# Patient Record
Sex: MALE | Race: WHITE | URBAN - METROPOLITAN AREA
[De-identification: names, ages, dates, MRNs, and addresses within clinical notes are randomized per-mention and may not be internally consistent; named-entity substitution may affect disease eponyms.]

---

## 2017-02-22 ENCOUNTER — RADIANT APPOINTMENT (OUTPATIENT)
Dept: GENERAL RADIOLOGY | Facility: CLINIC | Age: 52
End: 2017-02-22
Attending: PHYSICIAN ASSISTANT
Payer: COMMERCIAL

## 2017-02-22 ENCOUNTER — OFFICE VISIT (OUTPATIENT)
Dept: INTERNAL MEDICINE | Facility: CLINIC | Age: 52
End: 2017-02-22
Payer: COMMERCIAL

## 2017-02-22 VITALS
OXYGEN SATURATION: 98 % | HEART RATE: 60 BPM | SYSTOLIC BLOOD PRESSURE: 116 MMHG | TEMPERATURE: 98.8 F | DIASTOLIC BLOOD PRESSURE: 82 MMHG | WEIGHT: 197.8 LBS

## 2017-02-22 DIAGNOSIS — R05.9 COUGH: ICD-10-CM

## 2017-02-22 DIAGNOSIS — R05.8 POST-VIRAL COUGH SYNDROME: Primary | ICD-10-CM

## 2017-02-22 DIAGNOSIS — G93.31 POST VIRAL SYNDROME: ICD-10-CM

## 2017-02-22 PROCEDURE — 71020 XR CHEST 2 VW: CPT

## 2017-02-22 PROCEDURE — 99203 OFFICE O/P NEW LOW 30 MIN: CPT | Performed by: PHYSICIAN ASSISTANT

## 2017-02-22 RX ORDER — ALBUTEROL SULFATE 90 UG/1
2 AEROSOL, METERED RESPIRATORY (INHALATION) EVERY 6 HOURS PRN
Qty: 1 INHALER | Refills: 0 | Status: SHIPPED | OUTPATIENT
Start: 2017-02-22 | End: 2017-06-07

## 2017-02-22 RX ORDER — PREDNISONE 20 MG/1
20 TABLET ORAL DAILY
Qty: 5 TABLET | Refills: 0 | Status: SHIPPED | OUTPATIENT
Start: 2017-02-22 | End: 2017-02-27

## 2017-02-22 NOTE — MR AVS SNAPSHOT
"              After Visit Summary   2017    Kwaku Medrano    MRN: [id number]           Patient Information     Date Of Birth          1965        Visit Information        Provider Department      2017 10:00 AM Leanne Centeno PA-C [facility]        Today's Diagnoses     Post-viral cough syndrome    -  1    Post viral syndrome        Cough           Follow-ups after your visit        Who to contact     If you have questions or need follow up information about today's clinic visit or your schedule please contact [facility] directly at [phone].  Normal or non-critical lab and imaging results will be communicated to you by Rebiotixhart, letter or phone within 4 business days after the clinic has received the results. If you do not hear from us within 7 days, please contact the clinic through Rebiotixhart or phone. If you have a critical or abnormal lab result, we will notify you by phone as soon as possible.  Submit refill requests through [employer] or call your pharmacy and they will forward the refill request to us. Please allow 3 business days for your refill to be completed.          Additional Information About Your Visit        MyChart Information     [employer] lets you send messages to your doctor, view your test results, renew your prescriptions, schedule appointments and more. To sign up, go to [email or web address] . Click on \"Log in\" on the left side of the screen, which will take you to the Welcome page. Then click on \"Sign up Now\" on the right side of the page.     You will be asked to enter the access code listed below, as well as some personal information. Please follow the directions to create your username and password.     Your access code is: [email or web address]  Expires: 2017 10:31 AM     Your access code will  in 90 days. If you need help or a new code, please call your [facility] or [phone].        Care EveryWhere ID  "    This is your Care EveryWhere ID. This could be used by other organizations to access your [facility] medical records  [id number]        Your Vitals Were     Pulse Temperature Pulse Oximetry             60 98.8  F (37.1  C) (Oral) 98%          Blood Pressure from Last 3 Encounters:   02/22/17 116/82   04/18/13 157/75   01/17/06 130/80    Weight from Last 3 Encounters:   02/22/17 197 lb 12.8 oz (89.7 kg)   04/18/13 190 lb (86.2 kg)   05/31/11 180 lb (81.6 kg)              We Performed the Following     XR Chest 2 Views          Today's Medication Changes          These changes are accurate as of: 2/22/17 10:32 AM.  If you have any questions, ask your nurse or doctor.               Start taking these medicines.        Dose/Directions    albuterol 108 (90 BASE) MCG/ACT Inhaler   Commonly known as:  PROAIR HFA/PROVENTIL HFA/VENTOLIN HFA   Used for:  Post-viral cough syndrome, Post viral syndrome   Started by:  Leanne Centeno PA-C        Dose:  2 puff   Inhale 2 puffs into the lungs every 6 hours as needed (cough)   Quantity:  1 Inhaler   Refills:  0       predniSONE 20 MG tablet   Commonly known as:  DELTASONE   Used for:  Post-viral cough syndrome   Started by:  Leanne Centeno PA-C        Dose:  20 mg   Take 1 tablet (20 mg) by mouth daily for 5 days   Quantity:  5 tablet   Refills:  0            Where to get your medicines      These medications were sent to [facility] [id number] [place] [address] [address] AT [facility] [address], [facility] [address]    Hours:  24-hours Phone:  [phone]     albuterol 108 (90 BASE) MCG/ACT Inhaler    predniSONE 20 MG tablet                Primary Care Provider    None Specified       No primary provider on file.        Thank you!     Thank you for choosing [facility]  for your care. Our goal is always to provide you with excellent care. Hearing back from our patients is one way we can continue to  improve our services. Please take a few minutes to complete the written survey that you may receive in the mail after your visit with us. Thank you!             Your Updated Medication List - Protect others around you: Learn how to safely use, store and throw away your medicines at www.disposemymeds.org.          This list is accurate as of: 2/22/17 10:32 AM.  Always use your most recent med list.                   Brand Name Dispense Instructions for use    albuterol 108 (90 BASE) MCG/ACT Inhaler    PROAIR HFA/PROVENTIL HFA/VENTOLIN HFA    1 Inhaler    Inhale 2 puffs into the lungs every 6 hours as needed (cough)       predniSONE 20 MG tablet    DELTASONE    5 tablet    Take 1 tablet (20 mg) by mouth daily for 5 days

## 2017-02-22 NOTE — PROGRESS NOTES
SUBJECTIVE:                                                    Kwaku Medrano is a 51 year old male who presents to clinic today for the following health issues:      Concern - Cough     Onset: x3/4 months    Description:   Dry Cough    Intensity: mild    Progression of Symptoms:  same    Accompanying Signs & Symptoms:  Works in a [employer]house  Some runny nose intermittently  Yesterday with a deep breath had pain in the anterior chest.  Today more like it has been with deep breathing or movement then it triggers a dry cough.   Sleep at night normal no coughing.      Previous history of similar problem:   none    Precipitating factors:   Worsened by: Movement- with      Alleviating factors:  Improved by: Sitting still        Therapies Tried and outcome:   Had a cold at the beginning of this - started with coughing- and congestion.     Does not have a PCP      -------------------------------------    Problem list and histories reviewed & adjusted, as indicated.  Additional history: as documented    Labs reviewed in EPIC  Problem list, Medication list, Allergies, and Medical/Social/Surgical histories reviewed in [facility] and updated as appropriate.    ROS:  Constitutional, HEENT, cardiovascular, pulmonary, gi systems are negative, except as otherwise noted.    OBJECTIVE:                                                    /82 (BP Location: Left arm, Patient Position: Chair, Cuff Size: Adult Regular)  Pulse 60  Temp 98.8  F (37.1  C) (Oral)  Wt 197 lb 12.8 oz (89.7 kg)  SpO2 98%  There is no height or weight on file to calculate BMI.  GENERAL: healthy, alert and no distress  HENT: ear canals and TM's normal, nose and mouth without ulcers or lesions  NECK: no adenopathy, no asymmetry, masses, or scars and thyroid normal to palpation  RESP: lungs clear to auscultation - no rales, rhonchi or wheezes  CV: regular rate and rhythm, normal S1 S2, no S3 or S4, no murmur, click or rub, no peripheral edema and peripheral pulses  strong  MS: no gross musculoskeletal defects noted, no edema    Diagnostic Test Results:  Results for orders placed or performed in visit on 02/22/17 (from the past 24 hour(s))   XR Chest 2 Views    Narrative    CHEST TWO VIEWS   2/22/2017 10:22 AM     HISTORY: Cough.    COMPARISON: None.    FINDINGS: Heart size is normal. Lungs are clear.      Impression    IMPRESSION: Negative.    CALOS WESTON MD        ASSESSMENT/PLAN:                                                            1. Post-viral cough syndrome    - predniSONE (DELTASONE) 20 MG tablet; Take 1 tablet (20 mg) by mouth daily for 5 days  Dispense: 5 tablet; Refill: 0  - albuterol (PROAIR HFA/PROVENTIL HFA/VENTOLIN HFA) 108 (90 BASE) MCG/ACT Inhaler; Inhale 2 puffs into the lungs every 6 hours as needed (cough)  Dispense: 1 Inhaler; Refill: 0    2. Post viral syndrome    - albuterol (PROAIR HFA/PROVENTIL HFA/VENTOLIN HFA) 108 (90 BASE) MCG/ACT Inhaler; Inhale 2 puffs into the lungs every 6 hours as needed (cough)  Dispense: 1 Inhaler; Refill: 0    3. Cough    - XR Chest 2 Views    See Patient Instructions  Needs to establish with PCP - overdue for health maintenance    Leanne Centeno PA-C  [facility]

## 2017-02-22 NOTE — NURSING NOTE
Chief Complaint   Patient presents with     Cough     x3/4 Months        Initial /82 (BP Location: Left arm, Patient Position: Chair, Cuff Size: Adult Regular)  Pulse 60  Temp 98.8  F (37.1  C) (Oral)  Wt 197 lb 12.8 oz (89.7 kg)  SpO2 98% There is no height or weight on file to calculate BMI.  Medication Reconciliation: complete

## 2017-03-10 ENCOUNTER — RADIANT APPOINTMENT (OUTPATIENT)
Dept: GENERAL RADIOLOGY | Facility: CLINIC | Age: 52
End: 2017-03-10
Attending: FAMILY MEDICINE
Payer: COMMERCIAL

## 2017-03-10 ENCOUNTER — OFFICE VISIT (OUTPATIENT)
Dept: URGENT CARE | Facility: URGENT CARE | Age: 52
End: 2017-03-10
Payer: COMMERCIAL

## 2017-03-10 VITALS
HEART RATE: 55 BPM | TEMPERATURE: 97.9 F | OXYGEN SATURATION: 94 % | SYSTOLIC BLOOD PRESSURE: 128 MMHG | WEIGHT: 194 LBS | DIASTOLIC BLOOD PRESSURE: 70 MMHG

## 2017-03-10 DIAGNOSIS — M79.89 BILATERAL HAND SWELLING: ICD-10-CM

## 2017-03-10 DIAGNOSIS — R06.09 EXERTIONAL DYSPNEA: ICD-10-CM

## 2017-03-10 DIAGNOSIS — R05.9 COUGH: ICD-10-CM

## 2017-03-10 DIAGNOSIS — R05.9 COUGH: Primary | ICD-10-CM

## 2017-03-10 LAB
ALBUMIN SERPL-MCNC: 3.5 G/DL (ref 3.4–5)
ALP SERPL-CCNC: 99 U/L (ref 40–150)
ALT SERPL W P-5'-P-CCNC: 21 U/L (ref 0–70)
ANION GAP SERPL CALCULATED.3IONS-SCNC: 2 MMOL/L (ref 3–14)
AST SERPL W P-5'-P-CCNC: 15 U/L (ref 0–45)
BASOPHILS # BLD AUTO: 0 10E9/L (ref 0–0.2)
BASOPHILS NFR BLD AUTO: 0.6 %
BILIRUB SERPL-MCNC: 0.7 MG/DL (ref 0.2–1.3)
BUN SERPL-MCNC: 15 MG/DL (ref 7–30)
CALCIUM SERPL-MCNC: 8.9 MG/DL (ref 8.5–10.1)
CHLORIDE SERPL-SCNC: 108 MMOL/L (ref 94–109)
CO2 SERPL-SCNC: 31 MMOL/L (ref 20–32)
CREAT SERPL-MCNC: 0.98 MG/DL (ref 0.66–1.25)
DIFFERENTIAL METHOD BLD: NORMAL
EOSINOPHIL # BLD AUTO: 0.1 10E9/L (ref 0–0.7)
EOSINOPHIL NFR BLD AUTO: 2.8 %
ERYTHROCYTE [DISTWIDTH] IN BLOOD BY AUTOMATED COUNT: 13.7 % (ref 10–15)
GFR SERPL CREATININE-BSD FRML MDRD: 79 ML/MIN/1.7M2
GLUCOSE SERPL-MCNC: 87 MG/DL (ref 70–99)
HCT VFR BLD AUTO: 45.2 % (ref 40–53)
HGB BLD-MCNC: 14.9 G/DL (ref 13.3–17.7)
LYMPHOCYTES # BLD AUTO: 0.8 10E9/L (ref 0.8–5.3)
LYMPHOCYTES NFR BLD AUTO: 16 %
MCH RBC QN AUTO: 30.1 PG (ref 26.5–33)
MCHC RBC AUTO-ENTMCNC: 33 G/DL (ref 31.5–36.5)
MCV RBC AUTO: 91 FL (ref 78–100)
MONOCYTES # BLD AUTO: 0.7 10E9/L (ref 0–1.3)
MONOCYTES NFR BLD AUTO: 15.1 %
NEUTROPHILS # BLD AUTO: 3.1 10E9/L (ref 1.6–8.3)
NEUTROPHILS NFR BLD AUTO: 65.5 %
NT-PROBNP SERPL-MCNC: 66 PG/ML (ref 0–125)
PLATELET # BLD AUTO: 279 10E9/L (ref 150–450)
POTASSIUM SERPL-SCNC: 4.1 MMOL/L (ref 3.4–5.3)
PROT SERPL-MCNC: 6.8 G/DL (ref 6.8–8.8)
RBC # BLD AUTO: 4.95 10E12/L (ref 4.4–5.9)
SODIUM SERPL-SCNC: 141 MMOL/L (ref 133–144)
WBC # BLD AUTO: 4.7 10E9/L (ref 4–11)

## 2017-03-10 PROCEDURE — 99214 OFFICE O/P EST MOD 30 MIN: CPT | Performed by: FAMILY MEDICINE

## 2017-03-10 PROCEDURE — 93000 ELECTROCARDIOGRAM COMPLETE: CPT | Performed by: FAMILY MEDICINE

## 2017-03-10 PROCEDURE — 80053 COMPREHEN METABOLIC PANEL: CPT | Performed by: FAMILY MEDICINE

## 2017-03-10 PROCEDURE — 85025 COMPLETE CBC W/AUTO DIFF WBC: CPT | Performed by: FAMILY MEDICINE

## 2017-03-10 PROCEDURE — 36415 COLL VENOUS BLD VENIPUNCTURE: CPT | Performed by: FAMILY MEDICINE

## 2017-03-10 PROCEDURE — 83880 ASSAY OF NATRIURETIC PEPTIDE: CPT | Performed by: FAMILY MEDICINE

## 2017-03-10 PROCEDURE — 71020 XR CHEST 2 VW: CPT

## 2017-03-10 RX ORDER — PREDNISONE 20 MG/1
20 TABLET ORAL 2 TIMES DAILY
Qty: 10 TABLET | Refills: 0 | Status: SHIPPED | OUTPATIENT
Start: 2017-03-10 | End: 2017-03-15

## 2017-03-10 NOTE — NURSING NOTE
Chief Complaint   Patient presents with     Cough     productive cough x 4 months     Swelling     in hand for x2 months      Pain     muscle pain s4riiaty       Initial /70 (BP Location: Left arm, Patient Position: Chair, Cuff Size: Adult Regular)  Pulse 55  Temp 97.9  F (36.6  C) (Oral)  Wt 194 lb (88 kg)  SpO2 94% There is no height or weight on file to calculate BMI.  Medication Reconciliation: complete

## 2017-03-10 NOTE — MR AVS SNAPSHOT
After Visit Summary   3/10/2017    Kwaku Medrano    MRN: [id number]           Patient Information     Date Of Birth          1965        Visit Information        Provider Department      3/10/2017 11:30 AM Yusuf Everett DO [facility]        Today's Diagnoses     Cough    -  1    Exertional dyspnea        Bilateral hand swelling           Follow-ups after your visit        Additional Services     PULMONARY MEDICINE REFERRAL       Your provider has referred you to: [facility]: [facility] [place] [phone]   [email or web address]  [facility] [place] [phone]   [email or web address]  [facility]: [facility] [phone]   [email or web address]  [place] [phone]   [email or web address]    Please be aware that coverage of these services is subject to the terms and limitations of your health insurance plan.  Call member services at your health plan with any benefit or coverage questions.      Please bring the following with you to your appointment:    (1) Any X-Rays, CTs or MRIs which have been performed.  Contact the facility where they were done to arrange for  prior to your scheduled appointment.    (2) List of current medications   (3) This referral request   (4) Any documents/labs given to you for this referral            RHEUMATOLOGY REFERRAL       Your provider has referred you to: [facility]: [facility] [place] [phone]   [email or web address]  [facility] [place] [phone]   [email or web address]  [facility] [phone]   [email or web address]    Please be aware that coverage of these services is subject to the terms and limitations of your  "health insurance plan.  Call member services at your health plan with any benefit or coverage questions.      Please bring the following with you to your appointment:    (1) Any X-Rays, CTs or MRIs which have been performed.  Contact the facility where they were done to arrange for  prior to your scheduled appointment.    (2) List of current medications   (3) This referral request   (4) Any documents/labs given to you for this referral                  Who to contact     If you have questions or need follow up information about today's clinic visit or your schedule please contact [facility] directly at [phone].  Normal or non-critical lab and imaging results will be communicated to you by Nominumhart, letter or phone within 4 business days after the clinic has received the results. If you do not hear from us within 7 days, please contact the clinic through Nominumhart or phone. If you have a critical or abnormal lab result, we will notify you by phone as soon as possible.  Submit refill requests through [employer] or call your pharmacy and they will forward the refill request to us. Please allow 3 business days for your refill to be completed.          Additional Information About Your Visit        [employer]ominum[employer] Information     [employer] lets you send messages to your doctor, view your test results, renew your prescriptions, schedule appointments and more. To sign up, go to [email or web address] . Click on \"Log in\" on the left side of the screen, which will take you to the Welcome page. Then click on \"Sign up Now\" on the right side of the page.     You will be asked to enter the access code listed below, as well as some personal information. Please follow the directions to create your username and password.     Your access code is: [email or web address]  Expires: 2017 10:31 AM     Your access code will  in 90 days. If you need help or a new code, please call your [place] clinic or " [phone].        Care EveryWhere ID     This is your Care EveryWhere ID. This could be used by other organizations to access your [facility] medical records  [id number]        Your Vitals Were     Pulse Temperature Pulse Oximetry             55 97.9  F (36.6  C) (Oral) 94%          Blood Pressure from Last 3 Encounters:   03/10/17 128/70   02/22/17 116/82   04/18/13 157/75    Weight from Last 3 Encounters:   03/10/17 194 lb (88 kg)   02/22/17 197 lb 12.8 oz (89.7 kg)   04/18/13 190 lb (86.2 kg)              We Performed the Following     BNP-N terminal pro     CBC with platelets differential     Comprehensive metabolic panel     EKG 12-lead complete w/read - Clinics     PULMONARY MEDICINE REFERRAL     RHEUMATOLOGY REFERRAL          Today's Medication Changes          These changes are accurate as of: 3/10/17  1:07 PM.  If you have any questions, ask your nurse or doctor.               Start taking these medicines.        Dose/Directions    predniSONE 20 MG tablet   Commonly known as:  DELTASONE   Used for:  Cough   Started by:  Yusuf Everett,         Dose:  20 mg   Take 1 tablet (20 mg) by mouth 2 times daily for 5 days   Quantity:  10 tablet   Refills:  0            Where to get your medicines      These medications were sent to [facility] [id number] [place] [address] [address] AT [facility] [address]  [address] [facility] [address]    Hours:  24-hours Phone:  [phone]     predniSONE 20 MG tablet                Primary Care Provider    None Specified       No primary provider on file.        Thank you!     Thank you for choosing [facility]  for your care. Our goal is always to provide you with excellent care. Hearing back from our patients is one way we can continue to improve our services. Please take a few minutes to complete the written survey that you may receive in the mail after your visit with us. Thank you!             Your Updated  Medication List - Protect others around you: Learn how to safely use, store and throw away your medicines at www.disposemymeds.org.          This list is accurate as of: 3/10/17  1:07 PM.  Always use your most recent med list.                   Brand Name Dispense Instructions for use    albuterol 108 (90 BASE) MCG/ACT Inhaler    PROAIR HFA/PROVENTIL HFA/VENTOLIN HFA    1 Inhaler    Inhale 2 puffs into the lungs every 6 hours as needed (cough)       predniSONE 20 MG tablet    DELTASONE    10 tablet    Take 1 tablet (20 mg) by mouth 2 times daily for 5 days

## 2017-03-27 NOTE — PROGRESS NOTES
SUBJECTIVE: Kwaku Medrano is a 51 year old male presenting with a chief complaint of cough  and bilateral hand swelling .  Onset of symptoms was 4 month(s) ago.  Course of illness is same.    Severity moderate  Current and Associated symptoms: none  Treatment measures tried include None tried.  Predisposing factors include None.    No past medical history on file.  Allergies   Allergen Reactions     Ampicillin Rash     Social History   Substance Use Topics     Smoking status: Never Smoker     Smokeless tobacco: Not on file     Alcohol use Not on file       ROS:  SKIN: no rash  GI: no vomiting    OBJECTIVE:  /70 (BP Location: Left arm, Patient Position: Chair, Cuff Size: Adult Regular)  Pulse 55  Temp 97.9  F (36.6  C) (Oral)  Wt 194 lb (88 kg)  SpO2 94%   GENERAL APPEARANCE: healthy, alert and no distress  EYES: EOMI,  PERRL, conjunctiva clear  HENT: ear canals and TM's normal.  Nose and mouth without ulcers, erythema or lesions  NECK: supple, nontender, no lymphadenopathy  RESP: lungs clear to auscultation - no rales, rhonchi or wheezes  CV: regular rates and rhythm, normal S1 S2, no murmur noted  ABDOMEN:  soft, nontender, no HSM or masses and bowel sounds normal  NEURO: Normal strength and tone, sensory exam grossly normal,  normal speech and mentation  SKIN: no suspicious lesions or rashes    Xray without acute findings, read by Yusuf Everett D.O.         EKG Interpretation:      Interpreted by Yusuf Everett    Symptoms at time of EKG: as above   Rhythm: Normal sinus   Rate: Normal  Axis: Normal  Ectopy: None  Conduction: Normal  ST Segments/ T Waves: No ST-T wave changes and No acute ischemic changes  Q Waves: None  Comparison to prior: No old EKG available    Clinical Impression: normal EKG      ICD-10-CM    1. Cough R05 Comprehensive metabolic panel     CBC with platelets differential     BNP-N terminal pro     EKG 12-lead complete w/read - Clinics     XR Chest 2 Views     PULMONARY MEDICINE  REFERRAL     predniSONE (DELTASONE) 20 MG tablet   2. Exertional dyspnea R06.09 Comprehensive metabolic panel     CBC with platelets differential     BNP-N terminal pro     EKG 12-lead complete w/read - Clinics     XR Chest 2 Views     PULMONARY MEDICINE REFERRAL   3. Bilateral hand swelling M79.89 Comprehensive metabolic panel     CBC with platelets differential     BNP-N terminal pro     EKG 12-lead complete w/read - Clinics     XR Chest 2 Views     RHEUMATOLOGY REFERRAL     Fluids/Rest, f/u if worse/not any better

## 2017-05-16 ENCOUNTER — TRANSFERRED RECORDS (OUTPATIENT)
Dept: HEALTH INFORMATION MANAGEMENT | Facility: CLINIC | Age: 52
End: 2017-05-16

## 2017-05-17 DIAGNOSIS — R05.9 COUGH: ICD-10-CM

## 2017-05-17 DIAGNOSIS — R06.00 DYSPNEA, UNSPECIFIED TYPE: Primary | ICD-10-CM

## 2017-05-23 ENCOUNTER — HOSPITAL ENCOUNTER (OUTPATIENT)
Dept: CARDIOLOGY | Facility: CLINIC | Age: 52
Discharge: HOME | End: 2017-05-23
Attending: INTERNAL MEDICINE | Admitting: INTERNAL MEDICINE
Payer: COMMERCIAL

## 2017-05-23 PROCEDURE — 93005 ELECTROCARDIOGRAM TRACING: CPT

## 2017-05-23 PROCEDURE — 93010 ELECTROCARDIOGRAM REPORT: CPT | Performed by: INTERNAL MEDICINE

## 2017-05-24 ENCOUNTER — HOSPITAL ENCOUNTER (OUTPATIENT)
Dept: CT IMAGING | Facility: CLINIC | Age: 52
Discharge: HOME | End: 2017-05-24
Attending: INTERNAL MEDICINE | Admitting: INTERNAL MEDICINE
Payer: COMMERCIAL

## 2017-05-24 DIAGNOSIS — R05.9 COUGH: ICD-10-CM

## 2017-05-24 DIAGNOSIS — R06.00 DYSPNEA, UNSPECIFIED TYPE: ICD-10-CM

## 2017-05-24 PROCEDURE — 71250 CT THORAX DX C-: CPT

## 2017-05-25 LAB — INTERPRETATION ECG - MUSE: NORMAL

## 2017-05-31 ENCOUNTER — HOSPITAL ENCOUNTER (OUTPATIENT)
Dept: CARDIOLOGY | Facility: CLINIC | Age: 52
Discharge: HOME | End: 2017-05-31
Attending: INTERNAL MEDICINE | Admitting: INTERNAL MEDICINE
Payer: COMMERCIAL

## 2017-05-31 DIAGNOSIS — R05.9 COUGH: ICD-10-CM

## 2017-05-31 DIAGNOSIS — R06.00 DYSPNEA, UNSPECIFIED TYPE: ICD-10-CM

## 2017-05-31 PROCEDURE — 93306 TTE W/DOPPLER COMPLETE: CPT

## 2017-05-31 PROCEDURE — 93306 TTE W/DOPPLER COMPLETE: CPT | Mod: 26 | Performed by: INTERNAL MEDICINE

## 2017-06-07 ENCOUNTER — OFFICE VISIT (OUTPATIENT)
Dept: INTERNAL MEDICINE | Facility: CLINIC | Age: 52
End: 2017-06-07
Payer: COMMERCIAL

## 2017-06-07 VITALS
SYSTOLIC BLOOD PRESSURE: 112 MMHG | WEIGHT: 189.5 LBS | OXYGEN SATURATION: 97 % | HEIGHT: 69 IN | BODY MASS INDEX: 28.07 KG/M2 | HEART RATE: 79 BPM | TEMPERATURE: 99.2 F | DIASTOLIC BLOOD PRESSURE: 70 MMHG

## 2017-06-07 VITALS
TEMPERATURE: 99.4 F | SYSTOLIC BLOOD PRESSURE: 112 MMHG | BODY MASS INDEX: 28.07 KG/M2 | WEIGHT: 189.5 LBS | HEIGHT: 69 IN | DIASTOLIC BLOOD PRESSURE: 70 MMHG | OXYGEN SATURATION: 97 % | HEART RATE: 68 BPM

## 2017-06-07 DIAGNOSIS — M19.90 INFLAMMATORY ARTHRITIS: ICD-10-CM

## 2017-06-07 DIAGNOSIS — R06.09 DOE (DYSPNEA ON EXERTION): ICD-10-CM

## 2017-06-07 DIAGNOSIS — R60.1 GENERALIZED EDEMA: ICD-10-CM

## 2017-06-07 DIAGNOSIS — R05.8 NONPRODUCTIVE COUGH: Primary | ICD-10-CM

## 2017-06-07 DIAGNOSIS — J84.9 ILD (INTERSTITIAL LUNG DISEASE) (H): Primary | ICD-10-CM

## 2017-06-07 LAB
ALBUMIN UR-MCNC: NEGATIVE MG/DL
APPEARANCE UR: CLEAR
BILIRUB UR QL STRIP: NEGATIVE
COLOR UR AUTO: YELLOW
CRP SERPL-MCNC: 24 MG/L (ref 0–8)
ERYTHROCYTE [SEDIMENTATION RATE] IN BLOOD BY WESTERGREN METHOD: 10 MM/H (ref 0–20)
GLUCOSE UR STRIP-MCNC: NEGATIVE MG/DL
HGB UR QL STRIP: ABNORMAL
KETONES UR STRIP-MCNC: NEGATIVE MG/DL
LEUKOCYTE ESTERASE UR QL STRIP: NEGATIVE
NITRATE UR QL: NEGATIVE
PH UR STRIP: 5.5 PH (ref 5–7)
PROT UR-MCNC: 0.33 G/L
PROT/CREAT 24H UR: 0.26 G/G CR (ref 0–0.2)
RBC #/AREA URNS AUTO: NORMAL /HPF (ref 0–2)
SP GR UR STRIP: 1.02 (ref 1–1.03)
TSH SERPL DL<=0.005 MIU/L-ACNC: 1.36 MU/L (ref 0.4–4)
URN SPEC COLLECT METH UR: ABNORMAL
UROBILINOGEN UR STRIP-ACNC: 0.2 EU/DL (ref 0.2–1)
WBC #/AREA URNS AUTO: NORMAL /HPF (ref 0–2)

## 2017-06-07 PROCEDURE — 86235 NUCLEAR ANTIGEN ANTIBODY: CPT | Performed by: INTERNAL MEDICINE

## 2017-06-07 PROCEDURE — 84156 ASSAY OF PROTEIN URINE: CPT | Performed by: INTERNAL MEDICINE

## 2017-06-07 PROCEDURE — 00000402 ZZHCL STATISTIC TOTAL PROTEIN: Performed by: INTERNAL MEDICINE

## 2017-06-07 PROCEDURE — 85652 RBC SED RATE AUTOMATED: CPT | Performed by: INTERNAL MEDICINE

## 2017-06-07 PROCEDURE — 84165 PROTEIN E-PHORESIS SERUM: CPT | Performed by: INTERNAL MEDICINE

## 2017-06-07 PROCEDURE — 36415 COLL VENOUS BLD VENIPUNCTURE: CPT | Performed by: INTERNAL MEDICINE

## 2017-06-07 PROCEDURE — 99204 OFFICE O/P NEW MOD 45 MIN: CPT | Performed by: INTERNAL MEDICINE

## 2017-06-07 PROCEDURE — 86140 C-REACTIVE PROTEIN: CPT | Performed by: INTERNAL MEDICINE

## 2017-06-07 PROCEDURE — 83516 IMMUNOASSAY NONANTIBODY: CPT | Performed by: INTERNAL MEDICINE

## 2017-06-07 PROCEDURE — 81001 URINALYSIS AUTO W/SCOPE: CPT | Performed by: INTERNAL MEDICINE

## 2017-06-07 PROCEDURE — 84443 ASSAY THYROID STIM HORMONE: CPT | Performed by: INTERNAL MEDICINE

## 2017-06-07 PROCEDURE — 99214 OFFICE O/P EST MOD 30 MIN: CPT | Performed by: INTERNAL MEDICINE

## 2017-06-07 PROCEDURE — 83876 ASSAY MYELOPEROXIDASE: CPT | Performed by: INTERNAL MEDICINE

## 2017-06-07 RX ORDER — BENZONATATE 200 MG/1
200 CAPSULE ORAL 3 TIMES DAILY
COMMUNITY
Start: 2017-05-16 | End: 2017-06-27

## 2017-06-07 RX ORDER — OMEPRAZOLE 40 MG/1
1 CAPSULE, DELAYED RELEASE ORAL DAILY
COMMUNITY
Start: 2017-05-16 | End: 2017-06-27

## 2017-06-07 RX ORDER — SULFAMETHOXAZOLE AND TRIMETHOPRIM 400; 80 MG/1; MG/1
TABLET ORAL
Qty: 60 TABLET | Refills: 1 | Status: SHIPPED | OUTPATIENT
Start: 2017-06-07 | End: 2017-10-16

## 2017-06-07 RX ORDER — OMEPRAZOLE AND SODIUM BICARBONATE 40; 1100 MG/1; MG/1
1 CAPSULE ORAL
COMMUNITY
End: 2017-06-07

## 2017-06-07 RX ORDER — FLUTICASONE PROPIONATE 50 MCG
2 SPRAY, SUSPENSION (ML) NASAL DAILY
COMMUNITY
Start: 2017-05-17 | End: 2017-06-27

## 2017-06-07 RX ORDER — PREDNISONE 20 MG/1
60 TABLET ORAL DAILY
Qty: 120 TABLET | Refills: 0 | Status: SHIPPED | OUTPATIENT
Start: 2017-06-07 | End: 2017-07-25

## 2017-06-07 ASSESSMENT — ROUTINE ASSESSMENT OF PATIENT INDEX DATA (RAPID3)
TOTAL RAPID3 SCORE: 8.7
RAPID3 INTERPRETATION: MODERATE 6.1-12.0

## 2017-06-07 NOTE — PATIENT INSTRUCTIONS
Call [facility]: [facility] [phone] to set up an appointment with interstitial lung disease specialist preferably Dr. Elma Dillon.     While travelling on a long journey, particularly on a long-haul plane trip:    Exercise your calf and foot muscles regularly:  Every half hour or so, bend and straighten your legs, feet and toes when you are seated.  Press the balls of your feet down hard against the floor or foot rest every so often. This helps to increase the blood flow in your legs.  Take a walk up and down the aisle every hour or so, when the aircraft crew say it is safe to do so.  Make sure you have as much space as possible in front of you for your legs to move. So avoid having bags under the seat in front of you, and recline your seat where possible.  Take all opportunities to get up to stretch your legs, when there are stops in your journey.  Drink plenty of water to avoid a lack of fluid in the body (dehydration).  Do not drink too much alcohol. (Alcohol can cause dehydration and immobility.)  Do not take sleeping tablets, which cause immobility.

## 2017-06-07 NOTE — MR AVS SNAPSHOT
"              After Visit Summary   2017    Kwaku Medrano    MRN: [id number]           Patient Information     Date Of Birth          1965        Visit Information        Provider Department      2017 8:20 AM Stew Caldwell MD [facility]        Today's Diagnoses     Nonproductive cough    -  1    FLYNN (dyspnea on exertion)        Generalized edema           Follow-ups after your visit        Who to contact     If you have questions or need follow up information about today's clinic visit or your schedule please contact [facility] directly at [phone].  Normal or non-critical lab and imaging results will be communicated to you by [employer], letter or phone within 4 business days after the clinic has received the results. If you do not hear from us within 7 days, please contact the clinic through [employer] or phone. If you have a critical or abnormal lab result, we will notify you by phone as soon as possible.  Submit refill requests through [employer] or call your pharmacy and they will forward the refill request to us. Please allow 3 business days for your refill to be completed.          Additional Information About Your Visit        MyChart Information     [employer] lets you send messages to your doctor, view your test results, renew your prescriptions, schedule appointments and more. To sign up, go to [email or web address] . Click on \"Log in\" on the left side of the screen, which will take you to the Welcome page. Then click on \"Sign up Now\" on the right side of the page.     You will be asked to enter the access code listed below, as well as some personal information. Please follow the directions to create your username and password.     Your access code is: [email or web address]  Expires: 2017  9:17 AM     Your access code will  in 90 days. If you need help or a new code, please call your [facility] or [phone].        Care EveryWhere " "ID     This is your Care EveryWhere ID. This could be used by other organizations to access your [facility] medical records  [id number]        Your Vitals Were     Pulse Temperature Height Pulse Oximetry BMI (Body Mass Index)       68 99.4  F (37.4  C) (Oral) 5' 9\" (1.753 m) 97% 27.98 kg/m2        Blood Pressure from Last 3 Encounters:   06/07/17 112/70   03/10/17 128/70   02/22/17 116/82    Weight from Last 3 Encounters:   06/07/17 189 lb 8 oz (86 kg)   03/10/17 194 lb (88 kg)   02/22/17 197 lb 12.8 oz (89.7 kg)              We Performed the Following     *UA reflex to Microscopic and Culture ([place] and [facility] (except [place] and [place])     CRP, inflammation     ESR: Erythrocyte sedimentation rate     Protein  random urine     Protein electrophoresis     TSH with free T4 reflex        Primary Care Provider    Physician No Ref-Primary       No address on file        Thank you!     Thank you for choosing [facility]  for your care. Our goal is always to provide you with excellent care. Hearing back from our patients is one way we can continue to improve our services. Please take a few minutes to complete the written survey that you may receive in the mail after your visit with us. Thank you!             Your Updated Medication List - Protect others around you: Learn how to safely use, store and throw away your medicines at www.disposemymeds.org.          This list is accurate as of: 6/7/17  9:17 AM.  Always use your most recent med list.                   Brand Name Dispense Instructions for use    albuterol 108 (90 BASE) MCG/ACT Inhaler    PROAIR HFA/PROVENTIL HFA/VENTOLIN HFA    1 Inhaler    Inhale 2 puffs into the lungs every 6 hours as needed (cough)       benzonatate 200 MG capsule    TESSALON     Take 200 mg by mouth 3 times daily       fluticasone 50 MCG/ACT spray    FLONASE     Spray 2 sprays into both nostrils daily       omeprazole 40 MG capsule    priLOSEC     Take 1 " capsule by mouth daily

## 2017-06-07 NOTE — PROGRESS NOTES
"  SUBJECTIVE:                                                    Kwaku Medrano is a 51 year old male who presents to clinic today for the following health issues:    Pt reports that he went here for a cough and later to [facility] earlier this winter for both the cough and FLYNN.  Neither appt revealed much but he also reported having generalized soft tissue edema in his arms, hands, face and joints.  For this reason he was referred to pulmonary. He has seen Dr. Reynolds and studies thus far have not revealed a dx.  His PFTs showed mild restrictive defects- but this was felt to be related to his symptoms (cough) during the testing.  Several courses of steroids have failed to have any effect on the cough nor the swelling he reports.  He is currently on PPI, inhaled nasal steroid in an attempt to see if these help.   Echo- normal LV and RV function  A high res CT did show some fibrosis and several areas of ground glass opacities but he has yet to discuss these findings with Dr. Reynolds.  Today, he notes the continuing bothersome cough, continued FLYNN    His \"swelling\" is noted the most periorbitally, around joints in his wrist and hand (MCP) , lower leg symmetrically.  No pain in his joints but feels the swelling cause a sense of tightness and some resistance to movement in these joints.     Past Medical History:   Diagnosis Date     Fracture      Past Surgical History:   Procedure Laterality Date     NO HISTORY OF SURGERY       Current Outpatient Prescriptions   Medication Sig Dispense Refill     benzonatate (TESSALON) 200 MG capsule Take 200 mg by mouth 3 times daily       omeprazole (PRILOSEC) 40 MG capsule Take 1 capsule by mouth daily       fluticasone (FLONASE) 50 MCG/ACT spray Spray 2 sprays into both nostrils daily       Allergies as of 06/07/2017 - Yusuf as Reviewed 06/07/2017   Allergen Reaction Noted     Ampicillin Rash 01/17/2006       Social History     Social History     Marital status: Single     Spouse name: N/A " "    Number of children: N/A     Years of education: N/A     Occupational History     Not on file.     Social History Main Topics     Smoking status: Never Smoker     Smokeless tobacco: Former User     Types: Chew     Quit date: 1/1/1984     Alcohol use Yes      Comment: ocasionally     Drug use: No     Sexual activity: Not Currently     Other Topics Concern     Not on file     Social History Narrative       Family History   Problem Relation Age of Onset     Prostate Cancer Father      LUNG DISEASE No family hx of      Rheumatologic Disease No family hx of      Problem list and histories reviewed & adjusted, as indicated.  Additional history: as documented    Labs reviewed in EPIC    Reviewed and updated as needed this visit by clinical staff  Tobacco  Allergies  Meds  Soc Hx      Reviewed and updated as needed this visit by Provider       ROS:  Constitutional, neuro, ENT, endocrine, pulmonary, cardiac, gastrointestinal, genitourinary, musculoskeletal, integument and psychiatric systems are negative, except as otherwise noted.    OBJECTIVE:                                                    /70  Pulse 68  Temp 99.4  F (37.4  C) (Oral)  Ht 5' 9\" (1.753 m)  Wt 189 lb 8 oz (86 kg)  SpO2 97%  BMI 27.98 kg/m2  Body mass index is 27.98 kg/(m^2).  GENERAL APPEARANCE: alert and no distress  EYES: Eyes grossly normal to inspection, PERRL, conjunctivae and sclerae normal and eyelids- some mild edema noted  HENT: ear canals and TM's normal, nose and mouth without ulcers or lesions and normal cephalic/atraumatic  NECK: no adenopathy, no asymmetry, masses, or scars and thyroid normal to palpation  RESP: lungs clear to auscultation - no rales, rhonchi or wheezes  CV: regular rates and rhythm, normal S1 S2, no S3 or S4 and no murmur, click or rub.  Small trace edema lower legs bilat  MS: wrists, shoulders, elbows joints of hand normal ROM. Some soft tissue swelling seems to be present dorsal aspect of each hand.  " Knees, ankles full ROM  SKIN: scabbed areas on distal aspect of each finger.   NEURO: Normal strength and tone, mentation intact and speech normal  PSYCH: mentation appears normal and affect normal/bright    Diagnostic test results:  Results for orders placed or performed in visit on 06/07/17 (from the past 24 hour(s))   TSH with free T4 reflex   Result Value Ref Range    TSH 1.36 0.40 - 4.00 mU/L   ESR: Erythrocyte sedimentation rate   Result Value Ref Range    Sed Rate 10 0 - 20 mm/h      Others pending    ASSESSMENT/PLAN:                                                    1. Nonproductive cough  2. FLYNN (dyspnea on exertion)  3. Generalized edema  - no clear etiology. Given systemic symptoms recommend eval for underlying connective tissue disease causing pulmonary symptoms   - Protein electrophoresis  - TSH with free T4 reflex  - ESR: Erythrocyte sedimentation rate  - CRP, inflammation  - Protein  random urine  - *UA reflex to Microscopic and Culture ([place] and [facility] (except [place] and [place])      Stew Caldwell MD  [facility]

## 2017-06-07 NOTE — NURSING NOTE
"Chief Complaint   Patient presents with     [facility]       Initial /70 (BP Location: Right arm, Patient Position: Chair, Cuff Size: Adult Regular)  Pulse 79  Temp 99.2  F (37.3  C) (Oral)  Ht 1.753 m (5' 9\")  Wt 86 kg (189 lb 8 oz)  SpO2 97%  BMI 27.98 kg/m2 Estimated body mass index is 27.98 kg/(m^2) as calculated from the following:    Height as of this encounter: 1.753 m (5' 9\").    Weight as of this encounter: 86 kg (189 lb 8 oz).  Medication Reconciliation: complete    Joint stiffness history/swelling  Onset: Jan. 2017  Involved areas: hands, feet, legs, face, wrists  Pain scale:  1/10     Wakes the patient from sleep : No/ does have the feeling of pins and needles in his hands that keeps him awake at night  Morning stiffness:the same all the time, never gets worse or better  Meds used:none    Interim history  Since last visit:  1. Infections - No  2. New symptoms/medical problem - Yes/ SOB, coughing, fatigue  3. Any side effects from Rheum medications -NA  3. ER visits/Hospitalizations/surgeries - No  4. Last PCP visit: today  Wt Readings from Last 4 Encounters:   06/07/17 86 kg (189 lb 8 oz)   06/07/17 86 kg (189 lb 8 oz)   03/10/17 88 kg (194 lb)   02/22/17 89.7 kg (197 lb 12.8 oz)     BP Readings from Last 3 Encounters:   06/07/17 112/70   06/07/17 112/70   03/10/17 128/70       "

## 2017-06-07 NOTE — PROGRESS NOTES
[facility] - Rheumatology Clinic Visit     Kwaku Medrano MRN# [id number]   YOB: 1965    Primary care provider: No Ref-Primary, Physician  Jun 7, 2017          Assessment and Plan:   # Severe inflammatory arthritis - onset 2017  # Interstitial lung disease- symptom onset 2016    There is a temporal association between his interstitial lung disease and inflammatory arthritis. There is suspicion that his lung disease and inflammatory arthritis are associated with each other. It is unclear if this is rheumatoid arthritis vs connective tissue diseases such as scleroderma, mixed connective tissue disease or anti-synthetase syndrome. We will investigate further. I spoke with his pulmonologist Dr. Reynolds at [facility] and requested her to do a bronchoscopy to rule out any atypical infections in lungs. Since the patient c/o worsening shortness of breath in the last several months, it is important to start a steroid trial soon. I have given prescription for prednisone 60mg PO daily to be started after the bronchoscopy. There is risk of renal crisis with high dose steroids if the Scl-70 antibody is high.   Start PJP prophylaxis with bactrim when prednisone is started.   Referral to Dr. Dillon, ILD specialist at  is made. I discussed this with Dr. Reynolds also who agrees with the plan.     Plan to fly to [place] this month end. Educated him about risk of thrombosis.     The labs, imaging from patient records are reviewed.     I will be back in touch with the patient through mychart/letter when results are available.     Return in about 4 weeks (around 7/5/2017).    Orders Placed This Encounter   Procedures     Antinuclear antibody screen by EIA     CK total     Marlene 1 Antibody IgG     Rheumatoid factor     Hypersensitivity pneumonitis     Hypersens Pneumonitis - Farmer's Lung II     Antineutrophil cytoplasmic Cici IgG     IgG Subclasses     Cyclic Citrullinated Peptide Antibody IgG     Aldolase     DAVID antibody  panel     Centromere Antibody IgG     PULMONARY MEDICINE REFERRAL       Medications Discontinued During This Encounter   Medication Reason     albuterol (PROAIR HFA/PROVENTIL HFA/VENTOLIN HFA) 108 (90 BASE) MCG/ACT Inhaler      Current Outpatient Prescriptions   Medication Sig Dispense Refill     benzonatate (TESSALON) 200 MG capsule Take 200 mg by mouth 3 times daily       omeprazole (PRILOSEC) 40 MG capsule Take 1 capsule by mouth daily       fluticasone (FLONASE) 50 MCG/ACT spray Spray 2 sprays into both nostrils daily         Mani Golden MD  [facility]          Active Problem List:   There are no active problems to display for this patient.           History of Present Illness:     Chief Complaint   Patient presents with     Establish Care       Jun 7, 2017  Joint stiffness history/swelling  Onset: Jan. 2017  Involved areas: hands, feet, legs, face, wrists  Pain scale:  1/10     Wakes the patient from sleep : No/ does have the feeling of pins and needles in his hands that keeps him awake at night  Morning stiffness:the same all the time, never gets worse or better; used to have more morning stiffness but now it is the same all the time  Meds used:none     Interim history  Since last visit:  1. Infections - No  2. New symptoms/medical problem - Yes/ SOB X months, coughing X since Nov 2016- SOB steadily getting worse; SOB even to climb stairs now,   Fatigue - 4-5/10  3. Any side effects from Rheum medications -NA  3. ER visits/Hospitalizations/surgeries - No  4. Last PCP visit: today    CT chest: 6/17  IMPRESSION:  1. Changes of fibrosis within the periphery of the mid and upper right  lung and mid left lung. More fibrotic changes noted at the lung bases.  No overt honeycombing is currently evident, but areas of traction  bronchiectasis and groundglass opacity are noted suggesting active  alveolitis. Early changes of UIP are possible. Continued surveillance  as clinically warranted.  2.  Evidence of old granulomatous disease. No enlarged lymph nodes.    Raynaud's since fall 2016 but only occasional. 5th digit mostly in left.     No h/o unintentional weight loss, loss of appetite, fevers, rash, swollen glands  No h/o gout  No family or personal history of psoriasis, ulcerative colitis or chron's disease. No h/o iritis.   Patient denies any malar rash, photosensitivity, recurrent mouth/genital ulcers, sicca symptoms, recurrent sinusitis/rhinitis  No h/o arterial/venous thrombosis in the past  No h/o persistent nausea, vomiting, constipation, diarrhea, abdominal pain  No h/o hematochezia, hematuria, hemoptysis, hematemesis  No h/o seizures  No h/o cancer    BP Readings from Last 3 Encounters:   06/07/17 112/70   06/07/17 112/70   03/10/17 128/70              Review of Systems:   Complete ROS negative except for symptoms mentioned in the HPI          Past Medical History:     Past Medical History:   Diagnosis Date     Fracture      Past Surgical History:   Procedure Laterality Date     NO HISTORY OF SURGERY              Social History:   Works for [employer].   Lives with parents. Not  and does not have children.     Social History     Occupational History     Not on file.     Social History Main Topics     Smoking status: Never Smoker     Smokeless tobacco: Former User     Types: Chew     Quit date: 1/1/1984     Alcohol use Yes      Comment: ocasionally     Drug use: No     Sexual activity: Not Currently            Family History:     Family History   Problem Relation Age of Onset     Prostate Cancer Father      LUNG DISEASE No family hx of      Rheumatologic Disease No family hx of             Allergies:     Allergies   Allergen Reactions     Ampicillin Rash            Medications:     Current Outpatient Prescriptions   Medication Sig Dispense Refill     benzonatate (TESSALON) 200 MG capsule Take 200 mg by mouth 3 times daily       omeprazole (PRILOSEC) 40 MG capsule Take 1 capsule by mouth  "daily       fluticasone (FLONASE) 50 MCG/ACT spray Spray 2 sprays into both nostrils daily              Physical Exam:   Blood pressure 112/70, pulse 79, temperature 99.2  F (37.3  C), temperature source Oral, height 5' 9\" (1.753 m), weight 189 lb 8 oz (86 kg), SpO2 97 %.  Wt Readings from Last 4 Encounters:   06/07/17 189 lb 8 oz (86 kg)   06/07/17 189 lb 8 oz (86 kg)   03/10/17 194 lb (88 kg)   02/22/17 197 lb 12.8 oz (89.7 kg)     Constitutional: well-developed, appearing stated age; cooperative  Eyes: PERRLA, normal conjunctiva, sclera  ENT: nl external ears, nose, lips.No mucous membrane lesions, normal saliva pool  Neck: no cervical lymphadenopathy  Resp: lungs clear to auscultation,   CV: RRR, no added sounds, no edema  GI: Abdomen soft and no tenderness  : not tested  Lymph: no cervical, supraclavicular or epitrochlear nodes  MS: Synovitis in MCPs, PIPs, wrists with minimal restriction of ROM  All shoulder, elbow, wrist, MCP/PIP/DIP, hip, knee, ankle, and foot MTP/IP joints were examined and  found normal. No active synovitis or deformity. Full ROM.  Fist 100%.  No dactylitis,  tenosynovitis, enthesopathy.  Skin: no nail pitting; no rash in exposed areas  Psych: nl judgement, orientation, memory, affect.         Data:     Results for orders placed or performed in visit on 06/07/17   TSH with free T4 reflex   Result Value Ref Range    TSH 1.36 0.40 - 4.00 mU/L   ESR: Erythrocyte sedimentation rate   Result Value Ref Range    Sed Rate 10 0 - 20 mm/h       Mani Golden MD    [facility]    "

## 2017-06-07 NOTE — MR AVS SNAPSHOT
After Visit Summary   6/7/2017    Kwaku Medrano    MRN: [id number]           Patient Information     Date Of Birth          1965        Visit Information        Provider Department      6/7/2017 11:00 AM Mani Golden MD [facility]        Today's Diagnoses     ILD (interstitial lung disease) (H)    -  1    Inflammatory arthritis          Care Instructions    Call [facility] [[email or web address]]: [email or web address] [phone] to set up an appointment with interstitial lung disease specialist preferably Dr. Elma Dillon.     While travelling on a long journey, particularly on a long-haul plane trip:    Exercise your calf and foot muscles regularly:  Every half hour or so, bend and straighten your legs, feet and toes when you are seated.  Press the balls of your feet down hard against the floor or foot rest every so often. This helps to increase the blood flow in your legs.  Take a walk up and down the aisle every hour or so, when the aircraft crew say it is safe to do so.  Make sure you have as much space as possible in front of you for your legs to move. So avoid having bags under the seat in front of you, and recline your seat where possible.  Take all opportunities to get up to stretch your legs, when there are stops in your journey.  Drink plenty of water to avoid a lack of fluid in the body (dehydration).  Do not drink too much alcohol. (Alcohol can cause dehydration and immobility.)  Do not take sleeping tablets, which cause immobility.          Follow-ups after your visit        Additional Services     PULMONARY MEDICINE REFERRAL       Your provider has referred you to: [facility] [[email or web address]]: [email or web address] [phone]   [email or web address]    Please be aware that coverage of these services is subject to the terms and limitations of your health insurance plan.  Call member services  at your health plan with any benefit or coverage questions.      Please bring the following with you to your appointment:    (1) Any X-Rays, CTs or MRIs which have been performed.  Contact the facility where they were done to arrange for  prior to your scheduled appointment.    (2) List of current medications   (3) This referral request   (4) Any documents/labs given to you for this referral                  Follow-up notes from your care team     Return in about 4 weeks (around 7/5/2017).      Future tests that were ordered for you today     Open Future Orders        Priority Expected Expires Ordered    Antinuclear antibody screen by EIA Routine  9/5/2017 6/7/2017    CK total Routine  9/5/2017 6/7/2017    Marlene 1 Antibody IgG Routine  9/5/2017 6/7/2017    Rheumatoid factor Routine  9/5/2017 6/7/2017    Hypersensitivity pneumonitis Routine  9/5/2017 6/7/2017    Hypersens Pneumonitis - Farmer's Lung II Routine  9/5/2017 6/7/2017    Antineutrophil cytoplasmic Cici IgG Routine  9/5/2017 6/7/2017    IgG Subclasses Routine  9/5/2017 6/7/2017    Cyclic Citrullinated Peptide Antibody IgG Routine  9/5/2017 6/7/2017    Aldolase Routine  9/5/2017 6/7/2017            Who to contact     If you have questions or need follow up information about today's clinic visit or your schedule please contact [facility] directly at [phone].  Normal or non-critical lab and imaging results will be communicated to you by MyChart, letter or phone within 4 business days after the clinic has received the results. If you do not hear from us within 7 days, please contact the clinic through MyChart or phone. If you have a critical or abnormal lab result, we will notify you by phone as soon as possible.  Submit refill requests through [employer] or call your pharmacy and they will forward the refill request to us. Please allow 3 business days for your refill to be completed.          Additional Information About Your  "Visit        [employer] Information     [employer] lets you send messages to your doctor, view your test results, renew your prescriptions, schedule appointments and more. To sign up, go to [email or web address] . Click on \"Log in\" on the left side of the screen, which will take you to the Welcome page. Then click on \"Sign up Now\" on the right side of the page.     You will be asked to enter the access code listed below, as well as some personal information. Please follow the directions to create your username and password.     Your access code is: [email or web address]  Expires: 2017  9:17 AM     Your access code will  in 90 days. If you need help or a new code, please call your [facility] clinic or [phone].        Care EveryWhere ID     This is your Care EveryWhere ID. This could be used by other organizations to access your [facility] medical records  [id number]        Your Vitals Were     Pulse Temperature Height Pulse Oximetry BMI (Body Mass Index)       79 99.2  F (37.3  C) (Oral) 5' 9\" (1.753 m) 97% 27.98 kg/m2        Blood Pressure from Last 3 Encounters:   17 112/70   17 112/70   03/10/17 128/70    Weight from Last 3 Encounters:   17 189 lb 8 oz (86 kg)   17 189 lb 8 oz (86 kg)   03/10/17 194 lb (88 kg)              We Performed the Following     Centromere Antibody IgG     DAVID antibody panel     PULMONARY MEDICINE REFERRAL     Vasculitis panel          Today's Medication Changes          These changes are accurate as of: 17 12:03 PM.  If you have any questions, ask your nurse or doctor.               Start taking these medicines.        Dose/Directions    predniSONE 20 MG tablet   Commonly known as:  DELTASONE   Used for:  ILD (interstitial lung disease) (H), Inflammatory arthritis   Started by:  Mani Golden MD        Dose:  60 mg   Take 3 tablets (60 mg) by mouth daily To be started after bronchoscopy   Quantity:  120 tablet   Refills:  0       " sulfamethoxazole-trimethoprim 400-80 MG per tablet   Commonly known as:  BACTRIM   Used for:  ILD (interstitial lung disease) (H), Inflammatory arthritis   Started by:  Mani Golden MD        Once daily for PJP prophylaxis. Start after bronchoscopy   Quantity:  60 tablet   Refills:  1         Stop taking these medicines if you haven't already. Please contact your care team if you have questions.     albuterol 108 (90 BASE) MCG/ACT Inhaler   Commonly known as:  PROAIR HFA/PROVENTIL HFA/VENTOLIN HFA   Stopped by:  Mani Golden MD                Where to get your medicines      These medications were sent to [facility] [id number] [place] [address] [address] AT [facility] [address]  [address], [facility] [address]    Hours:  24-hours Phone:  [phone]     predniSONE 20 MG tablet    sulfamethoxazole-trimethoprim 400-80 MG per tablet                Primary Care Provider    Physician No Ref-Primary       No address on file        Thank you!     Thank you for choosing [facility]  for your care. Our goal is always to provide you with excellent care. Hearing back from our patients is one way we can continue to improve our services. Please take a few minutes to complete the written survey that you may receive in the mail after your visit with us. Thank you!             Your Updated Medication List - Protect others around you: Learn how to safely use, store and throw away your medicines at www.disposemymeds.org.          This list is accurate as of: 6/7/17 12:03 PM.  Always use your most recent med list.                   Brand Name Dispense Instructions for use    benzonatate 200 MG capsule    TESSALON     Take 200 mg by mouth 3 times daily       fluticasone 50 MCG/ACT spray    FLONASE     Spray 2 sprays into both nostrils daily       omeprazole 40 MG capsule    priLOSEC     Take 1 capsule by mouth daily       predniSONE 20 MG tablet     DELTASONE    120 tablet    Take 3 tablets (60 mg) by mouth daily To be started after bronchoscopy       sulfamethoxazole-trimethoprim 400-80 MG per tablet    BACTRIM    60 tablet    Once daily for PJP prophylaxis. Start after bronchoscopy

## 2017-06-07 NOTE — NURSING NOTE
"Chief Complaint   Patient presents with     Cough     cough since ~ 11/16 and swelling all over his body since ~ 1/17        Initial /70  Pulse 68  Temp 99.4  F (37.4  C) (Oral)  Ht 5' 9\" (1.753 m)  Wt 189 lb 8 oz (86 kg)  SpO2 97%  BMI 27.98 kg/m2 Estimated body mass index is 27.98 kg/(m^2) as calculated from the following:    Height as of this encounter: 5' 9\" (1.753 m).    Weight as of this encounter: 189 lb 8 oz (86 kg).  Medication Reconciliation: complete    "

## 2017-06-08 LAB
ALBUMIN SERPL ELPH-MCNC: 3.4 G/DL (ref 3.7–5.1)
ALPHA1 GLOB SERPL ELPH-MCNC: 0.4 G/DL (ref 0.2–0.4)
ALPHA2 GLOB SERPL ELPH-MCNC: 0.9 G/DL (ref 0.5–0.9)
B-GLOBULIN SERPL ELPH-MCNC: 0.7 G/DL (ref 0.6–1)
CENTROMERE IGG SER-ACNC: NORMAL AI (ref 0–0.9)
ENA RNP IGG SER IA-ACNC: 0.5 AI (ref 0–0.9)
ENA SCL70 IGG SER IA-ACNC: NORMAL AI (ref 0–0.9)
ENA SM IGG SER-ACNC: NORMAL AI (ref 0–0.9)
ENA SS-A IGG SER IA-ACNC: NORMAL AI (ref 0–0.9)
ENA SS-B IGG SER IA-ACNC: NORMAL AI (ref 0–0.9)
GAMMA GLOB SERPL ELPH-MCNC: 0.8 G/DL (ref 0.7–1.6)
M PROTEIN SERPL ELPH-MCNC: 0 G/DL
MYELOPEROXIDASE AB SER-ACNC: NORMAL AI (ref 0–0.9)
PROT PATTERN SERPL ELPH-IMP: ABNORMAL
PROTEINASE3 IGG SER-ACNC: NORMAL AI (ref 0–0.9)

## 2017-06-14 ENCOUNTER — TRANSFERRED RECORDS (OUTPATIENT)
Dept: HEALTH INFORMATION MANAGEMENT | Facility: CLINIC | Age: 52
End: 2017-06-14

## 2017-06-20 NOTE — PROGRESS NOTES
Results released to [facility]:  Dear Mr. Medrano,  Antibodies for some autoimmune conditions were negative (normal). Many of the other blood work were not done for some reason though orders are in the system. Please get them done at a nearby local [facility] lab as early as possible. You may call them to set up a lab appointment.       Sincerely    Mani Golden MD  [facility]

## 2017-06-21 ENCOUNTER — TELEPHONE (OUTPATIENT)
Dept: RHEUMATOLOGY | Facility: CLINIC | Age: 52
End: 2017-06-21

## 2017-06-21 DIAGNOSIS — J84.9 ILD (INTERSTITIAL LUNG DISEASE) (H): Primary | ICD-10-CM

## 2017-06-21 NOTE — TELEPHONE ENCOUNTER
----- Message from Heydi Pope LPN sent at 6/20/2017  4:48 PM CDT -----  Regarding: FW: Labs missed      ----- Message -----     From: Mani Golden MD     Sent: 6/20/2017   2:41 PM       To: Adult Rheum Triage-  Subject: Labs missed                                      Please call patient if he does not read my comments on the lab results by tomorrow.   Thanks  Richie

## 2017-06-22 ENCOUNTER — TRANSFERRED RECORDS (OUTPATIENT)
Dept: HEALTH INFORMATION MANAGEMENT | Facility: CLINIC | Age: 52
End: 2017-06-22

## 2017-06-22 DIAGNOSIS — M19.90 INFLAMMATORY ARTHRITIS: ICD-10-CM

## 2017-06-22 DIAGNOSIS — J84.9 ILD (INTERSTITIAL LUNG DISEASE) (H): ICD-10-CM

## 2017-06-22 LAB — CK SERPL-CCNC: 479 U/L (ref 30–300)

## 2017-06-22 PROCEDURE — 82085 ASSAY OF ALDOLASE: CPT | Mod: 90 | Performed by: INTERNAL MEDICINE

## 2017-06-22 PROCEDURE — 82550 ASSAY OF CK (CPK): CPT | Performed by: INTERNAL MEDICINE

## 2017-06-22 PROCEDURE — 86038 ANTINUCLEAR ANTIBODIES: CPT | Performed by: INTERNAL MEDICINE

## 2017-06-22 PROCEDURE — 86606 ASPERGILLUS ANTIBODY: CPT | Mod: 90 | Performed by: INTERNAL MEDICINE

## 2017-06-22 PROCEDURE — 99000 SPECIMEN HANDLING OFFICE-LAB: CPT | Performed by: INTERNAL MEDICINE

## 2017-06-22 PROCEDURE — 82787 IGG 1 2 3 OR 4 EACH: CPT | Performed by: INTERNAL MEDICINE

## 2017-06-22 PROCEDURE — 82784 ASSAY IGA/IGD/IGG/IGM EACH: CPT | Performed by: INTERNAL MEDICINE

## 2017-06-22 PROCEDURE — 86235 NUCLEAR ANTIGEN ANTIBODY: CPT | Performed by: INTERNAL MEDICINE

## 2017-06-22 PROCEDURE — 86331 IMMUNODIFFUSION OUCHTERLONY: CPT | Mod: 90 | Performed by: INTERNAL MEDICINE

## 2017-06-22 PROCEDURE — 86200 CCP ANTIBODY: CPT | Performed by: INTERNAL MEDICINE

## 2017-06-22 PROCEDURE — 86255 FLUORESCENT ANTIBODY SCREEN: CPT | Mod: 90 | Performed by: INTERNAL MEDICINE

## 2017-06-22 PROCEDURE — 86431 RHEUMATOID FACTOR QUANT: CPT | Performed by: INTERNAL MEDICINE

## 2017-06-22 PROCEDURE — 36415 COLL VENOUS BLD VENIPUNCTURE: CPT | Performed by: INTERNAL MEDICINE

## 2017-06-23 LAB
ANA SER QL IA: NORMAL
ENA JO1 IGG SER-ACNC: NORMAL AI (ref 0–0.9)
IGG SERPL-MCNC: 721 MG/DL (ref 695–1620)
IGG1 SER-MCNC: 443 MG/DL (ref 300–856)
IGG2 SER-MCNC: 210 MG/DL (ref 158–761)
IGG3 SER-MCNC: 73 MG/DL (ref 24–192)
IGG4 SER-MCNC: 45 MG/DL (ref 11–86)
RHEUMATOID FACT SER NEPH-ACNC: <20 IU/ML (ref 0–20)

## 2017-06-24 LAB
ALDOLASE SERPL-CCNC: 8.6
ANCA IGG TITR SER IF: NORMAL {TITER}
CCP AB SER IA-ACNC: 1 U/ML

## 2017-06-26 ENCOUNTER — TELEPHONE (OUTPATIENT)
Dept: RHEUMATOLOGY | Facility: CLINIC | Age: 52
End: 2017-06-26

## 2017-06-26 NOTE — PROGRESS NOTES
Results released to [facility]:  Dear Mr. Medrano,  Your muscle enzymes are also elevated suggesting an autoimmune lung, muscle and joint disease.   Rheumatoid antibodies are normal.   ANCA vasculitis antibody are negative.   IgG4 normal.   SHERRELL and Marlene-1 antibodies are normal.   My staff would be in touch with you to set up a sooner follow up visit to discuss management plan.     Sincerely    Mani Golden MD  [facility]

## 2017-06-26 NOTE — PROGRESS NOTES
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 Dillon at [facility] clinic in 9/17. I spoke with his pulmonologist Dr. Reynolds at [facility] and requested her to do a bronchoscopy to rule out any atypical infections in lungs. This is done and results are pending.   EMG, Muscle MRI, Muscle biopsy are avoided as it is obvious that he has mild myositis associated with his ILD. Myositis panel sent out as SHERRELL is negative and Marlene-1 negative.   Malignancy work up:  Colonoscopy: never had one. Ordered.  PSA never done. Ordered.   CT abdomen and pelvis ordered.     One week into the high dose prednisone, his cough is improving. Recheck CT and PFT in 9/17. His muscle weakness is improving.      There is risk of renal crisis with high dose steroids if this is a manifestation of scleroderma but risk is low.   PJP prophylaxis with bactrim.     Referral to Dr. Dillon, ILD specialist at  is made. I discussed this with Dr. Reynolds also who agrees with the plan.     May be having air travel coming up. Discussed clotting risk. Preventative education done.     The labs, imaging from patient records are reviewed.     I will be back in touch with the patient through mychart/letter when results are available.     Recheck CK and CRP in 4 weeks and then follow up. Prednisone taper based on the assessment in follow up visit.     Return in about 4 weeks (around 7/25/2017).  About 40 mins visit; >50% on coordination of care.     Orders Placed This Encounter   Procedures     CT Abdomen pelvis w & w/o contrast     PSA " tumor marker     Myositis Extended Panel [facility] # [id number]: Laboratory Miscellaneous Order     CK total     CRP inflammation     GASTROENTEROLOGY ADULT REF PROCEDURE ONLY       Medications Discontinued During This Encounter   Medication Reason     benzonatate (TESSALON) 200 MG capsule      omeprazole (PRILOSEC) 40 MG capsule      fluticasone (FLONASE) 50 MCG/ACT spray      Current Outpatient Prescriptions   Medication Sig Dispense Refill     pantoprazole (PROTONIX) 20 MG EC tablet        predniSONE (DELTASONE) 20 MG tablet Take 3 tablets (60 mg) by mouth daily To be started after bronchoscopy 120 tablet 0     sulfamethoxazole-trimethoprim (BACTRIM) 400-80 MG per tablet Once daily for PJP prophylaxis. Start after bronchoscopy 60 tablet 1       Mani Golden MD  [facility]          Active Problem List:   There are no active problems to display for this patient.           History of Present Illness:     Chief Complaint   Patient presents with     RECHECK       Jun 27, 2017  Joint stiffness history/swelling  Onset: Jan. 2017  Involved areas: hands, feet, legs, face, wrists  Pain scale:  1/10     Wakes the patient from sleep : No/ does have the feeling of pins and needles in his hands that keeps him awake at night  Morning stiffness:the same all the time, never gets worse or better; used to have more morning stiffness but now it is the same all the time  Meds used:none     Interim history  Since last visit:  1. Infections - No  2. New symptoms/medical problem - Yes/ SOB X months, coughing X since Nov 2016- SOB steadily getting worse; SOB even to climb stairs now,   Fatigue - 4-5/10  3. Any side effects from Rheum medications -NA  3. ER visits/Hospitalizations/surgeries - No  4. Last PCP visit: today    CT chest: 6/17  IMPRESSION:  1. Changes of fibrosis within the periphery of the mid and upper right  lung and mid left lung. More fibrotic changes noted at the lung bases.  No overt honeycombing is  currently evident, but areas of traction  bronchiectasis and groundglass opacity are noted suggesting active  alveolitis. Early changes of UIP are possible. Continued surveillance  as clinically warranted.  2. Evidence of old granulomatous disease. No enlarged lymph nodes.    Raynaud's since fall 2016 but only occasional. 5th digit mostly in left.     No h/o unintentional weight loss, loss of appetite, fevers, rash, swollen glands  No h/o gout  No family or personal history of psoriasis, ulcerative colitis or chron's disease. No h/o iritis.   Patient denies any malar rash, photosensitivity, recurrent mouth/genital ulcers, sicca symptoms, recurrent sinusitis/rhinitis  No h/o arterial/venous thrombosis in the past  No h/o persistent nausea, vomiting, constipation, diarrhea, abdominal pain  No h/o hematochezia, hematuria, hemoptysis, hematemesis  No h/o seizures  No h/o cancer    June 27, 2017  Bronchoscopy done about a week ago.   Prednisone started a week ago. 60mg PO daily.   Coughing is better with prednisone.     Joint pain history  Onset: 6 months  Involved joints: wrists, fingers, knees, shoulder. Moves around  Pain scale:  1.5/10     Wakes the patient from sleep : No  Morning stiffness: Yes for 120 minutes  Meds used: none     Interim history  Since last visit:  1. Infections - No  2. New symptoms/medical problem - No  3. Any side effects from Rheum medications -NA; tolerating prednisone well.   3. ER visits/Hospitalizations/surgeries - No  4. Last PCP visit: 6/7/17    Your muscle enzymes are also elevated suggesting an autoimmune lung, muscle and joint disease.   Rheumatoid antibodies are normal.   ANCA vasculitis antibody are negative.   IgG4 normal.   SHERRELL and Marlene-1 antibodies are normal.     BP Readings from Last 3 Encounters:   06/27/17 112/70   06/07/17 112/70   06/07/17 112/70              Review of Systems:   Complete ROS negative except for symptoms mentioned in the HPI          Past Medical History:      Past Medical History:   Diagnosis Date     Fracture      Past Surgical History:   Procedure Laterality Date     NO HISTORY OF SURGERY              Social History:   Works for [employer].   Lives with parents. Not  and does not have children.     Social History     Occupational History     Not on file.     Social History Main Topics     Smoking status: Never Smoker     Smokeless tobacco: Former User     Types: Chew     Quit date: 1/1/1984     Alcohol use Yes      Comment: ocasionally     Drug use: No     Sexual activity: Not Currently            Family History:     Family History   Problem Relation Age of Onset     Prostate Cancer Father      LUNG DISEASE No family hx of      Rheumatologic Disease No family hx of             Allergies:     Allergies   Allergen Reactions     Ampicillin Rash            Medications:     Current Outpatient Prescriptions   Medication Sig Dispense Refill     pantoprazole (PROTONIX) 20 MG EC tablet        predniSONE (DELTASONE) 20 MG tablet Take 3 tablets (60 mg) by mouth daily To be started after bronchoscopy 120 tablet 0     sulfamethoxazole-trimethoprim (BACTRIM) 400-80 MG per tablet Once daily for PJP prophylaxis. Start after bronchoscopy 60 tablet 1            Physical Exam:   Blood pressure 112/70, pulse 72, temperature 98.9  F (37.2  C), temperature source Oral, weight 81.3 kg (179 lb 3.2 oz), SpO2 97 %.  Wt Readings from Last 4 Encounters:   06/27/17 81.3 kg (179 lb 3.2 oz)   06/07/17 86 kg (189 lb 8 oz)   06/07/17 86 kg (189 lb 8 oz)   03/10/17 88 kg (194 lb)     Constitutional: well-developed, appearing stated age; cooperative  Eyes: PERRLA, normal conjunctiva, sclera  ENT: nl external ears, nose, lips.No mucous membrane lesions, normal saliva pool  Neck: no cervical lymphadenopathy  Resp: lungs clear to auscultation,   CV: RRR, no added sounds, no edema  GI: Abdomen soft and no tenderness  : not tested  Lymph: no cervical, supraclavicular or epitrochlear  nodes  Muscle: hip flexors 4/5; proximal upper extremity muscles 5/5  MS: Synovitis in MCPs, PIPs, wrists with minimal restriction of ROM  All shoulder, elbow, wrist, MCP/PIP/DIP, hip, knee, ankle, and foot MTP/IP joints were examined and  found normal. No active synovitis or deformity. Full ROM.  Fist 100%.  No dactylitis,  tenosynovitis, enthesopathy.  Skin: no nail pitting; no rash in exposed areas  Psych: nl judgement, orientation, memory, affect.         Data:     Results for orders placed or performed in visit on 06/22/17   Antinuclear antibody screen by EIA   Result Value Ref Range    SHERRELL Screen by EIA <1.0  Interpretation:  Negative   <1.0   CK total   Result Value Ref Range    CK Total 479 (H) 30 - 300 U/L   Marlene 1 Antibody IgG   Result Value Ref Range    Marlene 1 Antibody IgG  0.0 - 0.9 AI     <0.2  Negative   Antibody index (AI) values reflect qualitative changes in antibody   concentration that cannot be directly associated with clinical condition or   disease state.     Rheumatoid factor   Result Value Ref Range    Rheumatoid Factor <20 <20 IU/mL   Antineutrophil cytoplasmic Cici IgG   Result Value Ref Range    Neutrophil Cytoplasmic IgG Antibody       <1:20  Reference range: <1:20  (Note)  The ANCA IFA is <1:20; therefore, no further testing will  be performed.  INTERPRETIVE INFORMATION: Anti-Neutrophil Cyto Ab, IgG  Neutrophil Cytoplasmic Antibodies (C-ANCA = granular  cytoplasmic staining, P-ANCA = perinuclear staining) are  found in the serum of over 90 percent of patients with  certain necrotizing systemic vasculitides, and usually in  less than 5 percent of patients with collagen vascular  disease or arthritis.  Performed by [facility],  [address] [place] [address] [phone]  [email or web address], Manny Everett MD, Lab. Director     IgG Subclasses   Result Value Ref Range     695 - 1620 mg/dL    IgG1 443 300 - 856 mg/dL    IgG2 210 158 - 761 mg/dL    IgG3 73 24 - 192 mg/dL    IgG4 45 11 -  86 mg/dL   Cyclic Citrullinated Peptide Antibody IgG   Result Value Ref Range    Cyclic Citrullinated Peptide Antibody, IgG 1 <7 U/mL   Aldolase   Result Value Ref Range    Aldolase 8.6 (H)        Mani Golden MD    [facility]

## 2017-06-27 ENCOUNTER — OFFICE VISIT (OUTPATIENT)
Dept: RHEUMATOLOGY | Facility: CLINIC | Age: 52
End: 2017-06-27
Payer: COMMERCIAL

## 2017-06-27 VITALS
TEMPERATURE: 98.9 F | HEART RATE: 72 BPM | DIASTOLIC BLOOD PRESSURE: 70 MMHG | SYSTOLIC BLOOD PRESSURE: 112 MMHG | WEIGHT: 179.2 LBS | OXYGEN SATURATION: 97 % | BODY MASS INDEX: 26.46 KG/M2

## 2017-06-27 DIAGNOSIS — M60.9 MYOSITIS, UNSPECIFIED MYOSITIS TYPE, UNSPECIFIED SITE: ICD-10-CM

## 2017-06-27 DIAGNOSIS — Z00.00 HEALTH CARE MAINTENANCE: Primary | ICD-10-CM

## 2017-06-27 LAB
A FLAVUS AB SER QL ID: NORMAL
A FUMIGATUS1 AB SER QL ID: NORMAL
A FUMIGATUS2 AB SER QL: NORMAL
A FUMIGATUS3 AB SER QL ID: NORMAL
A FUMIGATUS6 AB SER QL ID: NORMAL
A PULLULANS AB SER QL ID: NORMAL
LACEYELLA SACCHARI AB SER QL: NORMAL
MISCELLANEOUS TEST: NORMAL
PIGEON DROP IGE QN: NORMAL
PSA SERPL-MCNC: 2.24 UG/L (ref 0–4)
S RECTIVIRGULA AB SER QL ID: NORMAL
S VIRIDIS AB SER QL: NORMAL
T CANDIDUS AB SER QL: NORMAL
T VULGARIS AB SER QL ID: NORMAL

## 2017-06-27 PROCEDURE — 83516 IMMUNOASSAY NONANTIBODY: CPT | Mod: 90 | Performed by: INTERNAL MEDICINE

## 2017-06-27 PROCEDURE — 99215 OFFICE O/P EST HI 40 MIN: CPT | Performed by: INTERNAL MEDICINE

## 2017-06-27 PROCEDURE — 99000 SPECIMEN HANDLING OFFICE-LAB: CPT | Performed by: INTERNAL MEDICINE

## 2017-06-27 PROCEDURE — 86235 NUCLEAR ANTIGEN ANTIBODY: CPT | Mod: 90 | Performed by: INTERNAL MEDICINE

## 2017-06-27 PROCEDURE — 84153 ASSAY OF PSA TOTAL: CPT | Performed by: INTERNAL MEDICINE

## 2017-06-27 PROCEDURE — 36415 COLL VENOUS BLD VENIPUNCTURE: CPT | Performed by: INTERNAL MEDICINE

## 2017-06-27 RX ORDER — PANTOPRAZOLE SODIUM 20 MG/1
TABLET, DELAYED RELEASE ORAL
COMMUNITY
Start: 2017-06-22 | End: 2017-07-25

## 2017-06-27 ASSESSMENT — ROUTINE ASSESSMENT OF PATIENT INDEX DATA (RAPID3)
RAPID3 INTERPRETATION: LOW 3.1-6
TOTAL RAPID3 SCORE: 4.8

## 2017-06-27 NOTE — NURSING NOTE
"Chief Complaint   Patient presents with     RECHECK       Initial /70 (BP Location: Left arm, Patient Position: Chair, Cuff Size: Adult Regular)  Pulse 72  Temp 98.9  F (37.2  C) (Oral)  Wt 81.3 kg (179 lb 3.2 oz)  SpO2 97%  BMI 26.46 kg/m2 Estimated body mass index is 26.46 kg/(m^2) as calculated from the following:    Height as of 6/7/17: 1.753 m (5' 9\").    Weight as of this encounter: 81.3 kg (179 lb 3.2 oz).  Medication Reconciliation: complete    Pt here for lab results  Joint pain history  Onset: 6 months  Involved joints: wrists, fingers, knees, shoulder. Moves around  Pain scale:  1.5/10     Wakes the patient from sleep : No  Morning stiffness:Yes for 120 minutes/   Meds used:none    Interim history  Since last visit:  1. Infections - No  2. New symptoms/medical problem - No  3. Any side effects from Rheum medications -NA  3. ER visits/Hospitalizations/surgeries - No  4. Last PCP visit: 6/7/17  Wt Readings from Last 4 Encounters:   06/27/17 81.3 kg (179 lb 3.2 oz)   06/07/17 86 kg (189 lb 8 oz)   06/07/17 86 kg (189 lb 8 oz)   03/10/17 88 kg (194 lb)     BP Readings from Last 3 Encounters:   06/27/17 112/70   06/07/17 112/70   06/07/17 112/70       "

## 2017-06-27 NOTE — NURSING NOTE
Pt scheduled for [facility]. For thursday. June 29th at 8:00 a.m. Pt to arrive at 7:45 at the [facility] desk.   Pt needs to start prep at 6:00 a.m. Prep and directions given to pt. Korin Ventura LPN

## 2017-06-27 NOTE — PATIENT INSTRUCTIONS
While travelling on a long journey, particularly on a long-haul plane trip:    Exercise your calf and foot muscles regularly:  Every half hour or so, bend and straighten your legs, feet and toes when you are seated.  Press the balls of your feet down hard against the floor or foot rest every so often. This helps to increase the blood flow in your legs.  Take a walk up and down the aisle every hour or so, when the aircraft crew say it is safe to do so.  Make sure you have as much space as possible in front of you for your legs to move. So avoid having bags under the seat in front of you, and recline your seat where possible.  Take all opportunities to get up to stretch your legs, when there are stops in your journey.  Drink plenty of water to avoid a lack of fluid in the body (dehydration).  Do not drink too much alcohol. (Alcohol can cause dehydration and immobility.)  Do not take sleeping tablets, which cause immobility.

## 2017-06-27 NOTE — MR AVS SNAPSHOT
After Visit Summary   6/27/2017    wKaku Medrano    MRN: [id number]           Patient Information     Date Of Birth          1965        Visit Information        Provider Department      6/27/2017 1:00 PM Mani Golden MD [facility]        Today's Diagnoses     Health care maintenance    -  1    Myositis, unspecified myositis type, unspecified site          Care Instructions    While travelling on a long journey, particularly on a long-haul plane trip:    Exercise your calf and foot muscles regularly:  Every half hour or so, bend and straighten your legs, feet and toes when you are seated.  Press the balls of your feet down hard against the floor or foot rest every so often. This helps to increase the blood flow in your legs.  Take a walk up and down the aisle every hour or so, when the aircraft crew say it is safe to do so.  Make sure you have as much space as possible in front of you for your legs to move. So avoid having bags under the seat in front of you, and recline your seat where possible.  Take all opportunities to get up to stretch your legs, when there are stops in your journey.  Drink plenty of water to avoid a lack of fluid in the body (dehydration).  Do not drink too much alcohol. (Alcohol can cause dehydration and immobility.)  Do not take sleeping tablets, which cause immobility.            Follow-ups after your visit        Additional Services     GASTROENTEROLOGY ADULT REF PROCEDURE ONLY       Last Lab Result: Creatinine (mg/dL)       Date                     Value                 03/10/2017               0.98             ----------  Body mass index is 26.46 kg/(m^2).     Needed:  No  Language:  English    Patient will be contacted to schedule procedure.     Please be aware that coverage of these services is subject to the terms and limitations of your health insurance plan.  Call member services at your health plan with any  benefit or coverage questions.  Any procedures must be performed at a [facility] facility OR coordinated by your clinic's referral office.    Please bring the following with you to your appointment:    (1) Any X-Rays, CTs or MRIs which have been performed.  Contact the facility where they were done to arrange for  prior to your scheduled appointment.    (2) List of current medications   (3) This referral request   (4) Any documents/labs given to you for this referral                  Follow-up notes from your care team     Return in about 4 weeks (around 7/25/2017).      Your next 10 appointments already scheduled     Jul 11, 2017  9:15 AM CDT   Return Visit with Mani Golden MD   [facility] ([facility])    [address] [id number] [phone]            Sep 15, 2017  8:00 AM CDT   SIX MINUTE WALK with UC PFL 6 MINUTE WALK 2, UC PFL C   [facility] Function [facility] ([facility])    [address] [place] [id number] [phone]            Sep 15, 2017  9:00 AM CDT   (Arrive by 8:45 AM)   New Interstitial Lung with Elma Dillon MD   [facility] ([facility])    [address] [place] [phone] [phone]              Future tests that were ordered for you today     Open Future Orders        Priority Expected Expires Ordered    CK total Routine 7/16/2017 8/27/2017 6/27/2017    CRP inflammation Routine 7/16/2017 8/27/2017 6/27/2017    CT Abdomen pelvis w & w/o contrast Routine  1/23/2018 6/27/2017            Who to contact     If you have questions or need follow up information about today's clinic visit or your schedule please contact [facility] directly at [phone].  Normal or non-critical lab and imaging results will be communicated to you by [facility]  letter or phone within 4 business days after the clinic has received the results. If you do not hear from us within 7 days, please contact the clinic through [employer] or phone. If you have a critical or abnormal lab result, we will notify you by phone as soon as possible.  Submit refill requests through [employer] or call your pharmacy and they will forward the refill request to us. Please allow 3 business days for your refill to be completed.          Additional Information About Your Visit        [employer] Information     [employer] gives you secure access to your electronic health record. If you see a primary care provider, you can also send messages to your care team and make appointments. If you have questions, please call your primary care clinic.  If you do not have a primary care provider, please call [phone] and they will assist you.        Care EveryWhere ID     This is your Care EveryWhere ID. This could be used by other organizations to access your [facility] medical records  [id number]        Your Vitals Were     Pulse Temperature Pulse Oximetry BMI (Body Mass Index)          72 98.9  F (37.2  C) (Oral) 97% 26.46 kg/m2         Blood Pressure from Last 3 Encounters:   06/27/17 112/70   06/07/17 112/70   06/07/17 112/70    Weight from Last 3 Encounters:   06/27/17 81.3 kg (179 lb 3.2 oz)   06/07/17 86 kg (189 lb 8 oz)   06/07/17 86 kg (189 lb 8 oz)              We Performed the Following     GASTROENTEROLOGY ADULT REF PROCEDURE ONLY     Myositis Extended Panel [facility] # [id number]: Laboratory Miscellaneous Order     PSA tumor marker          Today's Medication Changes          These changes are accurate as of: 6/27/17  1:48 PM.  If you have any questions, ask your nurse or doctor.               Stop taking these medicines if you haven't already. Please contact your care team if you have questions.     benzonatate 200 MG capsule   Commonly known as:  TESSALON   Stopped by:  Mani Golden MD            fluticasone 50 MCG/ACT spray   Commonly known as:  FLONASE   Stopped by:  Mani Golden MD           omeprazole 40 MG capsule   Commonly known as:  priLOSEC   Stopped by:  Mani Golden MD                    Primary Care Provider    Physician No Ref-Primary       No address on file        Equal Access to Services     ESTHER PASCUALELZA : Hadii aad ku hadasho Soomaali, waaxda luqadaha, qaybta kaalmada adeegyada, waxay arleyin henriquen adedaniel clayton laemekan . So [facility] [phone].    ATENCIÓN: Si habla español, tiene a rubalcava disposición servicios gratuitos de asistencia lingüística. Llame al [phone].    We comply with applicable federal civil rights laws and [place] laws. We do not discriminate on the basis of race, color, national origin, age, disability sex, sexual orientation or gender identity.            Thank you!     Thank you for choosing [facility]  for your care. Our goal is always to provide you with excellent care. Hearing back from our patients is one way we can continue to improve our services. Please take a few minutes to complete the written survey that you may receive in the mail after your visit with us. Thank you!             Your Updated Medication List - Protect others around you: Learn how to safely use, store and throw away your medicines at www.disposemymeds.org.          This list is accurate as of: 6/27/17  1:48 PM.  Always use your most recent med list.                   Brand Name Dispense Instructions for use Diagnosis    pantoprazole 20 MG EC tablet    PROTONIX          predniSONE 20 MG tablet    DELTASONE    120 tablet    Take 3 tablets (60 mg) by mouth daily To be started after bronchoscopy    ILD (interstitial lung disease) (H), Inflammatory arthritis       sulfamethoxazole-trimethoprim 400-80 MG per tablet    BACTRIM    60 tablet    Once daily for PJP prophylaxis. Start after bronchoscopy    ILD (interstitial lung disease) (H), Inflammatory  arthritis

## 2017-06-28 NOTE — PROGRESS NOTES
[facility]  Hypersensitivity testing was normal. Good.     Sincerely    Mani Golden MD  [facility]

## 2017-06-29 ENCOUNTER — HOSPITAL ENCOUNTER (OUTPATIENT)
Dept: CT IMAGING | Facility: CLINIC | Age: 52
Discharge: HOME | End: 2017-06-29
Attending: INTERNAL MEDICINE | Admitting: INTERNAL MEDICINE
Payer: COMMERCIAL

## 2017-06-29 DIAGNOSIS — M60.9 MYOSITIS, UNSPECIFIED MYOSITIS TYPE, UNSPECIFIED SITE: ICD-10-CM

## 2017-06-29 DIAGNOSIS — Z00.00 HEALTH CARE MAINTENANCE: ICD-10-CM

## 2017-06-29 PROCEDURE — 74177 CT ABD & PELVIS W/CONTRAST: CPT

## 2017-06-29 PROCEDURE — 25000125 ZZHC RX 250: Performed by: INTERNAL MEDICINE

## 2017-06-29 PROCEDURE — 25000128 H RX IP 250 OP 636: Performed by: INTERNAL MEDICINE

## 2017-06-29 RX ORDER — IOPAMIDOL 755 MG/ML
90 INJECTION, SOLUTION INTRAVASCULAR ONCE
Status: COMPLETED | OUTPATIENT
Start: 2017-06-29 | End: 2017-06-29

## 2017-06-29 RX ADMIN — IOPAMIDOL 90 ML: 755 INJECTION, SOLUTION INTRAVENOUS at 08:08

## 2017-06-29 RX ADMIN — SODIUM CHLORIDE 67 ML: 9 INJECTION, SOLUTION INTRAVENOUS at 08:08

## 2017-06-30 DIAGNOSIS — K76.9 LIVER LESION: Primary | ICD-10-CM

## 2017-06-30 NOTE — PROGRESS NOTES
Results released to [facility]:  Your CT showed a 2.3 cms lesion in the liver. This is more likely to be a benign lesion than cancerous. The radiologist has recommended an MRI of the liver to differentiate this. I have ordered this. Please contact [phone] to set up this appointment. I hope that these investigations would end soon.     Sincerely    Mani Golden MD  [facility]

## 2017-07-05 ENCOUNTER — HOSPITAL ENCOUNTER (OUTPATIENT)
Dept: MRI IMAGING | Facility: CLINIC | Age: 52
Discharge: HOME | End: 2017-07-05
Attending: INTERNAL MEDICINE | Admitting: INTERNAL MEDICINE
Payer: COMMERCIAL

## 2017-07-05 DIAGNOSIS — K76.9 LIVER LESION: ICD-10-CM

## 2017-07-05 PROCEDURE — 25000128 H RX IP 250 OP 636: Performed by: INTERNAL MEDICINE

## 2017-07-05 PROCEDURE — 74183 MRI ABD W/O CNTR FLWD CNTR: CPT

## 2017-07-05 PROCEDURE — A9585 GADOBUTROL INJECTION: HCPCS | Performed by: INTERNAL MEDICINE

## 2017-07-05 PROCEDURE — 27210995 ZZH RX 272: Performed by: INTERNAL MEDICINE

## 2017-07-05 RX ORDER — GADOBUTROL 604.72 MG/ML
8 INJECTION INTRAVENOUS ONCE
Status: COMPLETED | OUTPATIENT
Start: 2017-07-05 | End: 2017-07-05

## 2017-07-05 RX ORDER — ACYCLOVIR 200 MG/1
30 CAPSULE ORAL ONCE
Status: COMPLETED | OUTPATIENT
Start: 2017-07-05 | End: 2017-07-05

## 2017-07-05 RX ADMIN — GADOBUTROL 8 ML: 604.72 INJECTION INTRAVENOUS at 07:33

## 2017-07-05 RX ADMIN — SODIUM CHLORIDE 30 ML: 9 INJECTION, SOLUTION INTRAMUSCULAR; INTRAVENOUS; SUBCUTANEOUS at 07:33

## 2017-07-05 NOTE — PROGRESS NOTES
Results released to [facility]:  Dear Mr. Medrano,  Good news! Your liver lesion is NOT a cancerous lesion. It is only a benign hemangioma and no intervention is needed at this time for this.     Sincerely    Mani Golden MD  [facility]

## 2017-07-10 LAB
RESULT: NORMAL
SEND OUTS MISC TEST CODE: NORMAL
SEND OUTS MISC TEST SPECIMEN: NORMAL
TEST NAME: NORMAL

## 2017-07-10 NOTE — PROGRESS NOTES
Results released to [facility]:  The myositis panel (send out test) tests for many different antibodies which are associated with the symptoms that you have. All of them were negative. We will discuss more when we meet.     Sincerely    Mani Golden MD  [facility]

## 2017-07-18 DIAGNOSIS — Z00.00 HEALTH CARE MAINTENANCE: ICD-10-CM

## 2017-07-18 DIAGNOSIS — M60.9 MYOSITIS, UNSPECIFIED MYOSITIS TYPE, UNSPECIFIED SITE: ICD-10-CM

## 2017-07-18 LAB
CK SERPL-CCNC: 425 U/L (ref 30–300)
CRP SERPL-MCNC: 5.4 MG/L (ref 0–8)

## 2017-07-18 PROCEDURE — 82550 ASSAY OF CK (CPK): CPT | Performed by: INTERNAL MEDICINE

## 2017-07-18 PROCEDURE — 36415 COLL VENOUS BLD VENIPUNCTURE: CPT | Performed by: INTERNAL MEDICINE

## 2017-07-18 PROCEDURE — 86140 C-REACTIVE PROTEIN: CPT | Performed by: INTERNAL MEDICINE

## 2017-07-18 NOTE — PROGRESS NOTES
Results released to [facility]:  CRP has normalized. Good.   CK is trending down. Good.     Sincerely    Mani Golden MD  [facility]

## 2017-07-19 ENCOUNTER — SURGERY (OUTPATIENT)
Age: 52
End: 2017-07-19

## 2017-07-19 ENCOUNTER — MYC MEDICAL ADVICE (OUTPATIENT)
Dept: RHEUMATOLOGY | Facility: CLINIC | Age: 52
End: 2017-07-19

## 2017-07-19 ENCOUNTER — HOSPITAL ENCOUNTER (OUTPATIENT)
Facility: CLINIC | Age: 52
Discharge: HOME | End: 2017-07-19
Attending: COLON & RECTAL SURGERY | Admitting: COLON & RECTAL SURGERY
Payer: COMMERCIAL

## 2017-07-19 VITALS
OXYGEN SATURATION: 96 % | HEIGHT: 69 IN | SYSTOLIC BLOOD PRESSURE: 125 MMHG | RESPIRATION RATE: 12 BRPM | DIASTOLIC BLOOD PRESSURE: 83 MMHG

## 2017-07-19 DIAGNOSIS — I48.0 PAROXYSMAL ATRIAL FIBRILLATION (H): Primary | ICD-10-CM

## 2017-07-19 LAB — COLONOSCOPY: NORMAL

## 2017-07-19 PROCEDURE — G0500 MOD SEDAT ENDO SERVICE >5YRS: HCPCS | Performed by: COLON & RECTAL SURGERY

## 2017-07-19 PROCEDURE — 40000065 ZZH STATISTIC EKG NON-CHARGEABLE

## 2017-07-19 PROCEDURE — G0121 COLON CA SCRN NOT HI RSK IND: HCPCS | Performed by: COLON & RECTAL SURGERY

## 2017-07-19 PROCEDURE — 93005 ELECTROCARDIOGRAM TRACING: CPT

## 2017-07-19 PROCEDURE — 93010 ELECTROCARDIOGRAM REPORT: CPT | Performed by: INTERNAL MEDICINE

## 2017-07-19 PROCEDURE — 45378 DIAGNOSTIC COLONOSCOPY: CPT | Performed by: COLON & RECTAL SURGERY

## 2017-07-19 PROCEDURE — 25000128 H RX IP 250 OP 636: Performed by: COLON & RECTAL SURGERY

## 2017-07-19 RX ORDER — LIDOCAINE 40 MG/G
CREAM TOPICAL
Status: DISCONTINUED | OUTPATIENT
Start: 2017-07-19 | End: 2017-07-19 | Stop reason: HOSPADM

## 2017-07-19 RX ORDER — ONDANSETRON 2 MG/ML
4 INJECTION INTRAMUSCULAR; INTRAVENOUS
Status: DISCONTINUED | OUTPATIENT
Start: 2017-07-19 | End: 2017-07-19 | Stop reason: HOSPADM

## 2017-07-19 RX ORDER — FENTANYL CITRATE 50 UG/ML
INJECTION, SOLUTION INTRAMUSCULAR; INTRAVENOUS PRN
Status: DISCONTINUED | OUTPATIENT
Start: 2017-07-19 | End: 2017-07-19 | Stop reason: HOSPADM

## 2017-07-19 RX ADMIN — FENTANYL CITRATE 100 MCG: 50 INJECTION, SOLUTION INTRAMUSCULAR; INTRAVENOUS at 08:25

## 2017-07-19 RX ADMIN — MIDAZOLAM HYDROCHLORIDE 2 MG: 1 INJECTION, SOLUTION INTRAMUSCULAR; INTRAVENOUS at 08:26

## 2017-07-19 RX ADMIN — SODIUM CHLORIDE 1000 ML: 9 INJECTION, SOLUTION INTRAVENOUS at 08:25

## 2017-07-19 RX ADMIN — FENTANYL CITRATE 50 MCG: 50 INJECTION, SOLUTION INTRAMUSCULAR; INTRAVENOUS at 08:31

## 2017-07-19 NOTE — H&P
Colon & Rectal Surgery History and Physical  Pre-Endoscopy Procedure Note    History of Present Illness   I have been asked by Dr. Golden to evaluate this 52 year old male for colorectal cancer screening. He currently denies any abdominal pain, weight loss, bleeding per rectum, or recent change in bowel habits.    Past Medical History  Diagnosis Date     Fracture        Past Surgical History  Procedure Laterality Date     NO HISTORY OF SURGERY          Medications  Medication Sig     pantoprazole (PROTONIX) 20 MG EC tablet      predniSONE (DELTASONE) 20 MG tablet Take 3 tablets (60 mg) by mouth daily     sulfamethoxazole-trimethoprim (BACTRIM) 400-80 MG per tablet Once daily for PJP prophylaxis. Start after bronchoscopy       Allergies  Allergen Reactions     Ampicillin Rash        Family History   Family history includes Prostate Cancer in his father.     Social History    He reports that he has never smoked. He quit smokeless tobacco use about 33 years ago. His smokeless tobacco use included Chew. He reports that he drinks alcohol. He reports that he does not use illicit drugs.    Review of Systems   Constitutional:  No fever, weight change or fatigue.    Eyes:     No dry eyes or vision changes.   Ears/Nose/Throat/Neck:  No oral ulcers, sore throat or voice change.    Cardiovascular:   No palpitations, syncope, angina or edema.   Respiratory:    No chest pain, excessive sleepiness, shortness of breath or hemoptysis.    Gastrointestinal:   No abdominal pain, nausea, vomiting, diarrhea or heartburn.    Genitourinary:   No dysuria, hematuria, urinary retention or urinary frequency.   Musculoskeletal:  No joint swelling or arthralgias.    Dermatologic:  No skin rash or other skin changes.   Neurologic:    No focal weakness or numbness. No neuropathy.   Psychiatric:    No depression, anxiety, suicidal ideation, or paranoid ideation.   Endocrine:   No cold or heat intolerance, polydipsia, hirsutism, change in  "libido, or flushing.   Hematology/Lymphatic:  No bleeding or lymphadenopathy.    Allergy/Immunology:  No rhinitis or hives.     Physical Exam   Vitals:  BP (!) 126/96, RR 21, HR 92 (irregular), height 1.753 m (5' 9\"), SpO2 98 %.    General:  Alert and oriented to person, place and time   Airway: Normal oropharyngeal airway and neck mobility   Lungs:  Clear bilaterally   Heart:  Regular rate and rhythm   Abdomen: Soft, NT, ND, no masses   Rectal:  Perianal skin without excoriation, hemorrhoidal disease or anal fissure        Digital rectal examination reveals normal sphincter tone without masses    ASA Grade: II (mild systemic disease)      Impression: Cleared for use of conscious sedation for colorectal cancer screening    Plan: Proceed with colonoscopy     Mita Jimenez MD  [facility]  [phone]  "

## 2017-07-19 NOTE — TELEPHONE ENCOUNTER
I have put in a cardiology referral. He can see one whichever ([facility]) is closer for him.   In the mean time, if he develops dizziness or palpitations or chest pain or chest discomfort patient needs to seek medical attention immediately.

## 2017-07-20 LAB — INTERPRETATION ECG - MUSE: NORMAL

## 2017-07-24 NOTE — PROGRESS NOTES
"[facility] - [facility]umatology Clinic Visit     Kwaku Medrano MRN# [id number]   YOB: 1965    Primary care provider: No Ref-Primary, Physician  Jul 25, 2017          Assessment and Plan:   # Severe inflammatory arthritis - onset 2017  # Mild myositis with borderline CK and aldolase elevation; CRP elevation  # Gottron's papules   # Raynaud phenomenon and \"puffy fingers\"  # Interstitial lung disease; transbronch biopsy R middle lobe- organizing pneumonia; symptom onset 2016  # Intentional weight loss    In today's visit, he has developed Gottron's papules in the interim. 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    Bronch wash for infectious work up including AFB and fungal were all negative so far except for candida.     EMG, Muscle MRI, Muscle biopsy are avoided as it is obvious that he has mild myositis associated with his ILD. Myositis panel negative SHERRELL is negative and Marlene-1 negative.   Malignancy work up:  Colonoscopy: normal.  PSA normal  CT abdomen and pelvis no malignancy  MRI liver-no malignancy    Prednisone 60mg PO daily initiated mid June 2017, his cough was improving; CK improving and CRP normalized. However shortness of breath has worsened.    I recommend that we increase prednisone to 80 mg PO daily. Since we are not able to identify any malignancy, I recommend starting cellcept and titrating up to total of 3 grams per day. Will get HBV, HCV, TB screening. If not responding to cellcept or if there is rapid decline in lung function, then switch to IV cytoxan.     There is risk of renal crisis with high dose steroids if this is a manifestation of scleroderma but risk is low.   PJP prophylaxis with bactrim.     GERD: Switch protonix to pepcid as protonix can decrease efficacy of MMF.     Raynauds with finger tip pitting: start amlodipine 5mg PO daily.     Referral to Dr. Dillon, ILD specialist at  is made. I discussed this with Dr. Reynolds (patient's pulmonologist) also who agrees " with the plan.     The labs, imaging from patient records are reviewed.     I will be back in touch with the patient through mychart/letter when results are available.     Recheck CK and CRP in 4 weeks and then follow up. Prednisone taper based on the assessment in follow up visit.     Return in about 2 weeks (around 8/8/2017).  About 40 mins visit; >50% on coordination of care.     Orders Placed This Encounter   Procedures     Hepatitis B surface antigen     Hepatitis C antibody     Hepatitis B core antibody     M Tuberculosis by Quantiferon     Hepatitis B Surface Antibody     General PFT Lab (Please always keep checked)     Pulmonary Function Test       Medications Discontinued During This Encounter   Medication Reason     pantoprazole (PROTONIX) 20 MG EC tablet      predniSONE (DELTASONE) 20 MG tablet Reorder     Current Outpatient Prescriptions   Medication Sig Dispense Refill     mycophenolate (CELLCEPT - GENERIC EQUIVALENT) 500 MG tablet 500mg twice daily for 2 weeks, then 1000mg in the AM and 500mg in the PM for 2 weeks, then 1000mg in the AM and 1000mg in the PM.  Labs in 1 month.  Further refill depending on lab results. 120 tablet 0     famotidine (PEPCID) 40 MG tablet Take 1 tablet (40 mg) by mouth At Bedtime 30 tablet 2     amLODIPine (NORVASC) 5 MG tablet Take 1 tablet (5 mg) by mouth daily 30 tablet 3     predniSONE (DELTASONE) 20 MG tablet Take 4 tablets (80 mg) by mouth daily 120 tablet 0     sulfamethoxazole-trimethoprim (BACTRIM) 400-80 MG per tablet Once daily for PJP prophylaxis. 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 2017  Involved areas: hands, feet, legs, face, wrists  Pain scale:  1/10     Wakes the patient from sleep : No/ does have the feeling of pins and needles in his hands that keeps him awake at night  Morning stiffness:the same all the time, never gets worse or better; used to have more morning stiffness but now it is the same all the time  Meds used:none     Interim history  Since last visit:  1. Infections - No  2. New symptoms/medical problem - Yes/ SOB X months, coughing X since Nov 2016- SOB steadily getting worse; SOB even to climb stairs now,   Fatigue - 4-5/10  3. Any side effects from Rheum medications -NA  3. ER visits/Hospitalizations/surgeries - No  4. Last PCP visit: today    CT chest: 6/17  IMPRESSION:  1. Changes of fibrosis within the periphery of the mid and upper right  lung and mid left lung. More fibrotic changes noted at the lung bases.  No overt honeycombing is currently evident, but areas of traction  bronchiectasis and groundglass opacity are noted suggesting active  alveolitis. Early changes of UIP are possible. Continued surveillance  as clinically warranted.  2. Evidence of old granulomatous disease. No enlarged lymph nodes.    Raynaud's since fall 2016 but only occasional. 5th digit mostly in left.     No h/o unintentional weight loss, loss of appetite, fevers, rash, swollen glands  No h/o gout  No family or personal history of psoriasis, ulcerative colitis or chron's disease. No h/o iritis.   Patient denies any malar rash, photosensitivity, recurrent mouth/genital ulcers, sicca symptoms, recurrent sinusitis/rhinitis  No h/o arterial/venous thrombosis in the past  No h/o persistent nausea, vomiting, constipation, diarrhea, abdominal pain  No h/o hematochezia, hematuria, hemoptysis, hematemesis  No h/o seizures  No h/o cancer    June 27, 2017  Bronchoscopy done about a week ago.    Prednisone started a week ago. 60mg PO daily.   Coughing is better with prednisone.     Joint pain history  Onset: 6 months  Involved joints: wrists, fingers, knees, shoulder. Moves around  Pain scale:  1.5/10     Wakes the patient from sleep : No  Morning stiffness: Yes for 120 minutes  Meds used: none     Interim history  Since last visit:  1. Infections - No  2. New symptoms/medical problem - No  3. Any side effects from Rheum medications -NA; tolerating prednisone well.   3. ER visits/Hospitalizations/surgeries - No  4. Last PCP visit: 6/7/17    Your muscle enzymes are also elevated suggesting an autoimmune lung, muscle and joint disease.   Rheumatoid antibodies are normal.   ANCA vasculitis antibody are negative.   IgG4 normal.   SHERRELL and Marlene-1 antibodies are normal.     July 25, 2017    The myositis panel (send out test) tests for many different antibodies which are associated with the symptoms that you have. All of them were negative.    7/18  CRP has normalized. Good.   CK is trending down. Good.     PSA normal.     CT abdomen and pelvis: 6/17  1. Interstitial lung disease present at the lung bases, right greater  than left.  2. 2.3 cm low-density lesion in the liver does not have diagnostic  feature. Suggest further evaluation with liver MRI.    7/17  1. A 2.3 cm lesion in the posterior segment of the right hepatic lobe  has signal and enhancement characteristics consistent with a benign  hemangioma.  2. A subcentimeter left hepatic lesion also most likely represents a  Hemangioma.    Follow-up   Onset: November 2016  Pain scale:  4/10     Wakes the patient from sleep : No/ keeps him awake  Morning stiffness:all day  Meds used:prednisone 60 mg PO daily, bactrim     Made a lot of dietary changes. Intentional weight loss.     Wt Readings from Last 4 Encounters:   07/25/17 78.5 kg (173 lb)   07/25/17 78.7 kg (173 lb 8 oz)   06/27/17 81.3 kg (179 lb 3.2 oz)   06/07/17 86 kg (189 lb 8 oz)     Interim  history  Since last visit:  1. Infections - No  2. New symptoms/medical problem - Yes/ hands & feet continue to swell, O2 sats continue to drop, SOB all the time when he is moving.   3. Any side effects from Rheum medications -none  3. ER visits/Hospitalizations/surgeries - Yes/ had colonoscopy  4. Last PCP visit: doesn't have one     BP Readings from Last 3 Encounters:   07/25/17 124/68   07/25/17 124/68   07/19/17 125/83              Review of Systems:   Complete ROS negative except for symptoms mentioned in the HPI          Past Medical History:     Past Medical History:   Diagnosis Date     Fracture      Past Surgical History:   Procedure Laterality Date     COLONOSCOPY N/A 7/19/2017    Procedure: COLONOSCOPY;  COLONOSCOPY;  Surgeon: Mita Jimenez MD;  Location:  GI     NO HISTORY OF SURGERY              Social History:   Works for [employer].   Lives with parents. Not  and does not have children.     Social History     Occupational History     Not on file.     Social History Main Topics     Smoking status: Never Smoker     Smokeless tobacco: Former User     Types: Chew     Quit date: 1/1/1984     Alcohol use Yes      Comment: ocasionally     Drug use: No     Sexual activity: Not Currently            Family History:     Family History   Problem Relation Age of Onset     Prostate Cancer Father      LUNG DISEASE No family hx of      Rheumatologic Disease No family hx of             Allergies:     Allergies   Allergen Reactions     Ampicillin Rash            Medications:     Current Outpatient Prescriptions   Medication Sig Dispense Refill     mycophenolate (CELLCEPT - GENERIC EQUIVALENT) 500 MG tablet 500mg twice daily for 2 weeks, then 1000mg in the AM and 500mg in the PM for 2 weeks, then 1000mg in the AM and 1000mg in the PM.  Labs in 1 month.  Further refill depending on lab results. 120 tablet 0     famotidine (PEPCID) 40 MG tablet Take 1 tablet (40 mg) by mouth At Bedtime 30 tablet 2      "amLODIPine (NORVASC) 5 MG tablet Take 1 tablet (5 mg) by mouth daily 30 tablet 3     predniSONE (DELTASONE) 20 MG tablet Take 4 tablets (80 mg) by mouth daily 120 tablet 0     sulfamethoxazole-trimethoprim (BACTRIM) 400-80 MG per tablet Once daily for PJP prophylaxis. Start after bronchoscopy 60 tablet 1            Physical Exam:   Blood pressure 124/68, pulse 72, temperature 98.5  F (36.9  C), temperature source Oral, height 1.753 m (5' 9\"), weight 78.7 kg (173 lb 8 oz), SpO2 94 %.  Wt Readings from Last 4 Encounters:   07/25/17 78.5 kg (173 lb)   07/25/17 78.7 kg (173 lb 8 oz)   06/27/17 81.3 kg (179 lb 3.2 oz)   06/07/17 86 kg (189 lb 8 oz)     Constitutional: well-developed, appearing stated age; cooperative  Eyes: PERRLA, normal conjunctiva, sclera  ENT: nl external ears, nose, lips.No mucous membrane lesions, normal saliva pool  Neck: no cervical lymphadenopathy  Resp: lungs clear to auscultation,   CV: RRR, no added sounds, no edema  GI: Abdomen soft and no tenderness  : not tested  Lymph: no cervical, supraclavicular or epitrochlear nodes  Muscle: hip flexors 5/5; proximal upper extremity muscles 5/5  MS: Synovitis in MCPs, PIPs, wrists with minimal restriction of ROM  All shoulder, elbow, wrist, MCP/PIP/DIP, hip, knee, ankle, and foot MTP/IP joints were examined and  found normal. No active synovitis or deformity. Full ROM.  Fist 100%.  No dactylitis,  tenosynovitis, enthesopathy.  Skin: Gottron's papules noted in MCPs, PIPs, DIPs. Finger tip pitting noted in 2nd through 5th fingers.   Psych: nl judgement, orientation, memory, affect.         Data:     Results for orders placed or performed in visit on 07/25/17   Hepatitis B surface antigen   Result Value Ref Range    Hep B Surface Agn Nonreactive NR   Hepatitis C antibody   Result Value Ref Range    Hepatitis C Antibody  NR     Nonreactive   Assay performance characteristics have not been established for newborns,   infants, and children     Hepatitis B " core antibody   Result Value Ref Range    Hepatitis B Core Cici Nonreactive NR   Hepatitis B Surface Antibody   Result Value Ref Range    Hepatitis B Surface Antibody 0.10 <8.00 m[IU]/mL       Mani Golden MD    [facility]

## 2017-07-25 ENCOUNTER — OFFICE VISIT (OUTPATIENT)
Dept: RHEUMATOLOGY | Facility: CLINIC | Age: 52
End: 2017-07-25
Payer: COMMERCIAL

## 2017-07-25 ENCOUNTER — OFFICE VISIT (OUTPATIENT)
Dept: INTERNAL MEDICINE | Facility: CLINIC | Age: 52
End: 2017-07-25
Payer: COMMERCIAL

## 2017-07-25 VITALS
WEIGHT: 173 LBS | BODY MASS INDEX: 25.55 KG/M2 | OXYGEN SATURATION: 96 % | TEMPERATURE: 98.2 F | HEART RATE: 78 BPM | SYSTOLIC BLOOD PRESSURE: 124 MMHG | DIASTOLIC BLOOD PRESSURE: 68 MMHG

## 2017-07-25 VITALS
DIASTOLIC BLOOD PRESSURE: 68 MMHG | HEART RATE: 72 BPM | SYSTOLIC BLOOD PRESSURE: 124 MMHG | HEIGHT: 69 IN | TEMPERATURE: 98.5 F | WEIGHT: 173.5 LBS | BODY MASS INDEX: 25.7 KG/M2 | OXYGEN SATURATION: 94 %

## 2017-07-25 DIAGNOSIS — Z13.6 CARDIOVASCULAR SCREENING; LDL GOAL LESS THAN 160: ICD-10-CM

## 2017-07-25 DIAGNOSIS — M13.0 POLYARTHRITIS: ICD-10-CM

## 2017-07-25 DIAGNOSIS — M60.9 MYOSITIS, UNSPECIFIED MYOSITIS TYPE, UNSPECIFIED SITE: ICD-10-CM

## 2017-07-25 DIAGNOSIS — M19.90 INFLAMMATORY ARTHRITIS: ICD-10-CM

## 2017-07-25 DIAGNOSIS — J84.9 ILD (INTERSTITIAL LUNG DISEASE) (H): Primary | ICD-10-CM

## 2017-07-25 DIAGNOSIS — M33.13 DERMATOMYOSITIS (H): ICD-10-CM

## 2017-07-25 DIAGNOSIS — D89.89 ANTISYNTHETASE SYNDROME (H): Primary | ICD-10-CM

## 2017-07-25 DIAGNOSIS — Z23 NEED FOR VACCINATION: ICD-10-CM

## 2017-07-25 DIAGNOSIS — J84.9 ILD (INTERSTITIAL LUNG DISEASE) (H): ICD-10-CM

## 2017-07-25 DIAGNOSIS — D89.89 ANTISYNTHETASE SYNDROME (H): ICD-10-CM

## 2017-07-25 DIAGNOSIS — K21.9 GASTROESOPHAGEAL REFLUX DISEASE WITHOUT ESOPHAGITIS: ICD-10-CM

## 2017-07-25 DIAGNOSIS — I73.00 RAYNAUD'S DISEASE WITHOUT GANGRENE: ICD-10-CM

## 2017-07-25 LAB
HBV CORE AB SERPL QL IA: NONREACTIVE
HBV SURFACE AB SERPL IA-ACNC: 0.1 M[IU]/ML
HBV SURFACE AG SERPL QL IA: NONREACTIVE
HCV AB SERPL QL IA: NORMAL

## 2017-07-25 PROCEDURE — 87340 HEPATITIS B SURFACE AG IA: CPT | Performed by: INTERNAL MEDICINE

## 2017-07-25 PROCEDURE — 90471 IMMUNIZATION ADMIN: CPT | Performed by: INTERNAL MEDICINE

## 2017-07-25 PROCEDURE — 99215 OFFICE O/P EST HI 40 MIN: CPT | Performed by: INTERNAL MEDICINE

## 2017-07-25 PROCEDURE — 86706 HEP B SURFACE ANTIBODY: CPT | Performed by: INTERNAL MEDICINE

## 2017-07-25 PROCEDURE — 90670 PCV13 VACCINE IM: CPT | Performed by: INTERNAL MEDICINE

## 2017-07-25 PROCEDURE — 86480 TB TEST CELL IMMUN MEASURE: CPT | Performed by: INTERNAL MEDICINE

## 2017-07-25 PROCEDURE — 99214 OFFICE O/P EST MOD 30 MIN: CPT | Mod: 25 | Performed by: INTERNAL MEDICINE

## 2017-07-25 PROCEDURE — 86704 HEP B CORE ANTIBODY TOTAL: CPT | Performed by: INTERNAL MEDICINE

## 2017-07-25 PROCEDURE — 36415 COLL VENOUS BLD VENIPUNCTURE: CPT | Performed by: INTERNAL MEDICINE

## 2017-07-25 PROCEDURE — 86803 HEPATITIS C AB TEST: CPT | Performed by: INTERNAL MEDICINE

## 2017-07-25 RX ORDER — MYCOPHENOLATE MOFETIL 500 MG/1
TABLET ORAL
Qty: 120 TABLET | Refills: 0 | Status: SHIPPED | OUTPATIENT
Start: 2017-07-25 | End: 2017-08-31

## 2017-07-25 RX ORDER — AMLODIPINE BESYLATE 5 MG/1
5 TABLET ORAL DAILY
Qty: 30 TABLET | Refills: 3 | Status: SHIPPED | OUTPATIENT
Start: 2017-07-25 | End: 2017-09-15

## 2017-07-25 RX ORDER — PREDNISONE 20 MG/1
80 TABLET ORAL DAILY
Qty: 120 TABLET | Refills: 0 | Status: SHIPPED | OUTPATIENT
Start: 2017-07-25 | End: 2017-08-22

## 2017-07-25 RX ORDER — FAMOTIDINE 40 MG/1
40 TABLET, FILM COATED ORAL AT BEDTIME
Qty: 30 TABLET | Refills: 2 | Status: SHIPPED | OUTPATIENT
Start: 2017-07-25 | End: 2017-09-15

## 2017-07-25 ASSESSMENT — ROUTINE ASSESSMENT OF PATIENT INDEX DATA (RAPID3)
TOTAL RAPID3 SCORE: 7.8
RAPID3 INTERPRETATION: MODERATE 6.1-12.0

## 2017-07-25 NOTE — NURSING NOTE
"Chief Complaint   Patient presents with     RECHECK       Initial /68 (BP Location: Left arm, Patient Position: Chair, Cuff Size: Adult Regular)  Pulse 72  Temp 98.5  F (36.9  C) (Oral)  Ht 1.753 m (5' 9\")  Wt 78.7 kg (173 lb 8 oz)  SpO2 94%  BMI 25.62 kg/m2 Estimated body mass index is 25.62 kg/(m^2) as calculated from the following:    Height as of this encounter: 1.753 m (5' 9\").    Weight as of this encounter: 78.7 kg (173 lb 8 oz).  Medication Reconciliation: complete    Follow-up   Onset: November 2016  Pain scale:  4/10     Wakes the patient from sleep : No/ keeps him awake  Morning stiffness:all day  Meds used:prednisone, bactrim     Interim history  Since last visit:  1. Infections - No  2. New symptoms/medical problem - Yes/ hands & feet continue to swell, O2 sats continue to drop, SOB all the time when he is moving.   3. Any side effects from Rheum medications -none  3. ER visits/Hospitalizations/surgeries - Yes/ had colonoscopy  4. Last PCP visit: doesn't have one  Wt Readings from Last 4 Encounters:   07/25/17 78.7 kg (173 lb 8 oz)   06/27/17 81.3 kg (179 lb 3.2 oz)   06/07/17 86 kg (189 lb 8 oz)   06/07/17 86 kg (189 lb 8 oz)     BP Readings from Last 3 Encounters:   07/25/17 124/68   07/19/17 125/83   06/27/17 112/70       "

## 2017-07-25 NOTE — MR AVS SNAPSHOT
After Visit Summary   7/25/2017    Kwaku Medrano    MRN: [id number]           Patient Information     Date Of Birth          1965        Visit Information        Provider Department      7/25/2017 8:30 AM Mani Golden MD [facility]        Today's Diagnoses     ILD (interstitial lung disease) (H)    -  1    Myositis, unspecified myositis type, unspecified site        Polyarthritis        Dermatomyositis (H)        Antisynthetase syndrome (H)        Gastroesophageal reflux disease without esophagitis        Raynaud's disease without gangrene          Care Instructions      Dermatomyositis  Dermatomyositis is a connective tissue disease. It causes inflammatory changes in the muscle and a skin rash. The cause is unknown. It usually starts with an itchy, burning, reddish or bluish rash on areas that have been exposed to the sun. As the disease progresses, there is muscle weakness. The arm and thigh muscles are most affected. You may have a hard time climbing stairs, getting out of chairs, lifting heavy things, or raising your arms overhead. Sometimes the muscles ache and become tender. The swallowing muscles may also be affected.  This condition is treated with oral steroids to slow down the disease's progression. Other medicines may also be used. Sometimes, medicines such as azathioprine or methotrexate may be started at the same time as oral steroids. This helps prevent some of the complications of long-term steroid use. In some cases, these medicines may only be used if you fail oral steroid treatment. Some patients will recover completely. Others will need treatment over their lifetime.  Dermatomyositis may be associated with a higher risk of certain types of cancer. These include ovarian, stomach, lymphoma. Your healthcare provider may do periodic screening exams.  Home care  The following guidelines will help you care for yourself at home:    The rash is  sensitive to sun exposure. Protect yourself from the sun with hats and cover-up clothing and use sunscreen of at least SPF 15.    If you have muscle aches, rest as needed.    Light exercise and physical activity can help keep your muscles in the best shape possible. Talk with your healthcare provider about an exercise plan that is right for you. You may need to see a physical therapist.    You may use over-the-counter medicine as directed to control pain, unless another medicine was prescribed. If you have chronic liver or kidney disease or ever had a stomach ulcer or GI bleeding, talk with your healthcare provider before using these medicines. Don t take ibuprofen or other NSAID medicines if you were prescribed prednisone.    Talk with your healthcare provider before trying to get pregnant.  Follow-up care  Follow up with your healthcare provider, or as advised. For more information contact the following:    Myositis Association   [phone]   www.myositis.org    Arthritis Foundation    [phone]   www.arthritis.org  When to seek medical care  Call your healthcare provider right away if any of these occur.    Blood in the stool (black or red color)    Change in bowel or bladder habits    Cough or hoarseness that doesn't go away    Difficulty swallowing    Fever of 100.4 F (38 C) or higher, or as directed by your healthcare provider    Pain in abdomen    Thickening or lump in the breast or elsewhere    Unexpected weight loss    Vision changes  Call 911  Call 911 if you have:    Dizziness    Shortness of breath    Weakness   Date Last Reviewed: 6/1/2016 2000-2017 [employer]. [address] [place] [address]. All rights reserved. This information is not intended as a substitute for professional medical care. Always follow your healthcare professional's instructions.        Mycophenolate capsules  What is this medicine?  MYCOPHENOLATE MOFETIL (mye koe FEN oh late CHON fe til) is used to  decrease the immune system's response to a transplanted organ.  How should I use this medicine?  Take this medicine by mouth with a full glass of water. Follow the directions on the prescription label. Take this medicine on an empty stomach, at least 1 hour before or 2 hours after food. Do not take with food unless your doctor approves. Swallow the medicine whole. Do not cut, crush, or chew the medicine. If the medicine is broken or is not intact, do not get the powder on your skin or eyes. If contact occurs, rinse thoroughly with water. Take your medicine at regular intervals. Do not take your medicine more often than directed. Do not stop taking except on your doctor's advice.  A special MedGuide will be given to you by the pharmacist with each prescription and refill. Be sure to read this information carefully each time.  Talk to your pediatrician regarding the use of this medicine in children. Special care may be needed.  What side effects may I notice from receiving this medicine?  Side effects that you should report to your doctor or health care professional as soon as possible:    allergic reactions like skin rash, itching or hives, swelling of the face, lips, or tongue    bloody, dark, or tarry stools    changes in vision    dizziness    fever, chills or any other sign of infection    unusual bleeding or bruising    unusually weak or tired  Side effects that usually do not require medical attention (report to your doctor or health care professional if they continue or are bothersome):    constipation    diarrhea    difficulty sleeping    loss of appetite    nausea, vomiting  What may interact with this medicine?    acyclovir or valacyclovir    antacids    azathioprine    birth control pills    certain antibiotics like ciprofloxacin and amoxicillin; clavulanic acid    ganciclovir or valganciclovir    lanthanum carbonate    medicines for cholesterol like cholestyramine and  colestipol    metronidazole    norfloxacin    other mycophenolate medicines    probenecid    rifampin    sevelamer    vaccines  What if I miss a dose?  If you miss a dose, take it as soon as you can. If it is almost time for your next dose, take only that dose. Do not take double or extra doses.  Where should I keep my medicine?  Keep out of the reach of children.  Store at room temperature between 15 and 30 degrees C (59 and 86 degrees F). Throw away any unused medicine after the expiration date.  What should I tell my health care provider before I take this medicine?  They need to know if you have any of these conditions:    anemia or other blood disorder    diarrhea    immune system problems    infection    kidney disease    phenylketonuria    stomach problems    an unusual or allergic reaction to mycophenolate mofetil, other medicines, foods, dyes, or preservatives    pregnant or trying to get pregnant    breast-feeding  What should I watch for while using this medicine?  Visit your doctor or health care professional for regular checks on your progress. You will need frequent blood checks during the first few months you are receiving the medicine.  This medicine can make you more sensitive to the sun. Keep out of the sun. If you cannot avoid being in the sun, wear protective clothing and use sunscreen. Do not use sun lamps or tanning beds/booths.  This medicine can cause birth defects. Do not get pregnant while taking this drug. Females will need to have a negative pregnancy test before starting this medicine. If sexually active, use 2 reliable forms of birth control together for 4 weeks before starting this medicine, while you are taking this medicine, and for 6 weeks after you stop taking this medicine. Birth control pills alone may not work properly while you are taking this medicine. If you think that you might be pregnant talk to your doctor right away.  If you get a cold or other infection while receiving  this medicine, call your doctor or health care professional. Do not treat yourself. The medicine may decrease your body's ability to fight infections.  NOTE:This sheet is a summary. It may not cover all possible information. If you have questions about this medicine, talk to your doctor, pharmacist, or health care provider. Copyright  2017 [employer]                Follow-ups after your visit        Follow-up notes from your care team     Return in about 2 weeks (around 8/8/2017).      Your next 10 appointments already scheduled     Jul 25, 2017 11:00 AM CDT   Office Visit with Stew Caldwell MD   [facility]    [address] [address] [phone]           Bring a current list of meds and any records pertaining to this visit.  For Physicals, please bring immunization records and any forms needing to be filled out.  Please arrive 10 minutes early to complete paperwork.            Sep 15, 2017  8:00 AM CDT   SIX MINUTE WALK with [facility] PFL 6 MINUTE WALK 2, UC PFL C   [facility] ([facility])    [address] [place] [id number]   [phone]            Sep 15, 2017  9:00 AM CDT   (Arrive by 8:45 AM)   New Interstitial Lung with Elma Dillon MD   [facility] ([facility])    [address] [place] [address]   [phone]              Future tests that were ordered for you today     Open Future Orders        Priority Expected Expires Ordered    Pulmonary Function Test Routine  7/25/2018 7/25/2017    General PFT Lab (Please always keep checked) Routine  7/25/2018 7/25/2017            Who to contact     If you have questions or need follow up information about today's clinic visit or your schedule please contact [facility] directly at  "[phone].  Normal or non-critical lab and imaging results will be communicated to you by MyChart, letter or phone within 4 business days after the clinic has received the results. If you do not hear from us within 7 days, please contact the clinic through [employer] or phone. If you have a critical or abnormal lab result, we will notify you by phone as soon as possible.  Submit refill requests through [employer] or call your pharmacy and they will forward the refill request to us. Please allow 3 business days for your refill to be completed.          Additional Information About Your Visit        [email or web address] Information     [employer] gives you secure access to your electronic health record. If you see a primary care provider, you can also send messages to your care team and make appointments. If you have questions, please call your primary care clinic.  If you do not have a primary care provider, please call [phone] and they will assist you.        Care EveryWhere ID     This is your Care EveryWhere ID. This could be used by other organizations to access your [facility] medical records  [id number]        Your Vitals Were     Pulse Temperature Height Pulse Oximetry BMI (Body Mass Index)       72 98.5  F (36.9  C) (Oral) 1.753 m (5' 9\") 94% 25.62 kg/m2        Blood Pressure from Last 3 Encounters:   07/25/17 124/68   07/19/17 125/83   06/27/17 112/70    Weight from Last 3 Encounters:   07/25/17 78.7 kg (173 lb 8 oz)   06/27/17 81.3 kg (179 lb 3.2 oz)   06/07/17 86 kg (189 lb 8 oz)              We Performed the Following     Hepatitis B core antibody     Hepatitis B Surface Antibody     Hepatitis B surface antigen     Hepatitis C antibody     M Tuberculosis by Quantiferon          Today's Medication Changes          These changes are accurate as of: 7/25/17  9:33 AM.  If you have any questions, ask your nurse or doctor.               Start taking these medicines.        Dose/Directions    amLODIPine 5 MG tablet   Commonly " known as:  NORVASC   Used for:  Raynaud's disease without gangrene   Started by:  Mani Golden MD        Dose:  5 mg   Take 1 tablet (5 mg) by mouth daily   Quantity:  30 tablet   Refills:  3       famotidine 40 MG tablet   Commonly known as:  PEPCID   Used for:  Gastroesophageal reflux disease without esophagitis   Started by:  Mani Golden MD        Dose:  40 mg   Take 1 tablet (40 mg) by mouth At Bedtime   Quantity:  30 tablet   Refills:  2       mycophenolate 500 MG tablet   Commonly known as:  CELLCEPT - GENERIC EQUIVALENT   Used for:  ILD (interstitial lung disease) (H), Myositis, unspecified myositis type, unspecified site, Polyarthritis, Dermatomyositis (H), Antisynthetase syndrome (H)   Started by:  Mani Golden MD        500mg twice daily for 2 weeks, then 1000mg in the AM and 500mg in the PM for 2 weeks, then 1000mg in the AM and 1000mg in the PM.  Labs in 1 month.  Further refill depending on lab results.   Quantity:  120 tablet   Refills:  0         Stop taking these medicines if you haven't already. Please contact your care team if you have questions.     pantoprazole 20 MG EC tablet   Commonly known as:  PROTONIX   Stopped by:  Mani Golden MD                Where to get your medicines      These medications were sent to [facility] [address] [place], [place] - [address] AT [facility] [address], [facility] [address]    Hours:  24-hours Phone:  [phone]     amLODIPine 5 MG tablet    famotidine 40 MG tablet         Some of these will need a paper prescription and others can be bought over the counter.  Ask your nurse if you have questions.     Bring a paper prescription for each of these medications     mycophenolate 500 MG tablet                Primary Care Provider    Physician No Ref-Primary       No address on file        Equal Access to Services     ESTHER DE LA O AH: derik Rios  juan f natalie christianokeegan laguerre ah. So [facility] [phone].    ATENCIÓN: Si rivera wilkins, tiene a rubalcava disposición servicios gratuitos de asistencia lingüística. Abimbola al [phone].    We comply with applicable federal civil rights laws and [place] laws. We do not discriminate on the basis of race, color, national origin, age, disability sex, sexual orientation or gender identity.            Thank you!     Thank you for choosing [facility]  for your care. Our goal is always to provide you with excellent care. Hearing back from our patients is one way we can continue to improve our services. Please take a few minutes to complete the written survey that you may receive in the mail after your visit with us. Thank you!             Your Updated Medication List - Protect others around you: Learn how to safely use, store and throw away your medicines at www.disposemymeds.org.          This list is accurate as of: 7/25/17  9:33 AM.  Always use your most recent med list.                   Brand Name Dispense Instructions for use Diagnosis    amLODIPine 5 MG tablet    NORVASC    30 tablet    Take 1 tablet (5 mg) by mouth daily    Raynaud's disease without gangrene       famotidine 40 MG tablet    PEPCID    30 tablet    Take 1 tablet (40 mg) by mouth At Bedtime    Gastroesophageal reflux disease without esophagitis       mycophenolate 500 MG tablet    CELLCEPT - GENERIC EQUIVALENT    120 tablet    500mg twice daily for 2 weeks, then 1000mg in the AM and 500mg in the PM for 2 weeks, then 1000mg in the AM and 1000mg in the PM.  Labs in 1 month.  Further refill depending on lab results.    ILD (interstitial lung disease) (H), Myositis, unspecified myositis type, unspecified site, Polyarthritis, Dermatomyositis (H), Antisynthetase syndrome (H)       predniSONE 20 MG tablet    DELTASONE    120 tablet    Take 3 tablets (60 mg) by mouth daily To be started after  bronchoscopy    ILD (interstitial lung disease) (H), Inflammatory arthritis       sulfamethoxazole-trimethoprim 400-80 MG per tablet    BACTRIM    60 tablet    Once daily for PJP prophylaxis. Start after bronchoscopy    ILD (interstitial lung disease) (H), Inflammatory arthritis

## 2017-07-25 NOTE — NURSING NOTE
Pt scheduled for PFT at [facility] on August 3rd at 8 a.m. They will be sending pt a packet in the mail.  Pt aware of scott. Korin Ventura LPN

## 2017-07-25 NOTE — NURSING NOTE
Screening Questionnaire for Adult Immunization    Are you sick today?   No   Do you have allergies to medications, food, a vaccine component or latex?   Yes   Have you ever had a serious reaction after receiving a vaccination?   No   Do you have a long-term health problem with heart disease, lung disease, asthma, kidney disease, metabolic disease (e.g. diabetes), anemia, or other blood disorder?   Yes   Do you have cancer, leukemia, HIV/AIDS, or any other immune system problem?   No   In the past 3 months, have you taken medications that affect  your immune system, such as prednisone, other steroids, or anticancer drugs; drugs for the treatment of rheumatoid arthritis, Crohn s disease, or psoriasis; or have you had radiation treatments?   No   Have you had a seizure, or a brain or other nervous system problem?   No   During the past year, have you received a transfusion of blood or blood     products, or been given immune (gamma) globulin or antiviral drug?   No   For women: Are you pregnant or is there a chance you could become        pregnant during the next month?   No   Have you received any vaccinations in the past 4 weeks?   No     Immunization questionnaire was positive for at least one answer.  Notified Dr. Caldwell.      MNVFC doesn't apply on this patient    Per orders of Dr. Caldwell, injection of Prevnar 13 given by Devi Blanco. Patient instructed to remain in clinic for 15 minutes afterwards, and to report any adverse reaction to me immediately.       Screening performed by Devi Blanco on 7/25/2017 at 12:32 PM.

## 2017-07-25 NOTE — PATIENT INSTRUCTIONS
[facility]: [facility] - Counseling (Individual/Couples/Family) - [facility] [phone]

## 2017-07-25 NOTE — NURSING NOTE
"Chief Complaint   Patient presents with     Depression       Initial /68  Pulse 78  Temp 98.2  F (36.8  C) (Oral)  Wt 173 lb (78.5 kg)  SpO2 96%  BMI 25.55 kg/m2 Estimated body mass index is 25.55 kg/(m^2) as calculated from the following:    Height as of an earlier encounter on 7/25/17: 5' 9\" (1.753 m).    Weight as of this encounter: 173 lb (78.5 kg).  Medication Reconciliation: complete    "

## 2017-07-25 NOTE — PATIENT INSTRUCTIONS
Dermatomyositis  Dermatomyositis is a connective tissue disease. It causes inflammatory changes in the muscle and a skin rash. The cause is unknown. It usually starts with an itchy, burning, reddish or bluish rash on areas that have been exposed to the sun. As the disease progresses, there is muscle weakness. The arm and thigh muscles are most affected. You may have a hard time climbing stairs, getting out of chairs, lifting heavy things, or raising your arms overhead. Sometimes the muscles ache and become tender. The swallowing muscles may also be affected.  This condition is treated with oral steroids to slow down the disease's progression. Other medicines may also be used. Sometimes, medicines such as azathioprine or methotrexate may be started at the same time as oral steroids. This helps prevent some of the complications of long-term steroid use. In some cases, these medicines may only be used if you fail oral steroid treatment. Some patients will recover completely. Others will need treatment over their lifetime.  Dermatomyositis may be associated with a higher risk of certain types of cancer. These include ovarian, stomach, lymphoma. Your healthcare provider may do periodic screening exams.  Home care  The following guidelines will help you care for yourself at home:    The rash is sensitive to sun exposure. Protect yourself from the sun with hats and cover-up clothing and use sunscreen of at least SPF 15.    If you have muscle aches, rest as needed.    Light exercise and physical activity can help keep your muscles in the best shape possible. Talk with your healthcare provider about an exercise plan that is right for you. You may need to see a physical therapist.    You may use over-the-counter medicine as directed to control pain, unless another medicine was prescribed. If you have chronic liver or kidney disease or ever had a stomach ulcer or GI bleeding, talk with your healthcare provider before using  these medicines. Don t take ibuprofen or other NSAID medicines if you were prescribed prednisone.    Talk with your healthcare provider before trying to get pregnant.  Follow-up care  Follow up with your healthcare provider, or as advised. For more information contact the following:    Myositis Association   [phone]   www.myositis.org    Arthritis Foundation    [phone]   www.arthritis.org  When to seek medical care  Call your healthcare provider right away if any of these occur.    Blood in the stool (black or red color)    Change in bowel or bladder habits    Cough or hoarseness that doesn't go away    Difficulty swallowing    Fever of 100.4 F (38 C) or higher, or as directed by your healthcare provider    Pain in abdomen    Thickening or lump in the breast or elsewhere    Unexpected weight loss    Vision changes  Call 911  Call 911 if you have:    Dizziness    Shortness of breath    Weakness   Date Last Reviewed: 6/1/2016 2000-2017 [employer]. [address] [place] [address]. All rights reserved. This information is not intended as a substitute for professional medical care. Always follow your healthcare professional's instructions.        Mycophenolate capsules  What is this medicine?  MYCOPHENOLATE MOFETIL (mye koe FEN oh late CHON fe til) is used to decrease the immune system's response to a transplanted organ.  How should I use this medicine?  Take this medicine by mouth with a full glass of water. Follow the directions on the prescription label. Take this medicine on an empty stomach, at least 1 hour before or 2 hours after food. Do not take with food unless your doctor approves. Swallow the medicine whole. Do not cut, crush, or chew the medicine. If the medicine is broken or is not intact, do not get the powder on your skin or eyes. If contact occurs, rinse thoroughly with water. Take your medicine at regular intervals. Do not take your medicine more often than directed.  Do not stop taking except on your doctor's advice.  A special MedGuide will be given to you by the pharmacist with each prescription and refill. Be sure to read this information carefully each time.  Talk to your pediatrician regarding the use of this medicine in children. Special care may be needed.  What side effects may I notice from receiving this medicine?  Side effects that you should report to your doctor or health care professional as soon as possible:    allergic reactions like skin rash, itching or hives, swelling of the face, lips, or tongue    bloody, dark, or tarry stools    changes in vision    dizziness    fever, chills or any other sign of infection    unusual bleeding or bruising    unusually weak or tired  Side effects that usually do not require medical attention (report to your doctor or health care professional if they continue or are bothersome):    constipation    diarrhea    difficulty sleeping    loss of appetite    nausea, vomiting  What may interact with this medicine?    acyclovir or valacyclovir    antacids    azathioprine    birth control pills    certain antibiotics like ciprofloxacin and amoxicillin; clavulanic acid    ganciclovir or valganciclovir    lanthanum carbonate    medicines for cholesterol like cholestyramine and colestipol    metronidazole    norfloxacin    other mycophenolate medicines    probenecid    rifampin    sevelamer    vaccines  What if I miss a dose?  If you miss a dose, take it as soon as you can. If it is almost time for your next dose, take only that dose. Do not take double or extra doses.  Where should I keep my medicine?  Keep out of the reach of children.  Store at room temperature between 15 and 30 degrees C (59 and 86 degrees F). Throw away any unused medicine after the expiration date.  What should I tell my health care provider before I take this medicine?  They need to know if you have any of these conditions:    anemia or other blood  disorder    diarrhea    immune system problems    infection    kidney disease    phenylketonuria    stomach problems    an unusual or allergic reaction to mycophenolate mofetil, other medicines, foods, dyes, or preservatives    pregnant or trying to get pregnant    breast-feeding  What should I watch for while using this medicine?  Visit your doctor or health care professional for regular checks on your progress. You will need frequent blood checks during the first few months you are receiving the medicine.  This medicine can make you more sensitive to the sun. Keep out of the sun. If you cannot avoid being in the sun, wear protective clothing and use sunscreen. Do not use sun lamps or tanning beds/booths.  This medicine can cause birth defects. Do not get pregnant while taking this drug. Females will need to have a negative pregnancy test before starting this medicine. If sexually active, use 2 reliable forms of birth control together for 4 weeks before starting this medicine, while you are taking this medicine, and for 6 weeks after you stop taking this medicine. Birth control pills alone may not work properly while you are taking this medicine. If you think that you might be pregnant talk to your doctor right away.  If you get a cold or other infection while receiving this medicine, call your doctor or health care professional. Do not treat yourself. The medicine may decrease your body's ability to fight infections.  NOTE:This sheet is a summary. It may not cover all possible information. If you have questions about this medicine, talk to your doctor, pharmacist, or health care provider. Copyright  2017 [employer]

## 2017-07-25 NOTE — MR AVS SNAPSHOT
After Visit Summary   7/25/2017    Kwaku Medrano    MRN: [id number]           Patient Information     Date Of Birth          1965        Visit Information        Provider Department      7/25/2017 11:00 AM Stew Caldwell MD [facility]        Today's Diagnoses     Antisynthetase syndrome (H)    -  1    Dermatomyositis (H)        ILD (interstitial lung disease) (H)        CARDIOVASCULAR SCREENING; LDL GOAL LESS THAN 160          Care Instructions    [facility]: [facility] - Counseling (Individual/Couples/Family) - [facility] [phone]             Follow-ups after your visit        Your next 10 appointments already scheduled     Aug 03, 2017  8:00 AM CDT   Pulmonary Function with PFT LAB IN [facility] ([facility])    [facility] [place] [address]  [facility] [id number] [phone]           No Inhalers for 6 hours prior to test No Smoking 2 hours prior to test            Aug 08, 2017  8:30 AM CDT   Return Visit with Mani Golden MD   [facility] ([facility])    [address] [id number] [phone]            Sep 15, 2017  8:00 AM CDT   SIX MINUTE WALK with UC PFL 6 MINUTE WALK 2, UC PFL C   [facility] ([facility])    [address] [address] [phone]            Sep 15, 2017  9:00 AM CDT   (Arrive by 8:45 AM)   New Interstitial Lung with Elma Dillon MD   [facility] ([facility])    [address] [address] [phone]              Future tests that were ordered for you today     Open Future Orders        Priority Expected Expires Ordered    Lipid panel reflex to direct LDL Routine  7/25/2018 7/25/2017     Pulmonary Function Test Routine  7/25/2018 7/25/2017    General PFT Lab (Please always keep checked) Routine  7/25/2018 7/25/2017            Who to contact     If you have questions or need follow up information about today's clinic visit or your schedule please contact [facility] directly at [phone].  Normal or non-critical lab and imaging results will be communicated to you by MyChart, letter or phone within 4 business days after the clinic has received the results. If you do not hear from us within 7 days, please contact the clinic through [employer] or phone. If you have a critical or abnormal lab result, we will notify you by phone as soon as possible.  Submit refill requests through [employer] or call your pharmacy and they will forward the refill request to us. Please allow 3 business days for your refill to be completed.          Additional Information About Your Visit        [employer] Information     [employer] gives you secure access to your electronic health record. If you see a primary care provider, you can also send messages to your care team and make appointments. If you have questions, please call your primary care clinic.  If you do not have a primary care provider, please call [phone] and they will assist you.        Care EveryWhere ID     This is your Care EveryWhere ID. This could be used by other organizations to access your [place] medical records  [id number]        Your Vitals Were     Pulse Temperature Pulse Oximetry BMI (Body Mass Index)          78 98.2  F (36.8  C) (Oral) 96% 25.55 kg/m2         Blood Pressure from Last 3 Encounters:   07/25/17 124/68   07/25/17 124/68   07/19/17 125/83    Weight from Last 3 Encounters:   07/25/17 173 lb (78.5 kg)   07/25/17 173 lb 8 oz (78.7 kg)   06/27/17 179 lb 3.2 oz (81.3 kg)                 Today's Medication Changes          These changes are accurate as of: 7/25/17 11:27 AM.  If you have any questions, ask your nurse or  doctor.               Start taking these medicines.        Dose/Directions    amLODIPine 5 MG tablet   Commonly known as:  NORVASC   Used for:  Raynaud's disease without gangrene   Started by:  Mani Golden MD        Dose:  5 mg   Take 1 tablet (5 mg) by mouth daily   Quantity:  30 tablet   Refills:  3       famotidine 40 MG tablet   Commonly known as:  PEPCID   Used for:  Gastroesophageal reflux disease without esophagitis   Started by:  Mani Golden MD        Dose:  40 mg   Take 1 tablet (40 mg) by mouth At Bedtime   Quantity:  30 tablet   Refills:  2       mycophenolate 500 MG tablet   Commonly known as:  CELLCEPT - GENERIC EQUIVALENT   Used for:  ILD (interstitial lung disease) (H), Myositis, unspecified myositis type, unspecified site, Polyarthritis, Dermatomyositis (H), Antisynthetase syndrome (H)   Started by:  Mani Golden MD        500mg twice daily for 2 weeks, then 1000mg in the AM and 500mg in the PM for 2 weeks, then 1000mg in the AM and 1000mg in the PM.  Labs in 1 month.  Further refill depending on lab results.   Quantity:  120 tablet   Refills:  0         These medicines have changed or have updated prescriptions.        Dose/Directions    predniSONE 20 MG tablet   Commonly known as:  DELTASONE   This may have changed:    - how much to take  - additional instructions   Used for:  ILD (interstitial lung disease) (H), Inflammatory arthritis   Changed by:  Mani Golden MD        Dose:  80 mg   Take 4 tablets (80 mg) by mouth daily   Quantity:  120 tablet   Refills:  0         Stop taking these medicines if you haven't already. Please contact your care team if you have questions.     pantoprazole 20 MG EC tablet   Commonly known as:  PROTONIX   Stopped by:  Mani Golden MD                Where to get your medicines      These medications were sent to [facility] [address] [place], [place] - [address] AT [facility] [address] [address]    Hours:  24-hours Phone:  [phone]     amLODIPine 5 MG tablet    famotidine 40 MG tablet    predniSONE 20 MG tablet         Some of these will need a paper prescription and others can be bought over the counter.  Ask your nurse if you have questions.     Bring a paper prescription for each of these medications     mycophenolate 500 MG tablet                Primary Care Provider    Physician No Ref-Primary       No address on file        Equal Access to Services     ESTHER DE LA O : Hadii aad ku hadasho Soomaali, waaxda luqadaha, qaybta kaalmada adeegyada, waxay idiin hayaan adeeg jasjuniorandrew lajarrell . So [facility] [phone].    ATENCIÓN: Si habla español, tiene a rubalcava disposición servicios gratuitos de asistencia lingüística. [facility] [phone].    We comply with applicable federal civil rights laws and [place] laws. We do not discriminate on the basis of race, color, national origin, age, disability sex, sexual orientation or gender identity.            Thank you!     Thank you for choosing [facility]  for your care. Our goal is always to provide you with excellent care. Hearing back from our patients is one way we can continue to improve our services. Please take a few minutes to complete the written survey that you may receive in the mail after your visit with us. Thank you!             Your Updated Medication List - Protect others around you: Learn how to safely use, store and throw away your medicines at www.disposemymeds.org.          This list is accurate as of: 7/25/17 11:27 AM.  Always use your most recent med list.                   Brand Name Dispense Instructions for use Diagnosis    amLODIPine 5 MG tablet    NORVASC    30 tablet    Take 1 tablet (5 mg) by mouth daily    Raynaud's disease without gangrene       famotidine 40 MG tablet    PEPCID    30 tablet    Take 1 tablet (40 mg) by mouth At Bedtime    Gastroesophageal reflux  disease without esophagitis       mycophenolate 500 MG tablet    CELLCEPT - GENERIC EQUIVALENT    120 tablet    500mg twice daily for 2 weeks, then 1000mg in the AM and 500mg in the PM for 2 weeks, then 1000mg in the AM and 1000mg in the PM.  Labs in 1 month.  Further refill depending on lab results.    ILD (interstitial lung disease) (H), Myositis, unspecified myositis type, unspecified site, Polyarthritis, Dermatomyositis (H), Antisynthetase syndrome (H)       predniSONE 20 MG tablet    DELTASONE    120 tablet    Take 4 tablets (80 mg) by mouth daily    ILD (interstitial lung disease) (H), Inflammatory arthritis       sulfamethoxazole-trimethoprim 400-80 MG per tablet    BACTRIM    60 tablet    Once daily for PJP prophylaxis. Start after bronchoscopy    ILD (interstitial lung disease) (H), Inflammatory arthritis

## 2017-07-26 ASSESSMENT — PATIENT HEALTH QUESTIONNAIRE - PHQ9: SUM OF ALL RESPONSES TO PHQ QUESTIONS 1-9: 3

## 2017-07-28 LAB
M TB TUBERC IFN-G BLD QL: ABNORMAL
M TB TUBERC IFN-G/MITOGEN IGNF BLD: 0 IU/ML

## 2017-07-28 NOTE — PROGRESS NOTES
Results released to [facility]:  Hepatitis B and C screening are negative.   TB test is indeterminate. This is likely because of prednisone use.   We will recheck TB test after a month or so.     Sincerely    Mani Golden MD  [facility]

## 2017-08-03 ENCOUNTER — HOSPITAL ENCOUNTER (OUTPATIENT)
Dept: RESPIRATORY THERAPY | Facility: CLINIC | Age: 52
End: 2017-08-03
Attending: INTERNAL MEDICINE
Payer: COMMERCIAL

## 2017-08-03 ENCOUNTER — HOSPITAL ENCOUNTER (OUTPATIENT)
Dept: LAB | Facility: CLINIC | Age: 52
Discharge: HOME | End: 2017-08-03
Attending: INTERNAL MEDICINE | Admitting: INTERNAL MEDICINE
Payer: COMMERCIAL

## 2017-08-03 DIAGNOSIS — M33.13 DERMATOMYOSITIS (H): ICD-10-CM

## 2017-08-03 DIAGNOSIS — J84.9 ILD (INTERSTITIAL LUNG DISEASE) (H): ICD-10-CM

## 2017-08-03 DIAGNOSIS — M13.0 POLYARTHRITIS: ICD-10-CM

## 2017-08-03 DIAGNOSIS — M60.9 MYOSITIS, UNSPECIFIED MYOSITIS TYPE, UNSPECIFIED SITE: ICD-10-CM

## 2017-08-03 DIAGNOSIS — D89.89 ANTISYNTHETASE SYNDROME (H): ICD-10-CM

## 2017-08-03 LAB — HGB BLD-MCNC: 16 G/DL (ref 13.3–17.7)

## 2017-08-03 PROCEDURE — 85018 HEMOGLOBIN: CPT | Performed by: INTERNAL MEDICINE

## 2017-08-03 PROCEDURE — 36415 COLL VENOUS BLD VENIPUNCTURE: CPT | Performed by: INTERNAL MEDICINE

## 2017-08-04 DIAGNOSIS — R06.00 DYSPNEA: ICD-10-CM

## 2017-08-04 DIAGNOSIS — R05.9 COUGH: Primary | ICD-10-CM

## 2017-08-04 LAB
DLCOCOR-%PRED-PRE: 60 %
DLCOCOR-PRE: 17.63 ML/MIN/MMHG
DLCOUNC-%PRED-PRE: 62 %
DLCOUNC-PRE: 18.29 ML/MIN/MMHG
DLCOUNC-PRED: 29.37 ML/MIN/MMHG
ERV-%PRED-PRE: 89 %
ERV-PRE: 1.21 L
ERV-PRED: 1.36 L
EXPTIME-PRE: 7.19 SEC
FEF2575-%PRED-PRE: 79 %
FEF2575-PRE: 2.59 L/SEC
FEF2575-PRED: 3.26 L/SEC
FEFMAX-%PRED-PRE: 97 %
FEFMAX-PRE: 9.11 L/SEC
FEFMAX-PRED: 9.39 L/SEC
FEV1-%PRED-PRE: 77 %
FEV1-PRE: 2.85 L
FEV1FEV6-PRE: 86 %
FEV1FEV6-PRED: 80 %
FEV1FVC-PRE: 85 %
FEV1FVC-PRED: 79 %
FEV1SVC-PRE: 95 %
FEV1SVC-PRED: 74 %
FIFMAX-PRE: 3.76 L/SEC
FRCPLETH-%PRED-PRE: 89 %
FRCPLETH-PRE: 3.12 L
FRCPLETH-PRED: 3.5 L
FVC-%PRED-PRE: 71 %
FVC-PRE: 3.34 L
FVC-PRED: 4.69 L
IC-%PRED-PRE: 49 %
IC-PRE: 1.77 L
IC-PRED: 3.59 L
RVPLETH-%PRED-PRE: 84 %
RVPLETH-PRE: 1.91 L
RVPLETH-PRED: 2.26 L
TLCPLETH-%PRED-PRE: 70 %
TLCPLETH-PRE: 4.89 L
TLCPLETH-PRED: 6.92 L
VA-%PRED-PRE: 71 %
VA-PRE: 4.72 L
VC-%PRED-PRE: 60 %
VC-PRE: 2.99 L
VC-PRED: 4.95 L

## 2017-08-04 NOTE — PROGRESS NOTES
Results released to [facility]:  No anemia noted. Good.     Sincerely    Mani Golden MD  [facility]

## 2017-08-07 NOTE — PROGRESS NOTES
"[place] - Rheumatology Clinic Visit     Kwaku Medrano MRN# [id number]   YOB: 1965    Primary care provider: No Ref-Primary, Physician  Aug 8, 2017          Assessment and Plan:   # Severe inflammatory arthritis - onset 2017  # Mild myositis with borderline CK and aldolase elevation; CRP elevation  # Gottron's papules   # Raynaud phenomenon and \"puffy fingers\"  # Interstitial lung disease; transbronch biopsy R middle lobe- organizing pneumonia; symptom onset 2016  # Intentional weight loss    In today's visit, he has developed Gottron's papules in the interim. 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    Bronch wash for infectious work up including AFB and fungal were all negative so far except for candida.     EMG, Muscle MRI, Muscle biopsy are avoided as it is obvious that he has mild myositis associated with his ILD. Myositis panel negative SHERRELL is negative and Marlene-1 negative.   Malignancy work up:  Colonoscopy: normal.  PSA normal  CT abdomen and pelvis no malignancy  MRI liver-no malignancy    Prednisone 60mg PO daily initiated mid June 2017, his cough was improving; CK improving and CRP normalized. However shortness of breath has worsened though FVC % pred and FEV1% pred and DLCO are improving on PFTs obtained through two different labs. Patient reports that he was not able to complete the recent PFTs fully because of recurrent cough so I am not sure about the numbers on the recent PFT.  Current dose: prednisone 80 mg PO daily. Since we are not able to identify any malignancy, I recommended starting cellcept 7/17 and titrating up to total of 3 grams per day. Will get HBV, HCV, TB screening. If not responding to cellcept or if there is rapid decline in lung function, then switch to IV cytoxan. Referral to Dr. Dillon, ILD specialist at  is made (Sep 15 2017). I discussed this with Dr. Reynolds (patient's pulmonologist) also who agrees with the plan.  Recommended seeing Dr. Reynolds " next week. I would try to get an appointment with  at [facility] who is an expert in myositis and scleroderma.     There is risk of renal crisis with high dose steroids if this is a manifestation of scleroderma but risk is low.   PJP prophylaxis with bactrim.     Seeing cardiology next week for palpitations.     GERD: Switch protonix to pepcid 40mg daily as protonix  can decrease efficacy of MMF.     Raynauds with finger tip pitting: start amlodipine 5mg PO daily.     The labs, imaging from patient records are reviewed.     I will be back in touch with the patient through mychart/letter when results are available.     Recheck CK and CRP today. Prednisone taper based on today's results.     Return in about 2 weeks (around 8/22/2017).  About 40 mins visit; >50% on coordination of care.     Orders Placed This Encounter   Procedures     CK total     CRP inflammation       There are no discontinued medications.  Current Outpatient Prescriptions   Medication Sig Dispense Refill     mycophenolate (CELLCEPT - GENERIC EQUIVALENT) 500 MG tablet 500mg twice daily for 2 weeks, then 1000mg in the AM and 500mg in the PM for 2 weeks, then 1000mg in the AM and 1000mg in the PM.  Labs in 1 month.  Further refill depending on lab results. 120 tablet 0     famotidine (PEPCID) 40 MG tablet Take 1 tablet (40 mg) by mouth At Bedtime 30 tablet 2     amLODIPine (NORVASC) 5 MG tablet Take 1 tablet (5 mg) by mouth daily 30 tablet 3     predniSONE (DELTASONE) 20 MG tablet Take 4 tablets (80 mg) by mouth daily 120 tablet 0     sulfamethoxazole-trimethoprim (BACTRIM) 400-80 MG per tablet Once daily for PJP prophylaxis. 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 2017  Involved areas: hands, feet, legs, face, wrists  Pain scale:  1/10     Wakes the patient from sleep : No/ does have the feeling of pins and needles in his hands that keeps him awake at night  Morning stiffness:the same all the time, never gets worse or better; used to have more morning stiffness but now it is the same all the time  Meds used:none     Interim history  Since last visit:  1. Infections - No  2. New symptoms/medical problem - Yes/ SOB X months, coughing X since Nov 2016- SOB steadily getting worse; SOB even to climb stairs now,   Fatigue - 4-5/10  3. Any side effects from Rheum medications -NA  3. ER visits/Hospitalizations/surgeries - No  4. Last PCP visit: today    CT chest: 6/17  IMPRESSION:  1. Changes of fibrosis within the periphery of the mid and upper right  lung and mid left lung. More fibrotic changes noted at the lung bases.  No overt honeycombing is currently evident, but areas of traction  bronchiectasis and groundglass opacity are noted suggesting active  alveolitis. Early changes of UIP are possible. Continued surveillance  as clinically warranted.  2. Evidence of old granulomatous disease. No enlarged lymph nodes.    Raynaud's since fall 2016 but only occasional. 5th digit mostly in left.     No h/o unintentional weight loss, loss of appetite, fevers, rash, swollen glands  No h/o gout  No family or personal history of psoriasis, ulcerative colitis or chron's disease. No h/o iritis.   Patient denies any malar rash, photosensitivity, recurrent mouth/genital ulcers, sicca symptoms, recurrent sinusitis/rhinitis  No h/o arterial/venous thrombosis in the past  No h/o persistent nausea, vomiting, constipation, diarrhea, abdominal pain  No h/o hematochezia, hematuria, hemoptysis,  hematemesis  No h/o seizures  No h/o cancer    June 27, 2017  Bronchoscopy done about a week ago.   Prednisone started a week ago. 60mg PO daily.   Coughing is better with prednisone.     Joint pain history  Onset: 6 months  Involved joints: wrists, fingers, knees, shoulder. Moves around  Pain scale:  1.5/10     Wakes the patient from sleep : No  Morning stiffness: Yes for 120 minutes  Meds used: none     Interim history  Since last visit:  1. Infections - No  2. New symptoms/medical problem - No  3. Any side effects from Rheum medications -NA; tolerating prednisone well.   3. ER visits/Hospitalizations/surgeries - No  4. Last PCP visit: 6/7/17    Your muscle enzymes are also elevated suggesting an autoimmune lung, muscle and joint disease.   Rheumatoid antibodies are normal.   ANCA vasculitis antibody are negative.   IgG4 normal.   SHERRELL and Marlene-1 antibodies are normal.     July 25, 2017    The myositis panel (send out test) tests for many different antibodies which are associated with the symptoms that you have. All of them were negative.    7/18  CRP has normalized. Good.   CK is trending down. Good.     PSA normal.     CT abdomen and pelvis: 6/17  1. Interstitial lung disease present at the lung bases, right greater  than left.  2. 2.3 cm low-density lesion in the liver does not have diagnostic  feature. Suggest further evaluation with liver MRI.    7/17  1. A 2.3 cm lesion in the posterior segment of the right hepatic lobe  has signal and enhancement characteristics consistent with a benign  hemangioma.  2. A subcentimeter left hepatic lesion also most likely represents a  Hemangioma.    Follow-up   Onset: November 2016  Pain scale:  4/10     Wakes the patient from sleep : No/ keeps him awake  Morning stiffness:all day  Meds used:prednisone 60 mg PO daily, bactrim     Made a lot of dietary changes. Intentional weight loss.     Wt Readings from Last 4 Encounters:   08/08/17 77.4 kg (170 lb 11.2 oz)   07/25/17 78.5  kg (173 lb)   07/25/17 78.7 kg (173 lb 8 oz)   06/27/17 81.3 kg (179 lb 3.2 oz)     Interim history  Since last visit:  1. Infections - No  2. New symptoms/medical problem - Yes/ hands & feet continue to swell, O2 sats continue to drop, SOB all the time when he is moving.   3. Any side effects from Rheum medications -none  3. ER visits/Hospitalizations/surgeries - Yes/ had colonoscopy  4. Last PCP visit: doesn't have one     August 8, 2017  Have you ever seen a rheumatologist Yes Who You When 7/25/17  Joint pain history  Onset: pt states that his breathing is progressively  getting worse  Involved joints: hands and feet are swollen , hands hurt  Pain scale:  2/10     Wakes the patient from sleep : No  Morning stiffness:Yes for 60 minutes  Meds used:cellcept, prednisone, sulfa, amlodipine, pepcid     Interim history  Since last visit:  1. Infections - No  2. New symptoms/medical problem - No  3. Any side effects from Rheum medications -none, doesn't feel that they are helping  3. ER visits/Hospitalizations/surgeries - No  4. Last PCP visit: 7/25/17    BP Readings from Last 3 Encounters:   08/08/17 104/68   07/25/17 124/68   07/25/17 124/68              Review of Systems:   Complete ROS negative except for symptoms mentioned in the HPI          Past Medical History:     Past Medical History:   Diagnosis Date     Fracture      Past Surgical History:   Procedure Laterality Date     COLONOSCOPY N/A 7/19/2017    Procedure: COLONOSCOPY;  COLONOSCOPY;  Surgeon: Mita Jimenez MD;  Location:  GI     NO HISTORY OF SURGERY              Social History:   Works for [employer].   Lives with parents. Not  and does not have children.     Social History     Occupational History     Not on file.     Social History Main Topics     Smoking status: Never Smoker     Smokeless tobacco: Former User     Types: Chew     Quit date: 1/1/1984     Alcohol use Yes      Comment: ocasionally     Drug use: No     Sexual activity:  "Not Currently            Family History:     Family History   Problem Relation Age of Onset     Prostate Cancer Father      LUNG DISEASE No family hx of      Rheumatologic Disease No family hx of             Allergies:     Allergies   Allergen Reactions     Ampicillin Rash            Medications:     Current Outpatient Prescriptions   Medication Sig Dispense Refill     mycophenolate (CELLCEPT - GENERIC EQUIVALENT) 500 MG tablet 500mg twice daily for 2 weeks, then 1000mg in the AM and 500mg in the PM for 2 weeks, then 1000mg in the AM and 1000mg in the PM.  Labs in 1 month.  Further refill depending on lab results. 120 tablet 0     famotidine (PEPCID) 40 MG tablet Take 1 tablet (40 mg) by mouth At Bedtime 30 tablet 2     amLODIPine (NORVASC) 5 MG tablet Take 1 tablet (5 mg) by mouth daily 30 tablet 3     predniSONE (DELTASONE) 20 MG tablet Take 4 tablets (80 mg) by mouth daily 120 tablet 0     sulfamethoxazole-trimethoprim (BACTRIM) 400-80 MG per tablet Once daily for PJP prophylaxis. Start after bronchoscopy 60 tablet 1            Physical Exam:   Blood pressure 104/68, pulse 99, temperature 97.8  F (36.6  C), temperature source Oral, height 1.753 m (5' 9\"), weight 77.4 kg (170 lb 11.2 oz), SpO2 98 %.  Wt Readings from Last 4 Encounters:   08/08/17 77.4 kg (170 lb 11.2 oz)   07/25/17 78.5 kg (173 lb)   07/25/17 78.7 kg (173 lb 8 oz)   06/27/17 81.3 kg (179 lb 3.2 oz)     Constitutional: well-developed, appearing stated age; cooperative  Eyes: PERRLA, normal conjunctiva, sclera  ENT: nl external ears, nose, lips.No mucous membrane lesions, normal saliva pool  Neck: no cervical lymphadenopathy  Resp: lungs clear to auscultation,   CV: RRR, no added sounds, no edema  GI: Abdomen soft and no tenderness  : not tested  Lymph: no cervical, supraclavicular or epitrochlear nodes  Muscle: hip flexors 5/5; proximal upper extremity muscles 5/5  MS: Synovitis in MCPs, PIPs, wrists with minimal restriction of ROM  All shoulder, " elbow, wrist, MCP/PIP/DIP, hip, knee, ankle, and foot MTP/IP joints were examined and  found normal. No active synovitis or deformity. Full ROM.  Fist 100%.  No dactylitis,  tenosynovitis, enthesopathy.  Skin: Gottron's papules noted in MCPs, PIPs, DIPs. Finger tip pitting noted in 2nd through 5th fingers.   Psych: nl judgement, orientation, memory, affect.         Data:     Results for orders placed or performed during the hospital encounter of 08/03/17   Hemoglobin   Result Value Ref Range    Hemoglobin 16.0 13.3 - 17.7 g/dL       Mani Golden MD    [facility]

## 2017-08-08 ENCOUNTER — OFFICE VISIT (OUTPATIENT)
Dept: RHEUMATOLOGY | Facility: CLINIC | Age: 52
End: 2017-08-08
Payer: COMMERCIAL

## 2017-08-08 VITALS
SYSTOLIC BLOOD PRESSURE: 104 MMHG | TEMPERATURE: 97.8 F | HEART RATE: 99 BPM | HEIGHT: 69 IN | BODY MASS INDEX: 25.28 KG/M2 | DIASTOLIC BLOOD PRESSURE: 68 MMHG | OXYGEN SATURATION: 98 % | WEIGHT: 170.7 LBS

## 2017-08-08 DIAGNOSIS — D89.89 ANTISYNTHETASE SYNDROME (H): Primary | ICD-10-CM

## 2017-08-08 LAB
CK SERPL-CCNC: 361 U/L (ref 30–300)
CRP SERPL-MCNC: <2.9 MG/L (ref 0–8)

## 2017-08-08 PROCEDURE — 86140 C-REACTIVE PROTEIN: CPT | Performed by: INTERNAL MEDICINE

## 2017-08-08 PROCEDURE — 99215 OFFICE O/P EST HI 40 MIN: CPT | Performed by: INTERNAL MEDICINE

## 2017-08-08 PROCEDURE — 36415 COLL VENOUS BLD VENIPUNCTURE: CPT | Performed by: INTERNAL MEDICINE

## 2017-08-08 PROCEDURE — 82550 ASSAY OF CK (CPK): CPT | Performed by: INTERNAL MEDICINE

## 2017-08-08 ASSESSMENT — ROUTINE ASSESSMENT OF PATIENT INDEX DATA (RAPID3)
TOTAL RAPID3 SCORE: 7.5
RAPID3 INTERPRETATION: MODERATE 6.1-12.0

## 2017-08-08 NOTE — NURSING NOTE
"Chief Complaint   Patient presents with     RECHECK       Initial /68 (BP Location: Left arm, Patient Position: Chair, Cuff Size: Adult Regular)  Pulse 99  Temp 97.8  F (36.6  C) (Oral)  Ht 1.753 m (5' 9\")  Wt 77.4 kg (170 lb 11.2 oz)  SpO2 98%  BMI 25.21 kg/m2 Estimated body mass index is 25.21 kg/(m^2) as calculated from the following:    Height as of this encounter: 1.753 m (5' 9\").    Weight as of this encounter: 77.4 kg (170 lb 11.2 oz).  Medication Reconciliation: complete    Have you ever seen a rheumatologist Yes Who You When 7/25/17  Joint pain history  Onset: pt states that his breathing is progressively  getting worse  Involved joints: hands and feet are swollen , hands hurt  Pain scale:  2/10     Wakes the patient from sleep : No  Morning stiffness:Yes for 60 minutes  Meds used:cellcept, prednisone, sulfa, amlodipine, pepcid    Interim history  Since last visit:  1. Infections - No  2. New symptoms/medical problem - No  3. Any side effects from Rheum medications -none, doesn't feel that they are helping  3. ER visits/Hospitalizations/surgeries - No  4. Last PCP visit: 7/25/17  Wt Readings from Last 4 Encounters:   08/08/17 77.4 kg (170 lb 11.2 oz)   07/25/17 78.5 kg (173 lb)   07/25/17 78.7 kg (173 lb 8 oz)   06/27/17 81.3 kg (179 lb 3.2 oz)     BP Readings from Last 3 Encounters:   08/08/17 104/68   07/25/17 124/68   07/25/17 124/68       "

## 2017-08-08 NOTE — MR AVS SNAPSHOT
After Visit Summary   8/8/2017    Kwaku Medrano    MRN: [id number]           Patient Information     Date Of Birth          1965        Visit Information        Provider Department      8/8/2017 8:30 AM Mani Golden MD [facility]        Today's Diagnoses     Antisynthetase syndrome (H)    -  1       Follow-ups after your visit        Follow-up notes from your care team     Return in about 2 weeks (around 8/22/2017).      Your next 10 appointments already scheduled     Aug 15, 2017  8:30 AM CDT   Ech Complete with [id number]   [facility] CV Echocardiography ([facility])    [address] [id number] [phone]           1. Please bring or wear a comfortable two-piece outfit. 2. You may eat, drink and take your normal medicines. 3. For any questions that cannot be answered, please contact the ordering physician            Aug 15, 2017  9:30 AM CDT   ecg with SHCR3   [facility] Electrocardiology ([facility])    [address] [facility] [address]   [phone]            Aug 15, 2017 10:00 AM CDT   CT CHEST W/O & W CONTRAST with SCICT1   [facility] Clinic ([facility])    [address] [id number] [phone]           Please bring any scans or X-rays taken at other hospitals, if similar tests were done. Also bring a list of your medicines, including vitamins, minerals and over-the-counter drugs. It is safest to leave personal items at home.  Be sure to tell your doctor:   If you have any allergies.   If there s any chance you are pregnant.   If you are breastfeeding.   If you have any special needs.  You will have contrast for this exam. To prepare:   Do not eat or drink for 2 hours before your exam. If you need to take medicine, you may take it with small sips  of water. (We may ask you to take liquid medicine as well.)   The day before your exam, drink extra fluids at least six 8-ounce glasses (unless your doctor tells you to restrict your fluids).  Patients over 70 or patients with diabetes or kidney problems:   If you haven t had a blood test (creatinine test) within the last 30 days, go to your clinic or Diagnostic Imaging Department for this test.  If you have diabetes:   If your kidney function is normal, continue taking your metformin (Avandamet, Glucophage, Glucovance, Metaglip) on the day of your exam.   If your kidney function is abnormal, wait 48 hours before restarting this medicine.  Please wear loose clothing, such as a sweat suit or jogging clothes. Avoid snaps, zippers and other metal. We may ask you to undress and put on a hospital gown.  If you have any questions, please call the Imaging Department where you will have your exam.            Sep 15, 2017  8:00 AM CDT   SIX MINUTE WALK with UC PFL 6 MINUTE WALK 2, UC PFL C   [facility] Pulmonary Function Testing ([facility])    [address] [place] [id number] [phone]            Sep 15, 2017  9:00 AM CDT   (Arrive by 8:45 AM)   New Interstitial Lung with Elma Dillon MD   [facility] ([facility])    [address] [place] [address] [phone]              Who to contact     If you have questions or need follow up information about today's clinic visit or your schedule please contact [facility] directly at [phone].  Normal or non-critical lab and imaging results will be communicated to you by MyChart, letter or phone within 4 business days after the clinic has received the results. If you do not hear from us within 7 days, please contact the clinic through MyChart or phone. If you have a critical or abnormal lab result, we will notify you  "by phone as soon as possible.  Submit refill requests through [employer] or call your pharmacy and they will forward the refill request to us. Please allow 3 business days for your refill to be completed.          Additional Information About Your Visit        [employer] Information     [employer] gives you secure access to your electronic health record. If you see a primary care provider, you can also send messages to your care team and make appointments. If you have questions, please call your primary care clinic.  If you do not have a primary care provider, please call [phone] and they will assist you.        Care EveryWhere ID     This is your Care EveryWhere ID. This could be used by other organizations to access your [facility] medical records  [id number]        Your Vitals Were     Pulse Temperature Height Pulse Oximetry BMI (Body Mass Index)       99 97.8  F (36.6  C) (Oral) 1.753 m (5' 9\") 98% 25.21 kg/m2        Blood Pressure from Last 3 Encounters:   08/08/17 104/68   07/25/17 124/68   07/25/17 124/68    Weight from Last 3 Encounters:   08/08/17 77.4 kg (170 lb 11.2 oz)   07/25/17 78.5 kg (173 lb)   07/25/17 78.7 kg (173 lb 8 oz)              We Performed the Following     CK total     CRP inflammation        Primary Care Provider    Physician No Ref-Primary       No address on file        Equal Access to Services     ESTHER DE LA O : Hadii aad ku hadasho Soomaali, waaxda luqadaha, qaybta kaalmada adeegyada, keegan gar . So [facility] [phone].    ATENCIÓN: Si habla español, tiene a rubalcava disposición servicios gratuitos de asistencia lingüística. Llame al [phone].    We comply with applicable federal civil rights laws and [place] laws. We do not discriminate on the basis of race, color, national origin, age, disability sex, sexual orientation or gender identity.            Thank you!     Thank you for choosing [facility]  for your care. Our goal is always " to provide you with excellent care. Hearing back from our patients is one way we can continue to improve our services. Please take a few minutes to complete the written survey that you may receive in the mail after your visit with us. Thank you!             Your Updated Medication List - Protect others around you: Learn how to safely use, store and throw away your medicines at www.disposemymeds.org.          This list is accurate as of: 8/8/17  9:27 AM.  Always use your most recent med list.                   Brand Name Dispense Instructions for use Diagnosis    amLODIPine 5 MG tablet    NORVASC    30 tablet    Take 1 tablet (5 mg) by mouth daily    Raynaud's disease without gangrene       famotidine 40 MG tablet    PEPCID    30 tablet    Take 1 tablet (40 mg) by mouth At Bedtime    Gastroesophageal reflux disease without esophagitis       mycophenolate 500 MG tablet    CELLCEPT - GENERIC EQUIVALENT    120 tablet    500mg twice daily for 2 weeks, then 1000mg in the AM and 500mg in the PM for 2 weeks, then 1000mg in the AM and 1000mg in the PM.  Labs in 1 month.  Further refill depending on lab results.    ILD (interstitial lung disease) (H), Myositis, unspecified myositis type, unspecified site, Polyarthritis, Dermatomyositis (H), Antisynthetase syndrome (H)       predniSONE 20 MG tablet    DELTASONE    120 tablet    Take 4 tablets (80 mg) by mouth daily    ILD (interstitial lung disease) (H), Inflammatory arthritis       sulfamethoxazole-trimethoprim 400-80 MG per tablet    BACTRIM    60 tablet    Once daily for PJP prophylaxis. Start after bronchoscopy    ILD (interstitial lung disease) (H), Inflammatory arthritis

## 2017-08-08 NOTE — PROGRESS NOTES
Results released to [facility]:  Your inflammatory marker has normalized.   CK is improved significantly but not fully normalized yet.   Please decrease your prednisone to 70 mg daily X 2 weeks and then 60 mg daily    Sincerely    Mani Golden MD  [facility]

## 2017-08-15 ENCOUNTER — HOSPITAL ENCOUNTER (OUTPATIENT)
Dept: CARDIOLOGY | Facility: CLINIC | Age: 52
End: 2017-08-15
Attending: INTERNAL MEDICINE
Payer: COMMERCIAL

## 2017-08-15 ENCOUNTER — HOSPITAL ENCOUNTER (OUTPATIENT)
Dept: CARDIOLOGY | Facility: CLINIC | Age: 52
Discharge: HOME | End: 2017-08-15
Attending: INTERNAL MEDICINE | Admitting: INTERNAL MEDICINE
Payer: COMMERCIAL

## 2017-08-15 DIAGNOSIS — R05.9 COUGH: ICD-10-CM

## 2017-08-15 DIAGNOSIS — R06.00 DYSPNEA: ICD-10-CM

## 2017-08-15 PROCEDURE — 93306 TTE W/DOPPLER COMPLETE: CPT | Mod: 26 | Performed by: INTERNAL MEDICINE

## 2017-08-15 PROCEDURE — 93306 TTE W/DOPPLER COMPLETE: CPT

## 2017-08-15 PROCEDURE — 93010 ELECTROCARDIOGRAM REPORT: CPT | Performed by: INTERNAL MEDICINE

## 2017-08-19 LAB — INTERPRETATION ECG - MUSE: NORMAL

## 2017-08-21 ENCOUNTER — TELEPHONE (OUTPATIENT)
Dept: RHEUMATOLOGY | Facility: CLINIC | Age: 52
End: 2017-08-21

## 2017-08-21 NOTE — PROGRESS NOTES
"[facility] - Rheumatology Clinic Visit     Kwaku Medrano MRN# [id number]   YOB: 1965    Primary care provider: No Ref-Primary, Physician  Aug 22, 2017          Assessment and Plan:   # Severe inflammatory arthritis - onset 2017  # Mild myositis with borderline CK and aldolase elevation; CRP elevation  # Gottron's papules   # Raynaud phenomenon and \"puffy fingers\"  # Interstitial lung disease; transbronch biopsy R middle lobe- organizing pneumonia; symptom onset 2016  # Cardiomyopathy - Dx 2017  # Atrial flutter vs fibrillation  # Intentional weight loss    In today's visit, he has developed Gottron's papules in the interim. 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    Cardiomyopathy may be related to the myositis but unclear. Cardiology following. Atrial fibrillation vs flutter likely related to the cardiomyopathy. Patient would follow up with cardiology.     Bronch wash for infectious work up including AFB and fungal were all negative so far except for candida.     EMG, Muscle MRI, Muscle biopsy are avoided as it is obvious that he has mild myositis associated with his ILD. Myositis panel negative SHERRELL is negative and Marlene-1 negative.   Malignancy work up:  Colonoscopy: normal.  PSA normal  CT abdomen and pelvis no malignancy  MRI liver-no malignancy    Prednisone 60mg PO daily initiated mid June 2017, his cough was improving; CK improving and CRP normalized. His shortness of breath and PFTs also improving. CK was still fully normalized end of July 2017.      Current dose: prednisone 70 mg PO daily. Taper based on today's CK. Since we are not able to identify any malignancy, I recommended starting cellcept 7/17 and titrating up to total of 3 grams per day. (current dose: 1000mg in AM and 500mg in AM) If not responding to cellcept or if there is rapid decline in lung function, then switch to IV cytoxan. Patient has noticed symptomatic improvement from breathing standpoint after " initiation of cellcept.     HBV, HCV screening negative. TB quantiferon indeterminate likely from high dose prednisone use. Recheck 9/17    Referral to Dr. Dillon, ILD specialist at  is made (Sep 15 2017). I discussed this with Dr. Reynolds (patient's pulmonologist) also who agrees with the plan.  I would try to get an appointment with  at [facility] who is an expert in myositis and scleroderma. He is still working full time about 70 hours per week. Walks about [id number] steps daily on fitbit. Trying to get an appointment with scleroderma expert Dr. Acosta at [facility].     There is risk of renal crisis with high dose steroids if this is a manifestation of scleroderma but risk is low.   PJP prophylaxis with bactrim.     GERD: Switch protonix to pepcid 40mg daily as protonix  can decrease efficacy of MMF.     Raynauds with finger tip pitting: start amlodipine 5mg PO daily.     The labs, imaging from patient records are reviewed.     I will be back in touch with the patient through mychart/letter when results are available.     Recheck CK today. Prednisone taper based on today's results.     Return in about 3 weeks (around 9/12/2017).      Orders Placed This Encounter   Procedures     CK total       Medications Discontinued During This Encounter   Medication Reason     predniSONE (DELTASONE) 20 MG tablet Reorder     Current Outpatient Prescriptions   Medication Sig Dispense Refill     predniSONE (DELTASONE) 20 MG tablet Take 3.5 tablets (70 mg) by mouth daily 120 tablet 0     mycophenolate (CELLCEPT - GENERIC EQUIVALENT) 500 MG tablet 500mg twice daily for 2 weeks, then 1000mg in the AM and 500mg in the PM for 2 weeks, then 1000mg in the AM and 1000mg in the PM.  Labs in 1 month.  Further refill depending on lab results. 120 tablet 0     famotidine (PEPCID) 40 MG tablet Take 1 tablet (40 mg) by mouth At Bedtime 30 tablet 2     amLODIPine (NORVASC) 5 MG tablet Take 1 tablet (5 mg) by mouth daily 30 tablet 3      sulfamethoxazole-trimethoprim (BACTRIM) 400-80 MG per tablet Once daily for PJP prophylaxis. 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 2017  Involved areas: hands, feet, legs, face, wrists  Pain scale:  1/10     Wakes the patient from sleep : No/ does have the feeling of pins and needles in his hands that keeps him awake at night  Morning stiffness:the same all the time, never gets worse or better; used to have more morning stiffness but now it is the same all the time  Meds used:none     Interim history  Since last visit:  1. Infections - No  2. New symptoms/medical problem - Yes/ SOB X months, coughing X since Nov 2016- SOB steadily getting worse; SOB even to climb stairs now,   Fatigue - 4-5/10  3. Any side effects from Rheum medications -NA  3. ER visits/Hospitalizations/surgeries - No  4. Last PCP visit: today    CT chest: 6/17  IMPRESSION:  1. Changes of fibrosis within the periphery of the mid and upper right  lung and mid left lung. More fibrotic changes noted at the lung bases.  No overt honeycombing is currently evident, but areas of traction  bronchiectasis and groundglass opacity are noted suggesting active  alveolitis. Early changes of UIP are possible. Continued surveillance  as clinically warranted.  2. Evidence of old granulomatous disease. No enlarged lymph nodes.    Raynaud's since fall 2016 but only occasional. 5th digit mostly in left.     No h/o unintentional weight loss, loss of appetite, fevers, rash, swollen  glands  No h/o gout  No family or personal history of psoriasis, ulcerative colitis or chron's disease. No h/o iritis.   Patient denies any malar rash, photosensitivity, recurrent mouth/genital ulcers, sicca symptoms, recurrent sinusitis/rhinitis  No h/o arterial/venous thrombosis in the past  No h/o persistent nausea, vomiting, constipation, diarrhea, abdominal pain  No h/o hematochezia, hematuria, hemoptysis, hematemesis  No h/o seizures  No h/o cancer    June 27, 2017  Bronchoscopy done about a week ago.   Prednisone started a week ago. 60mg PO daily.   Coughing is better with prednisone.     Joint pain history  Onset: 6 months  Involved joints: wrists, fingers, knees, shoulder. Moves around  Pain scale:  1.5/10     Wakes the patient from sleep : No  Morning stiffness: Yes for 120 minutes  Meds used: none     Interim history  Since last visit:  1. Infections - No  2. New symptoms/medical problem - No  3. Any side effects from Rheum medications -NA; tolerating prednisone well.   3. ER visits/Hospitalizations/surgeries - No  4. Last PCP visit: 6/7/17    Your muscle enzymes are also elevated suggesting an autoimmune lung, muscle and joint disease.   Rheumatoid antibodies are normal.   ANCA vasculitis antibody are negative.   IgG4 normal.   SHERRELL and Marlene-1 antibodies are normal.     July 25, 2017    The myositis panel (send out test) tests for many different antibodies which are associated with the symptoms that you have. All of them were negative.    7/18  CRP has normalized. Good.   CK is trending down. Good.     PSA normal.     CT abdomen and pelvis: 6/17  1. Interstitial lung disease present at the lung bases, right greater  than left.  2. 2.3 cm low-density lesion in the liver does not have diagnostic  feature. Suggest further evaluation with liver MRI.    7/17  1. A 2.3 cm lesion in the posterior segment of the right hepatic lobe  has signal and enhancement characteristics consistent with a benign  hemangioma.  2.  A subcentimeter left hepatic lesion also most likely represents a  Hemangioma.    Follow-up   Onset: November 2016  Pain scale:  4/10     Wakes the patient from sleep : No/ keeps him awake  Morning stiffness:all day  Meds used:prednisone 60 mg PO daily, bactrim     Made a lot of dietary changes. Intentional weight loss.     Wt Readings from Last 4 Encounters:   08/22/17 76.7 kg (169 lb 3.2 oz)   08/08/17 77.4 kg (170 lb 11.2 oz)   07/25/17 78.5 kg (173 lb)   07/25/17 78.7 kg (173 lb 8 oz)     Interim history  Since last visit:  1. Infections - No  2. New symptoms/medical problem - Yes/ hands & feet continue to swell, O2 sats continue to drop, SOB all the time when he is moving.   3. Any side effects from Rheum medications -none  3. ER visits/Hospitalizations/surgeries - Yes/ had colonoscopy  4. Last PCP visit: doesn't have one     August 8, 2017  Have you ever seen a rheumatologist Yes Who You When 7/25/17  Joint pain history  Onset: pt states that his breathing is progressively  getting worse  Involved joints: hands and feet are swollen , hands hurt  Pain scale:  2/10     Wakes the patient from sleep : No  Morning stiffness:Yes for 60 minutes  Meds used:cellcept, prednisone, sulfa, amlodipine, pepcid     Interim history  Since last visit:  1. Infections - No  2. New symptoms/medical problem - No  3. Any side effects from Rheum medications -none, doesn't feel that they are helping  3. ER visits/Hospitalizations/surgeries - No  4. Last PCP visit: 7/25/17 August 22, 2017  Have you ever seen a rheumatologist Yes Who You When 8/8/17  Joint pain history  Onset: fingers are getting alittle better, breathing is a little better, since last visit. Heart rate issue is all over  Involved joints: hands are still swollen and stiff  Pain scale:  1.5/10     Wakes the patient from sleep : No  Morning stiffness:Yes for 90 minutes  Meds used:cellcept, prednisone, bactrim , amlodipine, pepcid     Interim history  Since last  visit:  1. Infections - No  2. New symptoms/medical problem - No  3. Any side effects from Rheum medications -not sure if Pepcid is working, continues with reflux  3. ER visits/Hospitalizations/surgeries - No  4. Last PCP visit: 7/25/17    BP Readings from Last 3 Encounters:   08/22/17 108/72   08/08/17 104/68   07/25/17 124/68              Review of Systems:   Complete ROS negative except for symptoms mentioned in the HPI          Past Medical History:     Past Medical History:   Diagnosis Date     Fracture      Past Surgical History:   Procedure Laterality Date     COLONOSCOPY N/A 7/19/2017    Procedure: COLONOSCOPY;  COLONOSCOPY;  Surgeon: Mita Jimenez MD;  Location:  GI     NO HISTORY OF SURGERY              Social History:   Works for [employer].   Lives with parents. Not  and does not have children.     Social History     Occupational History     Not on file.     Social History Main Topics     Smoking status: Never Smoker     Smokeless tobacco: Former User     Types: Chew     Quit date: 1/1/1984     Alcohol use Yes      Comment: ocasionally     Drug use: No     Sexual activity: Not Currently            Family History:     Family History   Problem Relation Age of Onset     Prostate Cancer Father      LUNG DISEASE No family hx of      Rheumatologic Disease No family hx of             Allergies:     Allergies   Allergen Reactions     Ampicillin Rash            Medications:     Current Outpatient Prescriptions   Medication Sig Dispense Refill     predniSONE (DELTASONE) 20 MG tablet Take 3.5 tablets (70 mg) by mouth daily 120 tablet 0     mycophenolate (CELLCEPT - GENERIC EQUIVALENT) 500 MG tablet 500mg twice daily for 2 weeks, then 1000mg in the AM and 500mg in the PM for 2 weeks, then 1000mg in the AM and 1000mg in the PM.  Labs in 1 month.  Further refill depending on lab results. 120 tablet 0     famotidine (PEPCID) 40 MG tablet Take 1 tablet (40 mg) by mouth At Bedtime 30 tablet 2      "amLODIPine (NORVASC) 5 MG tablet Take 1 tablet (5 mg) by mouth daily 30 tablet 3     sulfamethoxazole-trimethoprim (BACTRIM) 400-80 MG per tablet Once daily for PJP prophylaxis. Start after bronchoscopy 60 tablet 1            Physical Exam:   Blood pressure 108/72, pulse 96, temperature 98.4  F (36.9  C), temperature source Oral, height 1.753 m (5' 9\"), weight 76.7 kg (169 lb 3.2 oz), SpO2 97 %.  Wt Readings from Last 4 Encounters:   08/22/17 76.7 kg (169 lb 3.2 oz)   08/08/17 77.4 kg (170 lb 11.2 oz)   07/25/17 78.5 kg (173 lb)   07/25/17 78.7 kg (173 lb 8 oz)     Constitutional: well-developed, appearing stated age; cooperative  Eyes: PERRLA, normal conjunctiva, sclera  ENT: nl external ears, nose, lips.No mucous membrane lesions, normal saliva pool  Neck: no cervical lymphadenopathy  Resp: lungs clear to auscultation,   CV: RRR, no added sounds, no edema  GI: Abdomen soft and no tenderness  : not tested  Lymph: no cervical, supraclavicular or epitrochlear nodes  Muscle: hip flexors 5/5; proximal upper extremity muscles 5/5  MS: Synovitis in MCPs, PIPs, wrists with minimal restriction of ROM- improving  All shoulder, elbow, wrist, MCP/PIP/DIP, hip, knee, ankle, and foot MTP/IP joints were examined and  found normal. No active synovitis or deformity. Full ROM.  Fist 100%.  No dactylitis,  tenosynovitis, enthesopathy.  Skin: Gottron's papules noted in MCPs, PIPs, DIPs (IMPROVING). Finger tip pitting noted in 2nd through 5th fingers- IMPROVED   Psych: nl judgement, orientation, memory, affect.         Data:     Results for orders placed or performed during the hospital encounter of 08/15/17   EKG 12-lead, tracing only   Result Value Ref Range    Interpretation ECG Click View Image link to view waveform and result        Mani Golden MD    [facility]    "

## 2017-08-21 NOTE — TELEPHONE ENCOUNTER
----- Message from Mani Golden MD sent at 8/8/2017  9:13 AM CDT -----  Regarding: Worsening possible anti-synthetase syndrome   Hi Dr. Acosta/Pérez,  Hope you are doing well there. I need your help for a patient with worsening ILD. He is scheduled to see Dr. Dillon next month.   Mr. 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 He has sought medical attention only this year.   He has severe inflammatory arthritis - onset 2017; Mild myositis with borderline CK and aldolase elevation; CRP elevation; Gottron's papules over MCPs and PIPs; Interstitial lung disease; transbronch biopsy R middle lobe- organizing pneumonia; symptom onset 2016. Surprisingly, myositis panel negative SHERRELL is negative and Marlene-1 negative. Malignancy work up negative so far.   I started seeing him 2 months ago and made the diagnosis. He is still on high dose prednisone. I am tapering up cellcept to 3 grams per day. I have a feeling that he may slowly continue to decline especially from ILD standpoint. It would be great if you could see him so that you can confirm the diagnosis and treatment plan for him and also he can get [facility] to the [facility] team as he may start requiring hospitalization at some point.   Thanks  Richie

## 2017-08-22 ENCOUNTER — TRANSFERRED RECORDS (OUTPATIENT)
Dept: HEALTH INFORMATION MANAGEMENT | Facility: CLINIC | Age: 52
End: 2017-08-22

## 2017-08-22 ENCOUNTER — OFFICE VISIT (OUTPATIENT)
Dept: RHEUMATOLOGY | Facility: CLINIC | Age: 52
End: 2017-08-22
Payer: COMMERCIAL

## 2017-08-22 VITALS
OXYGEN SATURATION: 97 % | TEMPERATURE: 98.4 F | WEIGHT: 169.2 LBS | BODY MASS INDEX: 25.06 KG/M2 | HEART RATE: 96 BPM | DIASTOLIC BLOOD PRESSURE: 72 MMHG | HEIGHT: 69 IN | SYSTOLIC BLOOD PRESSURE: 108 MMHG

## 2017-08-22 DIAGNOSIS — J84.9 ILD (INTERSTITIAL LUNG DISEASE) (H): ICD-10-CM

## 2017-08-22 DIAGNOSIS — M19.90 INFLAMMATORY ARTHRITIS: ICD-10-CM

## 2017-08-22 LAB — CK SERPL-CCNC: 281 U/L (ref 30–300)

## 2017-08-22 PROCEDURE — 82550 ASSAY OF CK (CPK): CPT | Performed by: INTERNAL MEDICINE

## 2017-08-22 PROCEDURE — 99214 OFFICE O/P EST MOD 30 MIN: CPT | Performed by: INTERNAL MEDICINE

## 2017-08-22 PROCEDURE — 36415 COLL VENOUS BLD VENIPUNCTURE: CPT | Performed by: INTERNAL MEDICINE

## 2017-08-22 RX ORDER — PREDNISONE 20 MG/1
70 TABLET ORAL DAILY
Qty: 120 TABLET | Refills: 0 | COMMUNITY
Start: 2017-08-22 | End: 2017-08-31

## 2017-08-22 ASSESSMENT — ROUTINE ASSESSMENT OF PATIENT INDEX DATA (RAPID3)
TOTAL RAPID3 SCORE: 5.5
RAPID3 INTERPRETATION: LOW 3.1-6

## 2017-08-22 NOTE — MR AVS SNAPSHOT
After Visit Summary   8/22/2017    Kwaku Medrano    MRN: [id number]           Patient Information     Date Of Birth          1965        Visit Information        Provider Department      8/22/2017 8:30 AM Mani Golden MD [facility]        Today's Diagnoses     ILD (interstitial lung disease) (H)        Inflammatory arthritis           Follow-ups after your visit        Follow-up notes from your care team     Return in about 3 weeks (around 9/12/2017).      Your next 10 appointments already scheduled     Sep 15, 2017  8:00 AM CDT   SIX MINUTE WALK with [facility] PFL 6 MINUTE WALK 2, [facility] PFL C   [facility] Pulmonary Function Testing ([facility])    [address] [place] [id number] [phone]            Sep 15, 2017  9:00 AM CDT   (Arrive by 8:45 AM)   New Interstitial Lung with Elma Dillon MD   [facility] ([facility])    [address] [place] [id number] [phone]              Who to contact     If you have questions or need follow up information about today's clinic visit or your schedule please contact [facility] directly at [phone].  Normal or non-critical lab and imaging results will be communicated to you by MyChart, letter or phone within 4 business days after the clinic has received the results. If you do not hear from us within 7 days, please contact the clinic through MyChart or phone. If you have a critical or abnormal lab result, we will notify you by phone as soon as possible.  Submit refill requests through [employer] or call your pharmacy and they will forward the refill request to us. Please allow 3 business days for your refill to be completed.          Additional Information About Your Visit        [employer] Information     [employer] gives you secure access to your electronic health  "record. If you see a primary care provider, you can also send messages to your care team and make appointments. If you have questions, please call your primary care clinic.  If you do not have a primary care provider, please call [phone] and they will assist you.        Care EveryWhere ID     This is your Care EveryWhere ID. This could be used by other organizations to access your [facility] medical records  [id number]        Your Vitals Were     Pulse Temperature Height Pulse Oximetry BMI (Body Mass Index)       96 98.4  F (36.9  C) (Oral) 1.753 m (5' 9\") 97% 24.99 kg/m2        Blood Pressure from Last 3 Encounters:   08/22/17 108/72   08/08/17 104/68   07/25/17 124/68    Weight from Last 3 Encounters:   08/22/17 76.7 kg (169 lb 3.2 oz)   08/08/17 77.4 kg (170 lb 11.2 oz)   07/25/17 78.5 kg (173 lb)              We Performed the Following     CK total          Today's Medication Changes          These changes are accurate as of: 8/22/17  9:10 AM.  If you have any questions, ask your nurse or doctor.               These medicines have changed or have updated prescriptions.        Dose/Directions    predniSONE 20 MG tablet   Commonly known as:  DELTASONE   This may have changed:  how much to take   Used for:  ILD (interstitial lung disease) (H), Inflammatory arthritis   Changed by:  Mani Golden MD        Dose:  70 mg   Take 3.5 tablets (70 mg) by mouth daily   Quantity:  120 tablet   Refills:  0                Primary Care Provider Office Phone # Fax #    Stew Caldwell [phone]       [address] [place] [id number]        Equal Access to Services     [facility]: Molly Linn, derik rogel, keegan cortes. So [facility] [phone].    ATENCIÓN: Si habla español, tiene a rubalcava disposición servicios gratuitos de asistencia lingüística. Llame al [phone].    We comply with applicable federal " civil rights laws and [place] laws. We do not discriminate on the basis of race, color, national origin, age, disability sex, sexual orientation or gender identity.            Thank you!     Thank you for choosing [facility]  for your care. Our goal is always to provide you with excellent care. Hearing back from our patients is one way we can continue to improve our services. Please take a few minutes to complete the written survey that you may receive in the mail after your visit with us. Thank you!             Your Updated Medication List - Protect others around you: Learn how to safely use, store and throw away your medicines at www.disposemymeds.org.          This list is accurate as of: 8/22/17  9:10 AM.  Always use your most recent med list.                   Brand Name Dispense Instructions for use Diagnosis    amLODIPine 5 MG tablet    NORVASC    30 tablet    Take 1 tablet (5 mg) by mouth daily    Raynaud's disease without gangrene       famotidine 40 MG tablet    PEPCID    30 tablet    Take 1 tablet (40 mg) by mouth At Bedtime    Gastroesophageal reflux disease without esophagitis       mycophenolate 500 MG tablet    GENERIC EQUIVALENT    120 tablet    500mg twice daily for 2 weeks, then 1000mg in the AM and 500mg in the PM for 2 weeks, then 1000mg in the AM and 1000mg in the PM.  Labs in 1 month.  Further refill depending on lab results.    ILD (interstitial lung disease) (H), Myositis, unspecified myositis type, unspecified site, Polyarthritis, Dermatomyositis (H), Antisynthetase syndrome (H)       predniSONE 20 MG tablet    DELTASONE    120 tablet    Take 3.5 tablets (70 mg) by mouth daily    ILD (interstitial lung disease) (H), Inflammatory arthritis       sulfamethoxazole-trimethoprim 400-80 MG per tablet    BACTRIM    60 tablet    Once daily for PJP prophylaxis. Start after bronchoscopy    ILD (interstitial lung disease) (H), Inflammatory arthritis

## 2017-08-22 NOTE — NURSING NOTE
"Chief Complaint   Patient presents with     RECHECK       Initial /72 (BP Location: Left arm, Patient Position: Chair, Cuff Size: Adult Regular)  Pulse 96  Temp 98.4  F (36.9  C) (Oral)  Ht 1.753 m (5' 9\")  Wt 76.7 kg (169 lb 3.2 oz)  SpO2 97%  BMI 24.99 kg/m2 Estimated body mass index is 24.99 kg/(m^2) as calculated from the following:    Height as of this encounter: 1.753 m (5' 9\").    Weight as of this encounter: 76.7 kg (169 lb 3.2 oz).  Medication Reconciliation: complete    Have you ever seen a rheumatologist Yes Who You When 8/8/17  Joint pain history  Onset: fingers are getting alittle better, breathing is a little better, since last visit. Heart rate is all over  Involved joints: hands are still swollen and stiff  Pain scale:  1.5/10     Wakes the patient from sleep : No  Morning stiffness:Yes for 90 minutes  Meds used:cellcept, prednisone, bactrim , amlodipine, pepcid    Interim history  Since last visit:  1. Infections - No  2. New symptoms/medical problem - No  3. Any side effects from Rheum medications -not sure if Pepcid is working, continues with reflux  3. ER visits/Hospitalizations/surgeries - No  4. Last PCP visit: 7/25/17  Wt Readings from Last 4 Encounters:   08/22/17 76.7 kg (169 lb 3.2 oz)   08/08/17 77.4 kg (170 lb 11.2 oz)   07/25/17 78.5 kg (173 lb)   07/25/17 78.7 kg (173 lb 8 oz)     BP Readings from Last 3 Encounters:   08/22/17 108/72   08/08/17 104/68   07/25/17 124/68       "

## 2017-08-25 NOTE — TELEPHONE ENCOUNTER
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 That should be good. Thanks    ----- Message -----     From: Pérez Ayala RN     Sent: 8/24/2017  12:10 PM       To: Mani Golden MD  Subject: RE: Worsening possible anti-synthetase syndr#    Would 9/14/17 be ok?

## 2017-08-25 NOTE — TELEPHONE ENCOUNTER
Called pt and offered appointment on 9/14/17 at 8:30 am with a 8:15 check in. Pt accepted. Location of clinic provided to pt. Staff message sent to the pool asking for assistance in scheduling this appointment.    Pt provided understanding by repeating date, time, and location of appointment with Dr Acosta.    Pérez Ayala, VONN RN  Rheumatology RN Coordinator  [facility]

## 2017-08-29 DIAGNOSIS — J84.9 ILD (INTERSTITIAL LUNG DISEASE) (H): ICD-10-CM

## 2017-08-29 DIAGNOSIS — M19.90 INFLAMMATORY ARTHRITIS: ICD-10-CM

## 2017-08-29 NOTE — PROGRESS NOTES
Results released to [facility]:  Mr. Medrano,  It is good that CK has normalized for the first time.   Please follow this taper plan for prednisone:  60mg PO daily X 2 weeks and 50mg PO daily X 2 weeks; then 40mg PO daily      Sincerely    Mani Golden MD  [facility]

## 2017-08-31 RX ORDER — PREDNISONE 20 MG/1
TABLET ORAL
Qty: 120 TABLET | Refills: 0 | COMMUNITY
Start: 2017-08-31

## 2017-09-05 ENCOUNTER — HOSPITAL ENCOUNTER (OUTPATIENT)
Dept: CT IMAGING | Facility: CLINIC | Age: 52
Discharge: HOME | End: 2017-09-05
Attending: INTERNAL MEDICINE | Admitting: INTERNAL MEDICINE
Payer: COMMERCIAL

## 2017-09-05 PROCEDURE — 71250 CT THORAX DX C-: CPT

## 2017-09-10 ASSESSMENT — ENCOUNTER SYMPTOMS
TINGLING: 1
SNORES LOUDLY: 0
PALPITATIONS: 1
MUSCLE CRAMPS: 0
STIFFNESS: 1
RECTAL BLEEDING: 0
WHEEZING: 0
HEARTBURN: 1
NECK PAIN: 0
NUMBNESS: 0
SPUTUM PRODUCTION: 0
LIGHT-HEADEDNESS: 0
BLOATING: 0
BLOOD IN STOOL: 0
TACHYCARDIA: 1
COUGH: 1
NAIL CHANGES: 0
SLEEP DISTURBANCES DUE TO BREATHING: 0
DYSPNEA ON EXERTION: 1
COUGH DISTURBING SLEEP: 0
MUSCLE WEAKNESS: 1
VOMITING: 0
CLAUDICATION: 0
ARTHRALGIAS: 1
SKIN CHANGES: 0
JAUNDICE: 0
RESPIRATORY PAIN: 1
WEAKNESS: 1
LOSS OF CONSCIOUSNESS: 0
HYPOTENSION: 0
POOR WOUND HEALING: 1
BACK PAIN: 0
SPEECH CHANGE: 0
FLANK PAIN: 0
HYPERTENSION: 0
DIFFICULTY URINATING: 0
MEMORY LOSS: 0
DIARRHEA: 0
HEMATURIA: 0
CONSTIPATION: 0
POSTURAL DYSPNEA: 0
ORTHOPNEA: 0
LEG PAIN: 0
JOINT SWELLING: 1
EXERCISE INTOLERANCE: 1
MYALGIAS: 0
DISTURBANCES IN COORDINATION: 0
DYSURIA: 0
NAUSEA: 0
LEG SWELLING: 1
PARALYSIS: 0
RECTAL PAIN: 0
BOWEL INCONTINENCE: 0
SHORTNESS OF BREATH: 1
SYNCOPE: 0
HEMOPTYSIS: 0
DIZZINESS: 0
ABDOMINAL PAIN: 0
HEADACHES: 0
SEIZURES: 0
TREMORS: 0

## 2017-09-12 ENCOUNTER — CARE COORDINATION (OUTPATIENT)
Dept: CARDIOLOGY | Facility: CLINIC | Age: 52
End: 2017-09-12

## 2017-09-12 ENCOUNTER — TRANSFERRED RECORDS (OUTPATIENT)
Dept: HEALTH INFORMATION MANAGEMENT | Facility: CLINIC | Age: 52
End: 2017-09-12

## 2017-09-12 ENCOUNTER — HOSPITAL ENCOUNTER (EMERGENCY)
Facility: CLINIC | Age: 52
Discharge: HOME | End: 2017-09-12
Attending: EMERGENCY MEDICINE | Admitting: EMERGENCY MEDICINE
Payer: COMMERCIAL

## 2017-09-12 VITALS
OXYGEN SATURATION: 99 % | HEIGHT: 69 IN | TEMPERATURE: 98.2 F | RESPIRATION RATE: 16 BRPM | DIASTOLIC BLOOD PRESSURE: 96 MMHG | WEIGHT: 165 LBS | HEART RATE: 89 BPM | BODY MASS INDEX: 24.44 KG/M2 | SYSTOLIC BLOOD PRESSURE: 120 MMHG

## 2017-09-12 DIAGNOSIS — I48.92 ATRIAL FLUTTER, UNSPECIFIED TYPE (H): ICD-10-CM

## 2017-09-12 LAB
ANION GAP SERPL CALCULATED.3IONS-SCNC: 10 MMOL/L (ref 3–14)
BASOPHILS # BLD AUTO: 0 10E9/L (ref 0–0.2)
BASOPHILS NFR BLD AUTO: 0.2 %
BUN SERPL-MCNC: 17 MG/DL (ref 7–30)
CALCIUM SERPL-MCNC: 9 MG/DL (ref 8.5–10.1)
CHLORIDE SERPL-SCNC: 102 MMOL/L (ref 94–109)
CO2 SERPL-SCNC: 26 MMOL/L (ref 20–32)
CREAT SERPL-MCNC: 0.96 MG/DL (ref 0.66–1.25)
DIFFERENTIAL METHOD BLD: ABNORMAL
EOSINOPHIL # BLD AUTO: 0 10E9/L (ref 0–0.7)
EOSINOPHIL NFR BLD AUTO: 0.2 %
ERYTHROCYTE [DISTWIDTH] IN BLOOD BY AUTOMATED COUNT: 17.3 % (ref 10–15)
GFR SERPL CREATININE-BSD FRML MDRD: 82 ML/MIN/1.7M2
GLUCOSE SERPL-MCNC: 93 MG/DL (ref 70–99)
HCT VFR BLD AUTO: 45.8 % (ref 40–53)
HGB BLD-MCNC: 15.4 G/DL (ref 13.3–17.7)
IMM GRANULOCYTES # BLD: 0.5 10E9/L (ref 0–0.4)
IMM GRANULOCYTES NFR BLD: 3.5 %
INTERPRETATION ECG - MUSE: NORMAL
LYMPHOCYTES # BLD AUTO: 0.6 10E9/L (ref 0.8–5.3)
LYMPHOCYTES NFR BLD AUTO: 4.9 %
MCH RBC QN AUTO: 30 PG (ref 26.5–33)
MCHC RBC AUTO-ENTMCNC: 33.6 G/DL (ref 31.5–36.5)
MCV RBC AUTO: 89 FL (ref 78–100)
MONOCYTES # BLD AUTO: 0.8 10E9/L (ref 0–1.3)
MONOCYTES NFR BLD AUTO: 6.3 %
NEUTROPHILS # BLD AUTO: 10.8 10E9/L (ref 1.6–8.3)
NEUTROPHILS NFR BLD AUTO: 84.9 %
NRBC # BLD AUTO: 0 10*3/UL
NRBC BLD AUTO-RTO: 0 /100
PLATELET # BLD AUTO: 253 10E9/L (ref 150–450)
POTASSIUM SERPL-SCNC: 3.3 MMOL/L (ref 3.4–5.3)
RBC # BLD AUTO: 5.13 10E12/L (ref 4.4–5.9)
SODIUM SERPL-SCNC: 138 MMOL/L (ref 133–144)
WBC # BLD AUTO: 12.8 10E9/L (ref 4–11)

## 2017-09-12 PROCEDURE — 80048 BASIC METABOLIC PNL TOTAL CA: CPT | Performed by: EMERGENCY MEDICINE

## 2017-09-12 PROCEDURE — 85025 COMPLETE CBC W/AUTO DIFF WBC: CPT | Performed by: EMERGENCY MEDICINE

## 2017-09-12 PROCEDURE — 96374 THER/PROPH/DIAG INJ IV PUSH: CPT

## 2017-09-12 PROCEDURE — 93005 ELECTROCARDIOGRAM TRACING: CPT

## 2017-09-12 PROCEDURE — 25000132 ZZH RX MED GY IP 250 OP 250 PS 637: Performed by: EMERGENCY MEDICINE

## 2017-09-12 PROCEDURE — 99284 EMERGENCY DEPT VISIT MOD MDM: CPT | Mod: 25

## 2017-09-12 PROCEDURE — 25000125 ZZHC RX 250: Performed by: EMERGENCY MEDICINE

## 2017-09-12 RX ORDER — DILTIAZEM HYDROCHLORIDE 120 MG/1
120 CAPSULE, EXTENDED RELEASE ORAL ONCE
Status: DISCONTINUED | OUTPATIENT
Start: 2017-09-12 | End: 2017-09-12

## 2017-09-12 RX ORDER — DILTIAZEM HYDROCHLORIDE 240 MG/1
240 CAPSULE, COATED, EXTENDED RELEASE ORAL DAILY
Qty: 30 CAPSULE | Refills: 0 | Status: SHIPPED | OUTPATIENT
Start: 2017-09-12 | End: 2017-10-06

## 2017-09-12 RX ORDER — DILTIAZEM HYDROCHLORIDE 5 MG/ML
25 INJECTION INTRAVENOUS ONCE
Status: COMPLETED | OUTPATIENT
Start: 2017-09-12 | End: 2017-09-12

## 2017-09-12 RX ORDER — DILTIAZEM HYDROCHLORIDE 240 MG/1
240 CAPSULE, EXTENDED RELEASE ORAL ONCE
Status: COMPLETED | OUTPATIENT
Start: 2017-09-12 | End: 2017-09-12

## 2017-09-12 RX ADMIN — DILTIAZEM HYDROCHLORIDE 25 MG: 5 INJECTION INTRAVENOUS at 14:08

## 2017-09-12 RX ADMIN — DILTIAZEM HYDROCHLORIDE 240 MG: 240 CAPSULE, EXTENDED RELEASE ORAL at 16:14

## 2017-09-12 ASSESSMENT — ENCOUNTER SYMPTOMS
SHORTNESS OF BREATH: 0
PALPITATIONS: 1

## 2017-09-12 NOTE — ED AVS SNAPSHOT
Emergency Department    [address] [place] [address]    Phone:  [phone]    Fax:  [phone]                                       Kwaku Medrano   MRN: [id number]    Department:   Emergency Department   Date of Visit:  9/12/2017           After Visit Summary Signature Page     I have received my discharge instructions, and my questions have been answered. I have discussed any challenges I see with this plan with the nurse or doctor.    ..........................................................................................................................................  Patient/Patient Representative Signature      ..........................................................................................................................................  Patient Representative Print Name and Relationship to Patient    ..................................................               ................................................  Date                                            Time    ..........................................................................................................................................  Reviewed by Signature/Title    ...................................................              ..............................................  Date                                                            Time

## 2017-09-12 NOTE — DISCHARGE INSTRUCTIONS
Understanding Atrial Flutter    An arrhythmia is any problem with the speed or pattern of the heartbeat. Atrial flutter is a type of arrhythmia. It causes the heart to beat faster than normal. Atrial flutter can increase the risk for certain serious problems, such as stroke. Your healthcare provider will need to monitor and manage it.  What happens during atrial flutter?  The heart has an electrical system that sends signals to control the heartbeat. As signals move through the heart, they tell the heart s upper chambers (atria) and lower chambers (ventricles) when to squeeze (contract) and relax. This lets blood move through the heart and out to the body and lungs.  With atrial flutter, an abnormal electrical loop (circuit) causes the atria to contract too quickly. This results in a fast, steady heartbeat. Because the atria are not lara normally, blood may pool in the atria instead of moving into the ventricles. This can increase the risk for blood clots and stroke. The ventricles also contract too quickly. As a result, they may not pump blood to the body and lungs as well as they should. This can weaken the heart muscle over time and cause heart failure.   What causes of atrial flutter?  Many things can cause atrial flutter. They include:    Coronary artery disease    Heart valve disease    Heart attack    Heart surgery    High blood pressure    Thyroid disease    Diabetes    Lung disease    Sleep apnea    Heavy alcohol use  What are the symptoms of atrial flutter?  Atrial flutter may or may not cause symptoms. If symptoms do occur, they may include:    A fast, pounding heartbeat    Shortness of breath    Tiredness    Dizziness or fainting    Chest pain  How is atrial flutter treated?  Treatment for atrial flutter may include any of the options below.    Medicines. You may be prescribed:    Heart rate medicines to help slow down the heartbeat    Heart rhythm medicines to help the heart beat more  regularly    Anti-clotting medicines to help reduce the risk for blood clots and stroke    Electrical cardioversion. Your healthcare provider uses special pads or paddles to send one or more brief electrical shocks to the heart. This can help reset the heartbeat to normal.    Ablation. A long thin tube called a catheter is thread through a blood vessel to the heart. There, the catheter sends out hot or cold energy to the areas causing the abnormal signals. This energy destroys the problem tissue or cells and cures atrial flutter.  If the heart rate and rhythm can t be controlled, you may need ablation and a pacemaker. Together these help control the heart rate and regularity of the heartbeat.  What are the complications of atrial flutter?  These can include:    Blood clots    Stroke    Heart failure. This problem occurs when the heart muscle weakens so much that it no longer pumps blood well.  When should I call my healthcare provider?  Call your healthcare provider right away if you have any of these:    Symptoms that don t get better with treatment, or get worse    New symptoms   Date Last Reviewed: 5/1/2016 2000-2017 [employer]. [address] [place] [address], [place] [address]. All rights reserved. This information is not intended as a substitute for professional medical care. Always follow your healthcare professional's instructions.

## 2017-09-12 NOTE — ED AVS SNAPSHOT
Emergency Department    [address] [facility] [place] [address]    Phone:  [phone]    Fax:  [phone]                                       Kwaku Medrano   MRN: [id number]    Department:   Emergency Department   Date of Visit:  9/12/2017           Patient Information     Date Of Birth          1965        Your diagnoses for this visit were:     Atrial flutter, unspecified type (H)        You were seen by Alexx Higginbotham MD.      Follow-up Information     Follow up with Radha Chavez MD.    Specialty:  Cardiology    Why:  office will call;  recheck ed, If symptoms worsen    Contact information:    [address]  [place] [address]  [phone]          Discharge Instructions         Understanding Atrial Flutter    An arrhythmia is any problem with the speed or pattern of the heartbeat. Atrial flutter is a type of arrhythmia. It causes the heart to beat faster than normal. Atrial flutter can increase the risk for certain serious problems, such as stroke. Your healthcare provider will need to monitor and manage it.  What happens during atrial flutter?  The heart has an electrical system that sends signals to control the heartbeat. As signals move through the heart, they tell the heart s upper chambers (atria) and lower chambers (ventricles) when to squeeze (contract) and relax. This lets blood move through the heart and out to the body and lungs.  With atrial flutter, an abnormal electrical loop (circuit) causes the atria to contract too quickly. This results in a fast, steady heartbeat. Because the atria are not lara normally, blood may pool in the atria instead of moving into the ventricles. This can increase the risk for blood clots and stroke. The ventricles also contract too quickly. As a result, they may not pump blood to the body and lungs as well as they should. This can weaken the heart muscle over time and cause heart failure.   What causes of  atrial flutter?  Many things can cause atrial flutter. They include:    Coronary artery disease    Heart valve disease    Heart attack    Heart surgery    High blood pressure    Thyroid disease    Diabetes    Lung disease    Sleep apnea    Heavy alcohol use  What are the symptoms of atrial flutter?  Atrial flutter may or may not cause symptoms. If symptoms do occur, they may include:    A fast, pounding heartbeat    Shortness of breath    Tiredness    Dizziness or fainting    Chest pain  How is atrial flutter treated?  Treatment for atrial flutter may include any of the options below.    Medicines. You may be prescribed:    Heart rate medicines to help slow down the heartbeat    Heart rhythm medicines to help the heart beat more regularly    Anti-clotting medicines to help reduce the risk for blood clots and stroke    Electrical cardioversion. Your healthcare provider uses special pads or paddles to send one or more brief electrical shocks to the heart. This can help reset the heartbeat to normal.    Ablation. A long thin tube called a catheter is thread through a blood vessel to the heart. There, the catheter sends out hot or cold energy to the areas causing the abnormal signals. This energy destroys the problem tissue or cells and cures atrial flutter.  If the heart rate and rhythm can t be controlled, you may need ablation and a pacemaker. Together these help control the heart rate and regularity of the heartbeat.  What are the complications of atrial flutter?  These can include:    Blood clots    Stroke    Heart failure. This problem occurs when the heart muscle weakens so much that it no longer pumps blood well.  When should I call my healthcare provider?  Call your healthcare provider right away if you have any of these:    Symptoms that don t get better with treatment, or get worse    New symptoms   Date Last Reviewed: 5/1/2016 2000-2017 [employer]. [address] [place] [address], [place] [address].  All rights reserved. This information is not intended as a substitute for professional medical care. Always follow your healthcare professional's instructions.          Future Appointments        Provider Department Dept Phone Center    9/13/2017 9:15 AM Mani Golden MD [facility] [phone]     9/14/2017 8:30 AM Denny Acosta MD [facility] Rheumatology [phone] [facility]    9/15/2017 8:00 AM Pulmonary Function Lab; PFL 6 minute Walk [facility] Pulmonary Function Testing [phone] [facility]    9/15/2017 9:00 AM Elma Dillon MD [facility] [phone] [facility]    9/15/2017 1:00 PM Radha Chavez MD [facility] AT [place] [phone] [facility] PSA CLIN      24 Hour Appointment Hotline       To make an appointment at any [facility], call [phone] ([phone]). If you don't have a family doctor or clinic, we will help you find one. [place] clinics are conveniently located to serve the needs of you and your family.             Review of your medicines      START taking        Dose / Directions Last dose taken    apixaban ANTICOAGULANT 5 MG tablet   Commonly known as:  ELIQUIS   Dose:  5 mg   Quantity:  60 tablet        Take 1 tablet (5 mg) by mouth 2 times daily   Refills:  0        diltiazem 240 MG 24 hr capsule   Commonly known as:  CARDIZEM CD   Dose:  240 mg   Quantity:  30 capsule        Take 1 capsule (240 mg) by mouth daily   Refills:  0          Our records show that you are taking the medicines listed below. If these are incorrect, please call your family doctor or clinic.        Dose / Directions Last dose taken    amLODIPine 5 MG tablet   Commonly known as:  NORVASC   Dose:  5 mg   Quantity:  30 tablet        Take 1 tablet (5 mg) by mouth daily   Refills:  3        famotidine 40 MG tablet   Commonly known as:  PEPCID   Dose:  40 mg   Quantity:  30 tablet        Take 1 tablet (40 mg) by mouth  At Bedtime   Refills:  2        mycophenolate 500 MG tablet   Commonly known as:  GENERIC EQUIVALENT   Quantity:  120 tablet        500mg twice daily for 2 weeks, then 1000mg in the AM and 500mg in the PM for 2 weeks, then 1000mg in the AM and 1000mg in the PM.  Labs in 1 month.  Further refill depending on lab results.   Refills:  0        predniSONE 20 MG tablet   Commonly known as:  DELTASONE   Dose:  60 mg   Quantity:  105 tablet        Take 3 tablets (60 mg) by mouth daily   Refills:  0        sulfamethoxazole-trimethoprim 400-80 MG per tablet   Commonly known as:  BACTRIM   Quantity:  60 tablet        Once daily for PJP prophylaxis. Start after bronchoscopy   Refills:  1                Prescriptions were sent or printed at these locations (2 Prescriptions)                   Other Prescriptions                Printed at Department/Unit printer (2 of 2)         diltiazem (CARDIZEM CD) 240 MG 24 hr capsule               apixaban ANTICOAGULANT (ELIQUIS) 5 MG tablet                Procedures and tests performed during your visit     Basic metabolic panel    CBC with platelets + differential    EKG 12 lead      Orders Needing Specimen Collection     None      Pending Results     No orders found from 9/10/2017 to 9/13/2017.            Pending Culture Results     No orders found from 9/10/2017 to 9/13/2017.            Pending Results Instructions     If you had any lab results that were not finalized at the time of your Discharge, you can call the ED Lab Result RN at [phone]. You will be contacted by this team for any positive Lab results or changes in treatment. The nurses are available 7 days a week from 10A to 6:30P.  You can leave a message 24 hours per day and they will return your call.        Test Results From Your Hospital Stay        9/12/2017  2:04 PM      Component Results     Component Value Ref Range & Units Status    WBC 12.8 (H) 4.0 - 11.0 10e9/L Final    RBC Count 5.13 4.4 - 5.9 10e12/L Final     Hemoglobin 15.4 13.3 - 17.7 g/dL Final    Hematocrit 45.8 40.0 - 53.0 % Final    MCV 89 78 - 100 fl Final    MCH 30.0 26.5 - 33.0 pg Final    MCHC 33.6 31.5 - 36.5 g/dL Final    RDW 17.3 (H) 10.0 - 15.0 % Final    Platelet Count 253 150 - 450 10e9/L Final    Diff Method Automated Method  Final    % Neutrophils 84.9 % Final    % Lymphocytes 4.9 % Final    % Monocytes 6.3 % Final    % Eosinophils 0.2 % Final    % Basophils 0.2 % Final    % Immature Granulocytes 3.5 % Final    Nucleated RBCs 0 0 /100 Final    Absolute Neutrophil 10.8 (H) 1.6 - 8.3 10e9/L Final    Absolute Lymphocytes 0.6 (L) 0.8 - 5.3 10e9/L Final    Absolute Monocytes 0.8 0.0 - 1.3 10e9/L Final    Absolute Eosinophils 0.0 0.0 - 0.7 10e9/L Final    Absolute Basophils 0.0 0.0 - 0.2 10e9/L Final    Abs Immature Granulocytes 0.5 (H) 0 - 0.4 10e9/L Final    Absolute Nucleated RBC 0.0  Final         9/12/2017  2:28 PM      Component Results     Component Value Ref Range & Units Status    Sodium 138 133 - 144 mmol/L Final    Potassium 3.3 (L) 3.4 - 5.3 mmol/L Final    Chloride 102 94 - 109 mmol/L Final    Carbon Dioxide 26 20 - 32 mmol/L Final    Anion Gap 10 3 - 14 mmol/L Final    Glucose 93 70 - 99 mg/dL Final    Urea Nitrogen 17 7 - 30 mg/dL Final    Creatinine 0.96 0.66 - 1.25 mg/dL Final    GFR Estimate 82 >60 mL/min/1.7m2 Final    Non  GFR Calc    GFR Estimate If Black >90 >60 mL/min/1.7m2 Final    African American GFR Calc    Calcium 9.0 8.5 - 10.1 mg/dL Final                Clinical Quality Measure: Blood Pressure Screening     Your blood pressure was checked while you were in the emergency department today. The last reading we obtained was  BP: (!) 120/96 . Please read the guidelines below about what these numbers mean and what you should do about them.  If your systolic blood pressure (the top number) is less than 120 and your diastolic blood pressure (the bottom number) is less than 80, then your blood pressure is normal. There is  nothing more that you need to do about it.  If your systolic blood pressure (the top number) is 120-139 or your diastolic blood pressure (the bottom number) is 80-89, your blood pressure may be higher than it should be. You should have your blood pressure rechecked within a year by a primary care provider.  If your systolic blood pressure (the top number) is 140 or greater or your diastolic blood pressure (the bottom number) is 90 or greater, you may have high blood pressure. High blood pressure is treatable, but if left untreated over time it can put you at risk for heart attack, stroke, or kidney failure. You should have your blood pressure rechecked by a primary care provider within the next 4 weeks.  If your provider in the emergency department today gave you specific instructions to follow-up with your doctor or provider even sooner than that, you should follow that instruction and not wait for up to 4 weeks for your follow-up visit.        Thank you for choosing [facility]       Thank you for choosing [facility] for your care. Our goal is always to provide you with excellent care. Hearing back from our patients is one way we can continue to improve our services. Please take a few minutes to complete the written survey that you may receive in the mail after you visit with us. Thank you!        [facility] Information     [facility] gives you secure access to your electronic health record. If you see a primary care provider, you can also send messages to your care team and make appointments. If you have questions, please call your primary care clinic.  If you do not have a primary care provider, please call [phone] and they will assist you.        Care EveryWhere ID     This is your Care EveryWhere ID. This could be used by other organizations to access your [facility] medical records  [id number]        Equal Access to Services     ESTHER DE LA O : derik Rios qaybta kaalmada  keegan summers ah. So [facility] [phone].    ATENCIÓN: Si habla español, tiene a rubalcava disposición servicios gratuitos de asistencia lingüística. Llame al [phone].    We comply with applicable federal civil rights laws and [place] laws. We do not discriminate on the basis of race, color, national origin, age, disability sex, sexual orientation or gender identity.            After Visit Summary       This is your record. Keep this with you and show to your community pharmacist(s) and doctor(s) at your next visit.

## 2017-09-12 NOTE — PROGRESS NOTES
"[facility] - Rheumatology Clinic Visit     Kwaku Medrano MRN# [id number]   YOB: 1965    Primary care provider: No Ref-Primary, Physician  Sep 13, 2017          Assessment and Plan:   # Severe inflammatory arthritis - onset 2017  # Mild myositis with borderline CK and aldolase elevation; CRP elevation  # ?Gottron's papules   # Raynaud phenomenon and \"puffy fingers\"  # Interstitial lung disease; transbronch biopsy R middle lobe- organizing pneumonia; symptom onset 2016  # Cardiomyopathy - Dx 2017  # Atrial flutter vs fibrillation; on eloquis  # Intentional weight loss    In one of the previous visits, he had Gottron's papules. 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 Cardiomyopathy may be related to the myositis but unclear. Cardiology following. Bronch wash for infectious work up including AFB and fungal were all negative so far except for candida. EMG, Muscle MRI, Muscle biopsy are avoided as it is obvious that he has mild myositis associated with his ILD. Myositis panel negative SHERRELL is negative and Marlene-1 negative. Malignancy work up 2017: Colonoscopy: normal. PSA normal; CT abdomen and pelvis no malignancy; MRI liver-no malignancy.   We have requested for a consult with Dr. Acosta (Rheumatology) and Dr. Dillon (ILD specialist) at [facility] as patient's ILD is continuing to slowly progress in spite of high dose steroid (since June 2017) and cellcept therapy. However we have noticed some improvement in his presentation with treatment: his cough was improving; CK improving and CRP normalized. His shortness of breath and PFTs also improving. CK was still fully normalized end of July 2017.      Current disease activity: Myositis- controlled; ILD- uncontrolled on prednisone 50 mg PO daily. Since we are not able to identify any malignancy, I recommended starting cellcept 7/17 and increased to 3 grams per day.  If not responding to cellcept or if there is rapid decline in lung function, patient " may need IV cytoxan. Further plan after seeing Dr. Acotsa and Dr. Dillon.     HBV, HCV screening negative. TB quantiferon indeterminate likely from high dose prednisone use. Recheck 9/17    Referral to Dr. Dillon, ILD specialist at  is made (Sep 15 2017). I discussed this with Dr. Reynolds (patient's pulmonologist) also who agrees with the plan.  I would try to get an appointment with  at [facility] who is an expert in myositis and scleroderma.     Patient is still working full time about 70 hours per week. Walks about [id number] steps daily on fitbit. Trying to get an appointment with scleroderma expert Dr. Acosta at [facility].     There is risk of renal crisis with high dose steroids if this is a manifestation of scleroderma but risk is low.   PJP prophylaxis with bactrim.     GERD: Switch protonix to pepcid 40mg daily as protonix  can decrease efficacy of MMF.     Raynauds with finger tip pitting: amlodipine 5mg PO daily - helping     The labs, imaging from patient records are reviewed.     I will be back in touch with the patient through mychart/letter when results are available.     Data Unavailable      Orders Placed This Encounter   Procedures     M Tuberculosis by Quantiferon       Medications Discontinued During This Encounter   Medication Reason     mycophenolate (GENERIC EQUIVALENT) 500 MG tablet Reorder     Current Outpatient Prescriptions   Medication Sig Dispense Refill     mycophenolate (GENERIC EQUIVALENT) 500 MG tablet 1500 mg twice daily 120 tablet 0     diltiazem (CARDIZEM CD) 240 MG 24 hr capsule Take 1 capsule (240 mg) by mouth daily 30 capsule 0     apixaban ANTICOAGULANT (ELIQUIS) 5 MG tablet Take 1 tablet (5 mg) by mouth 2 times daily 60 tablet 0     predniSONE (DELTASONE) 20 MG tablet Take 3 tablets (60 mg) by mouth daily 105 tablet 0     famotidine (PEPCID) 40 MG tablet Take 1 tablet (40 mg) by mouth At Bedtime 30 tablet 2     amLODIPine (NORVASC) 5 MG tablet Take 1 tablet (5 mg) by mouth daily 30  tablet 3     sulfamethoxazole-trimethoprim (BACTRIM) 400-80 MG per tablet Once daily for PJP prophylaxis. Start after bronchoscopy 60 tablet 1     [DISCONTINUED] mycophenolate (GENERIC EQUIVALENT) 500 MG tablet 500mg twice daily for 2 weeks, then 1000mg in the AM and 500mg in the PM for 2 weeks, then 1000mg in the AM and 1000mg in the PM.  Labs in 1 month.  Further refill depending on lab results. 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 2017  Involved areas: hands, feet, legs, face, wrists  Pain scale:  1/10     Wakes the patient from sleep : No/ does have the feeling of pins and needles in his hands that keeps him awake at night  Morning stiffness:the same all the time, never gets worse or better; used to have more morning stiffness but now it is the same all the time  Meds used:none     Interim history  Since last visit:  1. Infections - No  2. New symptoms/medical problem - Yes/ SOB X months, coughing X since Nov 2016- SOB steadily getting worse; SOB even to climb stairs now,   Fatigue - 4-5/10  3. Any side effects from Rheum medications -NA  3. ER visits/Hospitalizations/surgeries - No  4. Last PCP visit: today    CT chest: 6/17  IMPRESSION:  1. Changes of fibrosis within the periphery of the mid and upper right  lung and mid left lung. More fibrotic changes noted at the lung bases.  No overt honeycombing is currently evident, but areas of traction  bronchiectasis and groundglass opacity are noted suggesting active  alveolitis. Early changes of UIP  are possible. Continued surveillance  as clinically warranted.  2. Evidence of old granulomatous disease. No enlarged lymph nodes.    Raynaud's since fall 2016 but only occasional. 5th digit mostly in left.     No h/o unintentional weight loss, loss of appetite, fevers, rash, swollen glands  No h/o gout  No family or personal history of psoriasis, ulcerative colitis or chron's disease. No h/o iritis.   Patient denies any malar rash, photosensitivity, recurrent mouth/genital ulcers, sicca symptoms, recurrent sinusitis/rhinitis  No h/o arterial/venous thrombosis in the past  No h/o persistent nausea, vomiting, constipation, diarrhea, abdominal pain  No h/o hematochezia, hematuria, hemoptysis, hematemesis  No h/o seizures  No h/o cancer    June 27, 2017  Bronchoscopy done about a week ago.   Prednisone started a week ago. 60mg PO daily.   Coughing is better with prednisone.     Joint pain history  Onset: 6 months  Involved joints: wrists, fingers, knees, shoulder. Moves around  Pain scale:  1.5/10     Wakes the patient from sleep : No  Morning stiffness: Yes for 120 minutes  Meds used: none     Interim history  Since last visit:  1. Infections - No  2. New symptoms/medical problem - No  3. Any side effects from Rheum medications -NA; tolerating prednisone well.   3. ER visits/Hospitalizations/surgeries - No  4. Last PCP visit: 6/7/17    Your muscle enzymes are also elevated suggesting an autoimmune lung, muscle and joint disease.   Rheumatoid antibodies are normal.   ANCA vasculitis antibody are negative.   IgG4 normal.   SHERRELL and Marlene-1 antibodies are normal.     July 25, 2017    The myositis panel (send out test) tests for many different antibodies which are associated with the symptoms that you have. All of them were negative.    7/18  CRP has normalized. Good.   CK is trending down. Good.     PSA normal.     CT abdomen and pelvis: 6/17  1. Interstitial lung disease present at the lung bases, right greater  than  left.  2. 2.3 cm low-density lesion in the liver does not have diagnostic  feature. Suggest further evaluation with liver MRI.    7/17  1. A 2.3 cm lesion in the posterior segment of the right hepatic lobe  has signal and enhancement characteristics consistent with a benign  hemangioma.  2. A subcentimeter left hepatic lesion also most likely represents a  Hemangioma.    Follow-up   Onset: November 2016  Pain scale:  4/10     Wakes the patient from sleep : No/ keeps him awake  Morning stiffness:all day  Meds used:prednisone 60 mg PO daily, bactrim     Made a lot of dietary changes. Intentional weight loss.     Wt Readings from Last 4 Encounters:   09/13/17 77.2 kg (170 lb 1.6 oz)   09/12/17 74.8 kg (165 lb)   08/22/17 76.7 kg (169 lb 3.2 oz)   08/08/17 77.4 kg (170 lb 11.2 oz)     Interim history  Since last visit:  1. Infections - No  2. New symptoms/medical problem - Yes/ hands & feet continue to swell, O2 sats continue to drop, SOB all the time when he is moving.   3. Any side effects from Rheum medications -none  3. ER visits/Hospitalizations/surgeries - Yes/ had colonoscopy  4. Last PCP visit: doesn't have one     August 8, 2017  Have you ever seen a rheumatologist Yes Who You When 7/25/17  Joint pain history  Onset: pt states that his breathing is progressively  getting worse  Involved joints: hands and feet are swollen , hands hurt  Pain scale:  2/10     Wakes the patient from sleep : No  Morning stiffness:Yes for 60 minutes  Meds used:cellcept, prednisone, sulfa, amlodipine, pepcid     Interim history  Since last visit:  1. Infections - No  2. New symptoms/medical problem - No  3. Any side effects from Rheum medications -none, doesn't feel that they are helping  3. ER visits/Hospitalizations/surgeries - No  4. Last PCP visit: 7/25/17 August 22, 2017  Have you ever seen a rheumatologist Yes Who You When 8/8/17  Joint pain history  Onset: fingers are getting alittle better, breathing is a little better,  since last visit. Heart rate issue is all over  Involved joints: hands are still swollen and stiff  Pain scale:  1.5/10     Wakes the patient from sleep : No  Morning stiffness:Yes for 90 minutes  Meds used:cellcept, prednisone, bactrim , amlodipine, pepcid     Interim history  Since last visit:  1. Infections - No  2. New symptoms/medical problem - No  3. Any side effects from Rheum medications -not sure if Pepcid is working, continues with reflux  3. ER visits/Hospitalizations/surgeries - No  4. Last PCP visit: 7/25/17 September 13, 2017  Have you ever seen a rheumatologist yes Who You When 8/22/17  Joint pain history  Onset: was in ER yesterday for A-Fib. Cont. With cough, SOB. Hands are still slightly swollen  Involved joints: hands, lungs  Pain scale:  1.5/10     Wakes the patient from sleep : No  Morning stiffness:Yes for 30 minutes  Meds used:prednisone 50mg, pepcid, bactrim, amlodipine     Interim history  Since last visit:  1. Infections - No  2. New symptoms/medical problem - Yes/ A-Fib  3. Any side effects from Rheum medications -none  3. ER visits/Hospitalizations/surgeries - Yes  4. Last PCP visit: 7/25/17    BP Readings from Last 3 Encounters:   09/13/17 110/60   09/12/17 (!) 120/96   08/22/17 108/72              Review of Systems:   Complete ROS negative except for symptoms mentioned in the HPI          Past Medical History:     Past Medical History:   Diagnosis Date     Fracture      Myositis      Past Surgical History:   Procedure Laterality Date     COLONOSCOPY N/A 7/19/2017    Procedure: COLONOSCOPY;  COLONOSCOPY;  Surgeon: Mita Jimenez MD;  [facility]     NO HISTORY OF SURGERY              Social History:   Works for [employer].   Lives with parents. Not  and does not have children.     Social History     Occupational History     Not on file.     Social History Main Topics     Smoking status: Never Smoker     Smokeless tobacco: Former User     Types: Chew     Quit  "date: 1/1/1984     Alcohol use Yes      Comment: ocasionally     Drug use: No     Sexual activity: Not Currently            Family History:     Family History   Problem Relation Age of Onset     Prostate Cancer Father      LUNG DISEASE No family hx of      Rheumatologic Disease No family hx of             Allergies:     Allergies   Allergen Reactions     Ampicillin Rash            Medications:     Current Outpatient Prescriptions   Medication Sig Dispense Refill     mycophenolate (GENERIC EQUIVALENT) 500 MG tablet 1500 mg twice daily 120 tablet 0     diltiazem (CARDIZEM CD) 240 MG 24 hr capsule Take 1 capsule (240 mg) by mouth daily 30 capsule 0     apixaban ANTICOAGULANT (ELIQUIS) 5 MG tablet Take 1 tablet (5 mg) by mouth 2 times daily 60 tablet 0     predniSONE (DELTASONE) 20 MG tablet Take 3 tablets (60 mg) by mouth daily 105 tablet 0     famotidine (PEPCID) 40 MG tablet Take 1 tablet (40 mg) by mouth At Bedtime 30 tablet 2     amLODIPine (NORVASC) 5 MG tablet Take 1 tablet (5 mg) by mouth daily 30 tablet 3     sulfamethoxazole-trimethoprim (BACTRIM) 400-80 MG per tablet Once daily for PJP prophylaxis. Start after bronchoscopy 60 tablet 1     [DISCONTINUED] mycophenolate (GENERIC EQUIVALENT) 500 MG tablet 500mg twice daily for 2 weeks, then 1000mg in the AM and 500mg in the PM for 2 weeks, then 1000mg in the AM and 1000mg in the PM.  Labs in 1 month.  Further refill depending on lab results. 120 tablet 0            Physical Exam:   Blood pressure 110/60, pulse 99, temperature 98.9  F (37.2  C), temperature source Oral, height 1.753 m (5' 9\"), weight 77.2 kg (170 lb 1.6 oz), SpO2 97 %.  Wt Readings from Last 4 Encounters:   09/13/17 77.2 kg (170 lb 1.6 oz)   09/12/17 74.8 kg (165 lb)   08/22/17 76.7 kg (169 lb 3.2 oz)   08/08/17 77.4 kg (170 lb 11.2 oz)     Constitutional: well-developed, appearing stated age; cooperative  Eyes: PERRLA, normal conjunctiva, sclera  ENT: nl external ears, nose, lips.No mucous " membrane lesions, normal saliva pool  Neck: no cervical lymphadenopathy  Resp: lungs clear to auscultation,   CV: RRR, no added sounds, no edema  GI: Abdomen soft and no tenderness  : not tested  Lymph: no cervical, supraclavicular or epitrochlear nodes  Muscle: hip flexors 5/5; proximal upper extremity muscles 5/5   MS: Synovitis in MCPs, PIPs, wrists with minimal restriction of ROM- RESOLVED  All shoulder, elbow, wrist, MCP/PIP/DIP, hip, knee, ankle, and foot MTP/IP joints were examined and  found normal. No active synovitis or deformity. Full ROM.  Fist 100%.  No dactylitis,  tenosynovitis, enthesopathy.  Skin: Gottron's papules noted in MCPs (improved), PIPs, DIPs (IMPROVING). Finger tip pitting noted in 2nd through 5th fingers- IMPROVED   Psych: nl judgement, orientation, memory, affect.          Data:     Results for orders placed or performed during the hospital encounter of 09/12/17   CBC with platelets + differential   Result Value Ref Range    WBC 12.8 (H) 4.0 - 11.0 10e9/L    RBC Count 5.13 4.4 - 5.9 10e12/L    Hemoglobin 15.4 13.3 - 17.7 g/dL    Hematocrit 45.8 40.0 - 53.0 %    MCV 89 78 - 100 fl    MCH 30.0 26.5 - 33.0 pg    MCHC 33.6 31.5 - 36.5 g/dL    RDW 17.3 (H) 10.0 - 15.0 %    Platelet Count 253 150 - 450 10e9/L    Diff Method Automated Method     % Neutrophils 84.9 %    % Lymphocytes 4.9 %    % Monocytes 6.3 %    % Eosinophils 0.2 %    % Basophils 0.2 %    % Immature Granulocytes 3.5 %    Nucleated RBCs 0 0 /100    Absolute Neutrophil 10.8 (H) 1.6 - 8.3 10e9/L    Absolute Lymphocytes 0.6 (L) 0.8 - 5.3 10e9/L    Absolute Monocytes 0.8 0.0 - 1.3 10e9/L    Absolute Eosinophils 0.0 0.0 - 0.7 10e9/L    Absolute Basophils 0.0 0.0 - 0.2 10e9/L    Abs Immature Granulocytes 0.5 (H) 0 - 0.4 10e9/L    Absolute Nucleated RBC 0.0    Basic metabolic panel   Result Value Ref Range    Sodium 138 133 - 144 mmol/L    Potassium 3.3 (L) 3.4 - 5.3 mmol/L    Chloride 102 94 - 109 mmol/L    Carbon Dioxide 26 20 - 32  mmol/L    Anion Gap 10 3 - 14 mmol/L    Glucose 93 70 - 99 mg/dL    Urea Nitrogen 17 7 - 30 mg/dL    Creatinine 0.96 0.66 - 1.25 mg/dL    GFR Estimate 82 >60 mL/min/1.7m2    GFR Estimate If Black >90 >60 mL/min/1.7m2    Calcium 9.0 8.5 - 10.1 mg/dL   EKG 12 lead   Result Value Ref Range    Interpretation ECG Click View Image link to view waveform and result        Mani Golden MD    [facility]

## 2017-09-12 NOTE — ED PROVIDER NOTES
"  History     Chief Complaint:  Irregular Heart Beat    HPI   Kwaku Medrano is a 52 year old male with a history of interstitial lung disease, dermatomyositis, and antisynthetase, who presents to the emergency department today for evaluation of irregular heart beat, which was noted earlier today at his pulmonologist appointment. They sent him here for this, however, he notes that this has been going on since the middle of July (2 months). The patient was supposed to follow up with cardiology, but only had an EKG and echocardiogram obtained. No chest pain and no shortness of breath.     Allergies:  Ampicillin - rash     Medications:    mycophenolate (GENERIC EQUIVALENT) 500 MG tablet  predniSONE (DELTASONE) 20 MG tablet  famotidine (PEPCID) 40 MG tablet  amLODIPine (NORVASC) 5 MG tablet  sulfamethoxazole-trimethoprim (BACTRIM) 400-80 MG per tablet    Past Medical History:    Interstitial lung disease  Dermatomyositis   Antisynthetase syndrome    Past Surgical History:    Colonoscopy 7/19/17    Family History:    Prostate cancer    Social History:  The patient was accompanied to the ED alone.  Works in a [employer], works 60-70 hours per week  Smoking Status: Never  Smokeless Tobacco: Chew  Alcohol Use: Yes   Marital Status:  Single [1]     Review of Systems   Respiratory: Negative for shortness of breath.    Cardiovascular: Positive for palpitations. Negative for chest pain.   All other systems reviewed and are negative.    Physical Exam     Patient Vitals for the past 24 hrs:   BP Temp Temp src Pulse Heart Rate Resp SpO2 Height Weight   09/12/17 1637 - - - 89 - 16 - - -   09/12/17 1500 (!) 120/96 - - - 80 27 - - -   09/12/17 1445 115/90 - - - 65 - - - -   09/12/17 1430 117/80 - - - 66 - - - -   09/12/17 1415 (!) 114/91 - - - 59 - - - -   09/12/17 1315 - - - - 89 12 - - -   09/12/17 1146 145/80 98.2  F (36.8  C) Temporal 99 - 18 99 % 1.753 m (5' 9\") 74.8 kg (165 lb)       Physical Exam  Constitutional: White male " sitting no respiratory distress  HENT: No signs of trauma.   Eyes: EOM are normal. Pupils are equal, round, and reactive to light.   Neck: Normal range of motion. No JVD present. No cervical adenopathy.  Cardiovascular: Irregularly irregular rapid rhythm.  Exam reveals no gallop and no friction rub.    No murmur heard.  Pulmonary/Chest: Bilateral breath sounds normal. No wheezes or rales. Occasional rhonchi  Abdominal: Soft. No tenderness. No rebound or guarding.   Musculoskeletal: No edema. No tenderness.   Lymphadenopathy: No lymphadenopathy.   Neurological: Alert and oriented to person, place, and time. Normal strength. Coordination normal.   Skin: Skin is warm and dry. Thickening and redness over joints of both hands    Emergency Department Course     ECG:  ECG taken at 1150, ECG read at 1335  Atrial flutter with variable AV block  Left ventricular hypertrophy with repolarization abnormality  Cannot rule out inferior infarct, age undetermined  Rate 116 bpm. AK interval *. QRS duration 94. QT/QTc 314/436. P-R-T axes 243,60,-49.     Laboratory:  Laboratory findings were communicated with the patient who voiced understanding of the findings.  CBC: WBC 12.8, HGB 15.4,   BMP: K 3.3, o/w WNL (Creatinine 0.96)    Interventions:  1408 Cardizem 25 mg IV  1614 Cardizem 240 mg PO     Emergency Department Course:  Nursing notes and vitals reviewed.  1332 I entered the room.  1340 I performed an exam of the patient as documented above.   IV was inserted and blood was drawn for laboratory testing, results above.  The patient received the above intervention(s).   1600 the patient was rechecked and he was updated on the results of his laboratory studies.    1540 I spoke with Dr. Chavez of the cardiology service regarding patient's presentation, findings, and plan of care.   I discussed the treatment plan with the patient. They expressed understanding of this plan and consented to discharge. They will be discharged home  with instructions for care and follow up. In addition, the patient will return to the emergency department if their symptoms persist, worsen, if new symptoms arise or if there is any concern.  All questions were answered.     Impression & Plan      Medical Decision Making:  Kwaku Medrano is a 52 year old male who over the past year has developed lung disease secondary to his rheumatoid arthritis. 2 months ago he was noted to be in a-fib flutter and was supposed to follow up with cardiology. He did get an EKG and echocardiogram, which did show a decrease in his ejection fraction. Today when he was seeing his pulmonologist, he urged him to come to the ED. On examination here, his pulses were in the 140's, but then did come down to the 110's and 120's. He was in atrial fibrillation flutter. He has no other symptoms or signs at this time. The patient received a dose of Caredizem IV, which brought his rate under 100. I discussed him with Dr. Chavez and will start him on Eliquis 5 mg twice per day and Caredizem extended release 240 once per day. He will follow up in three days with Dr. Chavez. I have talked to him about increasing shortness of breath, weakness, dizziness, or bleeding for which he should come into the ED emergently.     Diagnosis:    ICD-10-CM   1. Atrial flutter, unspecified type (H) I48.92   2. interstitial lung disease  3. Rheumatoid arthritis    Disposition:   The patient was discharged to home with the below medications to be taken at home as instructed.     Discharge Medications:  Discharge Medication List as of 9/12/2017  4:13 PM      START taking these medications    Details   diltiazem (CARDIZEM CD) 240 MG 24 hr capsule Take 1 capsule (240 mg) by mouth daily, Disp-30 capsule, R-0, Local Print      apixaban ANTICOAGULANT (ELIQUIS) 5 MG tablet Take 1 tablet (5 mg) by mouth 2 times daily, Disp-60 tablet, R-0, Local Print     Scribe Disclosure:  Rock SMITH, am serving as a scribe at 1:33 PM on  9/12/2017 to document services personally performed by Alexx Higginbotham MD, based on my observations and the provider's statements to me.    EMERGENCY DEPARTMENT       Alexx Higginbotham MD  09/12/17 [id number]

## 2017-09-13 ENCOUNTER — OFFICE VISIT (OUTPATIENT)
Dept: RHEUMATOLOGY | Facility: CLINIC | Age: 52
End: 2017-09-13
Payer: COMMERCIAL

## 2017-09-13 VITALS
WEIGHT: 170.1 LBS | HEIGHT: 69 IN | OXYGEN SATURATION: 97 % | TEMPERATURE: 98.9 F | SYSTOLIC BLOOD PRESSURE: 110 MMHG | BODY MASS INDEX: 25.2 KG/M2 | DIASTOLIC BLOOD PRESSURE: 60 MMHG | HEART RATE: 99 BPM

## 2017-09-13 DIAGNOSIS — M60.9 MYOSITIS, UNSPECIFIED MYOSITIS TYPE, UNSPECIFIED SITE: ICD-10-CM

## 2017-09-13 DIAGNOSIS — M13.0 POLYARTHRITIS: ICD-10-CM

## 2017-09-13 DIAGNOSIS — J84.9 ILD (INTERSTITIAL LUNG DISEASE) (H): ICD-10-CM

## 2017-09-13 DIAGNOSIS — M33.13 DERMATOMYOSITIS (H): ICD-10-CM

## 2017-09-13 DIAGNOSIS — M19.90 INFLAMMATORY ARTHRITIS: ICD-10-CM

## 2017-09-13 DIAGNOSIS — D89.89 ANTISYNTHETASE SYNDROME (H): ICD-10-CM

## 2017-09-13 PROCEDURE — 99214 OFFICE O/P EST MOD 30 MIN: CPT | Performed by: INTERNAL MEDICINE

## 2017-09-13 RX ORDER — PREDNISONE 20 MG/1
50 TABLET ORAL DAILY
Qty: 105 TABLET | Refills: 0 | COMMUNITY
Start: 2017-09-13 | End: 2017-09-15 | Stop reason: DRUGHIGH

## 2017-09-13 RX ORDER — MYCOPHENOLATE MOFETIL 500 MG/1
TABLET ORAL
Qty: 120 TABLET | Refills: 0 | COMMUNITY
Start: 2017-09-13 | End: 2017-09-29

## 2017-09-13 ASSESSMENT — ROUTINE ASSESSMENT OF PATIENT INDEX DATA (RAPID3)
RAPID3 INTERPRETATION: LOW 3.1-6
TOTAL RAPID3 SCORE: 5

## 2017-09-13 NOTE — MR AVS SNAPSHOT
After Visit Summary   9/13/2017    Kwaku Medrano    MRN: [id number]           Patient Information     Date Of Birth          1965        Visit Information        Provider Department      9/13/2017 9:15 AM Mani Golden MD [facility]        Today's Diagnoses     ILD (interstitial lung disease) (H)        Myositis, unspecified myositis type, unspecified site        Polyarthritis        Dermatomyositis (H)        Antisynthetase syndrome (H)           Follow-ups after your visit        Your next 10 appointments already scheduled     Sep 14, 2017  8:30 AM CDT   (Arrive by 8:15 AM)   New Patient Visit with Denny Acosta MD   [facility] Rheumatology ([facility])    [address] [place] [id number] [phone]            Sep 15, 2017  8:00 AM CDT   SIX MINUTE WALK with UC PFL 6 MINUTE WALK 2, UC PFL C   [facility] Pulmonary Function Testing ([facility])    [address] [id number] [phone]            Sep 15, 2017  9:00 AM CDT   (Arrive by 8:45 AM)   New Interstitial Lung with Elma Dillon MD   [facility] ([facility])    [address] [place] [id number] [phone]            Sep 15, 2017  1:00 PM CDT   EP NEW with Radha Chavez MD   [facility]YSICI[facility] [place] ([facility])    [address] [place] [id number] [phone]              Future tests that were ordered for you today     Open Future Orders        Priority Expected Expires Ordered    M Tuberculosis by Quantiferon Routine  9/13/2018 9/13/2017            Who to contact     If you have questions or need follow up information about today's clinic visit or your schedule please contact [facility] directly at  "[phone].  Normal or non-critical lab and imaging results will be communicated to you by [email or web address], letter or phone within 4 business days after the clinic has received the results. If you do not hear from us within 7 days, please contact the clinic through [employer] or phone. If you have a critical or abnormal lab result, we will notify you by phone as soon as possible.  Submit refill requests through [email or web address] or call your pharmacy and they will forward the refill request to us. Please allow 3 business days for your refill to be completed.          Additional Information About Your Visit        [email or web address] Information     [email or web address] gives you secure access to your electronic health record. If you see a primary care provider, you can also send messages to your care team and make appointments. If you have questions, please call your primary care clinic.  If you do not have a primary care provider, please call [phone] and they will assist you.        Care EveryWhere ID     This is your Care EveryWhere ID. This could be used by other organizations to access your [facility] medical records  [id number]        Your Vitals Were     Pulse Temperature Height Pulse Oximetry BMI (Body Mass Index)       99 98.9  F (37.2  C) (Oral) 1.753 m (5' 9\") 97% 25.12 kg/m2        Blood Pressure from Last 3 Encounters:   09/13/17 110/60   09/12/17 (!) 120/96   08/22/17 108/72    Weight from Last 3 Encounters:   09/13/17 77.2 kg (170 lb 1.6 oz)   09/12/17 74.8 kg (165 lb)   08/22/17 76.7 kg (169 lb 3.2 oz)                 Today's Medication Changes          These changes are accurate as of: 9/13/17  9:56 AM.  If you have any questions, ask your nurse or doctor.               These medicines have changed or have updated prescriptions.        Dose/Directions    mycophenolate 500 MG tablet   Commonly known as:  GENERIC EQUIVALENT   This may have changed:  additional instructions   Used for:  ILD (interstitial lung disease) (H), Myositis, " unspecified myositis type, unspecified site, Polyarthritis, Dermatomyositis (H), Antisynthetase syndrome (H)   Changed by:  Mani Golden MD        1500 mg twice daily   Quantity:  120 tablet   Refills:  0                Primary Care Provider Office Phone # Fax #    Stew Caldwell [phone]       [address] [facility] [phone]        Equal Access to Services     ESTHER DE LA O : Hadii aad ku hadasho Soomaali, waaxda luqadaha, qaybta kaalmada adeegyada, waxay idiin hayaan adeeg kharash la'aan . So [facility] [phone].    ATENCIÓN: Si habla español, tiene a rubalcava disposición servicios gratuitos de asistencia lingüística. [facility] [phone].    We comply with applicable federal civil rights laws and [place] laws. We do not discriminate on the basis of race, color, national origin, age, disability sex, sexual orientation or gender identity.            Thank you!     Thank you for choosing [facility]  for your care. Our goal is always to provide you with excellent care. Hearing back from our patients is one way we can continue to improve our services. Please take a few minutes to complete the written survey that you may receive in the mail after your visit with us. Thank you!             Your Updated Medication List - Protect others around you: Learn how to safely use, store and throw away your medicines at www.disposemymeds.org.          This list is accurate as of: 9/13/17  9:56 AM.  Always use your most recent med list.                   Brand Name Dispense Instructions for use Diagnosis    amLODIPine 5 MG tablet    NORVASC    30 tablet    Take 1 tablet (5 mg) by mouth daily    Raynaud's disease without gangrene       apixaban ANTICOAGULANT 5 MG tablet    ELIQUIS    60 tablet    Take 1 tablet (5 mg) by mouth 2 times daily        diltiazem 240 MG 24 hr capsule    CARDIZEM CD    30 capsule    Take 1 capsule (240 mg) by mouth daily        famotidine 40  MG tablet    PEPCID    30 tablet    Take 1 tablet (40 mg) by mouth At Bedtime    Gastroesophageal reflux disease without esophagitis       mycophenolate 500 MG tablet    GENERIC EQUIVALENT    120 tablet    1500 mg twice daily    ILD (interstitial lung disease) (H), Myositis, unspecified myositis type, unspecified site, Polyarthritis, Dermatomyositis (H), Antisynthetase syndrome (H)       predniSONE 20 MG tablet    DELTASONE    105 tablet    Take 3 tablets (60 mg) by mouth daily    ILD (interstitial lung disease) (H), Inflammatory arthritis       sulfamethoxazole-trimethoprim 400-80 MG per tablet    BACTRIM    60 tablet    Once daily for PJP prophylaxis. Start after bronchoscopy    ILD (interstitial lung disease) (H), Inflammatory arthritis

## 2017-09-13 NOTE — NURSING NOTE
"Chief Complaint   Patient presents with     RECHECK       Initial /60 (BP Location: Left arm, Patient Position: Chair, Cuff Size: Adult Regular)  Pulse 99  Temp 98.9  F (37.2  C) (Oral)  Ht 1.753 m (5' 9\")  Wt 77.2 kg (170 lb 1.6 oz)  SpO2 97%  BMI 25.12 kg/m2 Estimated body mass index is 25.12 kg/(m^2) as calculated from the following:    Height as of this encounter: 1.753 m (5' 9\").    Weight as of this encounter: 77.2 kg (170 lb 1.6 oz).  Medication Reconciliation: complete    Have you ever seen a rheumatologist yes Who You When 8/22/17  Joint pain history  Onset: was in ER yesterday for A-Fib. Cont. With cough, SOB. Hands are still slightly swollen  Involved joints: hands, lungs  Pain scale:  1.5/10     Wakes the patient from sleep : No  Morning stiffness:Yes for 30 minutes  Meds used:prednisone 50mg, pepcid, bactrim, amlodipine    Interim history  Since last visit:  1. Infections - No  2. New symptoms/medical problem - Yes/ A-Fib  3. Any side effects from Rheum medications -none  3. ER visits/Hospitalizations/surgeries - Yes  4. Last PCP visit: 7/25/17  Wt Readings from Last 4 Encounters:   09/13/17 77.2 kg (170 lb 1.6 oz)   09/12/17 74.8 kg (165 lb)   08/22/17 76.7 kg (169 lb 3.2 oz)   08/08/17 77.4 kg (170 lb 11.2 oz)     BP Readings from Last 3 Encounters:   09/13/17 110/60   09/12/17 (!) 120/96   08/22/17 108/72       "

## 2017-09-14 ENCOUNTER — OFFICE VISIT (OUTPATIENT)
Dept: RHEUMATOLOGY | Facility: CLINIC | Age: 52
End: 2017-09-14
Attending: INTERNAL MEDICINE
Payer: COMMERCIAL

## 2017-09-14 VITALS
SYSTOLIC BLOOD PRESSURE: 134 MMHG | BODY MASS INDEX: 25.06 KG/M2 | TEMPERATURE: 98.7 F | DIASTOLIC BLOOD PRESSURE: 86 MMHG | HEIGHT: 69 IN | WEIGHT: 169.2 LBS | HEART RATE: 96 BPM

## 2017-09-14 DIAGNOSIS — M34.9 SYSTEMIC SCLEROSIS (H): ICD-10-CM

## 2017-09-14 DIAGNOSIS — K22.4 ESOPHAGEAL DYSMOTILITY: ICD-10-CM

## 2017-09-14 DIAGNOSIS — K21.9 GASTROESOPHAGEAL REFLUX DISEASE, ESOPHAGITIS PRESENCE NOT SPECIFIED: ICD-10-CM

## 2017-09-14 DIAGNOSIS — Z79.899 HIGH RISK MEDICATIONS (NOT ANTICOAGULANTS) LONG-TERM USE: ICD-10-CM

## 2017-09-14 DIAGNOSIS — J84.9 ILD (INTERSTITIAL LUNG DISEASE) (H): Primary | ICD-10-CM

## 2017-09-14 DIAGNOSIS — J84.9 ILD (INTERSTITIAL LUNG DISEASE) (H): ICD-10-CM

## 2017-09-14 DIAGNOSIS — M33.13 DERMATOMYOSITIS (H): ICD-10-CM

## 2017-09-14 DIAGNOSIS — Z13.6 CARDIOVASCULAR SCREENING; LDL GOAL LESS THAN 160: ICD-10-CM

## 2017-09-14 PROBLEM — I48.91 ATRIAL FIBRILLATION AND FLUTTER (H): Status: ACTIVE | Noted: 2017-07-01

## 2017-09-14 PROBLEM — I48.91 ATRIAL FIBRILLATION, UNSPECIFIED TYPE (H): Status: ACTIVE | Noted: 2017-09-14

## 2017-09-14 PROBLEM — I10 DIASTOLIC HYPERTENSION: Status: ACTIVE | Noted: 2017-09-14

## 2017-09-14 PROBLEM — I48.92 ATRIAL FIBRILLATION AND FLUTTER (H): Status: ACTIVE | Noted: 2017-07-01

## 2017-09-14 LAB
CHOLEST SERPL-MCNC: 198 MG/DL
HDLC SERPL-MCNC: 64 MG/DL
LDLC SERPL CALC-MCNC: 108 MG/DL
NONHDLC SERPL-MCNC: 134 MG/DL
TRIGL SERPL-MCNC: 128 MG/DL

## 2017-09-14 PROCEDURE — 86039 ANTINUCLEAR ANTIBODIES (ANA): CPT | Performed by: INTERNAL MEDICINE

## 2017-09-14 PROCEDURE — 86480 TB TEST CELL IMMUN MEASURE: CPT | Performed by: INTERNAL MEDICINE

## 2017-09-14 PROCEDURE — 36415 COLL VENOUS BLD VENIPUNCTURE: CPT | Performed by: INTERNAL MEDICINE

## 2017-09-14 PROCEDURE — 86038 ANTINUCLEAR ANTIBODIES: CPT | Performed by: INTERNAL MEDICINE

## 2017-09-14 PROCEDURE — 83516 IMMUNOASSAY NONANTIBODY: CPT | Performed by: INTERNAL MEDICINE

## 2017-09-14 PROCEDURE — 99213 OFFICE O/P EST LOW 20 MIN: CPT | Mod: ZF

## 2017-09-14 PROCEDURE — 80180 DRUG SCRN QUAN MYCOPHENOLATE: CPT | Performed by: INTERNAL MEDICINE

## 2017-09-14 PROCEDURE — 80061 LIPID PANEL: CPT | Performed by: INTERNAL MEDICINE

## 2017-09-14 PROCEDURE — 86235 NUCLEAR ANTIGEN ANTIBODY: CPT | Performed by: INTERNAL MEDICINE

## 2017-09-14 RX ORDER — PANTOPRAZOLE SODIUM 40 MG/1
40 TABLET, DELAYED RELEASE ORAL DAILY
Qty: 30 TABLET | Refills: 11 | Status: SHIPPED | OUTPATIENT
Start: 2017-09-14 | End: 2018-09-13

## 2017-09-14 ASSESSMENT — PAIN SCALES - GENERAL: PAINLEVEL: MILD PAIN (3)

## 2017-09-14 NOTE — LETTER
9/14/2017       RE: Kwaku Medrano  [address] [facility] [phone]     Dear Colleague,    Thank you for referring your patient, Kwaku Medrano, to the [facility] RHEUMATOLOGY at [facility]. Please see a copy of my visit note below.    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       He may be improving now that he is on the high-dose CellCept.  Because there is a real risk that some of his NSIP is due to recurrent aspiration, we will get a barium esophagram to assess for that but have him immediately go back on proton pump inhibitor.       Perhaps the biggest single concern in him is that he is having rapidly progressive skin and may have an RNA polymerase III antibody, putting him at increased risk of scleroderma renal crisis.  The fact that he already has evidence of cardiac involvement is concerning in this regard.      Accordingly, I have asked him to monitor his blood pressures on a daily basis and let us know if there is any increase in them.      The other immediate implication of this is that he needs his prednisone tapered ASAP.  He will go down by 10 mg weekly until he is at 10 mg a day, and we will keep him there because of the concern that the prednisone may be helping to control his aggressive lung disease process and I do not want to  take him totally off of it, and this should minimize his risk of renal crisis or further cardiac involvement.       In the meantime, if he continues to have elevated diastolic blood pressures, I want him on an ACE inhibitor or possibly losartan given losartan's ability to also help Raynaud's phenomena, which he is clearly developing in both his hands and his feet.       I am going to redo his serologies and, of course, check an RNA polymerase III antibody among those.  If he has this, we will need to do cancer surveillance with a CT scan of the chest, abdomen and pelvis, although his recent HRCT scan probably eliminates the need for doing the chest.       He has a lot going on and I do not want things falling through the cracks.  I have given him a note to give to the cardiologist who will be seeing him at [facility] tomorrow including my pager and my cell phone numbers so that I can be contacted.       In addition to this, I want to see him back in 1 month.  He will be seeing Dr. Dillon tomorrow.  He will contact us with any worsening, particularly worsening of the blood pressures, in the meantime.       This visit was 80 minutes in duration, over 50% of which was spent in counseling.               History of Present Illness:   Kwaku Medrano is a 52 year old male who presents for The patient is here for consultative evaluation at the request of Dr. Golden, who has seen him on several prior occasions and has followed him for a presumptive diagnosis of dermatomyositis.      For prior evaluation done by Dr. Golden, please see his most recent notes.       He has been seeing the patient for several months now after the patient initially developed inflammatory joint symptoms following a viral infection in 12/2016.  Initially these were really the dominant symptoms, but he then progressed to have some features consistent with inflammatory myositis including some muscle weakness.       Extensive serologic evaluation  was unrevealing, with a negative SHERRELL by EIA, negative myositis panel, negative Marlene-1, and negative extractable nuclear antigens including topoisomerase.      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       With the institution of MMF which was dose escalated to 2 grams a day approximately 3 weeks ago and to 3 grams a day just about 2 weeks ago with no side effects, he has noted gradual improvement in his primary symptoms of pulmonary hypertension which are dry cough with deeper inspiration as well as some exertional dyspnea.  Those have improved somewhat, and he is able to get back to nearly his normal function as a .       He has had repeat pulmonary function tests done and a repeat HRCT done just in the last several weeks, and this shows quite significant fibrotic NSIP which I have reviewed today with Dr. Dillon in our Pulmonary Department who is seeing the patient tomorrow.  He is also due for repeat PFTs and a    6-minute walk test including desaturation tomorrow.       In addition, he is seeing a cardiologist tomorrow.  He developed atrial fibrillation a few weeks ago and has now been placed on Eliquis for that as well as diltiazem for rate control.  He does note it, but does not feel particularly poorly with the atrial fibrillation.  Notably, this has occurred during a time frame when he is on high-dose steroids, but an echocardiogram also shows that he is having some left ventricular hypertrophy and a decreased ejection fraction which is likely diastolic.  He also has diastolic hypertension at this point.       In addition to these features, over the last few  months he has had progressive skin thickening of his hands and now his forearms.  This initially manifested primarily as swelling in the hands but is now definitely including some skin thickening.  He is getting some subtle skin thickening in the shins as well as in the feet, and he is noticing some hair loss there.  In addition to these features, he is also getting cyanosis in both the hands and the feet.  Fortunately, he has not had the development of any digital ulcerations.       He clearly has GERD.  That maybe a little bit better currently despite the fact that Dr. Golden has eliminated using the proton pump inhibitor because of concerns about the interaction with CellCept.  He therefore put him only on a Zantac dosing currently.  He has been avoiding eating within 3 hours of bedtime and has been using a wedge, but he finds he slips down in the middle of the night.  Nonetheless, he denies significant dry cough first thing in the morning and does not feel his breathing is worse at that time.  He is not having any dysphagia, however.       He continues to have some subtle weakness in his thighs but does not feel like this impairs him in his ability to do things, and he is able to get in and out of a car, go up and down stairs, and do his work without particular problems.  He does think it might be a little better since he is on the higher dose prednisone.      For additional details of his medical history, please see Dr. Golden's prior notes.  The remainder of a full 10-point review of systems including focused review on the scleroderma intake form is notable only for some weight loss that he says he was attempting.       His family history is notable for an aunt with lupus on his maternal side.             Review of Systems:   Review Of Systems as above, otherwise:   Skin: negative  Eyes: negative  Ears/Nose/Throat: negative  Respiratory: No shortness of breath, dyspnea on exertion, cough, or  hemoptysis  Cardiovascular: negative  Gastrointestinal: negative  Genitourinary: negative  Musculoskeletal: negative  Neurologic: negative  Psychiatric: negative  Hematologic/Lymphatic/Immunologic: negative  Endocrine: negative          Past Medical History:     Past Medical History:   Diagnosis Date     Fracture      Myositis        Patient Active Problem List    Diagnosis Date Noted     Diastolic hypertension 09/14/2017     Priority: Medium     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     Priority: Medium     Dermatomyositis (H) 07/25/2017     Priority: Medium     ILD (interstitial lung disease) (H) 07/25/2017     Priority: Medium             Past Surgical History:     Past Surgical History:   Procedure Laterality Date     COLONOSCOPY N/A 7/19/2017    Procedure: COLONOSCOPY;  COLONOSCOPY;  Surgeon: Mita Jimenez MD;  Location:  GI     NO HISTORY OF SURGERY               Social History:     Social History   Substance Use Topics     Smoking status: Never Smoker     Smokeless tobacco: Former User     Types: Chew     Quit date: 1/1/1984     Alcohol use Yes      Comment: ocasionally             Family History:     Family History   Problem Relation Age of Onset     Prostate Cancer Father      LUNG DISEASE No family hx of      Rheumatologic Disease No family hx of             Allergies:     Allergies   Allergen Reactions     Ampicillin Rash             Medications:     Current Outpatient Prescriptions   Medication Sig Dispense Refill     [START ON 9/15/2017] Blood Pressure Monitoring KIT 1 each three times a week Dr Acosta has asked you to check your blood pressure 2-3 times per week using your home monitor.  Please keep track of your findings in a journal and bring it to your appointments.   Contact this office if your blood pressure: the top number (systolic) is consistently 30 points higher than your normal BP or if the bottom number (diastolic) is consistently 20 points higher than your normal BP. 1 kit 1  "    mycophenolate (GENERIC EQUIVALENT) 500 MG tablet 1500 mg twice daily 120 tablet 0     predniSONE (DELTASONE) 20 MG tablet Take 2.5 tablets (50 mg) by mouth daily 105 tablet 0     diltiazem (CARDIZEM CD) 240 MG 24 hr capsule Take 1 capsule (240 mg) by mouth daily 30 capsule 0     apixaban ANTICOAGULANT (ELIQUIS) 5 MG tablet Take 1 tablet (5 mg) by mouth 2 times daily 60 tablet 0     famotidine (PEPCID) 40 MG tablet Take 1 tablet (40 mg) by mouth At Bedtime 30 tablet 2     amLODIPine (NORVASC) 5 MG tablet Take 1 tablet (5 mg) by mouth daily 30 tablet 3     sulfamethoxazole-trimethoprim (BACTRIM) 400-80 MG per tablet Once daily for PJP prophylaxis. Start after bronchoscopy 60 tablet 1            Physical Exam:   Blood pressure 134/86, pulse 96, temperature 98.7  F (37.1  C), temperature source Oral, height 1.753 m (5' 9\"), weight 76.7 kg (169 lb 3.2 oz).  Constitutional: WD-WN-WG cooperative  Eyes: nl EOM, PERRLA, vision, conjunctiva, sclera  ENT: nl external ears, nose, hearing, lips, teeth, gums, throat  No mucous membrane lesions, normal saliva pool  Neck: no mass or thyroid enlargement  Resp: lungs clear to auscultation, nl to palpation  CV: RRR, no murmurs, rubs, + S4 , no edema  GI: no ABD mass or tenderness, no HSM  : not tested  Lymph: no cervical, supraclavicular, inguinal or epitrochlear nodes  MS: All TMJ, neck, shoulder, elbow, wrist, MCP/PIP/DIP, spine, hip, knee, ankle, and foot MTP/IP joints were examined and  found normal. No active synovitis or deformity. Full ROM.  Normal  strength. No dactylitis,  tenosynovitis, enthespathy   Skin: MRSS= 23, with 3+ involvement in bilat. Hands and right forearm. +  RP . Nailfold telangiectasias. No DU or pits. no nail pitting, alopecia, rash, nodules or lesions  Neuro: nl cranial nerves, strength, sensation, DTRs.   Psych: nl judgement, orientation, memory, affect.         Data:     Lab Results   Component Value Date    WBC 12.8 (H) 09/12/2017    WBC 4.7 " 03/10/2017    HGB 15.4 09/12/2017    HGB 16.0 08/03/2017    HGB 14.9 03/10/2017    HCT 45.8 09/12/2017    HCT 45.2 03/10/2017    MCV 89 09/12/2017    MCV 91 03/10/2017     09/12/2017     03/10/2017     Lab Results   Component Value Date    BUN 17 09/12/2017    BUN 15 03/10/2017     No components found for: SEDRATE  Lab Results   Component Value Date    TSH 1.36 06/07/2017     Lab Results   Component Value Date    AST 15 03/10/2017    ALT 21 03/10/2017    ALKPHOS 99 03/10/2017     Reviewed Rheumatology lab flowsheet    Denny Acosta

## 2017-09-14 NOTE — MR AVS SNAPSHOT
After Visit Summary   9/14/2017    Kwaku Medrano    MRN: [id number]           Patient Information     Date Of Birth          1965        Visit Information        Provider Department      9/14/2017 8:30 AM Denny Acosta MD [facility] Rheumatology        Today's Diagnoses     ILD (interstitial lung disease) (H)    -  1    Dermatomyositis (H)        Systemic sclerosis (H)        Gastroesophageal reflux disease, esophagitis presence not specified        High risk medications (not anticoagulants) long-term use           Follow-ups after your visit        Your next 10 appointments already scheduled     Sep 14, 2017 10:45 AM CDT   Lab with  LAB   [facility] ([facility])    [address]  1st Floor  [facility] [address]   [phone]            Sep 15, 2017  8:00 AM CDT   SIX MINUTE WALK with  PFL 6 MINUTE WALK 2,  PFL C   [facility] Pulmonary Function Testing ([facility])    [address]  [address] [facility] [address]   [phone]            Sep 15, 2017  9:00 AM CDT   (Arrive by 8:45 AM)   New Interstitial Lung with Elma Dillon MD   [facility] ([facility])    [address]  [address] [facility] [address]   [phone]            Sep 15, 2017  1:00 PM CDT   EP NEW with Radha Chavez MD   [facility] [place] ([facility])    [address]  [facility] [id number] [phone]            Sep 15, 2017  2:50 PM CDT   XR ESOPHAGRAM with SHXR5   [facility] Radiology ([facility])    [address] [id number] [phone]           Please bring a list of your current medicines to your exam. (Include vitamins, minerals and over-the-counter medicines.) Leave your valuables at home.  Tell the doctor if there is a chance you could be  pregnant.  Do not eat for 4 hours before the exam. Keep drinking clear liquids until 2 hours before the exam.  You may take pain medicine (with a sip of water) up to 4 hours before the exam.  Do not swallow any other medicines unless your doctor tells you to. Talk to your doctor to be sure it s safe to stop your medicines.  Please call the Imaging Department at your exam site with any questions.            Oct 10, 2017  5:00 PM CDT   (Arrive by 4:45 PM)   Return Visit with Denny Acosta MD   [facility] Rheumatology ([facility])    [address] [place] [address] [phone]              Future tests that were ordered for you today     Open Standing Orders        Priority Remaining Interval Expires Ordered    Mycophenolic acid Routine 6/6 every 8 weeks 9/14/2018 9/14/2017          Open Future Orders        Priority Expected Expires Ordered    SHERRELL by IFA: Laboratory Miscellaneous Order Routine  9/14/2018 9/14/2017    PM/Scl-100 Antibody, IgG, by Immunoblot with Reflex to SHERRELL IFA: Laboratory Miscellaneous Order Routine  9/14/2018 9/14/2017    Scleroderma Antibody Scl70 DAVID IgG Routine  9/14/2018 9/14/2017    RNA Polymerase III antibody IgG: Laboratory Miscellaneous Order Routine  9/14/2018 9/14/2017    X-ray Esophogram Routine 9/14/2017 4/12/2018 9/14/2017    M Tuberculosis by Quantiferon Routine  9/13/2018 9/13/2017            Who to contact     If you have questions or need follow up information about today's clinic visit or your schedule please contact [facility] RHEUMATOLOGY directly at [phone].  Normal or non-critical lab and imaging results will be communicated to you by MyChart, letter or phone within 4 business days after the clinic has received the results. If you do not hear from us within 7 days, please contact the clinic through MyChart or phone. If you have a critical or abnormal lab result, we will notify you by phone as soon as possible.  Submit  "refill requests through [employer] or call your pharmacy and they will forward the refill request to us. Please allow 3 business days for your refill to be completed.          Additional Information About Your Visit        [employer] Information     [employer] gives you secure access to your electronic health record. If you see a primary care provider, you can also send messages to your care team and make appointments. If you have questions, please call your primary care clinic.  If you do not have a primary care provider, please call [phone] and they will assist you.        Care EveryWhere ID     This is your Care EveryWhere ID. This could be used by other organizations to access your [facility] medical records  [id number]        Your Vitals Were     Pulse Temperature Height BMI (Body Mass Index)          96 98.7  F (37.1  C) (Oral) 1.753 m (5' 9\") 24.99 kg/m2         Blood Pressure from Last 3 Encounters:   09/14/17 134/86   09/13/17 110/60   09/12/17 (!) 120/96    Weight from Last 3 Encounters:   09/14/17 76.7 kg (169 lb 3.2 oz)   09/13/17 77.2 kg (170 lb 1.6 oz)   09/12/17 74.8 kg (165 lb)                 Today's Medication Changes          These changes are accurate as of: 9/14/17 10:36 AM.  If you have any questions, ask your nurse or doctor.               Start taking these medicines.        Dose/Directions    Blood Pressure Monitoring Kit   Used for:  ILD (interstitial lung disease) (H), Dermatomyositis (H), Systemic sclerosis (H), Gastroesophageal reflux disease, esophagitis presence not specified, High risk medications (not anticoagulants) long-term use   Started by:  Denny Acosta MD        Dose:  1 each   Start taking on:  9/15/2017   1 each three times a week Dr Acosta has asked you to check your blood pressure 2-3 times per week using your home monitor.  Please keep track of your findings in a journal and bring it to your appointments.  Contact this office if your blood pressure: the top number " (systolic) is consistently 30 points higher than your normal BP or if the bottom number (diastolic) is consistently 20 points higher than your normal BP.   Quantity:  1 kit   Refills:  1            Where to get your medicines      Some of these will need a paper prescription and others can be bought over the counter.  Ask your nurse if you have questions.     Bring a paper prescription for each of these medications     Blood Pressure Monitoring Kit                Primary Care Provider Office Phone # Fax #    Stew Caldwell [phone]       [address] [place] [id number]        Equal Access to Services     [facility]: Hadii cindy ku hadasho Somt, waaxda luqadaha, qaybta kaalmada kristopher, keegan gar . So [facility] [phone].    ATENCIÓN: Si habla español, tiene a rubalcava disposición servicios gratuitos de asistencia lingüística. [facility] [phone].    We comply with applicable federal civil rights laws and [place] laws. We do not discriminate on the basis of race, color, national origin, age, disability sex, sexual orientation or gender identity.            Thank you!     Thank you for choosing [facility] RHEUMATOLOGY  for your care. Our goal is always to provide you with excellent care. Hearing back from our patients is one way we can continue to improve our services. Please take a few minutes to complete the written survey that you may receive in the mail after your visit with us. Thank you!             Your Updated Medication List - Protect others around you: Learn how to safely use, store and throw away your medicines at www.disposemymeds.org.          This list is accurate as of: 9/14/17 10:36 AM.  Always use your most recent med list.                   Brand Name Dispense Instructions for use Diagnosis    amLODIPine 5 MG tablet    NORVASC    30 tablet    Take 1 tablet (5 mg) by mouth daily    Raynaud's disease without gangrene       apixaban  ANTICOAGULANT 5 MG tablet    ELIQUIS    60 tablet    Take 1 tablet (5 mg) by mouth 2 times daily        Blood Pressure Monitoring Kit   Start taking on:  9/15/2017     1 kit    1 each three times a week Dr Acosta has asked you to check your blood pressure 2-3 times per week using your home monitor.  Please keep track of your findings in a journal and bring it to your appointments.  Contact this office if your blood pressure: the top number (systolic) is consistently 30 points higher than your normal BP or if the bottom number (diastolic) is consistently 20 points higher than your normal BP.    ILD (interstitial lung disease) (H), Dermatomyositis (H), Systemic sclerosis (H), Gastroesophageal reflux disease, esophagitis presence not specified, High risk medications (not anticoagulants) long-term use       diltiazem 240 MG 24 hr capsule    CARDIZEM CD    30 capsule    Take 1 capsule (240 mg) by mouth daily        famotidine 40 MG tablet    PEPCID    30 tablet    Take 1 tablet (40 mg) by mouth At Bedtime    Gastroesophageal reflux disease without esophagitis       mycophenolate 500 MG tablet    GENERIC EQUIVALENT    120 tablet    1500 mg twice daily    ILD (interstitial lung disease) (H), Myositis, unspecified myositis type, unspecified site, Polyarthritis, Dermatomyositis (H), Antisynthetase syndrome (H)       predniSONE 20 MG tablet    DELTASONE    105 tablet    Take 2.5 tablets (50 mg) by mouth daily    ILD (interstitial lung disease) (H), Inflammatory arthritis       sulfamethoxazole-trimethoprim 400-80 MG per tablet    BACTRIM    60 tablet    Once daily for PJP prophylaxis. Start after bronchoscopy    ILD (interstitial lung disease) (H), Inflammatory arthritis

## 2017-09-14 NOTE — NURSING NOTE
"Chief Complaint   Patient presents with     Consult     consult with possible anti-synthetase syndrome, tzimmer cma       Initial /86  Pulse 96  Temp 98.7  F (37.1  C) (Oral)  Ht 1.753 m (5' 9\")  Wt 76.7 kg (169 lb 3.2 oz)  BMI 24.99 kg/m2 Estimated body mass index is 24.99 kg/(m^2) as calculated from the following:    Height as of this encounter: 1.753 m (5' 9\").    Weight as of this encounter: 76.7 kg (169 lb 3.2 oz).  Medication Reconciliation: complete    "

## 2017-09-14 NOTE — PROGRESS NOTES
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       He may be improving now that he is on the high-dose CellCept.  Because there is a real risk that some of his NSIP is due to recurrent aspiration, we will get a barium esophagram to assess for that but have him immediately go back on proton pump inhibitor.       Perhaps the biggest single concern in him is that he is having rapidly progressive skin and may have an RNA polymerase III antibody, putting him at increased risk of scleroderma renal crisis.  The fact that he already has evidence of cardiac involvement is concerning in this regard.      Accordingly, I have asked him to monitor his blood pressures on a daily basis and let us know if there is any increase in them.      The other immediate implication of this is that he needs his prednisone tapered ASAP.  He will go down by 10 mg weekly until he is at 10 mg a day, and we will keep him there because of the concern that the prednisone may be helping to control his aggressive lung disease process and I do not want to take him totally off of it, and this should minimize his risk of renal crisis or further cardiac involvement.       In the meantime, if he continues to have elevated diastolic blood pressures, I want him on an ACE inhibitor or possibly losartan given losartan's ability to also help Raynaud's  phenomena, which he is clearly developing in both his hands and his feet.       I am going to redo his serologies and, of course, check an RNA polymerase III antibody among those.  If he has this, we will need to do cancer surveillance with a CT scan of the chest, abdomen and pelvis, although his recent HRCT scan probably eliminates the need for doing the chest.       He has a lot going on and I do not want things falling through the cracks.  I have given him a note to give to the cardiologist who will be seeing him at [facility] tomorrow including my pager and my cell phone numbers so that I can be contacted.       In addition to this, I want to see him back in 1 month.  He will be seeing Dr. Dillon tomorrow.  He will contact us with any worsening, particularly worsening of the blood pressures, in the meantime.       This visit was 80 minutes in duration, over 50% of which was spent in counseling.               History of Present Illness:   Kwaku Medrano is a 52 year old male who presents for The patient is here for consultative evaluation at the request of Dr. Golden, who has seen him on several prior occasions and has followed him for a presumptive diagnosis of dermatomyositis.      For prior evaluation done by Dr. Golden, please see his most recent notes.       He has been seeing the patient for several months now after the patient initially developed inflammatory joint symptoms following a viral infection in 12/2016.  Initially these were really the dominant symptoms, but he then progressed to have some features consistent with inflammatory myositis including some muscle weakness.       Extensive serologic evaluation was unrevealing, with a negative SHERRELL by EIA, negative myositis panel, negative Marlene-1, and negative extractable nuclear antigens including topoisomerase.      He, however, developed fairly clear-cut interstitial lung disease as well as muscle weakness, and when erythematous changes in the  fingers consistent with Gottron's papules were noted, the diagnosis of dermatomyositis was made and he was started on CellCept and dose escalated as well as high-dose prednisone at 80 mg a day which is now being tapered and is currently at 50 mg a day.       With the institution of MMF which was dose escalated to 2 grams a day approximately 3 weeks ago and to 3 grams a day just about 2 weeks ago with no side effects, he has noted gradual improvement in his primary symptoms of pulmonary hypertension which are dry cough with deeper inspiration as well as some exertional dyspnea.  Those have improved somewhat, and he is able to get back to nearly his normal function as a .       He has had repeat pulmonary function tests done and a repeat HRCT done just in the last several weeks, and this shows quite significant fibrotic NSIP which I have reviewed today with Dr. Dillon in our Pulmonary Department who is seeing the patient tomorrow.  He is also due for repeat PFTs and a    6-minute walk test including desaturation tomorrow.       In addition, he is seeing a cardiologist tomorrow.  He developed atrial fibrillation a few weeks ago and has now been placed on Eliquis for that as well as diltiazem for rate control.  He does note it, but does not feel particularly poorly with the atrial fibrillation.  Notably, this has occurred during a time frame when he is on high-dose steroids, but an echocardiogram also shows that he is having some left ventricular hypertrophy and a decreased ejection fraction which is likely diastolic.  He also has diastolic hypertension at this point.       In addition to these features, over the last few months he has had progressive skin thickening of his hands and now his forearms.  This initially manifested primarily as swelling in the hands but is now definitely including some skin thickening.  He is getting some subtle skin thickening in the shins as well as in the feet, and he is  noticing some hair loss there.  In addition to these features, he is also getting cyanosis in both the hands and the feet.  Fortunately, he has not had the development of any digital ulcerations.       He clearly has GERD.  That maybe a little bit better currently despite the fact that Dr. Golden has eliminated using the proton pump inhibitor because of concerns about the interaction with CellCept.  He therefore put him only on a Zantac dosing currently.  He has been avoiding eating within 3 hours of bedtime and has been using a wedge, but he finds he slips down in the middle of the night.  Nonetheless, he denies significant dry cough first thing in the morning and does not feel his breathing is worse at that time.  He is not having any dysphagia, however.       He continues to have some subtle weakness in his thighs but does not feel like this impairs him in his ability to do things, and he is able to get in and out of a car, go up and down stairs, and do his work without particular problems.  He does think it might be a little better since he is on the higher dose prednisone.      For additional details of his medical history, please see Dr. Golden's prior notes.  The remainder of a full 10-point review of systems including focused review on the scleroderma intake form is notable only for some weight loss that he says he was attempting.       His family history is notable for an aunt with lupus on his maternal side.             Review of Systems:   Review Of Systems as above, otherwise:   Skin: negative  Eyes: negative  Ears/Nose/Throat: negative  Respiratory: No shortness of breath, dyspnea on exertion, cough, or hemoptysis  Cardiovascular: negative  Gastrointestinal: negative  Genitourinary: negative  Musculoskeletal: negative  Neurologic: negative  Psychiatric: negative  Hematologic/Lymphatic/Immunologic: negative  Endocrine: negative          Past Medical History:     Past Medical History:   Diagnosis  Date     Fracture      Myositis        Patient Active Problem List    Diagnosis Date Noted     Diastolic hypertension 09/14/2017     Priority: Medium     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     Priority: Medium     Dermatomyositis (H) 07/25/2017     Priority: Medium     ILD (interstitial lung disease) (H) 07/25/2017     Priority: Medium             Past Surgical History:     Past Surgical History:   Procedure Laterality Date     COLONOSCOPY N/A 7/19/2017    Procedure: COLONOSCOPY;  COLONOSCOPY;  Surgeon: Mita Jimenez MD;  Location:  GI     NO HISTORY OF SURGERY               Social History:     Social History   Substance Use Topics     Smoking status: Never Smoker     Smokeless tobacco: Former User     Types: Chew     Quit date: 1/1/1984     Alcohol use Yes      Comment: ocasionally             Family History:     Family History   Problem Relation Age of Onset     Prostate Cancer Father      LUNG DISEASE No family hx of      Rheumatologic Disease No family hx of             Allergies:     Allergies   Allergen Reactions     Ampicillin Rash             Medications:     Current Outpatient Prescriptions   Medication Sig Dispense Refill     [START ON 9/15/2017] Blood Pressure Monitoring KIT 1 each three times a week Dr Acosta has asked you to check your blood pressure 2-3 times per week using your home monitor.  Please keep track of your findings in a journal and bring it to your appointments.   Contact this office if your blood pressure: the top number (systolic) is consistently 30 points higher than your normal BP or if the bottom number (diastolic) is consistently 20 points higher than your normal BP. 1 kit 1     mycophenolate (GENERIC EQUIVALENT) 500 MG tablet 1500 mg twice daily 120 tablet 0     predniSONE (DELTASONE) 20 MG tablet Take 2.5 tablets (50 mg) by mouth daily 105 tablet 0     diltiazem (CARDIZEM CD) 240 MG 24 hr capsule Take 1 capsule (240 mg) by mouth daily 30 capsule 0     apixaban  "ANTICOAGULANT (ELIQUIS) 5 MG tablet Take 1 tablet (5 mg) by mouth 2 times daily 60 tablet 0     famotidine (PEPCID) 40 MG tablet Take 1 tablet (40 mg) by mouth At Bedtime 30 tablet 2     amLODIPine (NORVASC) 5 MG tablet Take 1 tablet (5 mg) by mouth daily 30 tablet 3     sulfamethoxazole-trimethoprim (BACTRIM) 400-80 MG per tablet Once daily for PJP prophylaxis. Start after bronchoscopy 60 tablet 1            Physical Exam:   Blood pressure 134/86, pulse 96, temperature 98.7  F (37.1  C), temperature source Oral, height 1.753 m (5' 9\"), weight 76.7 kg (169 lb 3.2 oz).  Constitutional: WD-WN-WG cooperative  Eyes: nl EOM, PERRLA, vision, conjunctiva, sclera  ENT: nl external ears, nose, hearing, lips, teeth, gums, throat  No mucous membrane lesions, normal saliva pool  Neck: no mass or thyroid enlargement  Resp: lungs clear to auscultation, nl to palpation  CV: RRR, no murmurs, rubs, + S4 , no edema  GI: no ABD mass or tenderness, no HSM  : not tested  Lymph: no cervical, supraclavicular, inguinal or epitrochlear nodes  MS: All TMJ, neck, shoulder, elbow, wrist, MCP/PIP/DIP, spine, hip, knee, ankle, and foot MTP/IP joints were examined and  found normal. No active synovitis or deformity. Full ROM.  Normal  strength. No dactylitis,  tenosynovitis, enthespathy   Skin: MRSS= 23, with 3+ involvement in bilat. Hands and right forearm. +  RP . Nailfold telangiectasias. No DU or pits. no nail pitting, alopecia, rash, nodules or lesions  Neuro: nl cranial nerves, strength, sensation, DTRs.   Psych: nl judgement, orientation, memory, affect.         Data:     Lab Results   Component Value Date    WBC 12.8 (H) 09/12/2017    WBC 4.7 03/10/2017    HGB 15.4 09/12/2017    HGB 16.0 08/03/2017    HGB 14.9 03/10/2017    HCT 45.8 09/12/2017    HCT 45.2 03/10/2017    MCV 89 09/12/2017    MCV 91 03/10/2017     09/12/2017     03/10/2017     Lab Results   Component Value Date    BUN 17 09/12/2017    BUN 15 03/10/2017 "     No components found for: SEDRATE  Lab Results   Component Value Date    TSH 1.36 06/07/2017     Lab Results   Component Value Date    AST 15 03/10/2017    ALT 21 03/10/2017    ALKPHOS 99 03/10/2017     Reviewed Rheumatology lab flowsheet    Denny Acosta

## 2017-09-15 ENCOUNTER — OFFICE VISIT (OUTPATIENT)
Dept: CARDIOLOGY | Facility: CLINIC | Age: 52
End: 2017-09-15
Payer: COMMERCIAL

## 2017-09-15 ENCOUNTER — HOSPITAL ENCOUNTER (OUTPATIENT)
Dept: GENERAL RADIOLOGY | Facility: CLINIC | Age: 52
Discharge: HOME | End: 2017-09-15
Attending: INTERNAL MEDICINE | Admitting: INTERNAL MEDICINE
Payer: COMMERCIAL

## 2017-09-15 ENCOUNTER — OFFICE VISIT (OUTPATIENT)
Dept: PULMONOLOGY | Facility: CLINIC | Age: 52
End: 2017-09-15
Attending: INTERNAL MEDICINE
Payer: COMMERCIAL

## 2017-09-15 VITALS
HEART RATE: 95 BPM | TEMPERATURE: 98.6 F | OXYGEN SATURATION: 98 % | WEIGHT: 169.2 LBS | BODY MASS INDEX: 25.06 KG/M2 | DIASTOLIC BLOOD PRESSURE: 81 MMHG | HEIGHT: 69 IN | RESPIRATION RATE: 18 BRPM | SYSTOLIC BLOOD PRESSURE: 119 MMHG

## 2017-09-15 VITALS
WEIGHT: 168.1 LBS | HEART RATE: 99 BPM | DIASTOLIC BLOOD PRESSURE: 77 MMHG | HEIGHT: 69 IN | SYSTOLIC BLOOD PRESSURE: 126 MMHG | BODY MASS INDEX: 24.9 KG/M2

## 2017-09-15 DIAGNOSIS — M33.13 DERMATOMYOSITIS (H): ICD-10-CM

## 2017-09-15 DIAGNOSIS — I48.91 ATRIAL FIBRILLATION, UNSPECIFIED TYPE (H): Primary | ICD-10-CM

## 2017-09-15 DIAGNOSIS — I73.00 RAYNAUD'S DISEASE WITHOUT GANGRENE: ICD-10-CM

## 2017-09-15 DIAGNOSIS — J84.9 ILD (INTERSTITIAL LUNG DISEASE) (H): ICD-10-CM

## 2017-09-15 DIAGNOSIS — M34.9 SYSTEMIC SCLEROSIS (H): ICD-10-CM

## 2017-09-15 DIAGNOSIS — K21.9 GASTROESOPHAGEAL REFLUX DISEASE, ESOPHAGITIS PRESENCE NOT SPECIFIED: ICD-10-CM

## 2017-09-15 DIAGNOSIS — J84.9 ILD (INTERSTITIAL LUNG DISEASE) (H): Primary | ICD-10-CM

## 2017-09-15 DIAGNOSIS — I48.3 TYPICAL ATRIAL FLUTTER (H): ICD-10-CM

## 2017-09-15 DIAGNOSIS — Z79.899 HIGH RISK MEDICATIONS (NOT ANTICOAGULANTS) LONG-TERM USE: ICD-10-CM

## 2017-09-15 LAB
6 MIN WALK (FT): 1550 FT
6 MIN WALK (M): 472 M
ANA PAT SER IF-IMP: ABNORMAL
ANA SER QL IF: POSITIVE
ANA TITR SER IF: ABNORMAL {TITER}
ENA SCL70 IGG SER IA-ACNC: <0.2 AI (ref 0–0.9)
MYCOPHENOLATE SERPL LC/MS/MS-MCNC: 9.32 MG/L (ref 1–3.5)
MYCOPHENOLATE-G SERPL LC/MS/MS-MCNC: 41.5 MG/L (ref 30–95)
TME LAST DOSE: ABNORMAL H

## 2017-09-15 PROCEDURE — 99204 OFFICE O/P NEW MOD 45 MIN: CPT | Performed by: INTERNAL MEDICINE

## 2017-09-15 PROCEDURE — 74220 X-RAY XM ESOPHAGUS 1CNTRST: CPT

## 2017-09-15 PROCEDURE — 99212 OFFICE O/P EST SF 10 MIN: CPT | Mod: ZF

## 2017-09-15 PROCEDURE — 93000 ELECTROCARDIOGRAM COMPLETE: CPT | Performed by: INTERNAL MEDICINE

## 2017-09-15 PROCEDURE — 25500045 ZZH RX 255: Performed by: INTERNAL MEDICINE

## 2017-09-15 RX ORDER — PREDNISONE 20 MG/1
10 TABLET ORAL DAILY
COMMUNITY
End: 2018-02-15

## 2017-09-15 RX ORDER — LISINOPRIL 10 MG/1
10 TABLET ORAL DAILY
Qty: 30 TABLET | Refills: 11 | Status: SHIPPED | OUTPATIENT
Start: 2017-09-15 | End: 2018-09-10

## 2017-09-15 RX ADMIN — ANTACID/ANTIFLATULENT 4 G: 380; 550; 10; 10 GRANULE, EFFERVESCENT ORAL at 14:23

## 2017-09-15 ASSESSMENT — PAIN SCALES - GENERAL: PAINLEVEL: NO PAIN (0)

## 2017-09-15 NOTE — PROGRESS NOTES
HPI and Plan:   See dictation    Orders Placed This Encounter   Procedures     Follow-Up with Electrophysiologist     EKG 12-lead complete w/read - Clinics (performed today)     EKG 12-lead complete w/read - Clinics (to be scheduled)       Orders Placed This Encounter   Medications     predniSONE (DELTASONE) 20 MG tablet     Sig: Take 40 mg by mouth daily     lisinopril (PRINIVIL/ZESTRIL) 10 MG tablet     Sig: Take 1 tablet (10 mg) by mouth daily     Dispense:  30 tablet     Refill:  11       Medications Discontinued During This Encounter   Medication Reason     predniSONE (DELTASONE) 20 MG tablet Dose adjustment     amLODIPine (NORVASC) 5 MG tablet          Encounter Diagnoses   Name Primary?     Atrial fibrillation, unspecified type (H) Yes     Benign essential hypertension      Raynaud's disease without gangrene        CURRENT MEDICATIONS:  Current Outpatient Prescriptions   Medication Sig Dispense Refill     predniSONE (DELTASONE) 20 MG tablet Take 40 mg by mouth daily       lisinopril (PRINIVIL/ZESTRIL) 10 MG tablet Take 1 tablet (10 mg) by mouth daily 30 tablet 11     pantoprazole (PROTONIX) 40 MG EC tablet Take 1 tablet (40 mg) by mouth daily 30 tablet 11     mycophenolate (GENERIC EQUIVALENT) 500 MG tablet 1500 mg twice daily 120 tablet 0     diltiazem (CARDIZEM CD) 240 MG 24 hr capsule Take 1 capsule (240 mg) by mouth daily 30 capsule 0     apixaban ANTICOAGULANT (ELIQUIS) 5 MG tablet Take 1 tablet (5 mg) by mouth 2 times daily 60 tablet 0     sulfamethoxazole-trimethoprim (BACTRIM) 400-80 MG per tablet Once daily for PJP prophylaxis. Start after bronchoscopy 60 tablet 1     Blood Pressure Monitoring KIT 1 each three times a week Dr Acosta has asked you to check your blood pressure 2-3 times per week using your home monitor.  Please keep track of your findings in a journal and bring it to your appointments.   Contact this office if your blood pressure: the top number (systolic) is consistently 30 points  higher than your normal BP or if the bottom number (diastolic) is consistently 20 points higher than your normal BP. 1 kit 1       ALLERGIES     Allergies   Allergen Reactions     Ampicillin Rash       PAST MEDICAL HISTORY:  Past Medical History:   Diagnosis Date     Atrial fibrillation and flutter (H) 07/2017     Fracture      GERD (gastroesophageal reflux disease)      Interstitial lung disease (H)     sclerderma ILD; present on CXR 3-2017 and CT 5-2017; dx confirmed by CT 9-2017 fibrotic NSIP pattern with increased fibrosis; started mycophenolate 7/2017     Myositis      Scleroderma (H)     dx 9- at [facility] by Dr. Acosta       PAST SURGICAL HISTORY:  Past Surgical History:   Procedure Laterality Date     COLONOSCOPY N/A 7/19/2017    Procedure: COLONOSCOPY;  COLONOSCOPY;  Surgeon: Mita Jimenez MD;  Location:  GI     NO HISTORY OF SURGERY         FAMILY HISTORY:  Family History   Problem Relation Age of Onset     Prostate Cancer Father      Lung Cancer Other      LUNG DISEASE No family hx of      Rheumatologic Disease No family hx of        SOCIAL HISTORY:  Social History     Social History     Marital status: Single     Spouse name: N/A     Number of children: N/A     Years of education: N/A     Social History Main Topics     Smoking status: Never Smoker     Smokeless tobacco: Former User     Types: Chew     Quit date: 1/1/1984     Alcohol use Yes      Comment: ocasionally     Drug use: No     Sexual activity: Not Currently     Other Topics Concern     Not on file     Social History Narrative    Work in [employer]  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  Denies exposure to hot tubs, silica, pet birds, mold.  Single, no children.       Review of Systems:  Skin:  Positive for   scleraderma   Eyes:  Positive for glasses    ENT:  Negative      Respiratory:  Positive for shortness of breath;cough     Cardiovascular:    Positive for;palpitations;edema     Gastroenterology: Positive for heartburn;reflux    Genitourinary:  Negative      Musculoskeletal:  Negative      Neurologic:  Negative      Psychiatric:  Negative      Heme/Lymph/Imm:  Positive for allergies    Endocrine:  Negative        [id number]

## 2017-09-15 NOTE — MR AVS SNAPSHOT
After Visit Summary   9/15/2017    Kwaku Medrano    MRN: [id number]           Patient Information     Date Of Birth          1965        Visit Information        Provider Department      9/15/2017 9:00 AM Elma Dillon MD [facility]        Today's Diagnoses     ILD (interstitial lung disease) (H)    -  1       Follow-ups after your visit        Follow-up notes from your care team     Return in about 3 months (around 12/15/2017).      Your next 10 appointments already scheduled     Sep 15, 2017  1:00 PM CDT   EP NEW with Radha Chavez MD   [facility] AT [place] ([facility])    [address]  [facility] [id number] [phone]            Sep 15, 2017  2:50 PM CDT   XR ESOPHAGRAM with SHXR5   [facility] ([facility])    [address] [id number] [phone]           Please bring a list of your current medicines to your exam. (Include vitamins, minerals and over-the-counter medicines.) Leave your valuables at home.  Tell the doctor if there is a chance you could be pregnant.  Do not eat for 4 hours before the exam. Keep drinking clear liquids until 2 hours before the exam.  You may take pain medicine (with a sip of water) up to 4 hours before the exam.  Do not swallow any other medicines unless your doctor tells you to. Talk to your doctor to be sure it s safe to stop your medicines.  Please call the Imaging Department at your exam site with any questions.            Oct 10, 2017  5:00 PM CDT   (Arrive by 4:45 PM)   Return Visit with Denny Acosta MD   [facility] ([facility])    [address]  [address] [facility] [address] [phone]            Dec 22, 2017  8:00 AM CST   FULL PULMONARY FUNCTION with  PFL A   [facility] Pulmonary Function Testing ([facility])    [address] [address] [phone]            Dec 22, 2017  9:00 AM CST   (Arrive by 8:45 AM)   Return Interstitial Lung with Elma Dillon MD   [facility] ([facility])    [address] [place] [id number] [phone]              Future tests that were ordered for you today     Open Standing Orders        Priority Remaining Interval Expires Ordered    Mycophenolic acid Routine 5/6 every 8 weeks 9/14/2018 9/14/2017          Open Future Orders        Priority Expected Expires Ordered    CBC with platelets differential Routine 12/15/2017 9/15/2018 9/15/2017    Comprehensive metabolic panel Routine 12/15/2017 9/15/2018 9/15/2017    General PFT Lab (Please always keep checked) Routine 12/15/2017 9/15/2018 9/15/2017    Pulmonary Function Test Routine 12/15/2017 9/15/2018 9/15/2017    X-ray Esophogram Routine 9/14/2017 4/12/2018 9/14/2017            Who to contact     If you have questions or need follow up information about today's clinic visit or your schedule please contact [facility] directly at [phone].  Normal or non-critical lab and imaging results will be communicated to you by [employer], letter or phone within 4 business days after the clinic has received the results. If you do not hear from us within 7 days, please contact the clinic through MyChart or phone. If you have a critical or abnormal lab result, we will notify you by phone as soon as possible.  Submit refill requests through [employer] or call your pharmacy and they will forward the refill request to us. Please allow 3 business days for your refill to be completed.          Additional Information About Your Visit        [employer] Information     [employer] gives you secure access to your electronic health record. If you see a primary care provider, you can also send messages to your care  "team and make appointments. If you have questions, please call your primary care clinic.  If you do not have a primary care provider, please call [phone] and they will assist you.        Care EveryWhere ID     This is your Care EveryWhere ID. This could be used by other organizations to access your [place] medical records  [id number]        Your Vitals Were     Pulse Temperature Respirations Height Pulse Oximetry BMI (Body Mass Index)    95 98.6  F (37  C) (Oral) 18 1.753 m (5' 9\") 98% 24.99 kg/m2       Blood Pressure from Last 3 Encounters:   09/15/17 119/81   09/14/17 134/86   09/13/17 110/60    Weight from Last 3 Encounters:   09/15/17 76.7 kg (169 lb 3.2 oz)   09/14/17 76.7 kg (169 lb 3.2 oz)   09/13/17 77.2 kg (170 lb 1.6 oz)                 Today's Medication Changes          These changes are accurate as of: 9/15/17  9:57 AM.  If you have any questions, ask your nurse or doctor.               Stop taking these medicines if you haven't already. Please contact your care team if you have questions.     famotidine 40 MG tablet   Commonly known as:  PEPCID   Stopped by:  Elma Dillon MD                    Primary Care Provider Office Phone # Fax #    Stew Caldwell [phone]       [address] [place] [phone]        Equal Access to Services     [facility] : Hadfranca lewo Somt, waaxda luquhy, qaybta kaalmakeegan rees . So [facility] [phone].    ATENCIÓN: Si gregoryla franky, tiene a rubalcava disposición servicios gratuitos de asistencia lingüística. Abimbola al [phone].    We comply with applicable federal civil rights laws and [place] laws. We do not discriminate on the basis of race, color, national origin, age, disability sex, sexual orientation or gender identity.            Thank you!     Thank you for choosing [facility]  for your care. Our goal is always to provide you with " excellent care. Hearing back from our patients is one way we can continue to improve our services. Please take a few minutes to complete the written survey that you may receive in the mail after your visit with us. Thank you!             Your Updated Medication List - Protect others around you: Learn how to safely use, store and throw away your medicines at www.disposemymeds.org.          This list is accurate as of: 9/15/17  9:57 AM.  Always use your most recent med list.                   Brand Name Dispense Instructions for use Diagnosis    amLODIPine 5 MG tablet    NORVASC    30 tablet    Take 1 tablet (5 mg) by mouth daily    Raynaud's disease without gangrene       apixaban ANTICOAGULANT 5 MG tablet    ELIQUIS    60 tablet    Take 1 tablet (5 mg) by mouth 2 times daily        Blood Pressure Monitoring Kit     1 kit    1 each three times a week Dr Acosta has asked you to check your blood pressure 2-3 times per week using your home monitor.  Please keep track of your findings in a journal and bring it to your appointments.  Contact this office if your blood pressure: the top number (systolic) is consistently 30 points higher than your normal BP or if the bottom number (diastolic) is consistently 20 points higher than your normal BP.    ILD (interstitial lung disease) (H), Dermatomyositis (H), Systemic sclerosis (H), Gastroesophageal reflux disease, esophagitis presence not specified, High risk medications (not anticoagulants) long-term use       diltiazem 240 MG 24 hr capsule    CARDIZEM CD    30 capsule    Take 1 capsule (240 mg) by mouth daily        mycophenolate 500 MG tablet    GENERIC EQUIVALENT    120 tablet    1500 mg twice daily    ILD (interstitial lung disease) (H), Myositis, unspecified myositis type, unspecified site, Polyarthritis, Dermatomyositis (H), Antisynthetase syndrome (H)       pantoprazole 40 MG EC tablet    PROTONIX    30 tablet    Take 1 tablet (40 mg) by mouth daily    Gastroesophageal  reflux disease, esophagitis presence not specified, Esophageal dysmotility       predniSONE 20 MG tablet    DELTASONE    105 tablet    Take 2.5 tablets (50 mg) by mouth daily    ILD (interstitial lung disease) (H), Inflammatory arthritis       sulfamethoxazole-trimethoprim 400-80 MG per tablet    BACTRIM    60 tablet    Once daily for PJP prophylaxis. Start after bronchoscopy    ILD (interstitial lung disease) (H), Inflammatory arthritis

## 2017-09-15 NOTE — MR AVS SNAPSHOT
After Visit Summary   9/15/2017    Kwaku Medrano    MRN: [id number]           Patient Information     Date Of Birth          1965        Visit Information        Provider Department      9/15/2017 1:00 PM Radha Chavez MD [facility] AT [place]        Today's Diagnoses     Atrial fibrillation, unspecified type (H)    -  1    Benign essential hypertension        Raynaud's disease without gangrene          Care Instructions    1.  Stop amlodipine.  2.  Start lisinopril.  Call us if you develop a dry cough (1 in 7 patients gets a cough).  3.  Continue Eliquis and diltiazem.  4.  Will see you back in 4 weeks.             Follow-ups after your visit        Additional Services     Follow-Up with Electrophysiologist                 Your next 10 appointments already scheduled     Sep 15, 2017  2:50 PM CDT   XR ESOPHAGRAM with SHXR5   [facility] ([facility])    [address] [place] [address] [phone]           Please bring a list of your current medicines to your exam. (Include vitamins, minerals and over-the-counter medicines.) Leave your valuables at home.  Tell the doctor if there is a chance you could be pregnant.  Do not eat for 4 hours before the exam. Keep drinking clear liquids until 2 hours before the exam.  You may take pain medicine (with a sip of water) up to 4 hours before the exam.  Do not swallow any other medicines unless your doctor tells you to. Talk to your doctor to be sure it s safe to stop your medicines.  Please call the Imaging Department at your exam site with any questions.            Oct 10, 2017  5:00 PM CDT   (Arrive by 4:45 PM)   Return Visit with Denny Acosta MD   [facility] Rheumatology ([facility])    [address] [address] [phone]            Dec 22, 2017  7:45 AM CST   Lab with [facility] (  [facility])    [address]  1st Floor  [facility] [place] [facility] [address]   [phone]            Dec 22, 2017  8:00 AM CST   FULL PULMONARY FUNCTION with UC PFL A   [facility] Pulmonary Function Testing ([facility])    [address]  [address]rd [facility] [address]   [phone]            Dec 22, 2017  9:00 AM CST   (Arrive by 8:45 AM)   Return Interstitial Lung with Elma Dillon MD   [facility] ([facility])    [address]  [address]rd [facility] [address]   [phone]              Future tests that were ordered for you today     Open Standing Orders        Priority Remaining Interval Expires Ordered    Mycophenolic acid Routine 5/6 every 8 weeks 9/14/2018 9/14/2017          Open Future Orders        Priority Expected Expires Ordered    Follow-Up with Electrophysiologist Routine 10/12/2017 9/15/2018 9/15/2017    EKG 12-lead complete w/read - Clinics (to be scheduled) Routine 10/12/2017 9/15/2018 9/15/2017    CBC with platelets differential Routine 12/15/2017 9/15/2018 9/15/2017    Comprehensive metabolic panel Routine 12/15/2017 9/15/2018 9/15/2017    General PFT Lab (Please always keep checked) Routine 12/15/2017 9/15/2018 9/15/2017    Pulmonary Function Test Routine 12/15/2017 9/15/2018 9/15/2017    X-ray Esophogram Routine 9/14/2017 4/12/2018 9/14/2017            Who to contact     If you have questions or need follow up information about today's clinic visit or your schedule please contact [facility] [place] directly at [phone].  Normal or non-critical lab and imaging results will be communicated to you by MyChart, letter or phone within 4 business days after the clinic has received the results. If you do not hear from us within 7 days, please contact the clinic through MyChart or phone. If you have a critical or abnormal lab result, we  "will notify you by phone as soon as possible.  Submit refill requests through [employer] or call your pharmacy and they will forward the refill request to us. Please allow 3 business days for your refill to be completed.          Additional Information About Your Visit        [employer] Information     [employer] gives you secure access to your electronic health record. If you see a primary care provider, you can also send messages to your care team and make appointments. If you have questions, please call your primary care clinic.  If you do not have a primary care provider, please call [phone] and they will assist you.        Care EveryWhere ID     This is your Care EveryWhere ID. This could be used by other organizations to access your [place] medical records  [id number]        Your Vitals Were     Pulse Height BMI (Body Mass Index)             99 1.753 m (5' 9\") 24.82 kg/m2          Blood Pressure from Last 3 Encounters:   09/15/17 126/77   09/15/17 119/81   09/14/17 134/86    Weight from Last 3 Encounters:   09/15/17 76.2 kg (168 lb 1.6 oz)   09/15/17 76.7 kg (169 lb 3.2 oz)   09/14/17 76.7 kg (169 lb 3.2 oz)              We Performed the Following     EKG 12-lead complete w/read - Clinics (performed today)          Today's Medication Changes          These changes are accurate as of: 9/15/17  1:33 PM.  If you have any questions, ask your nurse or doctor.               Start taking these medicines.        Dose/Directions    lisinopril 10 MG tablet   Commonly known as:  PRINIVIL/ZESTRIL   Used for:  Raynaud's disease without gangrene, Atrial fibrillation, unspecified type (H)   Started by:  Radha Chavez MD        Dose:  10 mg   Take 1 tablet (10 mg) by mouth daily   Quantity:  30 tablet   Refills:  11         These medicines have changed or have updated prescriptions.        Dose/Directions    predniSONE 20 MG tablet   Commonly known as:  DELTASONE   This may have changed:  Another medication with " the same name was removed. Continue taking this medication, and follow the directions you see here.   Changed by:  Radha Chavez MD        Dose:  40 mg   Take 40 mg by mouth daily   Refills:  0         Stop taking these medicines if you haven't already. Please contact your care team if you have questions.     amLODIPine 5 MG tablet   Commonly known as:  NORVASC   Stopped by:  Radha Chavez MD           famotidine 40 MG tablet   Commonly known as:  PEPCID   Stopped by:  Elma Dillon MD                Where to get your medicines      These medications were sent to [facility] [address] [place] [address] [address] AT [place] [address] [address], [facility] [address]    Hours:  24-hours Phone:  [phone]     lisinopril 10 MG tablet                Primary Care Provider Office Phone # Fax #    Stew Caldwell [phone]       [address] [place] [address]        Equal Access to Services     [facility] : Hadii aad ku hadasho Soomaali, waaxda luqadaha, qaybta kaalmada adeegyada, waxay sriram hayeloise gar . So [facility] [phone].    ATENCIÓN: Si habla español, tiene a rubalcava disposición servicios gratuitos de asistencia lingüística. [facility] [phone].    We comply with applicable federal civil rights laws and [place] laws. We do not discriminate on the basis of race, color, national origin, age, disability sex, sexual orientation or gender identity.            Thank you!     Thank you for choosing [facility] [place]  for your care. Our goal is always to provide you with excellent care. Hearing back from our patients is one way we can continue to improve our services. Please take a few minutes to complete the written survey that you may receive in the mail after your visit with us. Thank you!             Your Updated Medication List - Protect others around you: Learn how to  safely use, store and throw away your medicines at www.disposemymeds.org.          This list is accurate as of: 9/15/17  1:33 PM.  Always use your most recent med list.                   Brand Name Dispense Instructions for use Diagnosis    apixaban ANTICOAGULANT 5 MG tablet    ELIQUIS    60 tablet    Take 1 tablet (5 mg) by mouth 2 times daily        Blood Pressure Monitoring Kit     1 kit    1 each three times a week Dr Acosta has asked you to check your blood pressure 2-3 times per week using your home monitor.  Please keep track of your findings in a journal and bring it to your appointments.  Contact this office if your blood pressure: the top number (systolic) is consistently 30 points higher than your normal BP or if the bottom number (diastolic) is consistently 20 points higher than your normal BP.    ILD (interstitial lung disease) (H), Dermatomyositis (H), Systemic sclerosis (H), Gastroesophageal reflux disease, esophagitis presence not specified, High risk medications (not anticoagulants) long-term use       diltiazem 240 MG 24 hr capsule    CARDIZEM CD    30 capsule    Take 1 capsule (240 mg) by mouth daily        lisinopril 10 MG tablet    PRINIVIL/ZESTRIL    30 tablet    Take 1 tablet (10 mg) by mouth daily    Raynaud's disease without gangrene, Atrial fibrillation, unspecified type (H)       mycophenolate 500 MG tablet    GENERIC EQUIVALENT    120 tablet    1500 mg twice daily    ILD (interstitial lung disease) (H), Myositis, unspecified myositis type, unspecified site, Polyarthritis, Dermatomyositis (H), Antisynthetase syndrome (H)       pantoprazole 40 MG EC tablet    PROTONIX    30 tablet    Take 1 tablet (40 mg) by mouth daily    Gastroesophageal reflux disease, esophagitis presence not specified, Esophageal dysmotility       predniSONE 20 MG tablet    DELTASONE     Take 40 mg by mouth daily        sulfamethoxazole-trimethoprim 400-80 MG per tablet    BACTRIM    60 tablet    Once daily for PJP  prophylaxis. Start after bronchoscopy    ILD (interstitial lung disease) (H), Inflammatory arthritis

## 2017-09-15 NOTE — PATIENT INSTRUCTIONS
1.  Stop amlodipine.  2.  Start lisinopril.  Call us if you develop a dry cough (1 in 7 patients gets a cough).  3.  Continue Eliquis and diltiazem.  4.  Will see you back in 4 weeks.

## 2017-09-15 NOTE — PROGRESS NOTES
[facility]    Reason for Visit  Kwaku Medrano is a 52 year old year old male who is being seen for Interstitial Lung Disease (ILD) (Follow up on Kwaku and his ILD)    HPI    Mr. Medrano is a 52-year-old who is a new patient referred by Dr. Golden for evaluation and management of interstitial lung disease.  He developed a cough in 11/2016.  The cough persisted, and he noticed swelling of his hands in January of this year.  In February and March he was treated empirically with prednisone bursts for the cough and reported no change in the cough.  He had a chest x-ray in February and also in March.  He eventually was referred to Dr. Golden in Rheumatology, who first saw him 3 months ago.  Dr. Golden suspected possible interstitial lung disease with an underlying myositis.  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  Mr. Medrano has been on prednisone since about June and started mycophenolate approximately 7-8 weeks ago.  He has been on the full dose of 1500 mg twice daily for a few weeks.  He reports noting that it is hard to make a fist.  He also noted increased skin tightening and thickness over the past few weeks.  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  He has never had prior chemotherapy or radiation therapy.  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  He also was followed by a pulmonologist in the community who did a bronchoscopy 3 months ago.  The lavage cultures were negative, as was cytology.        Mr. Medrano currently works in a warehouse and works about 12-14 hours per day.  He averages 17,000 steps per day.  He has noticed that he feels better when he is active.  He endorses weight loss of approximately 25 pounds in the past few months.  He did receive the Prevnar vaccine on 07/25.  He was given a prescription for  pantoprazole yesterday by Dr. Acosta for acid reflux.  Currently, Mr. Medrano does endorse a little bit of dyspnea on exertion; for example, if he is walking uphill or walking up one flight of stairs.  He has noted some decrease in his cough since starting mycophenolate.     Patient saw Dr. Acosta yesterday, and we discussed plan at that time.  Echo 8/2017 showed no pulmonary hypertension.        Current Outpatient Prescriptions   Medication     Blood Pressure Monitoring KIT     pantoprazole (PROTONIX) 40 MG EC tablet     mycophenolate (GENERIC EQUIVALENT) 500 MG tablet     diltiazem (CARDIZEM CD) 240 MG 24 hr capsule     apixaban ANTICOAGULANT (ELIQUIS) 5 MG tablet     amLODIPine (NORVASC) 5 MG tablet     sulfamethoxazole-trimethoprim (BACTRIM) 400-80 MG per tablet     predniSONE (DELTASONE) 20 MG tablet     No current facility-administered medications for this visit.      Allergies   Allergen Reactions     Ampicillin Rash     Past Medical History:   Diagnosis Date     Atrial fibrillation and flutter (H) 07/2017     Fracture      GERD (gastroesophageal reflux disease)      Interstitial lung disease (H)     present on CXR 3-2017 and CT 5-2017; CT 9-2017 fibrotic NSIP pattern     Myositis      Scleroderma (H)     dx 9- at [facility] by Dr. Acosta       Past Surgical History:   Procedure Laterality Date     COLONOSCOPY N/A 7/19/2017    Procedure: COLONOSCOPY;  COLONOSCOPY;  Surgeon: Mita Jimenez MD;  Location:  GI     NO HISTORY OF SURGERY         Social History     Social History     Marital status: Single     Spouse name: N/A     Number of children: N/A     Years of education: N/A     Occupational History     Not on file.     Social History Main Topics     Smoking status: Never Smoker     Smokeless tobacco: Former User     Types: Chew     Quit date: 1/1/1984     Alcohol use Yes      Comment: ocasionally     Drug use: No     Sexual activity: Not Currently     Other Topics Concern     Not on file  "    Social History Narrative    Work in [employer].  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  Denies exposure to hot tubs, silica, pet birds, mold.  Single, no children.       Family History   Problem Relation Age of Onset     Prostate Cancer Father      Lung Cancer Other      LUNG DISEASE No family hx of      Rheumatologic Disease No family hx of              ROS Pulmonary  A complete ROS was otherwise negative except as noted in the HPI.    Vitals: /81  Pulse 95  Temp 98.6  F (37  C) (Oral)  Resp 18  Ht 1.753 m (5' 9\")  Wt 76.7 kg (169 lb 3.2 oz)  SpO2 98%  BMI 24.99 kg/m2    Exam:   GENERAL APPEARANCE: Well developed, well nourished, alert, and in no apparent distress.  LYMPHATICS: No significant cervical, or supraclavicular nodes.  RESP: good air flow throughout.  Bibasilar inspiratory crackles. No rhonchi. No wheezes.  CV: Normal S1, S2, regular rhythm, normal rate. No murmur.  No LE edema.   ABD: BS+, soft, nontender.  MS: extremities normal. No clubbing. No cyanosis. +sclerodactyly.  SKIN: no rash on limited exam.  Thickened skin on hands/arms, neck and face.  NEURO: Mentation intact, speech normal, normal gait and stance.  PSYCH: mentation appears normal. and affect normal/bright.    Results:  Recent Results (from the past 168 hour(s))   EKG 12 lead    Collection Time: 09/12/17 11:50 AM   Result Value Ref Range    Interpretation ECG Click View Image link to view waveform and result    CBC with platelets + differential    Collection Time: 09/12/17  1:10 PM   Result Value Ref Range    WBC 12.8 (H) 4.0 - 11.0 10e9/L    RBC Count 5.13 4.4 - 5.9 10e12/L    Hemoglobin 15.4 13.3 - 17.7 g/dL    Hematocrit 45.8 40.0 - 53.0 %    MCV 89 78 - 100 fl    MCH 30.0 26.5 - 33.0 pg    MCHC 33.6 31.5 - 36.5 g/dL    RDW 17.3 (H) 10.0 - 15.0 %    Platelet Count 253 150 - 450 10e9/L    Diff Method Automated Method     % Neutrophils 84.9 %    % Lymphocytes 4.9 %    % Monocytes 6.3 % "    % Eosinophils 0.2 %    % Basophils 0.2 %    % Immature Granulocytes 3.5 %    Nucleated RBCs 0 0 /100    Absolute Neutrophil 10.8 (H) 1.6 - 8.3 10e9/L    Absolute Lymphocytes 0.6 (L) 0.8 - 5.3 10e9/L    Absolute Monocytes 0.8 0.0 - 1.3 10e9/L    Absolute Eosinophils 0.0 0.0 - 0.7 10e9/L    Absolute Basophils 0.0 0.0 - 0.2 10e9/L    Abs Immature Granulocytes 0.5 (H) 0 - 0.4 10e9/L    Absolute Nucleated RBC 0.0    Basic metabolic panel    Collection Time: 09/12/17  1:10 PM   Result Value Ref Range    Sodium 138 133 - 144 mmol/L    Potassium 3.3 (L) 3.4 - 5.3 mmol/L    Chloride 102 94 - 109 mmol/L    Carbon Dioxide 26 20 - 32 mmol/L    Anion Gap 10 3 - 14 mmol/L    Glucose 93 70 - 99 mg/dL    Urea Nitrogen 17 7 - 30 mg/dL    Creatinine 0.96 0.66 - 1.25 mg/dL    GFR Estimate 82 >60 mL/min/1.7m2    GFR Estimate If Black >90 >60 mL/min/1.7m2    Calcium 9.0 8.5 - 10.1 mg/dL   Lipid panel reflex to direct LDL    Collection Time: 09/14/17 11:16 AM   Result Value Ref Range    Cholesterol 198 <200 mg/dL    Triglycerides 128 <150 mg/dL    HDL Cholesterol 64 >39 mg/dL    LDL Cholesterol Calculated 108 (H) <100 mg/dL    Non HDL Cholesterol 134 (H) <130 mg/dL   Scleroderma Antibody Scl70 DAVID IgG    Collection Time: 09/14/17 11:16 AM   Result Value Ref Range    Scleroderma Antibody Scl-70 DAVID IgG <0.2 0.0 - 0.9 AI   Mycophenolic acid    Collection Time: 09/14/17 11:16 AM   Result Value Ref Range    Last Dose Mycophenolic Acid 09/13/17 11PM     Mycophenolic Acid Mg/L 9.32 (H) 1.00 - 3.50 mg/L    MPA Glucuronide Level 41.5 30.0 - 95.0 mg/L   6 minute walk test    Collection Time: 09/15/17 12:00 AM   Result Value Ref Range    6 min walk (FT) 1550 ft    6 Min Walk (M) 472 m   General PFT Lab (Please always keep checked)    Collection Time: 09/15/17  7:47 AM   Result Value Ref Range    FVC-Pred 4.69 L    FVC-Pre 3.46 L    FVC-%Pred-Pre 73 %    FEV1-Pre 3.13 L    FEV1-%Pred-Pre 85 %    FEV1FVC-Pred 79 %    FEV1FVC-Pre 91 %     FEFMax-Pred 9.38 L/sec    FEFMax-Pre 9.26 L/sec    FEFMax-%Pred-Pre 98 %    FEF2575-Pred 3.26 L/sec    FEF2575-Pre 4.94 L/sec    HRG9816-%Pred-Pre 151 %    ExpTime-Pre 5.92 sec    FIFMax-Pre 5.80 L/sec    VC-Pred 4.95 L    VC-Pre 2.99 L    VC-%Pred-Pre 60 %    IC-Pred 3.59 L    IC-Pre 1.82 L    IC-%Pred-Pre 50 %    ERV-Pred 1.36 L    ERV-Pre 1.17 L    ERV-%Pred-Pre 86 %    FEV1FEV6-Pred 80 %    FEV1FEV6-Pre 91 %    FRCPleth-Pred 3.50 L    FRCPleth-Pre 2.90 L    FRCPleth-%Pred-Pre 82 %    RVPleth-Pred 2.26 L    RVPleth-Pre 1.73 L    RVPleth-%Pred-Pre 76 %    TLCPleth-Pred 6.92 L    TLCPleth-Pre 4.73 L    TLCPleth-%Pred-Pre 68 %    DLCOunc-Pred 29.35 ml/min/mmHg    DLCOunc-Pre 19.77 ml/min/mmHg    DLCOunc-%Pred-Pre 67 %    DLCOcor-Pre 19.35 ml/min/mmHg    DLCOcor-%Pred-Pre 65 %    VA-Pre 4.29 L    VA-%Pred-Pre 64 %    FEV1SVC-Pred 74 %    FEV1SVC-Pre 105 %        FVC FEV1 TLC DLCO   6- [facility] 2.79 58% 2.54 68%  12.9 52%   8-3-2017 [facility] 3.34 2.85 4.89 17.63   9- 3.46 73% 3.13 85% 4.73 68% 19.77 67%       I reviewed the pulmonary function test that was performed today along with a 6-minute walk test.  PFT today shows mild restrictive lung disease with a mildly reduced diffusion.  Lung function is stable compared to 6 weeks ago and improved compared to 3 months ago.  Six-minute walk test today shows no hypoxia on room air.  Walk distance is reduced at 472 meters.  I also reviewed chest x-rays from February and March of this year.  Chest x-ray in February looks normal.  Chest x-ray in March has an interstitial pattern.  I reviewed the chest CT scan that was performed on 09/05 and compared it to the CT from 05/24.  The most recent chest CT scan shows interstitial lung disease with a fibrotic NSIP pattern.  There is more fibrosis compared to the chest CT from 05/24.     I reviewed results with the patient.      Assessment and plan:   Mr. Medrano is a 52-year-old who is a new patient referred for evaluation  of interstitial lung disease.   1.  Scleroderma interstitial lung disease.  He was diagnosed with scleroderma which was confirmed by Dr. Acosta yesterday when he saw him in clinic.  His interstitial lung disease has been present since at least March of this year on chest x-ray.  Most recent chest CT shows a fibrotic NSIP pattern with increase in the fibrosis compared to May of this year.  The first option for treatment of scleroderma ILD is CellCept, which was started approximately 2 months ago.  He has been at full dose of 1500 mg twice daily for approximately 2 weeks, and I think it is reasonable to see how he does on the full dose of CellCept since he just reached the optimal dose level.  What is reassuring is that his lung function has been stable for the past 6 weeks and has not declined.  I would not switch him to cyclophosphamide at this time as I think we have some time to monitor him on CellCept.  I did discuss this plan with Dr. Acosta yesterday.  Mr. Medrano is in agreement with this plan.  We talked about other options including cyclophosphamide if needed.  I recommended that he continue regular exercise, even on the weekends when he is not working.  I agree with Dr. Acosta that it is important to taper his prednisone down to as low of a dose as possible.  He received the Prevnar vaccine approximately 2 months ago and will need the pneumonia vaccine (23 Valent) in 07/2018.  I recommended that he get the flu vaccine this season.  We will continue mycophenolate 1500 mg twice daily.  He will return in 3 months with full PFT.   2.  Immunosuppression monitoring.  He had labs recently that are acceptable.  I will check labs again in 3 months to monitor the mycophenolate.  He will continue Bactrim for Pneumocystis prophylaxis.  I have counseled him to avoid sick contacts.        We did discuss the prognosis of scleroderma ILD as well as prevalence of lung disease in scleroderma.  I answered questions from  the patient.

## 2017-09-15 NOTE — LETTER
9/15/2017    Stew Caldwell MD  [address] [place] [address]    RE: Kwaku Medrano       Dear Colleague,    I had the pleasure of seeing Kwaku Medrano in the [facility].    It is my pleasure seeing Mr. Kwaku Medrano for evaluation of atrial tachyarrhythmias.  This very pleasant 52-year-old male with interstitial lung disease was recently diagnosed with a rheumatologic condition, initially labeled as dermatomyositis and more recently scleroderma.  He has been treated with prednisone.      On 05/31/2017, the patient had an echocardiogram that was ordered by Dr. Reynolds.  He had normal LV function and was in normal rhythm during the test.      On 07/19/2017, the patient presented for colonoscopy.  During the procedure, he was noted to be in atrial fibrillation.  There is a 12-lead ECG that documents atrial fibrillation with controlled ventricular rate.  Th epatient was unaware of the arrhythmia.  He was asked to see his primary care provider or a cardiologist.  The patient did not make such arrangements, however.      On 09/12/2017, Mr. Medrano and was seen by Dr. Reynolds and was noted to be in atrial flutter with RVR.  He was sent to the emergency room.  I spoke to the ER physician who prescribed apixaban 5 mg b.i.d. for anticoagulation and diltiazem  mg for rate control.  An appointment was made for the patient to see me today.      Mr. Medrano is mildly aware of the arrhythmia.  At times he feels his heart pounding but this is not a common occurrence.  He wears a Fitbit, which commonly indicates heart rates in the 140s.        He has Raynaud's as well as thickening of the skin in his fingers.  He has a hard time making a fist or bending the knuckles in his hands.  Amlodipine was prescribed for his Raynaud's.      He has not had exertional chest pain, syncope, near-syncope, orthopnea or PND.      He does not have family history of atrial fibrillation.  His is a  single man who lives alone in [place].  He is not known to snore.  His workdays are long and he typically does not get more than 4 hours of sleep during the weekdays and and slightly longer on weekends.  He does not feel excessively fatigued.      PHYSICAL EXAMINATION:   VITAL SIGNS:  Blood pressure 126/77, pulse 90 and irregular, weight 76 kg, height 175 cm.   GENERAL:  A pleasant gentleman in no apparent distress.   HEENT:  Normocephalic, atraumatic.   NECK:  Supple without bruits.   LUNGS:  Clear.  No apparent crackles or wheezes.   CARDIOVASCULAR:  Normal JVP, regular rhythm (atrial flutter with fixed AV conduction).  No apparent gallop, murmur, or rub.   ABDOMEN:  Soft, nontender.  Negative HJR.   EXTREMITIES:  No edema.   SKIN:  No rash.   BACK:  No CVA tenderness.   NEUROLOGIC:  Alert and oriented x3.      DIAGNOSTIC STUDIES:    Sodium 138, potassium 3.3, creatinine 0.96.  Cholesterol 198, HDL 64, .  Hematocrit 45.8%.      I have reviewed several 12-lead electrocardiograms.  His tracing today shows atrial flutter with ventricular rate of 96 beats per minute.  The atrial flutter waves are positive in lead V1 and sawtooth-appearing in the inferior leads.  Morphology is consistent with typical counterclockwise RA isthmus flutter.      Most recent echocardiogram on 08/15/2017 was done in the presence of atrial flutter with RVR.  EF was 45%-50%.  There was mild LVH, 1+ MR, 1+ TR and no evidence for pulmonary hypertension.      Pulmonary function tests from Dr. Reynolds's office from 06/2017 showed an FEV1 of 2.54 (69% of predicted), FEV1 to FVC of 91% and DLCO of 52% of predicted.  Dr. Reynolds interpreted the study showing mild to moderate restriction.     Outpatient Encounter Prescriptions as of 9/15/2017   Medication Sig Dispense Refill     predniSONE (DELTASONE) 20 MG tablet Take 40 mg by mouth daily       lisinopril (PRINIVIL/ZESTRIL) 10 MG tablet Take 1 tablet (10 mg) by mouth daily 30 tablet 11      pantoprazole (PROTONIX) 40 MG EC tablet Take 1 tablet (40 mg) by mouth daily 30 tablet 11     mycophenolate (GENERIC EQUIVALENT) 500 MG tablet 1500 mg twice daily 120 tablet 0     diltiazem (CARDIZEM CD) 240 MG 24 hr capsule Take 1 capsule (240 mg) by mouth daily 30 capsule 0     apixaban ANTICOAGULANT (ELIQUIS) 5 MG tablet Take 1 tablet (5 mg) by mouth 2 times daily 60 tablet 0     sulfamethoxazole-trimethoprim (BACTRIM) 400-80 MG per tablet Once daily for PJP prophylaxis. Start after bronchoscopy 60 tablet 1     Blood Pressure Monitoring KIT 1 each three times a week Dr Acosta has asked you to check your blood pressure 2-3 times per week using your home monitor.  Please keep track of your findings in a journal and bring it to your appointments.   Contact this office if your blood pressure: the top number (systolic) is consistently 30 points higher than your normal BP or if the bottom number (diastolic) is consistently 20 points higher than your normal BP. 1 kit 1     [DISCONTINUED] predniSONE (DELTASONE) 20 MG tablet Take 2.5 tablets (50 mg) by mouth daily 105 tablet 0     [DISCONTINUED] amLODIPine (NORVASC) 5 MG tablet Take 1 tablet (5 mg) by mouth daily 30 tablet 3     No facility-administered encounter medications on file as of 9/15/2017.       IMPRESSION:    1. Atrial arrhythmias, including both atrial fibrillation and atrial flutter, in the clinical setting of probable scleroderma with interstitial lung disease/pulmonary fibrosis.  Mildly reduced EF, likely related to the tachycardia.     There is a significant likelihood that the rheumatologic process is the underlying cause for the atrial arrhythmias.  Mr. Medrano was in normal rhythm on 05/31/2017 but since mid-July, he has likely been persistently out of rhythm.  The arrhythmia was initially atrial fibrillation with controlled ventricular rate, but later transitioned into atrial flutter.  As often is the case, atrial flutter has been more challenging to  rate control.  His LVEF has dropped likely due to the fast rate.       Today, the patient's heart rate was reasonably well controlled (at rest) with diltiazem  mg daily.  The patient was started on Eliquis as we are considering cardioversion or ablation for restoration of normal rhythm, see below.  His [id number] score is probably 0.      Because he is on 2 calcium channel blockers (amlodipine for Raynaud's and diltiazem for atrial flutter), I have asked him to stop the amlodipine.  Mr. Medrano's rheumatologist, Dr. Denyn Acosta, is interested in starting an ACE inhibitor or ARB.  I will start lisinopril.  There is a 1 in 7 risk of developing a dry cough and asked him to call us if this becomes the case.      RECOMMENDATIONS:   1.  Stop amlodipine.   2.  Start lisinopril 10 mg daily.     3.  BMP in 2 weeks.   4.  Continue apixaban and diltiazem.   5.  Follow up in the EP Clinic 4 weeks.  An ECG will be assessed.  If the patient remains in atrial flutter, I would discuss cardioversion and catheter ablation.  This appears to be typical flutter with a 95% success rate with a single procedure.   6. Sleep apnea evaluation as per Dr. Reynolds.      It was my pleasure seeing Mr. Medrano.  Please feel free to contact me with any questions.     Sincerely,    Radha Chavez MD     [facility]    cc:      Delfina Reynolds MD    [facility]   [address]   [place] [address]       Denny Acosta MD     Physicians   [address]   [place] [address]

## 2017-09-15 NOTE — LETTER
9/15/2017       RE: Kwaku Medrano  [address] [facility] [address]     Dear Colleague,    Thank you for referring your patient, Kwaku Medrano, to the [facility] at [facility]. Please see a copy of my visit note below.    [facility]    Reason for Visit  wKaku Medrano is a 52 year old year old male who is being seen for Interstitial Lung Disease (ILD) (Follow up on Kwaku and his ILD)    HPI    Mr. Medrano is a 52-year-old who is a new patient referred by Dr. Goledn for evaluation and management of interstitial lung disease.  He developed a cough in 11/2016.  The cough persisted, and he noticed swelling of his hands in January of this year.  In February and March he was treated empirically with prednisone bursts for the cough and reported no change in the cough.  He had a chest x-ray in February and also in March.  He eventually was referred to Dr. Golden in Rheumatology, who first saw him 3 months ago.  Dr. Golden suspected possible interstitial lung disease with an underlying myositis.  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  Mr. Medrano has been on prednisone since about June and started mycophenolate approximately 7-8 weeks ago.  He has been on the full dose of 1500 mg twice daily for a few weeks.  He reports noting that it is hard to make a fist.  He also noted increased skin tightening and thickness over the past few weeks.  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  He has never had prior chemotherapy or radiation therapy.  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  He also was followed by a pulmonologist in the community who did a bronchoscopy 3 months ago.  The lavage cultures were negative, as was cytology.        Mr. Medrano  currently works in a warehouse and works about 12-14 hours per day.  He averages 17,000 steps per day.  He has noticed that he feels better when he is active.  He endorses weight loss of approximately 25 pounds in the past few months.  He did receive the Prevnar vaccine on 07/25.  He was given a prescription for pantoprazole yesterday by Dr. Acosta for acid reflux.  Currently, Mr. Medrano does endorse a little bit of dyspnea on exertion; for example, if he is walking uphill or walking up one flight of stairs.  He has noted some decrease in his cough since starting mycophenolate.     Patient saw Dr. Acosta yesterday, and we discussed plan at that time.  Echo 8/2017 showed no pulmonary hypertension.        Current Outpatient Prescriptions   Medication     Blood Pressure Monitoring KIT     pantoprazole (PROTONIX) 40 MG EC tablet     mycophenolate (GENERIC EQUIVALENT) 500 MG tablet     diltiazem (CARDIZEM CD) 240 MG 24 hr capsule     apixaban ANTICOAGULANT (ELIQUIS) 5 MG tablet     amLODIPine (NORVASC) 5 MG tablet     sulfamethoxazole-trimethoprim (BACTRIM) 400-80 MG per tablet     predniSONE (DELTASONE) 20 MG tablet     No current facility-administered medications for this visit.      Allergies   Allergen Reactions     Ampicillin Rash     Past Medical History:   Diagnosis Date     Atrial fibrillation and flutter (H) 07/2017     Fracture      GERD (gastroesophageal reflux disease)      Interstitial lung disease (H)     present on CXR 3-2017 and CT 5-2017; CT 9-2017 fibrotic NSIP pattern     Myositis      Scleroderma (H)     dx 9- at [facility] by Dr. Acosta       Past Surgical History:   Procedure Laterality Date     COLONOSCOPY N/A 7/19/2017    Procedure: COLONOSCOPY;  COLONOSCOPY;  Surgeon: Mita Jimenez MD;  Location:  GI     NO HISTORY OF SURGERY         Social History     Social History     Marital status: Single     Spouse name: N/A     Number of children: N/A     Years of education: N/A  "    Occupational History     Not on file.     Social History Main Topics     Smoking status: Never Smoker     Smokeless tobacco: Former User     Types: Chew     Quit date: 1/1/1984     Alcohol use Yes      Comment: ocasionally     Drug use: No     Sexual activity: Not Currently     Other Topics Concern     Not on file     Social History Narrative    Work in [employer].  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  Denies exposure to hot tubs, silica, pet birds, mold.  Single, no children.       Family History   Problem Relation Age of Onset     Prostate Cancer Father      Lung Cancer Other      LUNG DISEASE No family hx of      Rheumatologic Disease No family hx of              ROS Pulmonary  A complete ROS was otherwise negative except as noted in the HPI.    Vitals: /81  Pulse 95  Temp 98.6  F (37  C) (Oral)  Resp 18  Ht 1.753 m (5' 9\")  Wt 76.7 kg (169 lb 3.2 oz)  SpO2 98%  BMI 24.99 kg/m2    Exam:   GENERAL APPEARANCE: Well developed, well nourished, alert, and in no apparent distress.  LYMPHATICS: No significant cervical, or supraclavicular nodes.  RESP: good air flow throughout.  Bibasilar inspiratory crackles. No rhonchi. No wheezes.  CV: Normal S1, S2, regular rhythm, normal rate. No murmur.  No LE edema.   ABD: BS+, soft, nontender.  MS: extremities normal. No clubbing. No cyanosis. +sclerodactyly.  SKIN: no rash on limited exam.  Thickened skin on hands/arms, neck and face.  NEURO: Mentation intact, speech normal, normal gait and stance.  PSYCH: mentation appears normal. and affect normal/bright.    Results:  Recent Results (from the past 168 hour(s))   EKG 12 lead    Collection Time: 09/12/17 11:50 AM   Result Value Ref Range    Interpretation ECG Click View Image link to view waveform and result    CBC with platelets + differential    Collection Time: 09/12/17  1:10 PM   Result Value Ref Range    WBC 12.8 (H) 4.0 - 11.0 10e9/L    RBC Count 5.13 4.4 - 5.9 " 10e12/L    Hemoglobin 15.4 13.3 - 17.7 g/dL    Hematocrit 45.8 40.0 - 53.0 %    MCV 89 78 - 100 fl    MCH 30.0 26.5 - 33.0 pg    MCHC 33.6 31.5 - 36.5 g/dL    RDW 17.3 (H) 10.0 - 15.0 %    Platelet Count 253 150 - 450 10e9/L    Diff Method Automated Method     % Neutrophils 84.9 %    % Lymphocytes 4.9 %    % Monocytes 6.3 %    % Eosinophils 0.2 %    % Basophils 0.2 %    % Immature Granulocytes 3.5 %    Nucleated RBCs 0 0 /100    Absolute Neutrophil 10.8 (H) 1.6 - 8.3 10e9/L    Absolute Lymphocytes 0.6 (L) 0.8 - 5.3 10e9/L    Absolute Monocytes 0.8 0.0 - 1.3 10e9/L    Absolute Eosinophils 0.0 0.0 - 0.7 10e9/L    Absolute Basophils 0.0 0.0 - 0.2 10e9/L    Abs Immature Granulocytes 0.5 (H) 0 - 0.4 10e9/L    Absolute Nucleated RBC 0.0    Basic metabolic panel    Collection Time: 09/12/17  1:10 PM   Result Value Ref Range    Sodium 138 133 - 144 mmol/L    Potassium 3.3 (L) 3.4 - 5.3 mmol/L    Chloride 102 94 - 109 mmol/L    Carbon Dioxide 26 20 - 32 mmol/L    Anion Gap 10 3 - 14 mmol/L    Glucose 93 70 - 99 mg/dL    Urea Nitrogen 17 7 - 30 mg/dL    Creatinine 0.96 0.66 - 1.25 mg/dL    GFR Estimate 82 >60 mL/min/1.7m2    GFR Estimate If Black >90 >60 mL/min/1.7m2    Calcium 9.0 8.5 - 10.1 mg/dL   Lipid panel reflex to direct LDL    Collection Time: 09/14/17 11:16 AM   Result Value Ref Range    Cholesterol 198 <200 mg/dL    Triglycerides 128 <150 mg/dL    HDL Cholesterol 64 >39 mg/dL    LDL Cholesterol Calculated 108 (H) <100 mg/dL    Non HDL Cholesterol 134 (H) <130 mg/dL   Scleroderma Antibody Scl70 DAVID IgG    Collection Time: 09/14/17 11:16 AM   Result Value Ref Range    Scleroderma Antibody Scl-70 DAVID IgG <0.2 0.0 - 0.9 AI   Mycophenolic acid    Collection Time: 09/14/17 11:16 AM   Result Value Ref Range    Last Dose Mycophenolic Acid 09/13/17 11PM     Mycophenolic Acid Mg/L 9.32 (H) 1.00 - 3.50 mg/L    MPA Glucuronide Level 41.5 30.0 - 95.0 mg/L   6 minute walk test    Collection Time: 09/15/17 12:00 AM   Result Value  Ref Range    6 min walk (FT) 1550 ft    6 Min Walk (M) 472 m   General PFT Lab (Please always keep checked)    Collection Time: 09/15/17  7:47 AM   Result Value Ref Range    FVC-Pred 4.69 L    FVC-Pre 3.46 L    FVC-%Pred-Pre 73 %    FEV1-Pre 3.13 L    FEV1-%Pred-Pre 85 %    FEV1FVC-Pred 79 %    FEV1FVC-Pre 91 %    FEFMax-Pred 9.38 L/sec    FEFMax-Pre 9.26 L/sec    FEFMax-%Pred-Pre 98 %    FEF2575-Pred 3.26 L/sec    FEF2575-Pre 4.94 L/sec    NCC0462-%Pred-Pre 151 %    ExpTime-Pre 5.92 sec    FIFMax-Pre 5.80 L/sec    VC-Pred 4.95 L    VC-Pre 2.99 L    VC-%Pred-Pre 60 %    IC-Pred 3.59 L    IC-Pre 1.82 L    IC-%Pred-Pre 50 %    ERV-Pred 1.36 L    ERV-Pre 1.17 L    ERV-%Pred-Pre 86 %    FEV1FEV6-Pred 80 %    FEV1FEV6-Pre 91 %    FRCPleth-Pred 3.50 L    FRCPleth-Pre 2.90 L    FRCPleth-%Pred-Pre 82 %    RVPleth-Pred 2.26 L    RVPleth-Pre 1.73 L    RVPleth-%Pred-Pre 76 %    TLCPleth-Pred 6.92 L    TLCPleth-Pre 4.73 L    TLCPleth-%Pred-Pre 68 %    DLCOunc-Pred 29.35 ml/min/mmHg    DLCOunc-Pre 19.77 ml/min/mmHg    DLCOunc-%Pred-Pre 67 %    DLCOcor-Pre 19.35 ml/min/mmHg    DLCOcor-%Pred-Pre 65 %    VA-Pre 4.29 L    VA-%Pred-Pre 64 %    FEV1SVC-Pred 74 %    FEV1SVC-Pre 105 %        FVC FEV1 TLC DLCO   6- [facility] 2.79 58% 2.54 68%  12.9 52%   8-3-2017 [facility] 3.34 2.85 4.89 17.63   9- 3.46 73% 3.13 85% 4.73 68% 19.77 67%       I reviewed the pulmonary function test that was performed today along with a 6-minute walk test.  PFT today shows mild restrictive lung disease with a mildly reduced diffusion.  Lung function is stable compared to 6 weeks ago and improved compared to 3 months ago.  Six-minute walk test today shows no hypoxia on room air.  Walk distance is reduced at 472 meters.  I also reviewed chest x-rays from February and March of this year.  Chest x-ray in February looks normal.  Chest x-ray in March has an interstitial pattern.  I reviewed the chest CT scan that was performed on 09/05 and compared it to  the CT from 05/24.  The most recent chest CT scan shows interstitial lung disease with a fibrotic NSIP pattern.  There is more fibrosis compared to the chest CT from 05/24.     I reviewed results with the patient.      Assessment and plan:   Mr. Medrano is a 52-year-old who is a new patient referred for evaluation of interstitial lung disease.   1.  Scleroderma interstitial lung disease.  He was diagnosed with scleroderma which was confirmed by Dr. Acosta yesterday when he saw him in clinic.  His interstitial lung disease has been present since at least March of this year on chest x-ray.  Most recent chest CT shows a fibrotic NSIP pattern with increase in the fibrosis compared to May of this year.  The first option for treatment of scleroderma ILD is CellCept, which was started approximately 2 months ago.  He has been at full dose of 1500 mg twice daily for approximately 2 weeks, and I think it is reasonable to see how he does on the full dose of CellCept since he just reached the optimal dose level.  What is reassuring is that his lung function has been stable for the past 6 weeks and has not declined.  I would not switch him to cyclophosphamide at this time as I think we have some time to monitor him on CellCept.  I did discuss this plan with Dr. Acosta yesterday.  Mr. Medrano is in agreement with this plan.  We talked about other options including cyclophosphamide if needed.  I recommended that he continue regular exercise, even on the weekends when he is not working.  I agree with Dr. Acosta that it is important to taper his prednisone down to as low of a dose as possible.  He received the Prevnar vaccine approximately 2 months ago and will need the pneumonia vaccine (23 Valent) in 07/2018.  I recommended that he get the flu vaccine this season.  We will continue mycophenolate 1500 mg twice daily.  He will return in 3 months with full PFT.   2.  Immunosuppression monitoring.  He had labs recently that are  acceptable.  I will check labs again in 3 months to monitor the mycophenolate.  He will continue Bactrim for Pneumocystis prophylaxis.  I have counseled him to avoid sick contacts.        We did discuss the prognosis of scleroderma ILD as well as prevalence of lung disease in scleroderma.  I answered questions from the patient.     Again, thank you for allowing me to participate in the care of your patient.      Sincerely,    Elma Dillon MD

## 2017-09-15 NOTE — PROGRESS NOTES
HISTORY OF PRESENT ILLNESS:    It is my pleasure seeing Mr. Kwaku Medrano for evaluation of atrial tachyarrhythmias.  This very pleasant 52-year-old male with interstitial lung disease was recently diagnosed with a rheumatologic condition, initially labeled as dermatomyositis and more recently scleroderma.  He has been treated with prednisone.      On 05/31/2017, the patient had an echocardiogram that was ordered by Dr. Reynolds.  He had normal LV function and was in normal rhythm during the test.      On 07/19/2017, the patient presented for colonoscopy.  During the procedure, he was noted to be in atrial fibrillation.  There is a 12-lead ECG that documents atrial fibrillation with controlled ventricular rate.  Rosario pak was unaware of the arrhythmia.  He was asked to see his primary care provider or a cardiologist.  The patient did not make such arrangements, however.      On 09/12/2017, Mr. Medrano and was seen by Dr. Reynolds and was noted to be in atrial flutter with RVR.  He was sent to the emergency room.  I spoke to the ER physician who prescribed apixaban 5 mg b.i.d. for anticoagulation and diltiazem  mg for rate control.  An appointment was made for the patient to see me today.      Mr. Medrano is mildly aware of the arrhythmia.  At times he feels his heart pounding but this is not a common occurrence.  He wears a Fitbit, which commonly indicates heart rates in the 140s.        He has Raynaud's as well as thickening of the skin in his fingers.  He has a hard time making a fist or bending the knuckles in his hands.  Amlodipine was prescribed for his Raynaud's.      He has not had exertional chest pain, syncope, near-syncope, orthopnea or PND.      He does not have family history of atrial fibrillation.  His is a single man who lives alone in [place].  He is not known to snore.  His workdays are long and he typically does not get more than 4 hours of sleep during the weekdays and and slightly  longer on weekends.  He does not feel excessively fatigued.      PHYSICAL EXAMINATION:   VITAL SIGNS:  Blood pressure 126/77, pulse 90 and irregular, weight 76 kg, height 175 cm.   GENERAL:  A pleasant gentleman in no apparent distress.   HEENT:  Normocephalic, atraumatic.   NECK:  Supple without bruits.   LUNGS:  Clear.  No apparent crackles or wheezes.   CARDIOVASCULAR:  Normal JVP, regular rhythm (atrial flutter with fixed AV conduction).  No apparent gallop, murmur, or rub.   ABDOMEN:  Soft, nontender.  Negative HJR.   EXTREMITIES:  No edema.   SKIN:  No rash.   BACK:  No CVA tenderness.   NEUROLOGIC:  Alert and oriented x3.      DIAGNOSTIC STUDIES:    Sodium 138, potassium 3.3, creatinine 0.96.  Cholesterol 198, HDL 64, .  Hematocrit 45.8%.      I have reviewed several 12-lead electrocardiograms.  His tracing today shows atrial flutter with ventricular rate of 96 beats per minute.  The atrial flutter waves are positive in lead V1 and sawtooth-appearing in the inferior leads.  Morphology is consistent with typical counterclockwise RA isthmus flutter.      Most recent echocardiogram on 08/15/2017 was done in the presence of atrial flutter with RVR.  EF was 45%-50%.  There was mild LVH, 1+ MR, 1+ TR and no evidence for pulmonary hypertension.      Pulmonary function tests from Dr. Reynolds's office from 06/2017 showed an FEV1 of 2.54 (69% of predicted), FEV1 to FVC of 91% and DLCO of 52% of predicted.  Dr. Reynolds interpreted the study showing mild to moderate restriction.      IMPRESSION:    1. Atrial arrhythmias, including both atrial fibrillation and atrial flutter, in the clinical setting of probable scleroderma with interstitial lung disease/pulmonary fibrosis.  Mildly reduced EF, likely related to the tachycardia.     There is a significant likelihood that the rheumatologic process is the underlying cause for the atrial arrhythmias.  Mr. Medrano was in normal rhythm on 05/31/2017 but since mid-July, he  has likely been persistently out of rhythm.  The arrhythmia was initially atrial fibrillation with controlled ventricular rate, but later transitioned into atrial flutter.  As often is the case, atrial flutter has been more challenging to rate control.  His LVEF has dropped likely due to the fast rate.       Today, the patient's heart rate was reasonably well controlled (at rest) with diltiazem  mg daily.  The patient was started on Eliquis as we are considering cardioversion or ablation for restoration of normal rhythm, see below.  His [id number] score is probably 0.      Because he is on 2 calcium channel blockers (amlodipine for Raynaud's and diltiazem for atrial flutter), I have asked him to stop the amlodipine.  Mr. Calzada's rheumatologist, Dr. Denny Acosta, is interested in starting an ACE inhibitor or ARB.  I will start lisinopril.  There is a 1 in 7 risk of developing a dry cough and asked him to call us if this becomes the case.      RECOMMENDATIONS:   1.  Stop amlodipine.   2.  Start lisinopril 10 mg daily.     3.  BMP in 2 weeks.   4.  Continue apixaban and diltiazem.   5.  Follow up in the EP Clinic 4 weeks.  An ECG will be assessed.  If the patient remains in atrial flutter, I would discuss cardioversion and catheter ablation.  This appears to be typical flutter with a 95% success rate with a single procedure.   6. Sleep apnea evaluation as per Dr. Reynolds.      It was my pleasure seeing Mr. Calzada.  Please feel free to contact me with any questions.        JOHN TANG MD, [facility]        cc:      Stew Caldwell MD    [facility]   [address] [place] [address]       Delfina Reynolds MD    [facility]   [address]   [place] [address]       Denny Acosta MD     [facility]   [address] [facility] [facility] [address]   [place] [address]             D: 09/15/2017 13:48   T: 09/15/2017 15:12   MT: JEISON      Name:     FLOR CALZADA   MRN:      [id number]         Account:      [id number]   :      1965           Service Date: 09/15/2017      Document: [id number]

## 2017-09-15 NOTE — NURSING NOTE
Chief Complaint   Patient presents with     Interstitial Lung Disease (ILD)     Follow up on Kwaku and his ILD     Yusuf Hill CMA at 8:42 AM on 9/15/2017

## 2017-09-18 LAB — RNAP III IGG SERPL IA-ACNC: 2 UNITS (ref 0–19)

## 2017-09-19 LAB
M TB TUBERC IFN-G BLD QL: ABNORMAL
M TB TUBERC IFN-G/MITOGEN IGNF BLD: 0 IU/ML

## 2017-09-20 LAB — PM/SCL-100 IGG SER-ACNC: NEGATIVE

## 2017-09-20 NOTE — PROGRESS NOTES
Results released to [facility]:  Mr. Medrano,  TB test is indeterminate still. This is likely from the prednisone and not from TB. You may discuss this with Dr. Acosta or Dr. Dillon when you see them next.     Sincerely    Mani Golden MD  [facility]

## 2017-09-29 ENCOUNTER — MYC MEDICAL ADVICE (OUTPATIENT)
Dept: RHEUMATOLOGY | Facility: CLINIC | Age: 52
End: 2017-09-29

## 2017-09-29 DIAGNOSIS — J84.9 ILD (INTERSTITIAL LUNG DISEASE) (H): ICD-10-CM

## 2017-09-29 DIAGNOSIS — M33.13 DERMATOMYOSITIS (H): ICD-10-CM

## 2017-09-29 DIAGNOSIS — D89.89 ANTISYNTHETASE SYNDROME (H): ICD-10-CM

## 2017-09-29 DIAGNOSIS — M13.0 POLYARTHRITIS: ICD-10-CM

## 2017-09-29 DIAGNOSIS — M60.9 MYOSITIS, UNSPECIFIED MYOSITIS TYPE, UNSPECIFIED SITE: ICD-10-CM

## 2017-09-29 RX ORDER — MYCOPHENOLATE MOFETIL 500 MG/1
1500 TABLET ORAL 2 TIMES DAILY
Qty: 180 TABLET | Refills: 3 | Status: SHIPPED | OUTPATIENT
Start: 2017-09-29 | End: 2018-01-30

## 2017-09-29 NOTE — TELEPHONE ENCOUNTER
Last seen: 9/14/17  Pending appt: 10/10/17  Medication: Mycophenolate    Last given: 8/13/17  Qty: 120  Lab:    Pt taking 1500 mg bid.  WBC   Date Value Ref Range Status   09/12/2017 12.8 (H) 4.0 - 11.0 10e9/L Final     RBC Count   Date Value Ref Range Status   09/12/2017 5.13 4.4 - 5.9 10e12/L Final     Hemoglobin   Date Value Ref Range Status   09/12/2017 15.4 13.3 - 17.7 g/dL Final     Hematocrit   Date Value Ref Range Status   09/12/2017 45.8 40.0 - 53.0 % Final     MCV   Date Value Ref Range Status   09/12/2017 89 78 - 100 fl Final     MCH   Date Value Ref Range Status   09/12/2017 30.0 26.5 - 33.0 pg Final     Platelet Count   Date Value Ref Range Status   09/12/2017 253 150 - 450 10e9/L Final     % Lymphocytes   Date Value Ref Range Status   09/12/2017 4.9 % Final     AST   Date Value Ref Range Status   03/10/2017 15 0 - 45 U/L Final     ALT   Date Value Ref Range Status   03/10/2017 21 0 - 70 U/L Final     Albumin   Date Value Ref Range Status   03/10/2017 3.5 3.4 - 5.0 g/dL Final     Alkaline Phosphatase   Date Value Ref Range Status   03/10/2017 99 40 - 150 U/L Final     Creatinine   Date Value Ref Range Status   09/12/2017 0.96 0.66 - 1.25 mg/dL Final     GFR Estimate   Date Value Ref Range Status   09/12/2017 82 >60 mL/min/1.7m2 Final     Comment:     Non  GFR Calc     GFR Estimate If Black   Date Value Ref Range Status   09/12/2017 >90 >60 mL/min/1.7m2 Final     Comment:      GFR Calc     Sed Rate   Date Value Ref Range Status   06/07/2017 10 0 - 20 mm/h Final     CRP Inflammation   Date Value Ref Range Status   08/08/2017 <2.9 0.0 - 8.0 mg/L Final     Criteria met for refill renewal per Rheumatology Refill Protocol, approval faxed to pharmacy.    RIN Laws RN  Rheumatology RN Coordinator  [facility]

## 2017-10-02 LAB
DLCOCOR-%PRED-PRE: 65 %
DLCOCOR-PRE: 19.35 ML/MIN/MMHG
DLCOUNC-%PRED-PRE: 67 %
DLCOUNC-PRE: 19.77 ML/MIN/MMHG
DLCOUNC-PRED: 29.35 ML/MIN/MMHG
ERV-%PRED-PRE: 86 %
ERV-PRE: 1.17 L
ERV-PRED: 1.36 L
EXPTIME-PRE: 5.92 SEC
FEF2575-%PRED-PRE: 151 %
FEF2575-PRE: 4.94 L/SEC
FEF2575-PRED: 3.26 L/SEC
FEFMAX-%PRED-PRE: 98 %
FEFMAX-PRE: 9.26 L/SEC
FEFMAX-PRED: 9.38 L/SEC
FEV1-%PRED-PRE: 85 %
FEV1-PRE: 3.13 L
FEV1FEV6-PRE: 91 %
FEV1FEV6-PRED: 80 %
FEV1FVC-PRE: 91 %
FEV1FVC-PRED: 79 %
FEV1SVC-PRE: 105 %
FEV1SVC-PRED: 74 %
FIFMAX-PRE: 5.8 L/SEC
FRCPLETH-%PRED-PRE: 82 %
FRCPLETH-PRE: 2.9 L
FRCPLETH-PRED: 3.5 L
FVC-%PRED-PRE: 73 %
FVC-PRE: 3.46 L
FVC-PRED: 4.69 L
IC-%PRED-PRE: 50 %
IC-PRE: 1.82 L
IC-PRED: 3.59 L
RVPLETH-%PRED-PRE: 76 %
RVPLETH-PRE: 1.73 L
RVPLETH-PRED: 2.26 L
TLCPLETH-%PRED-PRE: 68 %
TLCPLETH-PRE: 4.73 L
TLCPLETH-PRED: 6.92 L
VA-%PRED-PRE: 64 %
VA-PRE: 4.29 L
VC-%PRED-PRE: 60 %
VC-PRE: 2.99 L
VC-PRED: 4.95 L

## 2017-10-06 DIAGNOSIS — I48.91 ATRIAL FIBRILLATION (H): Primary | ICD-10-CM

## 2017-10-06 RX ORDER — DILTIAZEM HYDROCHLORIDE 240 MG/1
240 CAPSULE, COATED, EXTENDED RELEASE ORAL DAILY
Qty: 30 CAPSULE | Refills: 0 | Status: SHIPPED | OUTPATIENT
Start: 2017-10-06 | End: 2017-11-08

## 2017-10-10 ENCOUNTER — OFFICE VISIT (OUTPATIENT)
Dept: RHEUMATOLOGY | Facility: CLINIC | Age: 52
End: 2017-10-10
Attending: INTERNAL MEDICINE
Payer: COMMERCIAL

## 2017-10-10 VITALS
OXYGEN SATURATION: 99 % | SYSTOLIC BLOOD PRESSURE: 112 MMHG | HEART RATE: 94 BPM | BODY MASS INDEX: 25.9 KG/M2 | HEIGHT: 69 IN | DIASTOLIC BLOOD PRESSURE: 73 MMHG | WEIGHT: 174.9 LBS

## 2017-10-10 DIAGNOSIS — M34.9 SCLERODERMA (H): ICD-10-CM

## 2017-10-10 DIAGNOSIS — J84.9 ILD (INTERSTITIAL LUNG DISEASE) (H): ICD-10-CM

## 2017-10-10 DIAGNOSIS — J84.9 ILD (INTERSTITIAL LUNG DISEASE) (H): Primary | ICD-10-CM

## 2017-10-10 DIAGNOSIS — D89.89 ANTISYNTHETASE SYNDROME (H): ICD-10-CM

## 2017-10-10 DIAGNOSIS — Z79.899 HIGH RISK MEDICATIONS (NOT ANTICOAGULANTS) LONG-TERM USE: ICD-10-CM

## 2017-10-10 DIAGNOSIS — M33.13 DERMATOMYOSITIS (H): ICD-10-CM

## 2017-10-10 LAB
ALBUMIN SERPL-MCNC: 2.8 G/DL (ref 3.4–5)
ALP SERPL-CCNC: 52 U/L (ref 40–150)
ALT SERPL W P-5'-P-CCNC: 31 U/L (ref 0–70)
ANION GAP SERPL CALCULATED.3IONS-SCNC: 4 MMOL/L (ref 3–14)
AST SERPL W P-5'-P-CCNC: 16 U/L (ref 0–45)
BASOPHILS # BLD AUTO: 0.1 10E9/L (ref 0–0.2)
BASOPHILS NFR BLD AUTO: 0.9 %
BILIRUB SERPL-MCNC: 0.4 MG/DL (ref 0.2–1.3)
BUN SERPL-MCNC: 12 MG/DL (ref 7–30)
CALCIUM SERPL-MCNC: 8.3 MG/DL (ref 8.5–10.1)
CHLORIDE SERPL-SCNC: 101 MMOL/L (ref 94–109)
CK SERPL-CCNC: 70 U/L (ref 30–300)
CO2 SERPL-SCNC: 28 MMOL/L (ref 20–32)
CREAT SERPL-MCNC: 0.9 MG/DL (ref 0.66–1.25)
CRP SERPL-MCNC: <2.9 MG/L (ref 0–8)
DIFFERENTIAL METHOD BLD: ABNORMAL
EOSINOPHIL # BLD AUTO: 0 10E9/L (ref 0–0.7)
EOSINOPHIL NFR BLD AUTO: 0 %
ERYTHROCYTE [DISTWIDTH] IN BLOOD BY AUTOMATED COUNT: 18.1 % (ref 10–15)
ERYTHROCYTE [SEDIMENTATION RATE] IN BLOOD BY WESTERGREN METHOD: 5 MM/H (ref 0–20)
GFR SERPL CREATININE-BSD FRML MDRD: 88 ML/MIN/1.7M2
GLUCOSE SERPL-MCNC: 95 MG/DL (ref 70–99)
HCT VFR BLD AUTO: 35.3 % (ref 40–53)
HGB BLD-MCNC: 11.3 G/DL (ref 13.3–17.7)
LYMPHOCYTES # BLD AUTO: 1.5 10E9/L (ref 0.8–5.3)
LYMPHOCYTES NFR BLD AUTO: 11.5 %
MCH RBC QN AUTO: 30.5 PG (ref 26.5–33)
MCHC RBC AUTO-ENTMCNC: 32 G/DL (ref 31.5–36.5)
MCV RBC AUTO: 95 FL (ref 78–100)
MICROCYTES BLD QL SMEAR: PRESENT
MONOCYTES # BLD AUTO: 0.7 10E9/L (ref 0–1.3)
MONOCYTES NFR BLD AUTO: 5.3 %
NEUTROPHILS # BLD AUTO: 10.9 10E9/L (ref 1.6–8.3)
NEUTROPHILS NFR BLD AUTO: 82.3 %
PLATELET # BLD AUTO: 332 10E9/L (ref 150–450)
POTASSIUM SERPL-SCNC: 4.1 MMOL/L (ref 3.4–5.3)
PROT SERPL-MCNC: 5.2 G/DL (ref 6.8–8.8)
RBC # BLD AUTO: 3.71 10E12/L (ref 4.4–5.9)
SODIUM SERPL-SCNC: 133 MMOL/L (ref 133–144)
WBC # BLD AUTO: 13.2 10E9/L (ref 4–11)

## 2017-10-10 PROCEDURE — 99212 OFFICE O/P EST SF 10 MIN: CPT | Mod: ZF

## 2017-10-10 PROCEDURE — 40000809 ZZH STATISTIC NO DOCUMENTATION TO SUPPORT CHARGE

## 2017-10-10 PROCEDURE — 82550 ASSAY OF CK (CPK): CPT | Performed by: INTERNAL MEDICINE

## 2017-10-10 PROCEDURE — 36415 COLL VENOUS BLD VENIPUNCTURE: CPT | Performed by: INTERNAL MEDICINE

## 2017-10-10 PROCEDURE — 82085 ASSAY OF ALDOLASE: CPT | Performed by: INTERNAL MEDICINE

## 2017-10-10 PROCEDURE — 85025 COMPLETE CBC W/AUTO DIFF WBC: CPT | Performed by: INTERNAL MEDICINE

## 2017-10-10 PROCEDURE — 80053 COMPREHEN METABOLIC PANEL: CPT | Performed by: INTERNAL MEDICINE

## 2017-10-10 PROCEDURE — 86140 C-REACTIVE PROTEIN: CPT | Performed by: INTERNAL MEDICINE

## 2017-10-10 PROCEDURE — 85652 RBC SED RATE AUTOMATED: CPT | Performed by: INTERNAL MEDICINE

## 2017-10-10 ASSESSMENT — PAIN SCALES - GENERAL: PAINLEVEL: NO PAIN (0)

## 2017-10-10 NOTE — MR AVS SNAPSHOT
After Visit Summary   10/10/2017    Kwaku Medrano    MRN: [id number]           Patient Information     Date Of Birth          1965        Visit Information        Provider Department      10/10/2017 5:00 PM Denny Acosta MD [facility] Rhe[facility]        Today's Diagnoses     ILD (interstitial lung disease) (H)    -  1    Antisynthetase syndrome (H)        Dermatomyositis (H)        Scleroderma (H)        High risk medications (not anticoagulants) long-term use           Follow-ups after your visit        Your next 10 appointments already scheduled     Oct 10, 2017  6:00 PM CDT   Lab with  LAB    [facility]    [address] [place] [address]   [phone]            Oct 17, 2017  8:15 AM CDT   [facility] EP RETURN with Radha Chavez MD   [facility] PHYSICI[facility] ([facility])    [address] [place] [address]   [phone]            Nov 06, 2017  8:00 AM CST   (Arrive by 7:45 AM)   Return Visit with Denny Acosta MD   [facility] ([facility])    [address] [place] [address]  3rd [facility] [address]   [phone]            Dec 22, 2017  7:45 AM CST   Lab with  LAB   [facility] ([facility])    [address] [place] [address]   [phone]            Dec 22, 2017  8:00 AM CST   FULL PULMONARY FUNCTION with  PFL A   [facility] Function Testing ([facility])    [address] [place] [id number] [phone]            Dec 22, 2017  9:00 AM CST   (Arrive by 8:45 AM)   Return Interstitial Lung with Elma Dillon MD   [facility] ([facility])    [address] [place] [id number] [phone]              Future tests that  "were ordered for you today     Open Future Orders        Priority Expected Expires Ordered    Comprehensive metabolic panel Routine  10/10/2018 10/10/2017    CBC with platelets differential Routine  10/10/2018 10/10/2017    Erythrocyte sedimentation rate auto Routine  10/10/2018 10/10/2017    CRP inflammation Routine  10/10/2018 10/10/2017    Aldolase Routine  10/10/2018 10/10/2017    CK total Routine  10/10/2018 10/10/2017            Who to contact     If you have questions or need follow up information about today's clinic visit or your schedule please contact [facility] RHEUMATOLOGY directly at [phone].  Normal or non-critical lab and imaging results will be communicated to you by LIFEMODELERhart, letter or phone within 4 business days after the clinic has received the results. If you do not hear from us within 7 days, please contact the clinic through [employer] or phone. If you have a critical or abnormal lab result, we will notify you by phone as soon as possible.  Submit refill requests through [facility] or call your pharmacy and they will forward the refill request to us. Please allow 3 business days for your refill to be completed.          Additional Information About Your Visit        [facility] Information     [facility] gives you secure access to your electronic health record. If you see a primary care provider, you can also send messages to your care team and make appointments. If you have questions, please call your primary care clinic.  If you do not have a primary care provider, please call [phone] and they will assist you.        Care EveryWhere ID     This is your Care EveryWhere ID. This could be used by other organizations to access your [place] medical records  [id number]        Your Vitals Were     Pulse Height Pulse Oximetry BMI (Body Mass Index)          94 1.753 m (5' 9\") 99% 25.83 kg/m2         Blood Pressure from Last 3 Encounters:   10/10/17 112/73   09/15/17 126/77   09/15/17 119/81    Weight " from Last 3 Encounters:   10/10/17 79.3 kg (174 lb 14.4 oz)   09/15/17 76.2 kg (168 lb 1.6 oz)   09/15/17 76.7 kg (169 lb 3.2 oz)               Primary Care Provider Office Phone # Fax #    Stew Caldwell [phone]       [address] [facility] [address]        Equal Access to Services     ESTHER DE LA O : Hadii aad ku hadasho Soomaali, waaxda luqadaha, qaybta kaalmada adeegyada, waxay idiin hayaan adeeg khjuniorandrew la'eloise . So [facility] [phone].    ATENCIÓN: Si gregoryla español, tiene a rubalcava disposición servicios gratuitos de asistencia lingüística. [facility] [phone].    We comply with applicable federal civil rights laws and [place] laws. We do not discriminate on the basis of race, color, national origin, age, disability, sex, sexual orientation, or gender identity.            Thank you!     Thank you for choosing [facility] RHEUMATOLOGY  for your care. Our goal is always to provide you with excellent care. Hearing back from our patients is one way we can continue to improve our services. Please take a few minutes to complete the written survey that you may receive in the mail after your visit with us. Thank you!             Your Updated Medication List - Protect others around you: Learn how to safely use, store and throw away your medicines at www.disposemymeds.org.          This list is accurate as of: 10/10/17  5:50 PM.  Always use your most recent med list.                   Brand Name Dispense Instructions for use Diagnosis    apixaban ANTICOAGULANT 5 MG tablet    ELIQUIS    60 tablet    Take 1 tablet (5 mg) by mouth 2 times daily    Atrial fibrillation (H)       BENZONATATE PO      Take 200 mg by mouth 3 times daily as needed    ILD (interstitial lung disease) (H), Antisynthetase syndrome (H), Dermatomyositis (H), Scleroderma (H), High risk medications (not anticoagulants) long-term use       Blood Pressure Monitoring Kit     1 kit    1 each three times a week Dr Acosta has asked  you to check your blood pressure 2-3 times per week using your home monitor.  Please keep track of your findings in a journal and bring it to your appointments.  Contact this office if your blood pressure: the top number (systolic) is consistently 30 points higher than your normal BP or if the bottom number (diastolic) is consistently 20 points higher than your normal BP.    ILD (interstitial lung disease) (H), Dermatomyositis (H), Systemic sclerosis (H), Gastroesophageal reflux disease, esophagitis presence not specified, High risk medications (not anticoagulants) long-term use       diltiazem 240 MG 24 hr capsule    CARDIZEM CD    30 capsule    Take 1 capsule (240 mg) by mouth daily    Atrial fibrillation (H)       FLUVIRIN 0.5 ML Aileen   Generic drug:  Influenza Vac Typ A&B Surf Ant       ILD (interstitial lung disease) (H), Antisynthetase syndrome (H), Dermatomyositis (H), Scleroderma (H), High risk medications (not anticoagulants) long-term use       lisinopril 10 MG tablet    PRINIVIL/ZESTRIL    30 tablet    Take 1 tablet (10 mg) by mouth daily    Raynaud's disease without gangrene, Atrial fibrillation, unspecified type (H)       mycophenolate 500 MG tablet    GENERIC EQUIVALENT    180 tablet    Take 3 tablets (1,500 mg) by mouth 2 times daily Monitoring labs required every 8 weeks for medication refills.    ILD (interstitial lung disease) (H), Myositis, unspecified myositis type, unspecified site, Polyarthritis, Dermatomyositis (H), Antisynthetase syndrome (H)       pantoprazole 40 MG EC tablet    PROTONIX    30 tablet    Take 1 tablet (40 mg) by mouth daily    Gastroesophageal reflux disease, esophagitis presence not specified, Esophageal dysmotility       predniSONE 20 MG tablet    DELTASONE     Take 10 mg by mouth daily        sulfamethoxazole-trimethoprim 400-80 MG per tablet    BACTRIM    60 tablet    Once daily for PJP prophylaxis. Start after bronchoscopy    ILD (interstitial lung disease) (H),  Inflammatory arthritis

## 2017-10-10 NOTE — LETTER
10/10/2017      RE: Kwaku Medrano  [address] [facility] [address]       Rheumatology Visit     Kwaku Medrano MRN# [id number]   YOB: 1965 Age: 52 year old       Primary care provider: Stew Caldwell          Assessment and Plan:   He has early diffuse cutaneous systemic sclerosis, RAN ANDIE III +  with a modified Rodnan skin score  of 21 but fairly rapidly progressive skin based on his history, weakness in the muscles that is 4+ in his thighs but probably a little improved, likely cardiac involvement with LVH and probable diastolic dysfunction and S4 on examination today, and 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        PLAN AND RECOMMENDATIONS:  I did reinforce that ongoing monitoring of the blood pressure is going to be very important.  He is on some lisinopril in addition to his diltiazem and those may be controlling his blood pressures.  He fortunately did not develop any clear-cut features of renal crisis on the higher dose prednisone, but that will also need to remain below 15 mg a day.  He is doing reasonably well with it on 10 mg a day and I think we can keep him there.  We do want to make sure that his muscle involvement remains stable and the prednisone could contribute to that stability as well as stability for any lung process that might be getting started.       He is on a relatively high dose of MMF and needs ongoing laboratory monitoring for that.  He will get a CBC with differential, a CRP, and a CK today as well as a complete metabolic panel given his lower extremity edema.       I do think that that edema could be due to the diltiazem.  It is of interest that it worsened when he went down on prednisone, as I might have expected the prednisone might have been contributing to it, but there could be an inflammatory component to it that was better controlled while on prednisone.       I  think for the time being it is probably best to monitor this, but if this worsens there may need to be consideration for an alternative rate control drug besides diltiazem.  Based on the examination today, he may be in and out of the atrial fibrillation and there could be a possibility of good control of it with relatively low-dose beta blocker that would not worsen Raynaud's phenomenon.       I am going to continue to see him at relatively short intervals, every 6-12 weeks, sooner p.r.n. for any worsening.          This visit was 40 minutes in duration, over 50% of which was spent in counseling.               History of Present Illness:   Kwaku Medrano is a 52 year old male who presents for The patient is here for consultative evaluation at the request of Dr. Golden, who has seen him on several prior occasions and has followed him for a presumptive diagnosis of dermatomyositis.      For prior evaluation done by Dr. Golden, please see his most recent notes.       He has been seeing the patient for several months now after the patient initially developed inflammatory joint symptoms following a viral infection in 12/2016.  Initially these were really the dominant symptoms, but he then progressed to have some features consistent with inflammatory myositis including some muscle weakness.       Extensive serologic evaluation was unrevealing, with a negative SHERRELL by EIA, negative myositis panel, negative Marlene-1, and negative extractable nuclear antigens including topoisomerase.      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       With the institution of MMF which was dose  escalated to 2 grams a day approximately 3 weeks ago and to 3 grams a day just about 2 weeks ago with no side effects, he has noted gradual improvement in his primary symptoms of pulmonary hypertension which are dry cough with deeper inspiration as well as some exertional dyspnea.  Those have improved somewhat, and he is able to get back to nearly his normal function as a .       He has had repeat pulmonary function tests done and a repeat HRCT done just in the last several weeks, and this shows quite significant fibrotic NSIP which I have reviewed today with Dr. Dillon in our Pulmonary Department who is seeing the patient tomorrow.  He is also due for repeat PFTs and a    6-minute walk test including desaturation tomorrow.       In addition, he is seeing a cardiologist tomorrow.  He developed atrial fibrillation a few weeks ago and has now been placed on Eliquis for that as well as diltiazem for rate control.  He does note it, but does not feel particularly poorly with the atrial fibrillation.  Notably, this has occurred during a time frame when he is on high-dose steroids, but an echocardiogram also shows that he is having some left ventricular hypertrophy and a decreased ejection fraction which is likely diastolic.  He also has diastolic hypertension at this point.       In addition to these features, over the last few months he has had progressive skin thickening of his hands and now his forearms.  This initially manifested primarily as swelling in the hands but is now definitely including some skin thickening.  He is getting some subtle skin thickening in the shins as well as in the feet, and he is noticing some hair loss there.  In addition to these features, he is also getting cyanosis in both the hands and the feet.  Fortunately, he has not had the development of any digital ulcerations.       He clearly has GERD.  That maybe a little bit better currently despite the fact that Dr. Golden has  eliminated using the proton pump inhibitor because of concerns about the interaction with CellCept.  He therefore put him only on a Zantac dosing currently.  He has been avoiding eating within 3 hours of bedtime and has been using a wedge, but he finds he slips down in the middle of the night.  Nonetheless, he denies significant dry cough first thing in the morning and does not feel his breathing is worse at that time.  He is not having any dysphagia, however.       He continues to have some subtle weakness in his thighs but does not feel like this impairs him in his ability to do things, and he is able to get in and out of a car, go up and down stairs, and do his work without particular problems.  He does think it might be a little better since he is on the higher dose prednisone.      For additional details of his medical history, please see Dr. Golden's prior notes.  The remainder of a full 10-point review of systems including focused review on the scleroderma intake form is notable only for some weight loss that he says he was attempting.       His family history is notable for an aunt with lupus on his maternal side.        OCTOBER 10, 2017, INTERVAL HISTORY:  Since we have last seen him, he has been able to decrease his prednisone down to 10 mg a day.  He really has felt pretty stable in making that taper and has not felt any worse.      He has been religiously monitoring his blood pressures.  Those are reviewed today and they are all nicely within the normal limits.  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       He has been placed on diltiazem.  One week ago Monday he started noticing a lot of swelling in his ankles, potentially consistent with a known side effect of this drug.       He is tolerating his MMF well.  He has some dyspnea on exertion, but no  chest pain.       He does believe his skin is overall stable.  He has not noticed any further skin thickening.  He is not sure he has noticed any real improvement in it, either.       The low-grade weakness he has experienced feels stable to him and not clearly worse.  Other manifestations of his disease process appear to be stable.                       Review of Systems:   Review Of Systems as above, otherwise:   Skin: negative  Eyes: negative  Ears/Nose/Throat: negative  Respiratory: No shortness of breath, dyspnea on exertion, cough, or hemoptysis  Cardiovascular: negative  Gastrointestinal: negative  Genitourinary: negative  Musculoskeletal: negative  Neurologic: negative  Psychiatric: negative  Hematologic/Lymphatic/Immunologic: negative  Endocrine: negative          Past Medical History:     Past Medical History:   Diagnosis Date     Atrial fibrillation and flutter (H) 07/2017     Fracture      GERD (gastroesophageal reflux disease)      Interstitial lung disease (H)     sclerderma ILD; present on CXR 3-2017 and CT 5-2017; dx confirmed by CT 9-2017 fibrotic NSIP pattern with increased fibrosis; started mycophenolate 7/2017     Myositis      Scleroderma (H)     dx 9- at [facility] by Dr. 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     Priority: Medium     Dermatomyositis (H) 07/25/2017     Priority: Medium     ILD (interstitial lung disease) (H) 07/25/2017     Priority: Medium     Atrial fibrillation and flutter (H) 07/01/2017     Priority: Medium             Past Surgical History:     Past Surgical History:   Procedure Laterality Date     COLONOSCOPY N/A 7/19/2017     Procedure: COLONOSCOPY;  COLONOSCOPY;  Surgeon: Mita Jimenez MD;  Location:  GI     NO HISTORY OF SURGERY               Social History:     Social History   Substance Use Topics     Smoking status: Never Smoker     Smokeless tobacco: Former User     Types: Chew     Quit date: 1/1/1984     Alcohol use Yes      Comment: very rarely             Family History:     Family History   Problem Relation Age of Onset     Prostate Cancer Father      Lung Cancer Other      LUNG DISEASE No family hx of      Rheumatologic Disease No family hx of             Allergies:     Allergies   Allergen Reactions     Ampicillin Rash             Medications:     Current Outpatient Prescriptions   Medication Sig Dispense Refill     BENZONATATE PO Take 200 mg by mouth 3 times daily as needed       apixaban ANTICOAGULANT (ELIQUIS) 5 MG tablet Take 1 tablet (5 mg) by mouth 2 times daily 60 tablet 0     diltiazem (CARDIZEM CD) 240 MG 24 hr capsule Take 1 capsule (240 mg) by mouth daily 30 capsule 0     mycophenolate (GENERIC EQUIVALENT) 500 MG tablet Take 3 tablets (1,500 mg) by mouth 2 times daily Monitoring labs required every 8 weeks for medication refills. 180 tablet 3     predniSONE (DELTASONE) 20 MG tablet Take 10 mg by mouth daily        lisinopril (PRINIVIL/ZESTRIL) 10 MG tablet Take 1 tablet (10 mg) by mouth daily 30 tablet 11     Blood Pressure Monitoring KIT 1 each three times a week Dr Acosta has asked you to check your blood pressure 2-3 times per week using your home monitor.  Please keep track of your findings in a journal and bring it to your appointments.   Contact this office if your blood pressure: the top number (systolic) is consistently 30 points higher than your normal BP or if the bottom number (diastolic) is consistently 20 points higher than your normal BP. 1 kit 1     pantoprazole (PROTONIX) 40 MG EC tablet Take 1 tablet (40 mg) by mouth daily 30 tablet 11     sulfamethoxazole-trimethoprim (BACTRIM) 400-80  "MG per tablet Once daily for PJP prophylaxis. Start after bronchoscopy 60 tablet 1            Physical Exam:   Blood pressure 112/73, pulse 94, height 1.753 m (5' 9\"), weight 79.3 kg (174 lb 14.4 oz), SpO2 99 %.  Constitutional: WD-WN-WG cooperative  Eyes: nl EOM, PERRLA, vision, conjunctiva, sclera  ENT: nl external ears, nose, hearing, lips, teeth, gums, throat  No mucous membrane lesions, normal saliva pool  Neck: no mass or thyroid enlargement  Resp: lungs clear to auscultation, nl to palpation  CV: RRR, no murmurs, rubs, + S4 , no edema  GI: no ABD mass or tenderness, no HSM  : not tested  Lymph: no cervical, supraclavicular, inguinal or epitrochlear nodes  MS: All TMJ, neck, shoulder, elbow, wrist, MCP/PIP/DIP, spine, hip, knee, ankle, and foot MTP/IP joints were examined and  found normal. No active synovitis or deformity. Full ROM.  Normal  strength. No dactylitis,  tenosynovitis, enthespathy   Skin: MRSS= 23, with 3+ involvement in bilat. Hands and right forearm. +  RP . This appears stable to me, but I did not repeat the scoring today.     Nailfold telangiectasias. No DU or pits. no nail pitting, alopecia, rash, nodules or lesions  Neuro: nl cranial nerves, strength, sensation, DTRs.   Psych: nl judgement, orientation, memory, affect.         Data:     Lab Results   Component Value Date    WBC 6.9 10/18/2017    WBC 13.2 (H) 10/10/2017    WBC 12.8 (H) 09/12/2017    HGB 11.0 (L) 10/18/2017    HGB 11.3 (L) 10/10/2017    HGB 15.4 09/12/2017    HCT 33.3 (L) 10/18/2017    HCT 35.3 (L) 10/10/2017    HCT 45.8 09/12/2017    MCV 93 10/18/2017    MCV 95 10/10/2017    MCV 89 09/12/2017     10/18/2017     10/10/2017     09/12/2017     Lab Results   Component Value Date    BUN 12 10/18/2017    BUN 12 10/10/2017    BUN 17 09/12/2017     No components found for: SEDRATE  Lab Results   Component Value Date    TSH 1.36 06/07/2017     Lab Results   Component Value Date    AST 16 10/10/2017    AST " 15 03/10/2017    ALT 31 10/10/2017    ALT 21 03/10/2017    ALKPHOS 52 10/10/2017    ALKPHOS 99 03/10/2017     Reviewed Rheumatology lab flowsheet        Denny Acosta MD

## 2017-10-10 NOTE — NURSING NOTE
"Chief Complaint   Patient presents with     RECHECK     Follow up possible anti-synthetase syndrome.       Initial /73  Pulse 94  Ht 1.753 m (5' 9\")  Wt 79.3 kg (174 lb 14.4 oz)  SpO2 99%  BMI 25.83 kg/m2 Estimated body mass index is 25.83 kg/(m^2) as calculated from the following:    Height as of this encounter: 1.753 m (5' 9\").    Weight as of this encounter: 79.3 kg (174 lb 14.4 oz).  Medication Reconciliation: complete   Marisol Monk., CMA    "

## 2017-10-12 LAB — ALDOLASE SERPL-CCNC: 3.6 U/L (ref 1.5–8.1)

## 2017-10-16 DIAGNOSIS — M19.90 INFLAMMATORY ARTHRITIS: ICD-10-CM

## 2017-10-16 DIAGNOSIS — J84.9 ILD (INTERSTITIAL LUNG DISEASE) (H): ICD-10-CM

## 2017-10-16 RX ORDER — SULFAMETHOXAZOLE AND TRIMETHOPRIM 400; 80 MG/1; MG/1
TABLET ORAL
Qty: 60 TABLET | Refills: 1 | Status: SHIPPED | OUTPATIENT
Start: 2017-10-16 | End: 2018-02-15

## 2017-10-16 NOTE — TELEPHONE ENCOUNTER
sulfamethoxazole-trimethoprim (BACTRIM) 400-80 MG per tablet  Last Written Prescription Date:  6/7/17  Last Fill Quantity: 60,   # refills: 1  Last Office Visit with G, [facility] or [facility] prescribing provider: 10/10/17  Future Office visit:   11/6/17    Routing refill request to provider for review/approval because:   sulfamethoxazole-trimethoprim (BACTRIM) 400-80 MG per tablet. Not on SO protocol.

## 2017-10-17 ENCOUNTER — TELEPHONE (OUTPATIENT)
Dept: CARDIOLOGY | Facility: CLINIC | Age: 52
End: 2017-10-17

## 2017-10-17 ENCOUNTER — OFFICE VISIT (OUTPATIENT)
Dept: CARDIOLOGY | Facility: CLINIC | Age: 52
End: 2017-10-17
Attending: INTERNAL MEDICINE
Payer: COMMERCIAL

## 2017-10-17 VITALS
DIASTOLIC BLOOD PRESSURE: 60 MMHG | SYSTOLIC BLOOD PRESSURE: 120 MMHG | HEIGHT: 69 IN | BODY MASS INDEX: 25.62 KG/M2 | HEART RATE: 72 BPM | WEIGHT: 173 LBS

## 2017-10-17 DIAGNOSIS — I48.91 ATRIAL FIBRILLATION, UNSPECIFIED TYPE (H): ICD-10-CM

## 2017-10-17 DIAGNOSIS — I48.3 TYPICAL ATRIAL FLUTTER (H): Primary | ICD-10-CM

## 2017-10-17 PROCEDURE — 99214 OFFICE O/P EST MOD 30 MIN: CPT | Performed by: INTERNAL MEDICINE

## 2017-10-17 PROCEDURE — 93000 ELECTROCARDIOGRAM COMPLETE: CPT | Performed by: INTERNAL MEDICINE

## 2017-10-17 RX ORDER — LIDOCAINE 40 MG/G
CREAM TOPICAL
Status: CANCELLED | OUTPATIENT
Start: 2017-10-17

## 2017-10-17 RX ORDER — SODIUM CHLORIDE 450 MG/100ML
INJECTION, SOLUTION INTRAVENOUS CONTINUOUS
Status: CANCELLED | OUTPATIENT
Start: 2017-10-17

## 2017-10-17 NOTE — MR AVS SNAPSHOT
After Visit Summary   10/17/2017    Kwaku Medrano    MRN: [id number]           Patient Information     Date Of Birth          1965        Visit Information        Provider Department      10/17/2017 8:15 AM Radha Chavez MD [facility] [place]        Today's Diagnoses     Typical atrial flutter (H)    -  1    Atrial fibrillation, unspecified type (H)           Follow-ups after your visit        Your next 10 appointments already scheduled     Oct 18, 2017  1:00 PM CDT   Ep 90 Minute with SHCVR3   [facility] Cardiac Catheterization Lab ([facility])    [address]  [place] [address]   [phone]            Nov 06, 2017  8:00 AM CST   (Arrive by 7:45 AM)   Return Visit with Denny Acosta MD   [facility] ([facility])    [address] [place] [address]   [phone]            Dec 22, 2017  7:45 AM CST   Lab with  LAB   [facility] ([facility])    [address] [place] [address]   [phone]            Dec 22, 2017  8:00 AM CST   FULL PULMONARY FUNCTION with  PFL A   [facility] Pulmonary Function Testing ([facility])    [address] [place] [address]   [phone]            Dec 22, 2017  8:30 AM CST   (Arrive by 8:15 AM)   Return Interstitial Lung with Elma Dillon MD   [facility] ([facility])    [address] [place] [address]   [phone]              Future tests that were ordered for you today     Open Future Orders        Priority Expected Expires Ordered    EP Ablation Procedures Routine 10/24/2017 10/17/2018 10/17/2017            Who to contact     If you have questions or need follow up information about today's clinic visit or your schedule please  "contact [facility] [place] directly at [phone].  Normal or non-critical lab and imaging results will be communicated to you by MyChart, letter or phone within 4 business days after the clinic has received the results. If you do not hear from us within 7 days, please contact the clinic through MyChart or phone. If you have a critical or abnormal lab result, we will notify you by phone as soon as possible.  Submit refill requests through [employer] or call your pharmacy and they will forward the refill request to us. Please allow 3 business days for your refill to be completed.          Additional Information About Your Visit        [employer] Information     [employer] gives you secure access to your electronic health record. If you see a primary care provider, you can also send messages to your care team and make appointments. If you have questions, please call your primary care clinic.  If you do not have a primary care provider, please call [phone] and they will assist you.        Care EveryWhere ID     This is your Care EveryWhere ID. This could be used by other organizations to access your [place] medical records  [id number]        Your Vitals Were     Pulse Height BMI (Body Mass Index)             72 1.753 m (5' 9\") 25.55 kg/m2          Blood Pressure from Last 3 Encounters:   10/17/17 120/60   10/10/17 112/73   09/15/17 126/77    Weight from Last 3 Encounters:   10/17/17 78.5 kg (173 lb)   10/10/17 79.3 kg (174 lb 14.4 oz)   09/15/17 76.2 kg (168 lb 1.6 oz)              We Performed the Following     EKG 12-lead complete w/read - Clinics (to be scheduled)     Follow-Up with Electrophysiologist        Primary Care Provider Office Phone # Fax #    Stew Caldwell [phone]       [facility] [address] [place] [address]        Equal Access to Services     ESTHER DE LA O AH: Molly Linn, derik rogel, keegan cortes " sriram rodriguezdaniel shinaan ah. So [facility] [phone].    ATENCIÓN: Si rivera wilkins, tiene a rubalcava disposición servicios gratuitos de asistencia lingüística. Abimbola al [phone].    We comply with applicable federal civil rights laws and [place] laws. We do not discriminate on the basis of race, color, national origin, age, disability, sex, sexual orientation, or gender identity.            Thank you!     Thank you for choosing [facility] [place]  for your care. Our goal is always to provide you with excellent care. Hearing back from our patients is one way we can continue to improve our services. Please take a few minutes to complete the written survey that you may receive in the mail after your visit with us. Thank you!             Your Updated Medication List - Protect others around you: Learn how to safely use, store and throw away your medicines at www.disposemymeds.org.          This list is accurate as of: 10/17/17  8:42 AM.  Always use your most recent med list.                   Brand Name Dispense Instructions for use Diagnosis    apixaban ANTICOAGULANT 5 MG tablet    ELIQUIS    60 tablet    Take 1 tablet (5 mg) by mouth 2 times daily    Atrial fibrillation (H)       BENZONATATE PO      Take 200 mg by mouth 3 times daily as needed    ILD (interstitial lung disease) (H), Antisynthetase syndrome (H), Dermatomyositis (H), Scleroderma (H), High risk medications (not anticoagulants) long-term use       Blood Pressure Monitoring Kit     1 kit    1 each three times a week Dr Acosta has asked you to check your blood pressure 2-3 times per week using your home monitor.  Please keep track of your findings in a journal and bring it to your appointments.  Contact this office if your blood pressure: the top number (systolic) is consistently 30 points higher than your normal BP or if the bottom number (diastolic) is consistently 20 points higher than your normal BP.    ILD  (interstitial lung disease) (H), Dermatomyositis (H), Systemic sclerosis (H), Gastroesophageal reflux disease, esophagitis presence not specified, High risk medications (not anticoagulants) long-term use       diltiazem 240 MG 24 hr capsule    CARDIZEM CD    30 capsule    Take 1 capsule (240 mg) by mouth daily    Atrial fibrillation (H)       FLUVIRIN 0.5 ML Aileen   Generic drug:  Influenza Vac Typ A&B Surf Ant       ILD (interstitial lung disease) (H), Antisynthetase syndrome (H), Dermatomyositis (H), Scleroderma (H), High risk medications (not anticoagulants) long-term use       lisinopril 10 MG tablet    PRINIVIL/ZESTRIL    30 tablet    Take 1 tablet (10 mg) by mouth daily    Raynaud's disease without gangrene, Atrial fibrillation, unspecified type (H)       mycophenolate 500 MG tablet    GENERIC EQUIVALENT    180 tablet    Take 3 tablets (1,500 mg) by mouth 2 times daily Monitoring labs required every 8 weeks for medication refills.    ILD (interstitial lung disease) (H), Myositis, unspecified myositis type, unspecified site, Polyarthritis, Dermatomyositis (H), Antisynthetase syndrome (H)       pantoprazole 40 MG EC tablet    PROTONIX    30 tablet    Take 1 tablet (40 mg) by mouth daily    Gastroesophageal reflux disease, esophagitis presence not specified, Esophageal dysmotility       predniSONE 20 MG tablet    DELTASONE     Take 10 mg by mouth daily        sulfamethoxazole-trimethoprim 400-80 MG per tablet    BACTRIM    60 tablet    Once daily for PJP prophylaxis. Start after bronchoscopy    ILD (interstitial lung disease) (H), Inflammatory arthritis

## 2017-10-17 NOTE — LETTER
10/17/2017    Stew Caldwell MD  [address] [place] [address]    RE: Kwaku Medrano       Dear Colleague,    I had the pleasure of seeing Kwaku Medrano in the [facility].    I had the pleasure of seeing Mr. Kwaku Medrano in follow-up of atrial arrhythmias.  He is a very pleasant 52-year-old male with interstitial lung disease and recently diagnosed scleroderma.  He has been treated with prednisone.      He was diagnosed with atrial fibrillation on 07/09/2017 during colonoscopy.  Subsequently, the patient went into atrial flutter with RVR.  This was first documented on 09/12/2017.  He has remained in atrial flutter until today.  He has been treated with diltiazem and apixaban.      In coming in today, Mr. Medrano told me he felt reasonably well.  He still has some cough and dyspnea on exertion.  He is unaware of heart rate irregularity most of the time.  He has not had syncope.  He has had pleuritic chest pain on the right side of the chest.  (He points to the tender area with a single finger.)      PHYSICAL EXAMINATION:   VITAL SIGNS:  Blood pressure 120/60, pulse 72 and irregular, weight 78 kg, height 175 cm.   HEENT:  Normocephalic, atraumatic.  Sclerae nonicteric.   NECK:  Supple without thyromegaly or lymphadenopathy.   LUNGS:  Clear.   CARDIOVASCULAR:  Irregular rhythm, but not fast.  No apparent gallop or murmur.   ABDOMEN:  Soft, nontender.   EXTREMITIES:  No edema.   SKIN:  No rash.      DIAGNOSTIC STUDIES:    His 12 lead ECG today shows atrial flutter with a sawtooth pattern in inferior leads and positive flutter waves in V1.  Probably typical atrial flutter.     No facility-administered encounter medications on file as of 10/17/2017.      Outpatient Encounter Prescriptions as of 10/17/2017   Medication Sig Dispense Refill     sulfamethoxazole-trimethoprim (BACTRIM) 400-80 MG per tablet Once daily for PJP prophylaxis. Start after bronchoscopy 60 tablet 1      BENZONATATE PO Take 200 mg by mouth 3 times daily as needed       [DISCONTINUED] FLUVIRIN 0.5 ML BALWINDER        apixaban ANTICOAGULANT (ELIQUIS) 5 MG tablet Take 1 tablet (5 mg) by mouth 2 times daily 60 tablet 0     diltiazem (CARDIZEM CD) 240 MG 24 hr capsule Take 1 capsule (240 mg) by mouth daily 30 capsule 0     mycophenolate (GENERIC EQUIVALENT) 500 MG tablet Take 3 tablets (1,500 mg) by mouth 2 times daily Monitoring labs required every 8 weeks for medication refills. 180 tablet 3     predniSONE (DELTASONE) 20 MG tablet Take 10 mg by mouth daily        lisinopril (PRINIVIL/ZESTRIL) 10 MG tablet Take 1 tablet (10 mg) by mouth daily 30 tablet 11     Blood Pressure Monitoring KIT 1 each three times a week Dr Acosta has asked you to check your blood pressure 2-3 times per week using your home monitor.  Please keep track of your findings in a journal and bring it to your appointments.   Contact this office if your blood pressure: the top number (systolic) is consistently 30 points higher than your normal BP or if the bottom number (diastolic) is consistently 20 points higher than your normal BP. 1 kit 1     pantoprazole (PROTONIX) 40 MG EC tablet Take 1 tablet (40 mg) by mouth daily 30 tablet 11      IMPRESSION & PLAN:    1.  Typical atrial flutter and previously documented atrial fibrillation.  Mr. Medrano has been in atrial flutter for the past 5-6 weeks.  The rate is controlled on diltiazem.  He is quite young, and I believe restoring sinus rhythm is reasonable.      We discussed 2 options.  One is to cardiovert and the other is to perform catheter ablation of the atrial flutter and see how often he has atrial fibrillation.  I explained that with cardioversion alone, the likelihood of recurrent atrial arrhythmia is extremely high.  If we ablate the atrial flutter, this would take care of the arrhythmia that is the most concerning (as it was associated with RVR), but he would still have paroxysmal atrial  fibrillation.      I find both options reasonable.  After a good discussion, Mr. Medrano has expressed interest in pursuing catheter ablation.  I explained there is an approximately 1% risk of serious complication.  He would have to remain on apixaban without interruption before the procedure and for at least 1 month after the procedure.  He understands there is a chance that the atrial flutter will turn out to be an atypical circuit, and instead of ablation we would then proceed with cardioversion in the EP Lab.      It was my pleasure seeing Mr. Medrano today.  Please feel free to contact me with questions or concerns.     Sincerely,    Radha Chavez MD     [facility]

## 2017-10-17 NOTE — PROGRESS NOTES
HPI and Plan:   See dictation    Orders Placed This Encounter   Procedures     EP Ablation Procedures       No orders of the defined types were placed in this encounter.      There are no discontinued medications.      Encounter Diagnoses   Name Primary?     Typical atrial flutter (H) Yes     Atrial fibrillation, unspecified type (H)        CURRENT MEDICATIONS:  Current Outpatient Prescriptions   Medication Sig Dispense Refill     sulfamethoxazole-trimethoprim (BACTRIM) 400-80 MG per tablet Once daily for PJP prophylaxis. Start after bronchoscopy 60 tablet 1     BENZONATATE PO Take 200 mg by mouth 3 times daily as needed       FLUVIRIN 0.5 ML BALWINDER        apixaban ANTICOAGULANT (ELIQUIS) 5 MG tablet Take 1 tablet (5 mg) by mouth 2 times daily 60 tablet 0     diltiazem (CARDIZEM CD) 240 MG 24 hr capsule Take 1 capsule (240 mg) by mouth daily 30 capsule 0     mycophenolate (GENERIC EQUIVALENT) 500 MG tablet Take 3 tablets (1,500 mg) by mouth 2 times daily Monitoring labs required every 8 weeks for medication refills. 180 tablet 3     predniSONE (DELTASONE) 20 MG tablet Take 10 mg by mouth daily        lisinopril (PRINIVIL/ZESTRIL) 10 MG tablet Take 1 tablet (10 mg) by mouth daily 30 tablet 11     Blood Pressure Monitoring KIT 1 each three times a week Dr Acosta has asked you to check your blood pressure 2-3 times per week using your home monitor.  Please keep track of your findings in a journal and bring it to your appointments.   Contact this office if your blood pressure: the top number (systolic) is consistently 30 points higher than your normal BP or if the bottom number (diastolic) is consistently 20 points higher than your normal BP. 1 kit 1     pantoprazole (PROTONIX) 40 MG EC tablet Take 1 tablet (40 mg) by mouth daily 30 tablet 11       ALLERGIES     Allergies   Allergen Reactions     Ampicillin Rash       PAST MEDICAL HISTORY:  Past Medical History:   Diagnosis Date     Atrial fibrillation and flutter (H)  07/2017     Fracture      GERD (gastroesophageal reflux disease)      Interstitial lung disease (H)     sclerderma ILD; present on CXR 3-2017 and CT 5-2017; dx confirmed by CT 9-2017 fibrotic NSIP pattern with increased fibrosis; started mycophenolate 7/2017     Myositis      Scleroderma (H)     dx 9- at [facility] by Dr. Acosta       PAST SURGICAL HISTORY:  Past Surgical History:   Procedure Laterality Date     COLONOSCOPY N/A 7/19/2017    Procedure: COLONOSCOPY;  COLONOSCOPY;  Surgeon: Mita Jimenez MD;  Location:  GI     NO HISTORY OF SURGERY         FAMILY HISTORY:  Family History   Problem Relation Age of Onset     Prostate Cancer Father      Lung Cancer Other      LUNG DISEASE No family hx of      Rheumatologic Disease No family hx of        SOCIAL HISTORY:  Social History     Social History     Marital status: Single     Spouse name: N/A     Number of children: N/A     Years of education: N/A     Social History Main Topics     Smoking status: Never Smoker     Smokeless tobacco: Former User     Types: Chew     Quit date: 1/1/1984     Alcohol use Yes      Comment: ocasionally     Drug use: No     Sexual activity: Not Currently     Other Topics Concern     None     Social History Narrative    Work in [employer].  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  Denies exposure to hot tubs, silica, pet birds, mold.  Single, no children.       Review of Systems:  Skin:  Positive for   scleraderma   Eyes:  Positive for glasses    ENT:  Negative      Respiratory:  Positive for shortness of breath;cough     Cardiovascular:    Positive for;palpitations;edema    Gastroenterology: Positive for heartburn;reflux    Genitourinary:  Negative      Musculoskeletal:  Negative      Neurologic:  Negative      Psychiatric:  Negative      Heme/Lymph/Imm:  Positive for allergies    Endocrine:  Negative        [id number]      CC  Stew Caldwell MD  [address] [place]  [address]

## 2017-10-17 NOTE — PROGRESS NOTES
HISTORY OF PRESENT ILLNESS:    I had the pleasure of seeing Mr. Kwaku Medrano in follow-up of atrial arrhythmias.  He is a very pleasant 52-year-old male with interstitial lung disease and recently diagnosed scleroderma.  He has been treated with prednisone.      He was diagnosed with atrial fibrillation on 07/09/2017 during colonoscopy.  Subsequently, the patient went into atrial flutter with RVR.  This was first documented on 09/12/2017.  He has remained in atrial flutter until today.  He has been treated with diltiazem and apixaban.      In coming in today, Mr. Medrano told me he felt reasonably well.  He still has some cough and dyspnea on exertion.  He is unaware of heart rate irregularity most of the time.  He has not had syncope.  He has had pleuritic chest pain on the right side of the chest.  (He points to the tender area with a single finger.)      PHYSICAL EXAMINATION:   VITAL SIGNS:  Blood pressure 120/60, pulse 72 and irregular, weight 78 kg, height 175 cm.   HEENT:  Normocephalic, atraumatic.  Sclerae nonicteric.   NECK:  Supple without thyromegaly or lymphadenopathy.   LUNGS:  Clear.   CARDIOVASCULAR:  Irregular rhythm, but not fast.  No apparent gallop or murmur.   ABDOMEN:  Soft, nontender.   EXTREMITIES:  No edema.   SKIN:  No rash.      DIAGNOSTIC STUDIES:    His 12 lead ECG today shows atrial flutter with a sawtooth pattern in inferior leads and positive flutter waves in V1.  Probably typical atrial flutter.      IMPRESSION & PLAN:    1.  Typical atrial flutter and previously documented atrial fibrillation.  Mr. Medrano has been in atrial flutter for the past 5-6 weeks.  The rate is controlled on diltiazem.  He is quite young, and I believe restoring sinus rhythm is reasonable.      We discussed 2 options.  One is to cardiovert and the other is to perform catheter ablation of the atrial flutter and see how often he has atrial fibrillation.  I explained that with cardioversion alone, the  likelihood of recurrent atrial arrhythmia is extremely high.  If we ablate the atrial flutter, this would take care of the arrhythmia that is the most concerning (as it was associated with RVR), but he would still have paroxysmal atrial fibrillation.      I find both options reasonable.  After a good discussion, Mr. Medrano has expressed interest in pursuing catheter ablation.  I explained there is an approximately 1% risk of serious complication.  He would have to remain on apixaban without interruption before the procedure and for at least 1 month after the procedure.  He understands there is a chance that the atrial flutter will turn out to be an atypical circuit, and instead of ablation we would then proceed with cardioversion in the EP Lab.      It was my pleasure seeing Mr. Medrano today.  Please feel free to contact me with questions or concerns.        JOHN TANG MD, [facility]       cc:   Stew Caldwell MD   [address] [address]             D: 10/17/2017 08:40   T: 10/17/2017 08:57   MT: nohelia      Name:     FLOR MEDRANO   MRN:      [id number]        Account:      [id number]   :      1965           Service Date: 10/17/2017      Document: [id number]

## 2017-10-17 NOTE — TELEPHONE ENCOUNTER
Spoke to patient to review instructions for afib ablation scheduled for 10/18 .  Patient aware that he is to be NPO after midnight and may take sips of water with am medications. Patient to arrive at 11am  Patient aware that he  Will NOT be staying overnight after procedure and will need a  post procedure.  EMILIANO Aquino

## 2017-10-18 ENCOUNTER — APPOINTMENT (OUTPATIENT)
Dept: CARDIOLOGY | Facility: CLINIC | Age: 52
End: 2017-10-18
Attending: INTERNAL MEDICINE
Payer: COMMERCIAL

## 2017-10-18 ENCOUNTER — HOSPITAL ENCOUNTER (OUTPATIENT)
Facility: CLINIC | Age: 52
Discharge: HOME | End: 2017-10-18
Attending: INTERNAL MEDICINE | Admitting: INTERNAL MEDICINE
Payer: COMMERCIAL

## 2017-10-18 VITALS
TEMPERATURE: 98.2 F | BODY MASS INDEX: 25.7 KG/M2 | WEIGHT: 173.5 LBS | DIASTOLIC BLOOD PRESSURE: 53 MMHG | SYSTOLIC BLOOD PRESSURE: 96 MMHG | HEART RATE: 78 BPM | RESPIRATION RATE: 16 BRPM | HEIGHT: 69 IN | OXYGEN SATURATION: 97 %

## 2017-10-18 DIAGNOSIS — I48.3 TYPICAL ATRIAL FLUTTER (H): ICD-10-CM

## 2017-10-18 LAB
ANION GAP SERPL CALCULATED.3IONS-SCNC: 7 MMOL/L (ref 3–14)
BUN SERPL-MCNC: 12 MG/DL (ref 7–30)
CALCIUM SERPL-MCNC: 8.4 MG/DL (ref 8.5–10.1)
CHLORIDE SERPL-SCNC: 106 MMOL/L (ref 94–109)
CO2 SERPL-SCNC: 24 MMOL/L (ref 20–32)
CREAT SERPL-MCNC: 0.88 MG/DL (ref 0.66–1.25)
ERYTHROCYTE [DISTWIDTH] IN BLOOD BY AUTOMATED COUNT: 17.9 % (ref 10–15)
GFR SERPL CREATININE-BSD FRML MDRD: >90 ML/MIN/1.7M2
GLUCOSE SERPL-MCNC: 102 MG/DL (ref 70–99)
HCT VFR BLD AUTO: 33.3 % (ref 40–53)
HGB BLD-MCNC: 11 G/DL (ref 13.3–17.7)
MCH RBC QN AUTO: 30.8 PG (ref 26.5–33)
MCHC RBC AUTO-ENTMCNC: 33 G/DL (ref 31.5–36.5)
MCV RBC AUTO: 93 FL (ref 78–100)
PLATELET # BLD AUTO: 315 10E9/L (ref 150–450)
POTASSIUM SERPL-SCNC: 3.9 MMOL/L (ref 3.4–5.3)
RBC # BLD AUTO: 3.57 10E12/L (ref 4.4–5.9)
SODIUM SERPL-SCNC: 137 MMOL/L (ref 133–144)
WBC # BLD AUTO: 6.9 10E9/L (ref 4–11)

## 2017-10-18 PROCEDURE — 27210795 ZZH PAD DEFIB QUICK CR4

## 2017-10-18 PROCEDURE — 93005 ELECTROCARDIOGRAM TRACING: CPT

## 2017-10-18 PROCEDURE — C1732 CATH, EP, DIAG/ABL, 3D/VECT: HCPCS

## 2017-10-18 PROCEDURE — 27210995 ZZH RX 272: Performed by: INTERNAL MEDICINE

## 2017-10-18 PROCEDURE — 25000128 H RX IP 250 OP 636: Performed by: INTERNAL MEDICINE

## 2017-10-18 PROCEDURE — 99152 MOD SED SAME PHYS/QHP 5/>YRS: CPT

## 2017-10-18 PROCEDURE — 27210782 ZZH KIT EP TOTES DISP CR7

## 2017-10-18 PROCEDURE — 80048 BASIC METABOLIC PNL TOTAL CA: CPT | Performed by: INTERNAL MEDICINE

## 2017-10-18 PROCEDURE — 40000852 ZZH STATISTIC HEART CATH LAB OR EP LAB

## 2017-10-18 PROCEDURE — 27210796 ZZH PAD EXTRNAL REFRENCE CARDIAC MAPPING CR14

## 2017-10-18 PROCEDURE — 99152 MOD SED SAME PHYS/QHP 5/>YRS: CPT | Performed by: INTERNAL MEDICINE

## 2017-10-18 PROCEDURE — 93613 INTRACARDIAC EPHYS 3D MAPG: CPT | Performed by: INTERNAL MEDICINE

## 2017-10-18 PROCEDURE — 25000125 ZZHC RX 250: Performed by: INTERNAL MEDICINE

## 2017-10-18 PROCEDURE — 85027 COMPLETE CBC AUTOMATED: CPT | Performed by: INTERNAL MEDICINE

## 2017-10-18 PROCEDURE — 4A0234Z MEASUREMENT OF CARDIAC ELECTRICAL ACTIVITY, PERCUTANEOUS APPROACH: ICD-10-PCS | Performed by: INTERNAL MEDICINE

## 2017-10-18 PROCEDURE — 40000065 ZZH STATISTIC EKG NON-CHARGEABLE

## 2017-10-18 PROCEDURE — 93621 COMP EP EVL L PAC&REC C SINS: CPT | Mod: 26 | Performed by: INTERNAL MEDICINE

## 2017-10-18 PROCEDURE — 02K83ZZ MAP CONDUCTION MECHANISM, PERCUTANEOUS APPROACH: ICD-10-PCS | Performed by: INTERNAL MEDICINE

## 2017-10-18 PROCEDURE — 93653 COMPRE EP EVAL TX SVT: CPT

## 2017-10-18 PROCEDURE — 99153 MOD SED SAME PHYS/QHP EA: CPT

## 2017-10-18 PROCEDURE — 02583ZZ DESTRUCTION OF CONDUCTION MECHANISM, PERCUTANEOUS APPROACH: ICD-10-PCS | Performed by: INTERNAL MEDICINE

## 2017-10-18 PROCEDURE — 93621 COMP EP EVL L PAC&REC C SINS: CPT

## 2017-10-18 PROCEDURE — 93613 INTRACARDIAC EPHYS 3D MAPG: CPT

## 2017-10-18 PROCEDURE — 4A023FZ MEASUREMENT OF CARDIAC RHYTHM, PERCUTANEOUS APPROACH: ICD-10-PCS | Performed by: INTERNAL MEDICINE

## 2017-10-18 PROCEDURE — 93653 COMPRE EP EVAL TX SVT: CPT | Performed by: INTERNAL MEDICINE

## 2017-10-18 PROCEDURE — 93010 ELECTROCARDIOGRAM REPORT: CPT | Performed by: INTERNAL MEDICINE

## 2017-10-18 RX ORDER — BUPIVACAINE HYDROCHLORIDE 2.5 MG/ML
10-30 INJECTION, SOLUTION EPIDURAL; INFILTRATION; INTRACAUDAL
Status: DISCONTINUED | OUTPATIENT
Start: 2017-10-18 | End: 2017-10-18 | Stop reason: HOSPADM

## 2017-10-18 RX ORDER — FUROSEMIDE 10 MG/ML
20-100 INJECTION INTRAMUSCULAR; INTRAVENOUS
Status: DISCONTINUED | OUTPATIENT
Start: 2017-10-18 | End: 2017-10-18 | Stop reason: HOSPADM

## 2017-10-18 RX ORDER — HEPARIN SODIUM 1000 [USP'U]/ML
1000-10000 INJECTION, SOLUTION INTRAVENOUS; SUBCUTANEOUS EVERY 5 MIN PRN
Status: DISCONTINUED | OUTPATIENT
Start: 2017-10-18 | End: 2017-10-18 | Stop reason: HOSPADM

## 2017-10-18 RX ORDER — PROMETHAZINE HYDROCHLORIDE 25 MG/ML
6.25-25 INJECTION, SOLUTION INTRAMUSCULAR; INTRAVENOUS EVERY 4 HOURS PRN
Status: DISCONTINUED | OUTPATIENT
Start: 2017-10-18 | End: 2017-10-18 | Stop reason: HOSPADM

## 2017-10-18 RX ORDER — ATROPINE SULFATE 0.1 MG/ML
.5-1 INJECTION INTRAVENOUS
Status: DISCONTINUED | OUTPATIENT
Start: 2017-10-18 | End: 2017-10-18 | Stop reason: HOSPADM

## 2017-10-18 RX ORDER — LIDOCAINE HYDROCHLORIDE 10 MG/ML
10-30 INJECTION, SOLUTION EPIDURAL; INFILTRATION; INTRACAUDAL; PERINEURAL
Status: DISCONTINUED | OUTPATIENT
Start: 2017-10-18 | End: 2017-10-18 | Stop reason: HOSPADM

## 2017-10-18 RX ORDER — LIDOCAINE 40 MG/G
CREAM TOPICAL
Status: DISCONTINUED | OUTPATIENT
Start: 2017-10-18 | End: 2017-10-18 | Stop reason: HOSPADM

## 2017-10-18 RX ORDER — LIDOCAINE HYDROCHLORIDE 10 MG/ML
10-30 INJECTION, SOLUTION EPIDURAL; INFILTRATION; INTRACAUDAL; PERINEURAL
Status: COMPLETED | OUTPATIENT
Start: 2017-10-18 | End: 2017-10-18

## 2017-10-18 RX ORDER — FENTANYL CITRATE 50 UG/ML
25-50 INJECTION, SOLUTION INTRAMUSCULAR; INTRAVENOUS
Status: DISCONTINUED | OUTPATIENT
Start: 2017-10-18 | End: 2017-10-18 | Stop reason: HOSPADM

## 2017-10-18 RX ORDER — LORAZEPAM 2 MG/ML
.5-2 INJECTION INTRAMUSCULAR EVERY 10 MIN PRN
Status: DISCONTINUED | OUTPATIENT
Start: 2017-10-18 | End: 2017-10-18 | Stop reason: HOSPADM

## 2017-10-18 RX ORDER — KETOROLAC TROMETHAMINE 30 MG/ML
15-30 INJECTION, SOLUTION INTRAMUSCULAR; INTRAVENOUS
Status: DISCONTINUED | OUTPATIENT
Start: 2017-10-18 | End: 2017-10-18 | Stop reason: HOSPADM

## 2017-10-18 RX ORDER — NALOXONE HYDROCHLORIDE 0.4 MG/ML
.1-.4 INJECTION, SOLUTION INTRAMUSCULAR; INTRAVENOUS; SUBCUTANEOUS
Status: DISCONTINUED | OUTPATIENT
Start: 2017-10-18 | End: 2017-10-18 | Stop reason: HOSPADM

## 2017-10-18 RX ORDER — PROTAMINE SULFATE 10 MG/ML
1-5 INJECTION, SOLUTION INTRAVENOUS
Status: DISCONTINUED | OUTPATIENT
Start: 2017-10-18 | End: 2017-10-18 | Stop reason: HOSPADM

## 2017-10-18 RX ORDER — MORPHINE SULFATE 2 MG/ML
1-2 INJECTION, SOLUTION INTRAMUSCULAR; INTRAVENOUS EVERY 5 MIN PRN
Status: DISCONTINUED | OUTPATIENT
Start: 2017-10-18 | End: 2017-10-18 | Stop reason: HOSPADM

## 2017-10-18 RX ORDER — LIDOCAINE HYDROCHLORIDE AND EPINEPHRINE 10; 10 MG/ML; UG/ML
10-30 INJECTION, SOLUTION INFILTRATION; PERINEURAL
Status: DISCONTINUED | OUTPATIENT
Start: 2017-10-18 | End: 2017-10-18 | Stop reason: HOSPADM

## 2017-10-18 RX ORDER — ACETAMINOPHEN 325 MG/1
650 TABLET ORAL EVERY 4 HOURS PRN
Status: DISCONTINUED | OUTPATIENT
Start: 2017-10-18 | End: 2017-10-18 | Stop reason: HOSPADM

## 2017-10-18 RX ORDER — IBUTILIDE FUMARATE 1 MG/10ML
0.01 INJECTION, SOLUTION INTRAVENOUS
Status: DISCONTINUED | OUTPATIENT
Start: 2017-10-18 | End: 2017-10-18 | Stop reason: HOSPADM

## 2017-10-18 RX ORDER — FLUMAZENIL 0.1 MG/ML
0.2 INJECTION, SOLUTION INTRAVENOUS
Status: DISCONTINUED | OUTPATIENT
Start: 2017-10-18 | End: 2017-10-18 | Stop reason: HOSPADM

## 2017-10-18 RX ORDER — IBUTILIDE FUMARATE 1 MG/10ML
1 INJECTION, SOLUTION INTRAVENOUS
Status: DISCONTINUED | OUTPATIENT
Start: 2017-10-18 | End: 2017-10-18 | Stop reason: HOSPADM

## 2017-10-18 RX ORDER — DIPHENHYDRAMINE HYDROCHLORIDE 50 MG/ML
25-50 INJECTION INTRAMUSCULAR; INTRAVENOUS
Status: DISCONTINUED | OUTPATIENT
Start: 2017-10-18 | End: 2017-10-18 | Stop reason: HOSPADM

## 2017-10-18 RX ORDER — SODIUM CHLORIDE 450 MG/100ML
INJECTION, SOLUTION INTRAVENOUS CONTINUOUS
Status: DISCONTINUED | OUTPATIENT
Start: 2017-10-18 | End: 2017-10-18 | Stop reason: HOSPADM

## 2017-10-18 RX ORDER — DOBUTAMINE HYDROCHLORIDE 200 MG/100ML
5-40 INJECTION INTRAVENOUS CONTINUOUS PRN
Status: DISCONTINUED | OUTPATIENT
Start: 2017-10-18 | End: 2017-10-18 | Stop reason: HOSPADM

## 2017-10-18 RX ORDER — ONDANSETRON 2 MG/ML
4 INJECTION INTRAMUSCULAR; INTRAVENOUS EVERY 4 HOURS PRN
Status: DISCONTINUED | OUTPATIENT
Start: 2017-10-18 | End: 2017-10-18 | Stop reason: HOSPADM

## 2017-10-18 RX ORDER — NALOXONE HYDROCHLORIDE 0.4 MG/ML
0.4 INJECTION, SOLUTION INTRAMUSCULAR; INTRAVENOUS; SUBCUTANEOUS EVERY 5 MIN PRN
Status: DISCONTINUED | OUTPATIENT
Start: 2017-10-18 | End: 2017-10-18 | Stop reason: HOSPADM

## 2017-10-18 RX ORDER — ADENOSINE 3 MG/ML
6-12 INJECTION, SOLUTION INTRAVENOUS EVERY 5 MIN PRN
Status: DISCONTINUED | OUTPATIENT
Start: 2017-10-18 | End: 2017-10-18 | Stop reason: HOSPADM

## 2017-10-18 RX ORDER — PROTAMINE SULFATE 10 MG/ML
5-40 INJECTION, SOLUTION INTRAVENOUS EVERY 10 MIN PRN
Status: DISCONTINUED | OUTPATIENT
Start: 2017-10-18 | End: 2017-10-18 | Stop reason: HOSPADM

## 2017-10-18 RX ADMIN — LIDOCAINE HYDROCHLORIDE 100 MG: 10 INJECTION, SOLUTION EPIDURAL; INFILTRATION; INTRACAUDAL; PERINEURAL at 13:24

## 2017-10-18 RX ADMIN — MIDAZOLAM HYDROCHLORIDE 1 MG: 1 INJECTION, SOLUTION INTRAMUSCULAR; INTRAVENOUS at 13:15

## 2017-10-18 RX ADMIN — FENTANYL CITRATE 50 MCG: 50 INJECTION, SOLUTION INTRAMUSCULAR; INTRAVENOUS at 13:19

## 2017-10-18 RX ADMIN — FENTANYL CITRATE 50 MCG: 50 INJECTION, SOLUTION INTRAMUSCULAR; INTRAVENOUS at 13:14

## 2017-10-18 RX ADMIN — SODIUM CHLORIDE: 4.5 INJECTION, SOLUTION INTRAVENOUS at 12:08

## 2017-10-18 RX ADMIN — MIDAZOLAM HYDROCHLORIDE 1 MG: 1 INJECTION, SOLUTION INTRAMUSCULAR; INTRAVENOUS at 13:19

## 2017-10-18 RX ADMIN — FENTANYL CITRATE 50 MCG: 50 INJECTION, SOLUTION INTRAMUSCULAR; INTRAVENOUS at 13:36

## 2017-10-18 RX ADMIN — MIDAZOLAM HYDROCHLORIDE 1 MG: 1 INJECTION, SOLUTION INTRAMUSCULAR; INTRAVENOUS at 13:36

## 2017-10-18 NOTE — PLAN OF CARE
Pt admitted for A Flutter ablation, accompanied by father. IVF infusing, medications reviewed, contrast discharge instructions reviewed. Awaiting MD to consent pt.

## 2017-10-18 NOTE — DISCHARGE INSTRUCTIONS
Atrial Flutter Ablation Discharge Instructions - Femoral     After you go home:      Have an adult stay with you until tomorrow.    You may resume your normal diet.       For 24 hours - due to the sedation you received:    Relax and take it easy.    Do NOT make any important or legal decisions.    Do NOT drive or operate machines at home or at work.    Do NOT drink alcohol.    Care of Groin Puncture Site:      For the first 24 hrs - check the puncture site every 1-2 hours while awake.    For 2 days, when you cough, sneeze, laugh or move your bowels, hold your hand over the puncture site and press firmly.    Remove the bandaid after 24 hours. If there is minor oozing, apply another bandaid and remove it after 12 hours.    It is normal to have a small bruise or pea size lump at the site.    You may shower tomorrow.  Do NOT take a bath, or use a hot tub or pool for at least 3 days. Do NOT scrub the site. Do not use lotion or powder near the puncture site.    Activity:            For 2 days:    No stooping or squatting    Do NOT do any heavy activity such as exercise, lifting, or straining.     No housework, yard work or any activity that make you sweat    Do NOT lift more than 10 pounds    Bleeding:      If you start bleeding from the site in your groin, lie down flat and press firmly on the site for 10 minutes.     Once bleeding stops, lay flat for 2 hours.    Call [facility] as soon as you can.       Call 911 right away if you have heavy bleeding or bleeding that does not stop.      Medicines:      Take your medications, including blood thinners, unless your provider tells you not to.    If you have stopped any medicines, check with your provider about when to restart them.    If you have pain or shortness of breath, you may take Tylenol (acetaminophen).    Follow Up Appointments:      An appointment has been set up for you for follow-up care    You will receive a phone call tomorrow morning from Alina CUMMINGS or  Marisol CUMMINGS.    Call the clinic if:      You have increased pain or a large or growing hard lump around the site.    The site is red, swollen, hot or tender.    Blood or fluid is draining from the site.    You have chills or a fever greater than 101 F (38 C).    Your leg feels numb, cool or changes color.    Increased pain in the chest and/or groin.    Increased shortness of breath    Chest pain not relieved by Tylenol or Advil    New pain in the back or belly that you cannot control with Tylenol.    Recurrent irregular or fast heart rate (AFlutter) lasting over 2 hours.    Any questions or concerns.    Heart rhythms:    You may have some irregular heartbeats. These feel very strong. They may make you feel that the fast heart rhythm is going to start again.  Give it time. The irregular beats should occur less often.       [facility]:    [phone] ( 8am-5pm M-F)  EMILIANO Fuller or EMILIANO Damon    [phone] [facility] (7 days a week)

## 2017-10-18 NOTE — IP AVS SNAPSHOT
MRN:[id number]                      After Visit Summary   10/18/2017    Kwaku Medrano    MRN: [id number]           Visit Information        Department      10/18/2017 11:09 AM [facility]          Review of your medicines      CONTINUE these medicines which have NOT CHANGED        Dose / Directions    apixaban ANTICOAGULANT 5 MG tablet   Commonly known as:  ELIQUIS   Used for:  Atrial fibrillation (H)        Dose:  5 mg   Take 1 tablet (5 mg) by mouth 2 times daily   Quantity:  60 tablet   Refills:  0       BENZONATATE PO   Used for:  ILD (interstitial lung disease) (H), Antisynthetase syndrome (H), Dermatomyositis (H), Scleroderma (H), High risk medications (not anticoagulants) long-term use        Dose:  200 mg   Take 200 mg by mouth 3 times daily as needed   Refills:  0       Blood Pressure Monitoring Kit   Used for:  ILD (interstitial lung disease) (H), Dermatomyositis (H), Systemic sclerosis (H), Gastroesophageal reflux disease, esophagitis presence not specified, High risk medications (not anticoagulants) long-term use        Dose:  1 each   1 each three times a week Dr Acosta has asked you to check your blood pressure 2-3 times per week using your home monitor.  Please keep track of your findings in a journal and bring it to your appointments.  Contact this office if your blood pressure: the top number (systolic) is consistently 30 points higher than your normal BP or if the bottom number (diastolic) is consistently 20 points higher than your normal BP.   Quantity:  1 kit   Refills:  1       diltiazem 240 MG 24 hr capsule   Commonly known as:  CARDIZEM CD   Used for:  Atrial fibrillation (H)        Dose:  240 mg   Take 1 capsule (240 mg) by mouth daily   Quantity:  30 capsule   Refills:  0       lisinopril 10 MG tablet   Commonly known as:  PRINIVIL/ZESTRIL   Used for:  Raynaud's disease without gangrene, Atrial fibrillation, unspecified type (H)        Dose:  10 mg    Take 1 tablet (10 mg) by mouth daily   Quantity:  30 tablet   Refills:  11       mycophenolate 500 MG tablet   Commonly known as:  GENERIC EQUIVALENT   Used for:  ILD (interstitial lung disease) (H), Myositis, unspecified myositis type, unspecified site, Polyarthritis, Dermatomyositis (H), Antisynthetase syndrome (H)        Dose:  1500 mg   Take 3 tablets (1,500 mg) by mouth 2 times daily Monitoring labs required every 8 weeks for medication refills.   Quantity:  180 tablet   Refills:  3       pantoprazole 40 MG EC tablet   Commonly known as:  PROTONIX   Used for:  Gastroesophageal reflux disease, esophagitis presence not specified, Esophageal dysmotility        Dose:  40 mg   Take 1 tablet (40 mg) by mouth daily   Quantity:  30 tablet   Refills:  11       predniSONE 20 MG tablet   Commonly known as:  DELTASONE        Dose:  10 mg   Take 10 mg by mouth daily   Refills:  0       sulfamethoxazole-trimethoprim 400-80 MG per tablet   Commonly known as:  BACTRIM   Used for:  ILD (interstitial lung disease) (H), Inflammatory arthritis        Once daily for PJP prophylaxis. Start after bronchoscopy   Quantity:  60 tablet   Refills:  1                Protect others around you: Learn how to safely use, store and throw away your medicines at www.disposemymeds.org.         Follow-ups after your visit        Your next 10 appointments already scheduled     Nov 06, 2017  8:00 AM CST   (Arrive by 7:45 AM)   Return Visit with Denny Acosta MD   [facility] ([facility])    [address] [place] [id number] [phone]            Dec 22, 2017  7:45 AM CST   Lab with  LAB   [facility] Lab ([facility])    [address] [place] [id number]   [phone]            Dec 22, 2017  8:00 AM CST   FULL PULMONARY FUNCTION with  PFL A   [facility] Pulmonary Function Testing ([facility])    [address] [place] [address]  [address]  [address] [facility] [address]   [phone]            Dec 22, 2017  8:30 AM CST   (Arrive by 8:15 AM)   Return Interstitial Lung with Elma Dillon MD   [facility] ([facility])    [address] [facility]  [address] [facility] [address]   [phone]               Care Instructions        After Care Instructions     Discharge Instructions - Follow up with [facility] Nurse Practitioner          Follow up with EP Nurse Practitioner at [facility] of patient preference in 1 month.            Discharge Instructions - No driving for 1 day       No driving for 1 day and limit to necessary driving for 1 week.                  Further instructions from your care team       Atrial Flutter Ablation Discharge Instructions - Femoral     After you go home:      Have an adult stay with you until tomorrow.    You may resume your normal diet.       For 24 hours - due to the sedation you received:    Relax and take it easy.    Do NOT make any important or legal decisions.    Do NOT drive or operate machines at home or at work.    Do NOT drink alcohol.    Care of Groin Puncture Site:      For the first 24 hrs - check the puncture site every 1-2 hours while awake.    For 2 days, when you cough, sneeze, laugh or move your bowels, hold your hand over the puncture site and press firmly.    Remove the bandaid after 24 hours. If there is minor oozing, apply another bandaid and remove it after 12 hours.    It is normal to have a small bruise or pea size lump at the site.    You may shower tomorrow.  Do NOT take a bath, or use a hot tub or pool for at least 3 days. Do NOT scrub the site. Do not use lotion or powder near the puncture site.    Activity:            For 2 days:    No stooping or squatting    Do NOT do any heavy activity such as exercise, lifting, or straining.     No housework, yard work or any activity that make you sweat    Do NOT lift more  than 10 pounds    Bleeding:      If you start bleeding from the site in your groin, lie down flat and press firmly on the site for 10 minutes.     Once bleeding stops, lay flat for 2 hours.    Call [facility] as soon as you can.       Call 911 right away if you have heavy bleeding or bleeding that does not stop.      Medicines:      Take your medications, including blood thinners, unless your provider tells you not to.    If you have stopped any medicines, check with your provider about when to restart them.    If you have pain or shortness of breath, you may take Tylenol (acetaminophen).    Follow Up Appointments:      An appointment has been set up for you for follow-up care    You will receive a phone call tomorrow morning from Alina RN or Marisol RN.    Call the clinic if:      You have increased pain or a large or growing hard lump around the site.    The site is red, swollen, hot or tender.    Blood or fluid is draining from the site.    You have chills or a fever greater than 101 F (38 C).    Your leg feels numb, cool or changes color.    Increased pain in the chest and/or groin.    Increased shortness of breath    Chest pain not relieved by Tylenol or Advil    New pain in the back or belly that you cannot control with Tylenol.    Recurrent irregular or fast heart rate (AFlutter) lasting over 2 hours.    Any questions or concerns.    Heart rhythms:    You may have some irregular heartbeats. These feel very strong. They may make you feel that the fast heart rhythm is going to start again.  Give it time. The irregular beats should occur less often.       [facility]:    [phone] ( 8am-5pm M-F)  EMILIANO Fuller or EMILIANO Damon    [phone] [facility] (7 days a week)               Additional Information About Your Visit        [employer] Information     [email or web address] gives you secure access to your electronic health record. If you see a primary care provider, you can also send messages to your care  "team and make appointments. If you have questions, please call your primary care clinic.  If you do not have a primary care provider, please call [phone] and they will assist you.        Care EveryWhere ID     This is your Care EveryWhere ID. This could be used by other organizations to access your [facility] medical records  [id number]        Your Vitals Were     Blood Pressure Pulse Temperature Respirations Height Weight    112/72 88 98.2  F (36.8  C) (Oral) 18 1.753 m (5' 9\") 78.7 kg (173 lb 8 oz)    Pulse Oximetry BMI (Body Mass Index)                98% 25.62 kg/m2           Primary Care Provider Office Phone # Fax #    Stew Caldwell [phone]      Equal Access to Services     ESTHER DE LA O : Hadii aad ku hadasho Soomaali, waaxda luqadaha, qaybta kaalmada adeegyada, keegan gar . So [facility] [phone].    ATENCIÓN: Si habla español, tiene a rubalcava disposición servicios gratuitos de asistencia lingüística. Llame al [phone].    We comply with applicable federal civil rights laws and [place] laws. We do not discriminate on the basis of race, color, national origin, age, disability, sex, sexual orientation, or gender identity.            Thank you!     Thank you for choosing [facility] for your care. Our goal is always to provide you with excellent care. Hearing back from our patients is one way we can continue to improve our services. Please take a few minutes to complete the written survey that you may receive in the mail after you visit with us. Thank you!             Medication List: This is a list of all your medications and when to take them. Check marks below indicate your daily home schedule. Keep this list as a reference.      Medications           Morning Afternoon Evening Bedtime As Needed    apixaban ANTICOAGULANT 5 MG tablet   Commonly known as:  ELIQUIS   Take 1 tablet (5 mg) by mouth 2 times daily                                BENZONATATE PO "   Take 200 mg by mouth 3 times daily as needed                                Blood Pressure Monitoring Kit   1 each three times a week Dr Acosta has asked you to check your blood pressure 2-3 times per week using your home monitor.  Please keep track of your findings in a journal and bring it to your appointments.  Contact this office if your blood pressure: the top number (systolic) is consistently 30 points higher than your normal BP or if the bottom number (diastolic) is consistently 20 points higher than your normal BP.                                diltiazem 240 MG 24 hr capsule   Commonly known as:  CARDIZEM CD   Take 1 capsule (240 mg) by mouth daily                                lisinopril 10 MG tablet   Commonly known as:  PRINIVIL/ZESTRIL   Take 1 tablet (10 mg) by mouth daily                                mycophenolate 500 MG tablet   Commonly known as:  GENERIC EQUIVALENT   Take 3 tablets (1,500 mg) by mouth 2 times daily Monitoring labs required every 8 weeks for medication refills.                                pantoprazole 40 MG EC tablet   Commonly known as:  PROTONIX   Take 1 tablet (40 mg) by mouth daily                                predniSONE 20 MG tablet   Commonly known as:  DELTASONE   Take 10 mg by mouth daily                                sulfamethoxazole-trimethoprim 400-80 MG per tablet   Commonly known as:  BACTRIM   Once daily for PJP prophylaxis. Start after bronchoscopy

## 2017-10-18 NOTE — PROCEDURES
Dictated.  Typical CCW RA flutter.  Successful ablation.  EBL = 20 - 30 cc    No apparent complication.    Plan:  - continue Eliquis, next dose tonight  - home later in the evening, if all is well

## 2017-10-18 NOTE — PROGRESS NOTES
1625 Report received from Veda Delacruz RN.  1630 Bandaid CDI to right groin puncture sites. No oozing or hematoma noted. Area soft & flat. Pt denies pain. Ativity restrictions while on bedrest reinforced with pt. Verbal understanding received from pt. Pt's father at bedside.   1650 Pt taking diet & flds well. No complaints.  1810 OOB - steady on feet. Ambulated in halls to bathroom with good mariia. No change in puncture site assessment with activity.  1820 Discharge teaching & instructions reinforced with pt. All questions & concerns addressed.  1840 Pt discharged per w/c to private vehicle. All personal belongings sent w/ pt.

## 2017-10-18 NOTE — IP AVS SNAPSHOT
[place] [address]    [place] [address]    Phone:  [phone]                                       After Visit Summary   10/18/2017    Kwaku Medrano    MRN: [id number]           After Visit Summary Signature Page     I have received my discharge instructions, and my questions have been answered. I have discussed any challenges I see with this plan with the nurse or doctor.    ..........................................................................................................................................  Patient/Patient Representative Signature      ..........................................................................................................................................  Patient Representative Print Name and Relationship to Patient    ..................................................               ................................................  Date                                            Time    ..........................................................................................................................................  Reviewed by Signature/Title    ...................................................              ..............................................  Date                                                            Time

## 2017-10-18 NOTE — PLAN OF CARE
VSS, pt still very tired and sleeping between cares. R groin site soft, intact, CMS intact, pedal pulses palpable. Pt declined food at this time, taking sips of water, HOB still flat per pt request. Father at bedside, discharge instructions reviewed with both, questions answer, pt and father deny concerns at this time.

## 2017-10-19 ENCOUNTER — TELEPHONE (OUTPATIENT)
Dept: CARDIOLOGY | Facility: CLINIC | Age: 52
End: 2017-10-19

## 2017-10-19 NOTE — TELEPHONE ENCOUNTER
Call placed to patient f/u s/p aflutter ablation.  Patient reports feeling well. Denies pain or palpitations.  Groin site looks good, no bleeding or swelling noted in area.  Follow up appt made to see Dr Garcia 11/16.  EMILIANO Aquino

## 2017-10-21 LAB — INTERPRETATION ECG - MUSE: NORMAL

## 2017-11-04 NOTE — PROGRESS NOTES
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        PLAN AND RECOMMENDATIONS:  I did reinforce that ongoing monitoring of the blood pressure is going to be very important.  He is on some lisinopril in addition to his diltiazem and those may be controlling his blood pressures.  He fortunately did not develop any clear-cut features of renal crisis on the higher dose prednisone, but that will also need to remain below 15 mg a day.  He is doing reasonably well with it on 10 mg a day and I think we can keep him there.  We do want to make sure that his muscle involvement remains stable and the prednisone could contribute to that stability as well as stability for any lung process that might be getting started.       He is on a relatively high dose of MMF and needs ongoing laboratory monitoring for that.  He will get a CBC with differential, a CRP, and a CK today as well as a complete metabolic panel given his lower extremity edema.       I do think that that edema could be due to the diltiazem.  It is of interest that it worsened when he went down on prednisone, as I might have expected the prednisone might have been contributing to it, but there could be an inflammatory component to it that was better controlled while on prednisone.       I think for the time being it is probably best to monitor this, but if this worsens there may  need to be consideration for an alternative rate control drug besides diltiazem.  Based on the examination today, he may be in and out of the atrial fibrillation and there could be a possibility of good control of it with relatively low-dose beta blocker that would not worsen Raynaud's phenomenon.       I am going to continue to see him at relatively short intervals, every 6-12 weeks, sooner p.r.n. for any worsening.          This visit was 40 minutes in duration, over 50% of which was spent in counseling.               History of Present Illness:   Kwaku Medrano is a 52 year old male who presents for The patient is here for consultative evaluation at the request of Dr. oGlden, who has seen him on several prior occasions and has followed him for a presumptive diagnosis of dermatomyositis.      For prior evaluation done by Dr. Golden, please see his most recent notes.       He has been seeing the patient for several months now after the patient initially developed inflammatory joint symptoms following a viral infection in 12/2016.  Initially these were really the dominant symptoms, but he then progressed to have some features consistent with inflammatory myositis including some muscle weakness.       Extensive serologic evaluation was unrevealing, with a negative SHERRELL by EIA, negative myositis panel, negative Marlene-1, and negative extractable nuclear antigens including topoisomerase.      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       With the institution of MMF which was dose escalated to 2 grams a day approximately 3 weeks ago and to 3 grams a day just about 2 weeks ago  with no side effects, he has noted gradual improvement in his primary symptoms of pulmonary hypertension which are dry cough with deeper inspiration as well as some exertional dyspnea.  Those have improved somewhat, and he is able to get back to nearly his normal function as a .       He has had repeat pulmonary function tests done and a repeat HRCT done just in the last several weeks, and this shows quite significant fibrotic NSIP which I have reviewed today with Dr. Dillon in our Pulmonary Department who is seeing the patient tomorrow.  He is also due for repeat PFTs and a    6-minute walk test including desaturation tomorrow.       In addition, he is seeing a cardiologist tomorrow.  He developed atrial fibrillation a few weeks ago and has now been placed on Eliquis for that as well as diltiazem for rate control.  He does note it, but does not feel particularly poorly with the atrial fibrillation.  Notably, this has occurred during a time frame when he is on high-dose steroids, but an echocardiogram also shows that he is having some left ventricular hypertrophy and a decreased ejection fraction which is likely diastolic.  He also has diastolic hypertension at this point.       In addition to these features, over the last few months he has had progressive skin thickening of his hands and now his forearms.  This initially manifested primarily as swelling in the hands but is now definitely including some skin thickening.  He is getting some subtle skin thickening in the shins as well as in the feet, and he is noticing some hair loss there.  In addition to these features, he is also getting cyanosis in both the hands and the feet.  Fortunately, he has not had the development of any digital ulcerations.       He clearly has GERD.  That maybe a little bit better currently despite the fact that Dr. Golden has eliminated using the proton pump inhibitor because of concerns about the interaction with  CellCept.  He therefore put him only on a Zantac dosing currently.  He has been avoiding eating within 3 hours of bedtime and has been using a wedge, but he finds he slips down in the middle of the night.  Nonetheless, he denies significant dry cough first thing in the morning and does not feel his breathing is worse at that time.  He is not having any dysphagia, however.       He continues to have some subtle weakness in his thighs but does not feel like this impairs him in his ability to do things, and he is able to get in and out of a car, go up and down stairs, and do his work without particular problems.  He does think it might be a little better since he is on the higher dose prednisone.      For additional details of his medical history, please see Dr. Golden's prior notes.  The remainder of a full 10-point review of systems including focused review on the scleroderma intake form is notable only for some weight loss that he says he was attempting.       His family history is notable for an aunt with lupus on his maternal side.        OCTOBER 10, 2017, INTERVAL HISTORY:  Since we have last seen him, he has been able to decrease his prednisone down to 10 mg a day.  He really has felt pretty stable in making that taper and has not felt any worse.      He has been religiously monitoring his blood pressures.  Those are reviewed today and they are all nicely within the normal limits.  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       He has been placed on diltiazem.  One week ago Monday he started noticing a lot of swelling in his ankles, potentially consistent with a known side effect of this drug.       He is tolerating his MMF well.  He has some dyspnea on exertion, but no chest pain.       He does believe his skin is overall stable.  He has not noticed any further  skin thickening.  He is not sure he has noticed any real improvement in it, either.       The low-grade weakness he has experienced feels stable to him and not clearly worse.  Other manifestations of his disease process appear to be stable.                       Review of Systems:   Review Of Systems as above, otherwise:   Skin: negative  Eyes: negative  Ears/Nose/Throat: negative  Respiratory: No shortness of breath, dyspnea on exertion, cough, or hemoptysis  Cardiovascular: negative  Gastrointestinal: negative  Genitourinary: negative  Musculoskeletal: negative  Neurologic: negative  Psychiatric: negative  Hematologic/Lymphatic/Immunologic: negative  Endocrine: negative          Past Medical History:     Past Medical History:   Diagnosis Date     Atrial fibrillation and flutter (H) 07/2017     Fracture      GERD (gastroesophageal reflux disease)      Interstitial lung disease (H)     sclerderma ILD; present on CXR 3-2017 and CT 5-2017; dx confirmed by CT 9-2017 fibrotic NSIP pattern with increased fibrosis; started mycophenolate 7/2017     Myositis      Scleroderma (H)     dx 9- at [facility] by Dr. 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     Priority: Medium     Dermatomyositis (H) 07/25/2017     Priority: Medium     ILD (interstitial lung disease) (H) 07/25/2017     Priority: Medium     Atrial fibrillation and flutter (H) 07/01/2017     Priority: Medium             Past Surgical History:     Past Surgical History:   Procedure Laterality Date     COLONOSCOPY N/A 7/19/2017    Procedure: COLONOSCOPY;  COLONOSCOPY;  Surgeon: Mita Jimenez MD;  Location: [facility]     NO  HISTORY OF SURGERY               Social History:     Social History   Substance Use Topics     Smoking status: Never Smoker     Smokeless tobacco: Former User     Types: Chew     Quit date: 1/1/1984     Alcohol use Yes      Comment: very rarely             Family History:     Family History   Problem Relation Age of Onset     Prostate Cancer Father      Lung Cancer Other      LUNG DISEASE No family hx of      Rheumatologic Disease No family hx of             Allergies:     Allergies   Allergen Reactions     Ampicillin Rash             Medications:     Current Outpatient Prescriptions   Medication Sig Dispense Refill     BENZONATATE PO Take 200 mg by mouth 3 times daily as needed       apixaban ANTICOAGULANT (ELIQUIS) 5 MG tablet Take 1 tablet (5 mg) by mouth 2 times daily 60 tablet 0     diltiazem (CARDIZEM CD) 240 MG 24 hr capsule Take 1 capsule (240 mg) by mouth daily 30 capsule 0     mycophenolate (GENERIC EQUIVALENT) 500 MG tablet Take 3 tablets (1,500 mg) by mouth 2 times daily Monitoring labs required every 8 weeks for medication refills. 180 tablet 3     predniSONE (DELTASONE) 20 MG tablet Take 10 mg by mouth daily        lisinopril (PRINIVIL/ZESTRIL) 10 MG tablet Take 1 tablet (10 mg) by mouth daily 30 tablet 11     Blood Pressure Monitoring KIT 1 each three times a week Dr Acosta has asked you to check your blood pressure 2-3 times per week using your home monitor.  Please keep track of your findings in a journal and bring it to your appointments.   Contact this office if your blood pressure: the top number (systolic) is consistently 30 points higher than your normal BP or if the bottom number (diastolic) is consistently 20 points higher than your normal BP. 1 kit 1     pantoprazole (PROTONIX) 40 MG EC tablet Take 1 tablet (40 mg) by mouth daily 30 tablet 11     sulfamethoxazole-trimethoprim (BACTRIM) 400-80 MG per tablet Once daily for PJP prophylaxis. Start after bronchoscopy 60 tablet 1             "Physical Exam:   Blood pressure 112/73, pulse 94, height 1.753 m (5' 9\"), weight 79.3 kg (174 lb 14.4 oz), SpO2 99 %.  Constitutional: [id number] cooperative  Eyes: nl EOM, PERRLA, vision, conjunctiva, sclera  ENT: nl external ears, nose, hearing, lips, teeth, gums, throat  No mucous membrane lesions, normal saliva pool  Neck: no mass or thyroid enlargement  Resp: lungs clear to auscultation, nl to palpation  CV: RRR, no murmurs, rubs, + S4 , no edema  GI: no ABD mass or tenderness, no HSM  : not tested  Lymph: no cervical, supraclavicular, inguinal or epitrochlear nodes  MS: All TMJ, neck, shoulder, elbow, wrist, MCP/PIP/DIP, spine, hip, knee, ankle, and foot MTP/IP joints were examined and  found normal. No active synovitis or deformity. Full ROM.  Normal  strength. No dactylitis,  tenosynovitis, enthespathy   Skin: MRSS= 23, with 3+ involvement in bilat. Hands and right forearm. +  RP . This appears stable to me, but I did not repeat the scoring today.     Nailfold telangiectasias. No DU or pits. no nail pitting, alopecia, rash, nodules or lesions  Neuro: nl cranial nerves, strength, sensation, DTRs.   Psych: nl judgement, orientation, memory, affect.         Data:     Lab Results   Component Value Date    WBC 6.9 10/18/2017    WBC 13.2 (H) 10/10/2017    WBC 12.8 (H) 09/12/2017    HGB 11.0 (L) 10/18/2017    HGB 11.3 (L) 10/10/2017    HGB 15.4 09/12/2017    HCT 33.3 (L) 10/18/2017    HCT 35.3 (L) 10/10/2017    HCT 45.8 09/12/2017    MCV 93 10/18/2017    MCV 95 10/10/2017    MCV 89 09/12/2017     10/18/2017     10/10/2017     09/12/2017     Lab Results   Component Value Date    BUN 12 10/18/2017    BUN 12 10/10/2017    BUN 17 09/12/2017     No components found for: SEDRATE  Lab Results   Component Value Date    TSH 1.36 06/07/2017     Lab Results   Component Value Date    AST 16 10/10/2017    AST 15 03/10/2017    ALT 31 10/10/2017    ALT 21 03/10/2017    ALKPHOS 52 10/10/2017    ALKPHOS 99 " 03/10/2017     Reviewed Rheumatology lab flowsheet    Denny Acosta

## 2017-11-06 ENCOUNTER — OFFICE VISIT (OUTPATIENT)
Dept: RHEUMATOLOGY | Facility: CLINIC | Age: 52
End: 2017-11-06
Attending: INTERNAL MEDICINE
Payer: COMMERCIAL

## 2017-11-06 VITALS
TEMPERATURE: 97.7 F | DIASTOLIC BLOOD PRESSURE: 74 MMHG | BODY MASS INDEX: 25.98 KG/M2 | SYSTOLIC BLOOD PRESSURE: 120 MMHG | HEART RATE: 73 BPM | WEIGHT: 175.4 LBS | HEIGHT: 69 IN

## 2017-11-06 DIAGNOSIS — D89.89 ANTISYNTHETASE SYNDROME (H): ICD-10-CM

## 2017-11-06 DIAGNOSIS — Z79.899 HIGH RISK MEDICATIONS (NOT ANTICOAGULANTS) LONG-TERM USE: Primary | ICD-10-CM

## 2017-11-06 DIAGNOSIS — J84.9 ILD (INTERSTITIAL LUNG DISEASE) (H): ICD-10-CM

## 2017-11-06 DIAGNOSIS — M34.9 SCLERODERMA (H): ICD-10-CM

## 2017-11-06 DIAGNOSIS — M33.13 DERMATOMYOSITIS (H): ICD-10-CM

## 2017-11-06 PROCEDURE — 99213 OFFICE O/P EST LOW 20 MIN: CPT | Mod: ZF

## 2017-11-06 ASSESSMENT — PAIN SCALES - GENERAL: PAINLEVEL: NO PAIN (0)

## 2017-11-06 NOTE — NURSING NOTE
"Chief Complaint   Patient presents with     RECHECK     follow up with possiblt anti-synthetase syndrome, tzimmer cma       Initial /74  Pulse 73  Temp 97.7  F (36.5  C) (Oral)  Ht 1.753 m (5' 9\")  Wt 79.6 kg (175 lb 6.4 oz)  BMI 25.9 kg/m2 Estimated body mass index is 25.9 kg/(m^2) as calculated from the following:    Height as of this encounter: 1.753 m (5' 9\").    Weight as of this encounter: 79.6 kg (175 lb 6.4 oz).  Medication Reconciliation: complete    "

## 2017-11-06 NOTE — LETTER
11/6/2017       RE: Kwaku Medrano  [address] [facility] [id number]     Dear Colleague,    Thank you for referring your patient, Kwaku Medrano, to the [facility] RHEUMATOLOGY at [facility]. Please see a copy of my visit note below.    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        PLAN AND RECOMMENDATIONS:    I am really encouraged by how he is doing.  He seems better in almost every parameter.  He is not having any apparent toxicity from the CellCept, but he is on a very high dose.       Accordingly, I did discuss with him that should he get what appears to be a viral infection or any other type of infection, he should temporarily stop it and call us for instructions.  We discussed that his lung disease is unlikely to flare acutely from temporarily stopping the CellCept, but it could impair normal antiviral immunity enough that this would probably be the best thing to do.       I also discussed with him that should his cardiologist feel he no longer needs the diltiazem, I do not want him to simply stop it at his dose.  I would prefer he go down to 180-mg dose so that we can monitor carefully whether his Raynaud's phenomena, his digital ulcers, and his blood pressure all remain stable.       He knows he needs continue to monitor the blood pressure on an ongoing basis.        However, he is doing well enough otherwise that I think so long as he is getting his labs done as scheduled, we can start seeing him at less frequent intervals.  I will see him back in approximately 3 to no more than 4 months, sooner p.r.n.                   History of Present Illness:   Kwaku Medrano is a 52 year old male who presents for The patient is here for consultative evaluation at the request of Dr. Golden, who has seen him on several prior occasions and has followed him for a presumptive diagnosis of dermatomyositis.      For prior evaluation done by Dr. Golden, please see his most recent notes.       He has been seeing the patient for several months now after the patient initially developed inflammatory joint symptoms following a viral infection in 12/2016.  Initially these were really the dominant symptoms, but he then progressed to have some features consistent with inflammatory myositis including some muscle weakness.       Extensive serologic evaluation was unrevealing, with a negative SHERRELL by EIA, negative myositis panel, negative Marlene-1, and negative extractable nuclear antigens including topoisomerase.      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       With the institution of MMF which was dose escalated to 2 grams a day approximately 3 weeks ago and to 3 grams a day just about 2 weeks ago with no side effects, he has noted gradual improvement in his primary symptoms of pulmonary hypertension which are dry cough with deeper inspiration as well as some exertional dyspnea.  Those have improved somewhat, and he is able to get back to nearly his normal  function as a .       He has had repeat pulmonary function tests done and a repeat HRCT done just in the last several weeks, and this shows quite significant fibrotic NSIP which I have reviewed today with Dr. Dillon in our Pulmonary Department who is seeing the patient tomorrow.  He is also due for repeat PFTs and a    6-minute walk test including desaturation tomorrow.       In addition, he is seeing a cardiologist tomorrow.  He developed atrial fibrillation a few weeks ago and has now been placed on Eliquis for that as well as diltiazem for rate control.  He does note it, but does not feel particularly poorly with the atrial fibrillation.  Notably, this has occurred during a time frame when he is on high-dose steroids, but an echocardiogram also shows that he is having some left ventricular hypertrophy and a decreased ejection fraction which is likely diastolic.  He also has diastolic hypertension at this point.       In addition to these features, over the last few months he has had progressive skin thickening of his hands and now his forearms.  This initially manifested primarily as swelling in the hands but is now definitely including some skin thickening.  He is getting some subtle skin thickening in the shins as well as in the feet, and he is noticing some hair loss there.  In addition to these features, he is also getting cyanosis in both the hands and the feet.  Fortunately, he has not had the development of any digital ulcerations.       He clearly has GERD.  That maybe a little bit better currently despite the fact that Dr. Golden has eliminated using the proton pump inhibitor because of concerns about the interaction with CellCept.  He therefore put him only on a Zantac dosing currently.  He has been avoiding eating within 3 hours of bedtime and has been using a wedge, but he finds he slips down in the middle of the night.  Nonetheless, he denies significant dry cough first thing in the  morning and does not feel his breathing is worse at that time.  He is not having any dysphagia, however.       He continues to have some subtle weakness in his thighs but does not feel like this impairs him in his ability to do things, and he is able to get in and out of a car, go up and down stairs, and do his work without particular problems.  He does think it might be a little better since he is on the higher dose prednisone.      For additional details of his medical history, please see Dr. Golden's prior notes.  The remainder of a full 10-point review of systems including focused review on the scleroderma intake form is notable only for some weight loss that he says he was attempting.       His family history is notable for an aunt with lupus on his maternal side.        OCTOBER 10, 2017, INTERVAL HISTORY:  Since we have last seen him, he has been able to decrease his prednisone down to 10 mg a day.  He really has felt pretty stable in making that taper and has not felt any worse.      He has been religiously monitoring his blood pressures.  Those are reviewed today and they are all nicely within the normal limits.  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       He has been placed on diltiazem.  One week ago Monday he started noticing a lot of swelling in his ankles, potentially consistent with a known side effect of this drug.       He is tolerating his MMF well.  He has some dyspnea on exertion, but no chest pain.       He does believe his skin is overall stable.  He has not noticed any further skin thickening.  He is not sure he has noticed any real improvement in it, either.       The low-grade weakness he has experienced feels stable to him and not clearly worse.  Other manifestations of his disease process appear to be stable.       NOVEMBER 6, 2017,  INTERVAL HISTORY:  Since we have last seen the patient, he is starting to feel significantly better.  His skin, in particular, feels better to him.  He is noting decreased stiffness in the skin, tendons and joints of the hands and it is now down to less than an hour most days in his right hand and very minimally in his left hand.  Occasionally it is a little longer than that.  Laboratory testing is reviewed and he is showing minimal evidence of toxicity, although there was an initial drop in his hemoglobin and hematocrit, but that has apparently stabilized at 11.       His blood pressures are brought with him and they are very good with a high systolic pressure of 124 and the diastolics in the 60s-70s range.       He remains on the diltiazem which he was put on for rate control of atrial flutter.  He is due to see his cardiologist status post the ablation in the next week.       With respect to his diltiazem, it still gives him a little bit of peripheral edema, but he also thinks that his digital ulcerations and Raynaud's phenomena have been improved despite increasingly colder weather in the last several weeks.  He has had less pain and no new digital ulcers during that time.       He has also had very minimal GERD and dysphagia.  Eating can trigger his cough, however.      The other thing that triggers cough is climbing more than 2 flights of stairs.  He is still working and needs to do this at times.  In general, he feels like his breathing is better and his cough is better, however.                         Review of Systems:   Review Of Systems as above, otherwise:   Skin: negative  Eyes: negative  Ears/Nose/Throat: negative  Respiratory: No shortness of breath, dyspnea on exertion, cough, or hemoptysis  Cardiovascular: negative  Gastrointestinal: negative  Genitourinary: negative  Musculoskeletal: negative  Neurologic: negative  Psychiatric: negative  Hematologic/Lymphatic/Immunologic: negative  Endocrine:  negative          Past Medical History:     Past Medical History:   Diagnosis Date     Atrial fibrillation and flutter (H) 07/2017     Fracture      GERD (gastroesophageal reflux disease)      Interstitial lung disease (H)     sclerderma ILD; present on CXR 3-2017 and CT 5-2017; dx confirmed by CT 9-2017 fibrotic NSIP pattern with increased fibrosis; started mycophenolate 7/2017     Myositis      Scleroderma (H)     dx 9- at [facility] by Dr. 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     Priority: Medium     Dermatomyositis (H) 07/25/2017     Priority: Medium     ILD (interstitial lung disease) (H) 07/25/2017     Priority: Medium     Atrial fibrillation and flutter (H) 07/01/2017     Priority: Medium             Past Surgical History:     Past Surgical History:   Procedure Laterality Date     COLONOSCOPY N/A 7/19/2017    Procedure: COLONOSCOPY;  COLONOSCOPY;  Surgeon: Mita Jimenez MD;  Location:  GI     NO HISTORY OF SURGERY               Social History:     Social History   Substance Use Topics     Smoking status: Never Smoker     Smokeless tobacco: Former User     Types: Chew     Quit date: 1/1/1984     Alcohol use Yes      Comment: very rarely             Family History:     Family History   Problem Relation Age of Onset     Prostate Cancer Father      Lung Cancer Other      LUNG DISEASE No family hx of      Rheumatologic Disease No family hx of             Allergies:     Allergies   Allergen Reactions     Ampicillin Rash             Medications:     Current Outpatient Prescriptions   Medication Sig Dispense Refill     sulfamethoxazole-trimethoprim (BACTRIM) 400-80  "MG per tablet Once daily for PJP prophylaxis. Start after bronchoscopy 60 tablet 1     BENZONATATE PO Take 200 mg by mouth 3 times daily as needed       apixaban ANTICOAGULANT (ELIQUIS) 5 MG tablet Take 1 tablet (5 mg) by mouth 2 times daily 60 tablet 0     diltiazem (CARDIZEM CD) 240 MG 24 hr capsule Take 1 capsule (240 mg) by mouth daily 30 capsule 0     mycophenolate (GENERIC EQUIVALENT) 500 MG tablet Take 3 tablets (1,500 mg) by mouth 2 times daily Monitoring labs required every 8 weeks for medication refills. 180 tablet 3     predniSONE (DELTASONE) 20 MG tablet Take 10 mg by mouth daily        lisinopril (PRINIVIL/ZESTRIL) 10 MG tablet Take 1 tablet (10 mg) by mouth daily 30 tablet 11     Blood Pressure Monitoring KIT 1 each three times a week Dr Acosta has asked you to check your blood pressure 2-3 times per week using your home monitor.  Please keep track of your findings in a journal and bring it to your appointments.   Contact this office if your blood pressure: the top number (systolic) is consistently 30 points higher than your normal BP or if the bottom number (diastolic) is consistently 20 points higher than your normal BP. 1 kit 1     pantoprazole (PROTONIX) 40 MG EC tablet Take 1 tablet (40 mg) by mouth daily 30 tablet 11            Physical Exam:   Blood pressure 120/74, pulse 73, temperature 97.7  F (36.5  C), temperature source Oral, height 1.753 m (5' 9\"), weight 79.6 kg (175 lb 6.4 oz).  Constitutional: [id number] cooperative  Eyes: nl EOM, PERRLA, vision, conjunctiva, sclera  ENT: nl external ears, nose, hearing, lips, teeth, gums, throat  No mucous membrane lesions, normal saliva pool  Neck: no mass or thyroid enlargement  Resp: lungs clear to auscultation, nl to palpation  CV: RRR, no murmurs, rubs , no edema  GI: no ABD mass or tenderness, no HSM  : not tested  Lymph: no cervical, supraclavicular, inguinal or epitrochlear nodes  MS: All TMJ, neck, shoulder, elbow, wrist, MCP/PIP/DIP, spine, " hip, knee, ankle, and foot MTP/IP joints were examined and  found normal. No active synovitis or deformity. Full ROM.  Normal  strength. No dactylitis,  tenosynovitis, enthespathy   Skin: MRSS= 22, with 3+ involvement in bilat. H. 2+ forearms and face  .      Nailfold telangiectasias. No DU or pits. no nail pitting, alopecia, rash, nodules or lesions  Neuro: nl cranial nerves, strength, sensation, DTRs.   Psych: nl judgement, orientation, memory, affect.         Data:     Lab Results   Component Value Date    WBC 6.9 10/18/2017    WBC 13.2 (H) 10/10/2017    WBC 12.8 (H) 09/12/2017    HGB 11.0 (L) 10/18/2017    HGB 11.3 (L) 10/10/2017    HGB 15.4 09/12/2017    HCT 33.3 (L) 10/18/2017    HCT 35.3 (L) 10/10/2017    HCT 45.8 09/12/2017    MCV 93 10/18/2017    MCV 95 10/10/2017    MCV 89 09/12/2017     10/18/2017     10/10/2017     09/12/2017     Lab Results   Component Value Date    BUN 12 10/18/2017    BUN 12 10/10/2017    BUN 17 09/12/2017     No components found for: SEDRATE  Lab Results   Component Value Date    TSH 1.36 06/07/2017     Lab Results   Component Value Date    AST 16 10/10/2017    AST 15 03/10/2017    ALT 31 10/10/2017    ALT 21 03/10/2017    ALKPHOS 52 10/10/2017    ALKPHOS 99 03/10/2017     Reviewed Rheumatology lab flowsheet      Again, thank you for allowing me to participate in the care of your patient.      Sincerely,    Denny Acosta MD

## 2017-11-06 NOTE — PROGRESS NOTES
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        PLAN AND RECOMMENDATIONS:    I am really encouraged by how he is doing.  He seems better in almost every parameter.  He is not having any apparent toxicity from the CellCept, but he is on a very high dose.       Accordingly, I did discuss with him that should he get what appears to be a viral infection or any other type of infection, he should temporarily stop it and call us for instructions.  We discussed that his lung disease is unlikely to flare acutely from temporarily stopping the CellCept, but it could impair normal antiviral immunity enough that this would probably be the best thing to do.       I also discussed with him that should his cardiologist feel he no longer needs the diltiazem, I do not want him to simply stop it at his dose.  I would prefer he go down to 180-mg dose so that we can monitor carefully whether his Raynaud's phenomena, his digital ulcers, and his blood pressure all remain stable.       He knows he needs continue to monitor the blood pressure on an ongoing basis.       However, he is doing well enough otherwise that I think so long as he is getting his labs done as scheduled, we can start seeing him at less frequent intervals.  I will see him back in approximately 3 to no more than 4 months, sooner p.r.n.                   History of Present  Illness:   Kwaku Medrano is a 52 year old male who presents for The patient is here for consultative evaluation at the request of Dr. Golden, who has seen him on several prior occasions and has followed him for a presumptive diagnosis of dermatomyositis.      For prior evaluation done by Dr. Golden, please see his most recent notes.       He has been seeing the patient for several months now after the patient initially developed inflammatory joint symptoms following a viral infection in 12/2016.  Initially these were really the dominant symptoms, but he then progressed to have some features consistent with inflammatory myositis including some muscle weakness.       Extensive serologic evaluation was unrevealing, with a negative SHERRELL by EIA, negative myositis panel, negative Marlene-1, and negative extractable nuclear antigens including topoisomerase.      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       With the institution of MMF which was dose escalated to 2 grams a day approximately 3 weeks ago and to 3 grams a day just about 2 weeks ago with no side effects, he has noted gradual improvement in his primary symptoms of pulmonary hypertension which are dry cough with deeper inspiration as well as some exertional dyspnea.  Those have improved somewhat, and he is able to get back to nearly his normal function as a .       He has had repeat pulmonary function tests done and a repeat HRCT done just in the last several weeks, and this shows quite significant fibrotic NSIP which I have reviewed today with Dr. Dillon in our Pulmonary Department who is seeing the patient  tomorrow.  He is also due for repeat PFTs and a    6-minute walk test including desaturation tomorrow.       In addition, he is seeing a cardiologist tomorrow.  He developed atrial fibrillation a few weeks ago and has now been placed on Eliquis for that as well as diltiazem for rate control.  He does note it, but does not feel particularly poorly with the atrial fibrillation.  Notably, this has occurred during a time frame when he is on high-dose steroids, but an echocardiogram also shows that he is having some left ventricular hypertrophy and a decreased ejection fraction which is likely diastolic.  He also has diastolic hypertension at this point.       In addition to these features, over the last few months he has had progressive skin thickening of his hands and now his forearms.  This initially manifested primarily as swelling in the hands but is now definitely including some skin thickening.  He is getting some subtle skin thickening in the shins as well as in the feet, and he is noticing some hair loss there.  In addition to these features, he is also getting cyanosis in both the hands and the feet.  Fortunately, he has not had the development of any digital ulcerations.       He clearly has GERD.  That maybe a little bit better currently despite the fact that Dr. Golden has eliminated using the proton pump inhibitor because of concerns about the interaction with CellCept.  He therefore put him only on a Zantac dosing currently.  He has been avoiding eating within 3 hours of bedtime and has been using a wedge, but he finds he slips down in the middle of the night.  Nonetheless, he denies significant dry cough first thing in the morning and does not feel his breathing is worse at that time.  He is not having any dysphagia, however.       He continues to have some subtle weakness in his thighs but does not feel like this impairs him in his ability to do things, and he is able to get in and out of a car, go  up and down stairs, and do his work without particular problems.  He does think it might be a little better since he is on the higher dose prednisone.      For additional details of his medical history, please see Dr. Golden's prior notes.  The remainder of a full 10-point review of systems including focused review on the scleroderma intake form is notable only for some weight loss that he says he was attempting.       His family history is notable for an aunt with lupus on his maternal side.        OCTOBER 10, 2017, INTERVAL HISTORY:  Since we have last seen him, he has been able to decrease his prednisone down to 10 mg a day.  He really has felt pretty stable in making that taper and has not felt any worse.      He has been religiously monitoring his blood pressures.  Those are reviewed today and they are all nicely within the normal limits.  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       He has been placed on diltiazem.  One week ago Monday he started noticing a lot of swelling in his ankles, potentially consistent with a known side effect of this drug.       He is tolerating his MMF well.  He has some dyspnea on exertion, but no chest pain.       He does believe his skin is overall stable.  He has not noticed any further skin thickening.  He is not sure he has noticed any real improvement in it, either.       The low-grade weakness he has experienced feels stable to him and not clearly worse.  Other manifestations of his disease process appear to be stable.       NOVEMBER 6, 2017, INTERVAL HISTORY:  Since we have last seen the patient, he is starting to feel significantly better.  His skin, in particular, feels better to him.  He is noting decreased stiffness in the skin, tendons and joints of the hands and it is now down to less than an hour most days in his  right hand and very minimally in his left hand.  Occasionally it is a little longer than that.  Laboratory testing is reviewed and he is showing minimal evidence of toxicity, although there was an initial drop in his hemoglobin and hematocrit, but that has apparently stabilized at 11.       His blood pressures are brought with him and they are very good with a high systolic pressure of 124 and the diastolics in the 60s-70s range.       He remains on the diltiazem which he was put on for rate control of atrial flutter.  He is due to see his cardiologist status post the ablation in the next week.       With respect to his diltiazem, it still gives him a little bit of peripheral edema, but he also thinks that his digital ulcerations and Raynaud's phenomena have been improved despite increasingly colder weather in the last several weeks.  He has had less pain and no new digital ulcers during that time.       He has also had very minimal GERD and dysphagia.  Eating can trigger his cough, however.      The other thing that triggers cough is climbing more than 2 flights of stairs.  He is still working and needs to do this at times.  In general, he feels like his breathing is better and his cough is better, however.                         Review of Systems:   Review Of Systems as above, otherwise:   Skin: negative  Eyes: negative  Ears/Nose/Throat: negative  Respiratory: No shortness of breath, dyspnea on exertion, cough, or hemoptysis  Cardiovascular: negative  Gastrointestinal: negative  Genitourinary: negative  Musculoskeletal: negative  Neurologic: negative  Psychiatric: negative  Hematologic/Lymphatic/Immunologic: negative  Endocrine: negative          Past Medical History:     Past Medical History:   Diagnosis Date     Atrial fibrillation and flutter (H) 07/2017     Fracture      GERD (gastroesophageal reflux disease)      Interstitial lung disease (H)     sclerderma ILD; present on CXR 3-2017 and CT 5-2017; dx  confirmed by CT 9-2017 fibrotic NSIP pattern with increased fibrosis; started mycophenolate 7/2017     Myositis      Scleroderma (H)     dx 9- at [facility] by Dr. 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     Priority: Medium     Dermatomyositis (H) 07/25/2017     Priority: Medium     ILD (interstitial lung disease) (H) 07/25/2017     Priority: Medium     Atrial fibrillation and flutter (H) 07/01/2017     Priority: Medium             Past Surgical History:     Past Surgical History:   Procedure Laterality Date     COLONOSCOPY N/A 7/19/2017    Procedure: COLONOSCOPY;  COLONOSCOPY;  Surgeon: Mita Jimenez MD;  Location:  GI     NO HISTORY OF SURGERY               Social History:     Social History   Substance Use Topics     Smoking status: Never Smoker     Smokeless tobacco: Former User     Types: Chew     Quit date: 1/1/1984     Alcohol use Yes      Comment: very rarely             Family History:     Family History   Problem Relation Age of Onset     Prostate Cancer Father      Lung Cancer Other      LUNG DISEASE No family hx of      Rheumatologic Disease No family hx of             Allergies:     Allergies   Allergen Reactions     Ampicillin Rash             Medications:     Current Outpatient Prescriptions   Medication Sig Dispense Refill     sulfamethoxazole-trimethoprim (BACTRIM) 400-80 MG per tablet Once daily for PJP prophylaxis. Start after bronchoscopy 60 tablet 1     BENZONATATE PO Take 200 mg by mouth 3 times daily as needed       apixaban ANTICOAGULANT (ELIQUIS) 5 MG tablet Take 1 tablet (5 mg) by mouth 2 times daily 60 tablet 0     diltiazem (CARDIZEM CD)  "240 MG 24 hr capsule Take 1 capsule (240 mg) by mouth daily 30 capsule 0     mycophenolate (GENERIC EQUIVALENT) 500 MG tablet Take 3 tablets (1,500 mg) by mouth 2 times daily Monitoring labs required every 8 weeks for medication refills. 180 tablet 3     predniSONE (DELTASONE) 20 MG tablet Take 10 mg by mouth daily        lisinopril (PRINIVIL/ZESTRIL) 10 MG tablet Take 1 tablet (10 mg) by mouth daily 30 tablet 11     Blood Pressure Monitoring KIT 1 each three times a week Dr Acosta has asked you to check your blood pressure 2-3 times per week using your home monitor.  Please keep track of your findings in a journal and bring it to your appointments.   Contact this office if your blood pressure: the top number (systolic) is consistently 30 points higher than your normal BP or if the bottom number (diastolic) is consistently 20 points higher than your normal BP. 1 kit 1     pantoprazole (PROTONIX) 40 MG EC tablet Take 1 tablet (40 mg) by mouth daily 30 tablet 11            Physical Exam:   Blood pressure 120/74, pulse 73, temperature 97.7  F (36.5  C), temperature source Oral, height 1.753 m (5' 9\"), weight 79.6 kg (175 lb 6.4 oz).  Constitutional: WD-WN-[id number] cooperative  Eyes: nl EOM, PERRLA, vision, conjunctiva, sclera  ENT: nl external ears, nose, hearing, lips, teeth, gums, throat  No mucous membrane lesions, normal saliva pool  Neck: no mass or thyroid enlargement  Resp: lungs clear to auscultation, nl to palpation  CV: RRR, no murmurs, rubs , no edema  GI: no ABD mass or tenderness, no HSM  : not tested  Lymph: no cervical, supraclavicular, inguinal or epitrochlear nodes  MS: All TMJ, neck, shoulder, elbow, wrist, MCP/PIP/DIP, spine, hip, knee, ankle, and foot MTP/IP joints were examined and  found normal. No active synovitis or deformity. Full ROM.  Normal  strength. No dactylitis,  tenosynovitis, enthespathy   Skin: MRSS= 22, with 3+ involvement in bilat. H. 2+ forearms and face  .      Nailfold " telangiectasias. No DU or pits. no nail pitting, alopecia, rash, nodules or lesions  Neuro: nl cranial nerves, strength, sensation, DTRs.   Psych: nl judgement, orientation, memory, affect.         Data:     Lab Results   Component Value Date    WBC 6.9 10/18/2017    WBC 13.2 (H) 10/10/2017    WBC 12.8 (H) 09/12/2017    HGB 11.0 (L) 10/18/2017    HGB 11.3 (L) 10/10/2017    HGB 15.4 09/12/2017    HCT 33.3 (L) 10/18/2017    HCT 35.3 (L) 10/10/2017    HCT 45.8 09/12/2017    MCV 93 10/18/2017    MCV 95 10/10/2017    MCV 89 09/12/2017     10/18/2017     10/10/2017     09/12/2017     Lab Results   Component Value Date    BUN 12 10/18/2017    BUN 12 10/10/2017    BUN 17 09/12/2017     No components found for: SEDRATE  Lab Results   Component Value Date    TSH 1.36 06/07/2017     Lab Results   Component Value Date    AST 16 10/10/2017    AST 15 03/10/2017    ALT 31 10/10/2017    ALT 21 03/10/2017    ALKPHOS 52 10/10/2017    ALKPHOS 99 03/10/2017     Reviewed Rheumatology lab flowsheet    Denny Acosta

## 2017-11-06 NOTE — MR AVS SNAPSHOT
After Visit Summary   11/6/2017    Kwaku Medrano    MRN: [id number]           Patient Information     Date Of Birth          1965        Visit Information        Provider Department      11/6/2017 8:00 AM Denny Acosta MD [facility] Rheumatology        Today's Diagnoses     High risk medications (not anticoagulants) long-term use    -  1    Antisynthetase syndrome (H)        Dermatomyositis (H)        ILD (interstitial lung disease) (H)        Scleroderma (H)           Follow-ups after your visit        Your next 10 appointments already scheduled     Nov 16, 2017  8:15 AM CST   [facility] EP RETURN with Radha Chavez MD   [facility] ([facility])    [address]  [facility] [id number] [phone]            Dec 22, 2017  7:45 AM CST   Lab with  LAB    [facility]    [address] [address] [phone]            Dec 22, 2017  8:00 AM CST   FULL PULMONARY FUNCTION with  PFL A   [facility] Pulmonary Function Testing ([facility])    [address] [id number] [phone]            Dec 22, 2017  8:30 AM CST   (Arrive by 8:15 AM)   Return Interstitial Lung with Elma Dillon MD   [facility] ([place] [facility])    [address] [id number] [phone]            Feb 15, 2018  8:00 AM CST   (Arrive by 7:45 AM)   Return Visit with Denny Acosta MD   [facility] Rheumatology ([facility])    [address] [id number] [phone]              Who to contact     If you have questions or need follow up information about today's clinic visit or your schedule please contact [facility] RHEUMATOLOGY directly at [phone].  Normal or non-critical lab and  "imaging results will be communicated to you by MyChart, letter or phone within 4 business days after the clinic has received the results. If you do not hear from us within 7 days, please contact the clinic through [employer] or phone. If you have a critical or abnormal lab result, we will notify you by phone as soon as possible.  Submit refill requests through [employer] or call your pharmacy and they will forward the refill request to us. Please allow 3 business days for your refill to be completed.          Additional Information About Your Visit        [employer] Information     [employer] gives you secure access to your electronic health record. If you see a primary care provider, you can also send messages to your care team and make appointments. If you have questions, please call your primary care clinic.  If you do not have a primary care provider, please call [phone] and they will assist you.        Care EveryWhere ID     This is your Care EveryWhere ID. This could be used by other organizations to access your [facility] medical records  [id number]        Your Vitals Were     Pulse Temperature Height BMI (Body Mass Index)          73 97.7  F (36.5  C) (Oral) 1.753 m (5' 9\") 25.9 kg/m2         Blood Pressure from Last 3 Encounters:   11/06/17 120/74   10/18/17 96/53   10/17/17 120/60    Weight from Last 3 Encounters:   11/06/17 79.6 kg (175 lb 6.4 oz)   10/18/17 78.7 kg (173 lb 8 oz)   10/17/17 78.5 kg (173 lb)              Today, you had the following     No orders found for display       Primary Care Provider Office Phone # Fax #    Stew Caldwell [phone]       [address] [place] [id number]        Equal Access to Services     ESTHER DE LA O : Molly Linn, derik rogel, natalie summers, keegan boswell. So [facility] [phone].    ATENCIÓN: Si habla español, tiene a rubalcava disposición servicios gratuitos de asistencia lingüística. " Abimbola lawton [phone].    We comply with applicable federal civil rights laws and [place] laws. We do not discriminate on the basis of race, color, national origin, age, disability, sex, sexual orientation, or gender identity.            Thank you!     Thank you for choosing [facility] RHEUMATOLOGY  for your care. Our goal is always to provide you with excellent care. Hearing back from our patients is one way we can continue to improve our services. Please take a few minutes to complete the written survey that you may receive in the mail after your visit with us. Thank you!             Your Updated Medication List - Protect others around you: Learn how to safely use, store and throw away your medicines at www.disposemymeds.org.          This list is accurate as of: 11/6/17 11:59 PM.  Always use your most recent med list.                   Brand Name Dispense Instructions for use Diagnosis    BENZONATATE PO      Take 200 mg by mouth 3 times daily as needed    ILD (interstitial lung disease) (H), Antisynthetase syndrome (H), Dermatomyositis (H), Scleroderma (H), High risk medications (not anticoagulants) long-term use       Blood Pressure Monitoring Kit     1 kit    1 each three times a week Dr Acosta has asked you to check your blood pressure 2-3 times per week using your home monitor.  Please keep track of your findings in a journal and bring it to your appointments.  Contact this office if your blood pressure: the top number (systolic) is consistently 30 points higher than your normal BP or if the bottom number (diastolic) is consistently 20 points higher than your normal BP.    ILD (interstitial lung disease) (H), Dermatomyositis (H), Systemic sclerosis (H), Gastroesophageal reflux disease, esophagitis presence not specified, High risk medications (not anticoagulants) long-term use       lisinopril 10 MG tablet    PRINIVIL/ZESTRIL    30 tablet    Take 1 tablet (10 mg) by mouth daily    Raynaud's disease without  gangrene, Atrial fibrillation, unspecified type (H)       mycophenolate 500 MG tablet    GENERIC EQUIVALENT    180 tablet    Take 3 tablets (1,500 mg) by mouth 2 times daily Monitoring labs required every 8 weeks for medication refills.    ILD (interstitial lung disease) (H), Myositis, unspecified myositis type, unspecified site, Polyarthritis, Dermatomyositis (H), Antisynthetase syndrome (H)       pantoprazole 40 MG EC tablet    PROTONIX    30 tablet    Take 1 tablet (40 mg) by mouth daily    Gastroesophageal reflux disease, esophagitis presence not specified, Esophageal dysmotility       predniSONE 20 MG tablet    DELTASONE     Take 10 mg by mouth daily        sulfamethoxazole-trimethoprim 400-80 MG per tablet    BACTRIM    60 tablet    Once daily for PJP prophylaxis. Start after bronchoscopy    ILD (interstitial lung disease) (H), Inflammatory arthritis

## 2017-11-08 DIAGNOSIS — I48.0 PAROXYSMAL ATRIAL FIBRILLATION (H): ICD-10-CM

## 2017-11-08 RX ORDER — DILTIAZEM HYDROCHLORIDE 240 MG/1
240 CAPSULE, COATED, EXTENDED RELEASE ORAL DAILY
Qty: 30 CAPSULE | Refills: 1 | Status: SHIPPED | OUTPATIENT
Start: 2017-11-08 | End: 2018-01-05

## 2017-11-08 NOTE — TELEPHONE ENCOUNTER
Received refill request for:  Diltiazem and Eliquis  Last OV was: 10/17/2017 with Dr. Chavez  Labs/EKG: last EKG 10/18/2017  F/U scheduled: 11/16/2017 with Dr. Chavez  New script sent to: [facility]

## 2017-11-16 ENCOUNTER — OFFICE VISIT (OUTPATIENT)
Dept: CARDIOLOGY | Facility: CLINIC | Age: 52
End: 2017-11-16
Payer: COMMERCIAL

## 2017-11-16 VITALS
HEART RATE: 60 BPM | HEIGHT: 69 IN | BODY MASS INDEX: 25.48 KG/M2 | DIASTOLIC BLOOD PRESSURE: 60 MMHG | SYSTOLIC BLOOD PRESSURE: 110 MMHG | WEIGHT: 172 LBS

## 2017-11-16 DIAGNOSIS — I48.0 PAROXYSMAL ATRIAL FIBRILLATION (H): Primary | ICD-10-CM

## 2017-11-16 DIAGNOSIS — I48.3 TYPICAL ATRIAL FLUTTER (H): ICD-10-CM

## 2017-11-16 PROCEDURE — 93000 ELECTROCARDIOGRAM COMPLETE: CPT | Performed by: INTERNAL MEDICINE

## 2017-11-16 PROCEDURE — 99214 OFFICE O/P EST MOD 30 MIN: CPT | Performed by: INTERNAL MEDICINE

## 2017-11-16 NOTE — PROGRESS NOTES
HISTORY OF PRESENT ILLNESS:    I had the pleasure seeing Mr. Kwaku Medrano in followup of atrial arrhythmias.  He has documented both atrial fibrillation as well as rapid atypical atrial flutter.  The latter arrhythmia sustained for over 1 month and was successfully treated with catheter ablation 1 month ago.  The patient has interstitial lung disease and scleroderma.  He has been treated with prednisone.      During his EP study on 10/18/2017, Mr. Medrano was found to have typical right atrial isthmus flutter and underwent successful ablation with bidirectional block.  He has felt well since then.  He has not noted any palpitation, though his stamina or energy level has not obviously change.  I note that the patient had normal LV function in 05/2017, but in 08/2017, while in atrial flutter, his ejection fraction was reduced to 45%-50%.      The patient has been treated with apixaban for anticoagulation before and after his atrial flutter ablation.      PHYSICAL EXAMINATION:   VITAL SIGNS:  Blood pressure 110/60, pulse 60 irregular due to occasional extra ectopic beats.  Weight 78 kg.  Height 175 cm.   GENERAL:  He is a pleasant gentleman in no distress.   NECK:  Supple without bruits, normal JVP.   LUNGS:  Clear, without crackles.   CARDIOVASCULAR:  Regular rhythm.  No gallop, murmur or rub.   EXTREMITIES:  No edema.   SKIN:  Thickening of the skin in his hands as noted.      DIAGNOSTIC STUDIES:  His 12-lead ECG today showed sinus rhythm with PACs.      IMPRESSION:    1.  Atrial arrhythmias.  Mr. Medrano underwent successful catheter ablation of atrial flutter 1 month ago.  His BID6MZ2-KAEi score is probably 0.  He did have mild LV dysfunction in August but this was during atrial flutter.  When in sinus rhythm earlier this year, he had normal LV function.        I would like to assess his burden of atrial fibrillation.  I will order a cardiac monitor.  In the past the rate was controlled during atrial  fibrillation but not during atrial flutter.  The patient is currently on diltiazem  mg daily, which I encouraged him to continue as this medication likely has a beneficial effect on his Raynaud's.      RECOMMENDATIONS:   1.  Stop Eliquis.   2.  Start aspirin 81 mg daily.   3.  A 14-day ZIO Patch to be hooked up in early December.   4.  Follow-up in 6 months with echocardiogram before the visit.      I do appreciate the opportunity to be involved in this delightful gentleman's care.        JOHN TANG MD, [facility]         cc:      Stew Caldwell MD    [facility]   [address] [place] [id number]             D: 2017 08:38   T: 2017 09:50   MT: JEISON      Name:     FLOR CALZADA   MRN:      [id number]        Account:      [id number]   :      1965           Service Date: 2017      Document: [id number]

## 2017-11-16 NOTE — PROGRESS NOTES
HPI and Plan:   See dictation    Orders Placed This Encounter   Procedures     Follow-Up with Electrophysiologist     EKG 12-lead complete w/read - Clinics (performed today)     Zio Patch Monitor     Echocardiogram       Orders Placed This Encounter   Medications     aspirin 81 MG EC tablet     Sig: Take 1 tablet (81 mg) by mouth daily     Dispense:  30 tablet     Refill:  1       Medications Discontinued During This Encounter   Medication Reason     apixaban ANTICOAGULANT (ELIQUIS) 5 MG tablet          Encounter Diagnosis   Name Primary?     Atrial fibrillation and flutter (H) Yes       CURRENT MEDICATIONS:  Current Outpatient Prescriptions   Medication Sig Dispense Refill     aspirin 81 MG EC tablet Take 1 tablet (81 mg) by mouth daily 30 tablet 1     diltiazem (CARDIZEM CD) 240 MG 24 hr capsule Take 1 capsule (240 mg) by mouth daily 30 capsule 1     sulfamethoxazole-trimethoprim (BACTRIM) 400-80 MG per tablet Once daily for PJP prophylaxis. Start after bronchoscopy 60 tablet 1     BENZONATATE PO Take 200 mg by mouth 3 times daily as needed       mycophenolate (GENERIC EQUIVALENT) 500 MG tablet Take 3 tablets (1,500 mg) by mouth 2 times daily Monitoring labs required every 8 weeks for medication refills. 180 tablet 3     predniSONE (DELTASONE) 20 MG tablet Take 10 mg by mouth daily        lisinopril (PRINIVIL/ZESTRIL) 10 MG tablet Take 1 tablet (10 mg) by mouth daily 30 tablet 11     Blood Pressure Monitoring KIT 1 each three times a week Dr Acosta has asked you to check your blood pressure 2-3 times per week using your home monitor.  Please keep track of your findings in a journal and bring it to your appointments.   Contact this office if your blood pressure: the top number (systolic) is consistently 30 points higher than your normal BP or if the bottom number (diastolic) is consistently 20 points higher than your normal BP. 1 kit 1     pantoprazole (PROTONIX) 40 MG EC tablet Take 1 tablet (40 mg) by mouth  daily 30 tablet 11       ALLERGIES     Allergies   Allergen Reactions     Ampicillin Rash       PAST MEDICAL HISTORY:  Past Medical History:   Diagnosis Date     Atrial fibrillation and flutter (H) 07/2017     Fracture      GERD (gastroesophageal reflux disease)      Interstitial lung disease (H)     sclerderma ILD; present on CXR 3-2017 and CT 5-2017; dx confirmed by CT 9-2017 fibrotic NSIP pattern with increased fibrosis; started mycophenolate 7/2017     Myositis      Scleroderma (H)     dx 9- at [facility] by Dr. Acosta       PAST SURGICAL HISTORY:  Past Surgical History:   Procedure Laterality Date     COLONOSCOPY N/A 7/19/2017    Procedure: COLONOSCOPY;  COLONOSCOPY;  Surgeon: Mita Jimenez MD;  Location:  GI     NO HISTORY OF SURGERY         FAMILY HISTORY:  Family History   Problem Relation Age of Onset     Prostate Cancer Father      Lung Cancer Other      LUNG DISEASE No family hx of      Rheumatologic Disease No family hx of        SOCIAL HISTORY:  Social History     Social History     Marital status: Single     Spouse name: N/A     Number of children: N/A     Years of education: N/A     Social History Main Topics     Smoking status: Never Smoker     Smokeless tobacco: Former User     Types: Chew     Quit date: 1/1/1984     Alcohol use Yes      Comment: very rarely     Drug use: No     Sexual activity: Not Currently     Other Topics Concern     None     Social History Narrative    Work in [employer].  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  Denies exposure to hot tubs, silica, pet birds, mold.  Single, no children.       Review of Systems:  Skin:  Positive for   scleraderma   Eyes:  Positive for glasses    ENT:  Negative      Respiratory:  Positive for shortness of breath;cough     Cardiovascular:    Positive for;palpitations;edema    Gastroenterology: Positive for heartburn;reflux    Genitourinary:  Negative      Musculoskeletal:  Negative       Neurologic:  Negative      Psychiatric:  Negative      Heme/Lymph/Imm:  Positive for allergies    Endocrine:  Negative        [id number]

## 2017-11-16 NOTE — LETTER
11/16/2017      Stew Caldwell MD  [address] [facility] [address]      RE: Kwaku Medrano       Dear Colleague,    I had the pleasure of seeing Kwaku Medrano in the [facility].    HISTORY OF PRESENT ILLNESS:    I had the pleasure seeing Mr. Kwaku Medrano in followup of atrial arrhythmias.  He has documented both atrial fibrillation as well as rapid atypical atrial flutter.  The latter arrhythmia sustained for over 1 month and was successfully treated with catheter ablation 1 month ago.  The patient has interstitial lung disease and scleroderma.  He has been treated with prednisone.      During his EP study on 10/18/2017, Mr. Medrano was found to have typical right atrial isthmus flutter and underwent successful ablation with bidirectional block.  He has felt well since then.  He has not noted any palpitation, though his stamina or energy level has not obviously change.  I note that the patient had normal LV function in 05/2017, but in 08/2017, while in atrial flutter, his ejection fraction was reduced to 45%-50%.      The patient has been treated with apixaban for anticoagulation before and after his atrial flutter ablation.      PHYSICAL EXAMINATION:   VITAL SIGNS:  Blood pressure 110/60, pulse 60 irregular due to occasional extra ectopic beats.  Weight 78 kg.  Height 175 cm.   GENERAL:  He is a pleasant gentleman in no distress.   NECK:  Supple without bruits, normal JVP.   LUNGS:  Clear, without crackles.   CARDIOVASCULAR:  Regular rhythm.  No gallop, murmur or rub.   EXTREMITIES:  No edema.   SKIN:  Thickening of the skin in his hands as noted.      DIAGNOSTIC STUDIES:  His 12-lead ECG today showed sinus rhythm with PACs.      IMPRESSION:    1.  Atrial arrhythmias.  Mr. Medrano underwent successful catheter ablation of atrial flutter 1 month ago.  His [id number] score is probably 0.  He did have mild LV dysfunction in August but this was during atrial flutter.   When in sinus rhythm earlier this year, he had normal LV function.        I would like to assess his burden of atrial fibrillation.  I will order a cardiac monitor.  In the past the rate was controlled during atrial fibrillation but not during atrial flutter.  The patient is currently on diltiazem  mg daily, which I encouraged him to continue as this medication likely has a beneficial effect on his Raynaud's.      RECOMMENDATIONS:   1.  Stop Eliquis.   2.  Start aspirin 81 mg daily.   3.  A 14-day ZIO Patch to be hooked up in early December.   4.  Follow-up in 6 months with echocardiogram before the visit.      I do appreciate the opportunity to be involved in this delightful gentleman's care.       Outpatient Encounter Prescriptions as of 11/16/2017   Medication Sig Dispense Refill     aspirin 81 MG EC tablet Take 1 tablet (81 mg) by mouth daily 30 tablet 1     diltiazem (CARDIZEM CD) 240 MG 24 hr capsule Take 1 capsule (240 mg) by mouth daily 30 capsule 1     sulfamethoxazole-trimethoprim (BACTRIM) 400-80 MG per tablet Once daily for PJP prophylaxis. Start after bronchoscopy 60 tablet 1     BENZONATATE PO Take 200 mg by mouth 3 times daily as needed       mycophenolate (GENERIC EQUIVALENT) 500 MG tablet Take 3 tablets (1,500 mg) by mouth 2 times daily Monitoring labs required every 8 weeks for medication refills. 180 tablet 3     predniSONE (DELTASONE) 20 MG tablet Take 10 mg by mouth daily        lisinopril (PRINIVIL/ZESTRIL) 10 MG tablet Take 1 tablet (10 mg) by mouth daily 30 tablet 11     Blood Pressure Monitoring KIT 1 each three times a week Dr Acosta has asked you to check your blood pressure 2-3 times per week using your home monitor.  Please keep track of your findings in a journal and bring it to your appointments.   Contact this office if your blood pressure: the top number (systolic) is consistently 30 points higher than your normal BP or if the bottom number (diastolic) is consistently 20  points higher than your normal BP. 1 kit 1     pantoprazole (PROTONIX) 40 MG EC tablet Take 1 tablet (40 mg) by mouth daily 30 tablet 11     [DISCONTINUED] apixaban ANTICOAGULANT (ELIQUIS) 5 MG tablet Take 1 tablet (5 mg) by mouth 2 times daily 180 tablet 3     No facility-administered encounter medications on file as of 11/16/2017.        Again, thank you for allowing me to participate in the care of your patient.      Sincerely,    Radha Chavez MD     [facility]

## 2017-11-16 NOTE — MR AVS SNAPSHOT
After Visit Summary   11/16/2017    Kwaku Medrano    MRN: [id number]           Patient Information     Date Of Birth          1965        Visit Information        Provider Department      11/16/2017 8:15 AM Radha Chavez MD [facility]        Today's Diagnoses     Paroxysmal atrial fibrillation (H)    -  1    Typical atrial flutter (H)           Follow-ups after your visit        Additional Services     Follow-Up with Electrophysiologist                 Your next 10 appointments already scheduled     Nov 27, 2017  8:30 AM CST   ZIOPATCH MONITOR with [facility] ([facility])    [address]  [facility] [address]   [phone]            Dec 22, 2017  7:45 AM CST   Lab with  LAB   [facility] ([facility])    [address] [place] [id number] [phone]            Dec 22, 2017  8:00 AM CST   FULL PULMONARY FUNCTION with  PFL A   [facility] Pulmonary Function Testing ([facility])    [address] [place] [id number] [phone]            Dec 22, 2017  8:30 AM CST   (Arrive by 8:15 AM)   Return Interstitial Lung with Elma Dillon MD   [facility] ([facility])    [address] [place] [id number] [phone]            Feb 15, 2018  8:00 AM CST   (Arrive by 7:45 AM)   Return Visit with Denny Acosta MD   [facility] Rheumatology ([facility])    [address] [place] [id number] [phone]              Future tests that were ordered for you today     Open Future Orders        Priority Expected Expires Ordered    Follow-Up with Electrophysiologist Routine 5/15/2018 11/16/2018 11/16/2017    Echocardiogram Routine 5/15/2018  "11/16/2018 11/16/2017    Zio Patch Monitor Routine 11/30/2017 11/16/2018 11/16/2017            Who to contact     If you have questions or need follow up information about today's clinic visit or your schedule please contact [facility] directly at [phone].  Normal or non-critical lab and imaging results will be communicated to you by MyChart, letter or phone within 4 business days after the clinic has received the results. If you do not hear from us within 7 days, please contact the clinic through [employer] or phone. If you have a critical or abnormal lab result, we will notify you by phone as soon as possible.  Submit refill requests through [employer] or call your pharmacy and they will forward the refill request to us. Please allow 3 business days for your refill to be completed.          Additional Information About Your Visit        [employer] Information     [employer] gives you secure access to your electronic health record. If you see a primary care provider, you can also send messages to your care team and make appointments. If you have questions, please call your primary care clinic.  If you do not have a primary care provider, please call [phone] and they will assist you.        Care EveryWhere ID     This is your Care EveryWhere ID. This could be used by other organizations to access your [place] medical records  [id number]        Your Vitals Were     Pulse Height BMI (Body Mass Index)             60 1.753 m (5' 9\") 25.4 kg/m2          Blood Pressure from Last 3 Encounters:   11/16/17 110/60   11/06/17 120/74   10/18/17 96/53    Weight from Last 3 Encounters:   11/16/17 78 kg (172 lb)   11/06/17 79.6 kg (175 lb 6.4 oz)   10/18/17 78.7 kg (173 lb 8 oz)              We Performed the Following     EKG 12-lead complete w/read - Clinics (performed today)          Today's Medication Changes          These changes are accurate as of: 11/16/17  8:40 AM.  If you have any " questions, ask your nurse or doctor.               Stop taking these medicines if you haven't already. Please contact your care team if you have questions.     apixaban ANTICOAGULANT 5 MG tablet   Commonly known as:  ELIQUIS   Stopped by:  Radha Chavez MD                    Primary Care Provider Office Phone # Fax #    Stew Caldwell [phone]       [address] [facility] [address]        Equal Access to Services     [facility] : Hadii aad ku hadasho Soomaali, waaxda luqadaha, qaybta kaalmada adeegyada, waxay idiin hayaan adeeg kharash la'eloise . So [facility] [phone].    ATENCIÓN: Si habla español, tiene a rubalcava disposición servicios gratuitos de asistencia lingüística. [facility] [phone].    We comply with applicable federal civil rights laws and [place] laws. We do not discriminate on the basis of race, color, national origin, age, disability, sex, sexual orientation, or gender identity.            Thank you!     Thank you for choosing [facility]  for your care. Our goal is always to provide you with excellent care. Hearing back from our patients is one way we can continue to improve our services. Please take a few minutes to complete the written survey that you may receive in the mail after your visit with us. Thank you!             Your Updated Medication List - Protect others around you: Learn how to safely use, store and throw away your medicines at www.disposemymeds.org.          This list is accurate as of: 11/16/17  8:40 AM.  Always use your most recent med list.                   Brand Name Dispense Instructions for use Diagnosis    aspirin 81 MG EC tablet     30 tablet    Take 1 tablet (81 mg) by mouth daily        BENZONATATE PO      Take 200 mg by mouth 3 times daily as needed    ILD (interstitial lung disease) (H), Antisynthetase syndrome (H), Dermatomyositis (H), Scleroderma (H), High risk medications (not  anticoagulants) long-term use       Blood Pressure Monitoring Kit     1 kit    1 each three times a week Dr Acosta has asked you to check your blood pressure 2-3 times per week using your home monitor.  Please keep track of your findings in a journal and bring it to your appointments.  Contact this office if your blood pressure: the top number (systolic) is consistently 30 points higher than your normal BP or if the bottom number (diastolic) is consistently 20 points higher than your normal BP.    ILD (interstitial lung disease) (H), Dermatomyositis (H), Systemic sclerosis (H), Gastroesophageal reflux disease, esophagitis presence not specified, High risk medications (not anticoagulants) long-term use       diltiazem 240 MG 24 hr capsule    CARDIZEM CD    30 capsule    Take 1 capsule (240 mg) by mouth daily    Paroxysmal atrial fibrillation (H)       lisinopril 10 MG tablet    PRINIVIL/ZESTRIL    30 tablet    Take 1 tablet (10 mg) by mouth daily    Raynaud's disease without gangrene, Atrial fibrillation, unspecified type (H)       mycophenolate 500 MG tablet    GENERIC EQUIVALENT    180 tablet    Take 3 tablets (1,500 mg) by mouth 2 times daily Monitoring labs required every 8 weeks for medication refills.    ILD (interstitial lung disease) (H), Myositis, unspecified myositis type, unspecified site, Polyarthritis, Dermatomyositis (H), Antisynthetase syndrome (H)       pantoprazole 40 MG EC tablet    PROTONIX    30 tablet    Take 1 tablet (40 mg) by mouth daily    Gastroesophageal reflux disease, esophagitis presence not specified, Esophageal dysmotility       predniSONE 20 MG tablet    DELTASONE     Take 10 mg by mouth daily        sulfamethoxazole-trimethoprim 400-80 MG per tablet    BACTRIM    60 tablet    Once daily for PJP prophylaxis. Start after bronchoscopy    ILD (interstitial lung disease) (H), Inflammatory arthritis

## 2017-11-27 ENCOUNTER — HOSPITAL ENCOUNTER (OUTPATIENT)
Dept: CARDIOLOGY | Facility: CLINIC | Age: 52
Discharge: HOME | End: 2017-11-27
Attending: INTERNAL MEDICINE | Admitting: INTERNAL MEDICINE
Payer: COMMERCIAL

## 2017-11-27 PROCEDURE — 0298T ZZC EXT ECG > 48HR TO 21 DAY REVIEW AND INTERPRETATN: CPT | Performed by: INTERNAL MEDICINE

## 2017-11-27 PROCEDURE — 0296T ZIO PATCH HOLTER: CPT

## 2017-12-19 ASSESSMENT — ENCOUNTER SYMPTOMS
ARTHRALGIAS: 0
COUGH: 1
HYPERTENSION: 0
DYSPNEA ON EXERTION: 1
MYALGIAS: 0
MUSCLE CRAMPS: 1
HYPOTENSION: 0
MUSCLE WEAKNESS: 0
COUGH DISTURBING SLEEP: 0
NECK PAIN: 0
JOINT SWELLING: 0
ORTHOPNEA: 0
SKIN CHANGES: 0
POSTURAL DYSPNEA: 0
SYNCOPE: 0
LEG PAIN: 0
BACK PAIN: 0
POOR WOUND HEALING: 1
EXERCISE INTOLERANCE: 1
PALPITATIONS: 0
SPUTUM PRODUCTION: 0
SLEEP DISTURBANCES DUE TO BREATHING: 0
STIFFNESS: 0
SHORTNESS OF BREATH: 1
NAIL CHANGES: 0
LIGHT-HEADEDNESS: 0
HEMOPTYSIS: 0
WHEEZING: 0
SNORES LOUDLY: 0

## 2017-12-20 DIAGNOSIS — J84.9 ILD (INTERSTITIAL LUNG DISEASE) (H): ICD-10-CM

## 2017-12-20 LAB
ALBUMIN SERPL-MCNC: 3.6 G/DL (ref 3.4–5)
ALP SERPL-CCNC: 74 U/L (ref 40–150)
ALT SERPL W P-5'-P-CCNC: 19 U/L (ref 0–70)
ANION GAP SERPL CALCULATED.3IONS-SCNC: 8 MMOL/L (ref 3–14)
AST SERPL W P-5'-P-CCNC: 8 U/L (ref 0–45)
BASOPHILS # BLD AUTO: 0 10E9/L (ref 0–0.2)
BASOPHILS NFR BLD AUTO: 0.4 %
BILIRUB SERPL-MCNC: 0.3 MG/DL (ref 0.2–1.3)
BUN SERPL-MCNC: 18 MG/DL (ref 7–30)
CALCIUM SERPL-MCNC: 9.2 MG/DL (ref 8.5–10.1)
CHLORIDE SERPL-SCNC: 105 MMOL/L (ref 94–109)
CO2 SERPL-SCNC: 27 MMOL/L (ref 20–32)
CREAT SERPL-MCNC: 1.02 MG/DL (ref 0.66–1.25)
DIFFERENTIAL METHOD BLD: NORMAL
EOSINOPHIL # BLD AUTO: 0.1 10E9/L (ref 0–0.7)
EOSINOPHIL NFR BLD AUTO: 1.2 %
ERYTHROCYTE [DISTWIDTH] IN BLOOD BY AUTOMATED COUNT: 12.7 % (ref 10–15)
GFR SERPL CREATININE-BSD FRML MDRD: 76 ML/MIN/1.7M2
GLUCOSE SERPL-MCNC: 98 MG/DL (ref 70–99)
HCT VFR BLD AUTO: 43.8 % (ref 40–53)
HGB BLD-MCNC: 13.8 G/DL (ref 13.3–17.7)
LYMPHOCYTES # BLD AUTO: 1.8 10E9/L (ref 0.8–5.3)
LYMPHOCYTES NFR BLD AUTO: 23.2 %
MCH RBC QN AUTO: 30.3 PG (ref 26.5–33)
MCHC RBC AUTO-ENTMCNC: 31.5 G/DL (ref 31.5–36.5)
MCV RBC AUTO: 96 FL (ref 78–100)
MONOCYTES # BLD AUTO: 0.9 10E9/L (ref 0–1.3)
MONOCYTES NFR BLD AUTO: 11.6 %
NEUTROPHILS # BLD AUTO: 4.9 10E9/L (ref 1.6–8.3)
NEUTROPHILS NFR BLD AUTO: 63.6 %
PLATELET # BLD AUTO: 395 10E9/L (ref 150–450)
POTASSIUM SERPL-SCNC: 4 MMOL/L (ref 3.4–5.3)
PROT SERPL-MCNC: 5.9 G/DL (ref 6.8–8.8)
RBC # BLD AUTO: 4.56 10E12/L (ref 4.4–5.9)
SODIUM SERPL-SCNC: 140 MMOL/L (ref 133–144)
WBC # BLD AUTO: 7.7 10E9/L (ref 4–11)

## 2017-12-20 PROCEDURE — 80053 COMPREHEN METABOLIC PANEL: CPT | Performed by: INTERNAL MEDICINE

## 2017-12-20 PROCEDURE — 85025 COMPLETE CBC W/AUTO DIFF WBC: CPT | Performed by: INTERNAL MEDICINE

## 2017-12-20 PROCEDURE — 36415 COLL VENOUS BLD VENIPUNCTURE: CPT | Performed by: INTERNAL MEDICINE

## 2017-12-21 ENCOUNTER — TELEPHONE (OUTPATIENT)
Dept: CARDIOLOGY | Facility: CLINIC | Age: 52
End: 2017-12-21

## 2017-12-21 NOTE — TELEPHONE ENCOUNTER
Phone call to patient to notify him that there was no afib/flutter on his recent ziopatch. He was pleased to hear this and had no questions for me at this time. I told him that scheduling will reach out to him to get him an appointment with Dr. Chavez in May 2018. Drake

## 2017-12-22 ENCOUNTER — OFFICE VISIT (OUTPATIENT)
Dept: PULMONOLOGY | Facility: CLINIC | Age: 52
End: 2017-12-22
Attending: INTERNAL MEDICINE
Payer: COMMERCIAL

## 2017-12-22 VITALS
OXYGEN SATURATION: 98 % | WEIGHT: 175 LBS | HEART RATE: 62 BPM | BODY MASS INDEX: 25.84 KG/M2 | SYSTOLIC BLOOD PRESSURE: 119 MMHG | RESPIRATION RATE: 16 BRPM | DIASTOLIC BLOOD PRESSURE: 75 MMHG

## 2017-12-22 DIAGNOSIS — J84.9 ILD (INTERSTITIAL LUNG DISEASE) (H): Primary | ICD-10-CM

## 2017-12-22 PROBLEM — D89.89 ANTISYNTHETASE SYNDROME (H): Status: RESOLVED | Noted: 2017-07-25 | Resolved: 2017-12-22

## 2017-12-22 PROCEDURE — 99212 OFFICE O/P EST SF 10 MIN: CPT | Mod: ZF

## 2017-12-22 ASSESSMENT — PAIN SCALES - GENERAL: PAINLEVEL: NO PAIN (0)

## 2017-12-22 NOTE — LETTER
12/22/2017      RE: Kwaku Medrano  [address] [facility] [id number]       [facility]    Reason for Visit  Kwaku Medrano is a 52 year old year old male who is being seen for Interstitial Lung Disease (ILD) (Patient is being seen for ILD follow up)    HPI  Mr. Medrano is a 52-year-old who is here for follow up of scleroderma interstitial lung disease.  Mr. Medrano has been on prednisone since about June and started mycophenolate approximately in July.  He has been on the full dose of 1500 mg twice since Sept.  He reports that it is easier to make a fist.  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   His weight has been stable.  He does endorse cough with exertion and also has a sore throat and runny nose.  Benzonatate did not help the cough.  He denies paroxysmal nocturnal dyspnea, fevers, chest pain, syncope, or new rash.  He takes prednisone 10 mg daily and MMF 1500 mg twice daily.  He did receive the flu vaccine.     Current Outpatient Prescriptions   Medication     aspirin 81 MG EC tablet     diltiazem (CARDIZEM CD) 240 MG 24 hr capsule     sulfamethoxazole-trimethoprim (BACTRIM) 400-80 MG per tablet     mycophenolate (GENERIC EQUIVALENT) 500 MG tablet     predniSONE (DELTASONE) 20 MG tablet     lisinopril (PRINIVIL/ZESTRIL) 10 MG tablet     Blood Pressure Monitoring KIT     pantoprazole (PROTONIX) 40 MG EC tablet     BENZONATATE PO     No current facility-administered medications for this visit.      Allergies   Allergen Reactions     Ampicillin Rash     Past Medical History:   Diagnosis Date     Atrial fibrillation and flutter (H) 07/2017     Fracture      GERD (gastroesophageal reflux disease)      Interstitial lung disease (H)     sclerderma ILD; present on CXR 3-2017 and CT 5-2017; dx confirmed by CT 9-2017 fibrotic NSIP pattern with increased fibrosis; started mycophenolate  7/2017     Myositis      Scleroderma (H)     dx 9- at [facility] by Dr. Acosta       Past Surgical History:   Procedure Laterality Date     COLONOSCOPY N/A 7/19/2017    Procedure: COLONOSCOPY;  COLONOSCOPY;  Surgeon: Mita Jimenez MD;  Location:  GI     NO HISTORY OF SURGERY         Social History     Social History     Marital status: Single     Spouse name: N/A     Number of children: N/A     Years of education: N/A     Occupational History     Not on file.     Social History Main Topics     Smoking status: Never Smoker     Smokeless tobacco: Former User     Types: Chew     Quit date: 1/1/1984     Alcohol use Yes      Comment: very rarely     Drug use: No     Sexual activity: Not Currently     Other Topics Concern     Not on file     Social History Narrative    Work in [employer].  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  Denies exposure to hot tubs, silica, pet birds, mold.  Single, no children.       Family History   Problem Relation Age of Onset     Prostate Cancer Father      Lung Cancer Other      LUNG DISEASE No family hx of      Rheumatologic Disease No family hx of      ROS Pulmonary  A complete ROS was otherwise negative except as noted in the HPI.    Vitals: /75 (BP Location: Right arm, Patient Position: Chair, Cuff Size: Adult Regular)  Pulse 62  Resp 16  Wt 79.4 kg (175 lb)  SpO2 98%  BMI 25.84 kg/m2    Exam:   GENERAL APPEARANCE: Well developed, well nourished, alert, and in no apparent distress.  RESP: good air flow throughout.  No crackles. No rhonchi. No wheezes.  CV: Normal S1, S2, regular rhythm, normal rate. No murmur.  No LE edema.   MS: extremities normal. No clubbing. No cyanosis.  SKIN: no rash on limited exam.  NEURO: Mentation intact, speech normal, normal gait and stance.  PSYCH: mentation appears normal. and affect normal/bright.    Results:  Recent Results (from the past 168 hour(s))   CBC with platelets differential     Collection Time: 12/20/17  7:36 AM   Result Value Ref Range    WBC 7.7 4.0 - 11.0 10e9/L    RBC Count 4.56 4.4 - 5.9 10e12/L    Hemoglobin 13.8 13.3 - 17.7 g/dL    Hematocrit 43.8 40.0 - 53.0 %    MCV 96 78 - 100 fl    MCH 30.3 26.5 - 33.0 pg    MCHC 31.5 31.5 - 36.5 g/dL    RDW 12.7 10.0 - 15.0 %    Platelet Count 395 150 - 450 10e9/L    Diff Method Automated Method     % Neutrophils 63.6 %    % Lymphocytes 23.2 %    % Monocytes 11.6 %    % Eosinophils 1.2 %    % Basophils 0.4 %    Absolute Neutrophil 4.9 1.6 - 8.3 10e9/L    Absolute Lymphocytes 1.8 0.8 - 5.3 10e9/L    Absolute Monocytes 0.9 0.0 - 1.3 10e9/L    Absolute Eosinophils 0.1 0.0 - 0.7 10e9/L    Absolute Basophils 0.0 0.0 - 0.2 10e9/L   Comprehensive metabolic panel    Collection Time: 12/20/17  7:36 AM   Result Value Ref Range    Sodium 140 133 - 144 mmol/L    Potassium 4.0 3.4 - 5.3 mmol/L    Chloride 105 94 - 109 mmol/L    Carbon Dioxide 27 20 - 32 mmol/L    Anion Gap 8 3 - 14 mmol/L    Glucose 98 70 - 99 mg/dL    Urea Nitrogen 18 7 - 30 mg/dL    Creatinine 1.02 0.66 - 1.25 mg/dL    GFR Estimate 76 >60 mL/min/1.7m2    GFR Estimate If Black >90 >60 mL/min/1.7m2    Calcium 9.2 8.5 - 10.1 mg/dL    Bilirubin Total 0.3 0.2 - 1.3 mg/dL    Albumin 3.6 3.4 - 5.0 g/dL    Protein Total 5.9 (L) 6.8 - 8.8 g/dL    Alkaline Phosphatase 74 40 - 150 U/L    ALT 19 0 - 70 U/L    AST 8 0 - 45 U/L   General PFT Lab (Please always keep checked)    Collection Time: 12/22/17  7:43 AM   Result Value Ref Range    FVC-Pred 4.68 L    FVC-Pre 3.91 L    FVC-%Pred-Pre 83 %    FEV1-Pre 3.47 L    FEV1-%Pred-Pre 94 %    FEV1FVC-Pred 79 %    FEV1FVC-Pre 89 %    FEFMax-Pred 9.37 L/sec    FEFMax-Pre 10.69 L/sec    FEFMax-%Pred-Pre 114 %    FEF2575-Pred 3.24 L/sec    FEF2575-Pre 4.75 L/sec    LSL8528-%Pred-Pre 146 %    ExpTime-Pre 5.04 sec    FIFMax-Pre 5.90 L/sec    VC-Pred 4.94 L    VC-Pre 4.00 L    VC-%Pred-Pre 80 %    IC-Pred 3.64 L    IC-Pre 2.43 L    IC-%Pred-Pre 66 %    ERV-Pred  1.30 L    ERV-Pre 1.57 L    ERV-%Pred-Pre 120 %    FEV1FEV6-Pred 80 %    FEV1FEV6-Pre 89 %    FRCPleth-Pred 3.50 L    FRCPleth-Pre 2.68 L    FRCPleth-%Pred-Pre 76 %    RVPleth-Pred 2.26 L    RVPleth-Pre 1.10 L    RVPleth-%Pred-Pre 48 %    TLCPleth-Pred 6.92 L    TLCPleth-Pre 5.10 L    TLCPleth-%Pred-Pre 73 %    DLCOunc-Pred 29.29 ml/min/mmHg    DLCOunc-Pre 20.16 ml/min/mmHg    DLCOunc-%Pred-Pre 68 %    DLCOcor-Pre 20.64 ml/min/mmHg    DLCOcor-%Pred-Pre 70 %    VA-Pre 5.08 L    VA-%Pred-Pre 76 %    FEV1SVC-Pred 74 %    FEV1SVC-Pre 87 %        FVC FEV1 TLC DLCO   [facility] 2.79 58% 2.54 68%   12.9 52%   8-3-2017 [facility] 3.34 2.85 4.89 17.63   9- 3.46 73% 3.13 85% 4.73 68% 19.77 67%   12- 3.91 83% 3.47 94% 5.10 73% 20.16 68%     I reviewed the pulmonary function test that was performed today.  PFT today shows mild restrictive lung disease with a mildly reduced diffusion. Spirometry and diffusion have improved compared to 3 months ago.  I reviewed labs: normal.    I reviewed results with the patient.      Assessment and plan:   Mr. Medrano is a 52-year-old who is here for follow up of scleroderma interstitial lung disease.   1.  Scleroderma interstitial lung disease.   PFT has improved on mycophenolate.  We will continue the current dose of 1500 mg twice daily.  There is no need to change to cyclophosphamide.  He will need the pneumonia vaccine (23 Valent) in 07/2018.  We will continue mycophenolate 1500 mg twice daily.   I encouraged him to continue physical activity as he is doing.  I recommended that he try OTC robitussin for cough.  If that does not work, then can try robitussion DM.   He will return in 3 months with full PFT.   2.  Immunosuppression monitoring.   Labs are acceptable.  I will check labs again in 3 months to monitor the mycophenolate.  He will continue Bactrim for Pneumocystis prophylaxis.  I have counseled him to avoid sick contacts.       Elma Dillon MD

## 2017-12-22 NOTE — MR AVS SNAPSHOT
After Visit Summary   12/22/2017    Kwaku Medrano    MRN: [id number]           Patient Information     Date Of Birth          1965        Visit Information        Provider Department      12/22/2017 8:30 AM Elma Dillon MD [facility]        Today's Diagnoses     ILD (interstitial lung disease) (H)    -  1       Follow-ups after your visit        Follow-up notes from your care team     Return in about 3 months (around 3/22/2018).      Your next 10 appointments already scheduled     Feb 15, 2018  8:00 AM CST   (Arrive by 7:45 AM)   Return Visit with Denny Acosta MD   [facility] ([facility])    [address] [place] [id number] [phone]            Mar 30, 2018  7:30 AM CDT   FULL PULMONARY FUNCTION with  PFL A   [facility] Function Testing ([facility])    [address] [place] [id number] [phone]            Mar 30, 2018  8:30 AM CDT   (Arrive by 8:15 AM)   Return Interstitial Lung with Elma Dillon MD   [facility] ([facility])    [address] [place] [address] [id number] [phone]              Future tests that were ordered for you today     Open Future Orders        Priority Expected Expires Ordered    CBC with platelets differential Routine 3/22/2018 12/22/2018 12/22/2017    Comprehensive metabolic panel Routine 3/22/2018 12/22/2018 12/22/2017    General PFT Lab (Please always keep checked) Routine 3/22/2018 12/22/2018 12/22/2017    Pulmonary Function Test Routine 3/22/2018 12/22/2018 12/22/2017            Who to contact     If you have questions or need follow up information about today's clinic visit or your schedule please contact [facility] directly at [phone].  Normal or non-critical lab and imaging  results will be communicated to you by MyChart, letter or phone within 4 business days after the clinic has received the results. If you do not hear from us within 7 days, please contact the clinic through [employer] or phone. If you have a critical or abnormal lab result, we will notify you by phone as soon as possible.  Submit refill requests through [employer] or call your pharmacy and they will forward the refill request to us. Please allow 3 business days for your refill to be completed.          Additional Information About Your Visit        [employer] Information     [employer] gives you secure access to your electronic health record. If you see a primary care provider, you can also send messages to your care team and make appointments. If you have questions, please call your primary care clinic.  If you do not have a primary care provider, please call [phone] and they will assist you.        Care EveryWhere ID     This is your Care EveryWhere ID. This could be used by other organizations to access your [place] medical records  [id number]        Your Vitals Were     Pulse Respirations Pulse Oximetry BMI (Body Mass Index)          62 16 98% 25.84 kg/m2         Blood Pressure from Last 3 Encounters:   12/22/17 119/75   11/16/17 110/60   11/06/17 120/74    Weight from Last 3 Encounters:   12/22/17 79.4 kg (175 lb)   11/16/17 78 kg (172 lb)   11/06/17 79.6 kg (175 lb 6.4 oz)               Primary Care Provider Office Phone # Fax #    Stew Caldwell [phone]       [address] [place] [phone]        Equal Access to Services     [facility]: Hadii aad ku hadasho Soomaali, waaxda luqadaha, qaybta kaalmada kristopher, keegan gar . So [facility] [phone].    ATENCIÓN: Si habla español, tiene a rubalcava disposición servicios gratuitos de asistencia lingüística. Llame al [phone].    We comply with applicable federal civil rights laws and [place] laws. We do  not discriminate on the basis of race, color, national origin, age, disability, sex, sexual orientation, or gender identity.            Thank you!     Thank you for choosing [facility]  for your care. Our goal is always to provide you with excellent care. Hearing back from our patients is one way we can continue to improve our services. Please take a few minutes to complete the written survey that you may receive in the mail after your visit with us. Thank you!             Your Updated Medication List - Protect others around you: Learn how to safely use, store and throw away your medicines at www.disposemymeds.org.          This list is accurate as of: 12/22/17  2:24 PM.  Always use your most recent med list.                   Brand Name Dispense Instructions for use Diagnosis    aspirin 81 MG EC tablet     30 tablet    Take 1 tablet (81 mg) by mouth daily        BENZONATATE PO      Take 200 mg by mouth 3 times daily as needed    ILD (interstitial lung disease) (H), Antisynthetase syndrome (H), Dermatomyositis (H), Scleroderma (H), High risk medications (not anticoagulants) long-term use       Blood Pressure Monitoring Kit     1 kit    1 each three times a week Dr Acosta has asked you to check your blood pressure 2-3 times per week using your home monitor.  Please keep track of your findings in a journal and bring it to your appointments.  Contact this office if your blood pressure: the top number (systolic) is consistently 30 points higher than your normal BP or if the bottom number (diastolic) is consistently 20 points higher than your normal BP.    ILD (interstitial lung disease) (H), Dermatomyositis (H), Systemic sclerosis (H), Gastroesophageal reflux disease, esophagitis presence not specified, High risk medications (not anticoagulants) long-term use       diltiazem 240 MG 24 hr capsule    CARDIZEM CD    30 capsule    Take 1 capsule (240 mg) by mouth daily    Paroxysmal atrial  fibrillation (H)       lisinopril 10 MG tablet    PRINIVIL/ZESTRIL    30 tablet    Take 1 tablet (10 mg) by mouth daily    Raynaud's disease without gangrene, Atrial fibrillation, unspecified type (H)       mycophenolate 500 MG tablet    GENERIC EQUIVALENT    180 tablet    Take 3 tablets (1,500 mg) by mouth 2 times daily Monitoring labs required every 8 weeks for medication refills.    ILD (interstitial lung disease) (H), Myositis, unspecified myositis type, unspecified site, Polyarthritis, Dermatomyositis (H), Antisynthetase syndrome (H)       pantoprazole 40 MG EC tablet    PROTONIX    30 tablet    Take 1 tablet (40 mg) by mouth daily    Gastroesophageal reflux disease, esophagitis presence not specified, Esophageal dysmotility       predniSONE 20 MG tablet    DELTASONE     Take 10 mg by mouth daily        sulfamethoxazole-trimethoprim 400-80 MG per tablet    BACTRIM    60 tablet    Once daily for PJP prophylaxis. Start after bronchoscopy    ILD (interstitial lung disease) (H), Inflammatory arthritis

## 2017-12-22 NOTE — NURSING NOTE
Chief Complaint   Patient presents with     Interstitial Lung Disease (ILD)     Patient is being seen for ILD follow up      Pilar Schmitz CMA at 8:14 AM on 12/22/2017

## 2017-12-22 NOTE — PROGRESS NOTES
[facility] Interstitial Lung Disease Clinic    Reason for Visit  Kwaku Medrano is a 52 year old year old male who is being seen for Interstitial Lung Disease (ILD) (Patient is being seen for ILD follow up)    HPI    Mr. Medrano is a 52-year-old who is here for follow up of scleroderma interstitial lung disease.  Mr. Medrano has been on prednisone since about June and started mycophenolate approximately in July.  He has been on the full dose of 1500 mg twice since Sept.  He reports that it is easier to make a fist.  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   His weight has been stable.  He does endorse cough with exertion and also has a sore throat and runny nose.  Benzonatate did not help the cough.  He denies paroxysmal nocturnal dyspnea, fevers, chest pain, syncope, or new rash.  He takes prednisone 10 mg daily and MMF 1500 mg twice daily.  He did receive the flu vaccine.         Current Outpatient Prescriptions   Medication     aspirin 81 MG EC tablet     diltiazem (CARDIZEM CD) 240 MG 24 hr capsule     sulfamethoxazole-trimethoprim (BACTRIM) 400-80 MG per tablet     mycophenolate (GENERIC EQUIVALENT) 500 MG tablet     predniSONE (DELTASONE) 20 MG tablet     lisinopril (PRINIVIL/ZESTRIL) 10 MG tablet     Blood Pressure Monitoring KIT     pantoprazole (PROTONIX) 40 MG EC tablet     BENZONATATE PO     No current facility-administered medications for this visit.      Allergies   Allergen Reactions     Ampicillin Rash     Past Medical History:   Diagnosis Date     Atrial fibrillation and flutter (H) 07/2017     Fracture      GERD (gastroesophageal reflux disease)      Interstitial lung disease (H)     sclerderma ILD; present on CXR 3-2017 and CT 5-2017; dx confirmed by CT 9-2017 fibrotic NSIP pattern with increased fibrosis; started mycophenolate 7/2017     Myositis      Scleroderma (H)     dx 9- at [facility] by Dr. Acosta        Past Surgical History:   Procedure Laterality Date     COLONOSCOPY N/A 7/19/2017    Procedure: COLONOSCOPY;  COLONOSCOPY;  Surgeon: Mita Jimenez MD;  Location: [facility]     NO HISTORY OF SURGERY         Social History     Social History     Marital status: Single     Spouse name: N/A     Number of children: N/A     Years of education: N/A     Occupational History     Not on file.     Social History Main Topics     Smoking status: Never Smoker     Smokeless tobacco: Former User     Types: Chew     Quit date: 1/1/1984     Alcohol use Yes      Comment: very rarely     Drug use: No     Sexual activity: Not Currently     Other Topics Concern     Not on file     Social History Narrative    Work in [employer].  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  Denies exposure to hot tubs, silica, pet birds, mold.  Single, no children.       Family History   Problem Relation Age of Onset     Prostate Cancer Father      Lung Cancer Other      LUNG DISEASE No family hx of      Rheumatologic Disease No family hx of              ROS Pulmonary  A complete ROS was otherwise negative except as noted in the HPI.    Vitals: /75 (BP Location: Right arm, Patient Position: Chair, Cuff Size: Adult Regular)  Pulse 62  Resp 16  Wt 79.4 kg (175 lb)  SpO2 98%  BMI 25.84 kg/m2    Exam:   GENERAL APPEARANCE: Well developed, well nourished, alert, and in no apparent distress.  RESP: good air flow throughout.  No crackles. No rhonchi. No wheezes.  CV: Normal S1, S2, regular rhythm, normal rate. No murmur.  No LE edema.   MS: extremities normal. No clubbing. No cyanosis.  SKIN: no rash on limited exam.  NEURO: Mentation intact, speech normal, normal gait and stance.  PSYCH: mentation appears normal. and affect normal/bright.    Results:  Recent Results (from the past 168 hour(s))   CBC with platelets differential    Collection Time: 12/20/17  7:36 AM   Result Value Ref Range    WBC 7.7 4.0 - 11.0  10e9/L    RBC Count 4.56 4.4 - 5.9 10e12/L    Hemoglobin 13.8 13.3 - 17.7 g/dL    Hematocrit 43.8 40.0 - 53.0 %    MCV 96 78 - 100 fl    MCH 30.3 26.5 - 33.0 pg    MCHC 31.5 31.5 - 36.5 g/dL    RDW 12.7 10.0 - 15.0 %    Platelet Count 395 150 - 450 10e9/L    Diff Method Automated Method     % Neutrophils 63.6 %    % Lymphocytes 23.2 %    % Monocytes 11.6 %    % Eosinophils 1.2 %    % Basophils 0.4 %    Absolute Neutrophil 4.9 1.6 - 8.3 10e9/L    Absolute Lymphocytes 1.8 0.8 - 5.3 10e9/L    Absolute Monocytes 0.9 0.0 - 1.3 10e9/L    Absolute Eosinophils 0.1 0.0 - 0.7 10e9/L    Absolute Basophils 0.0 0.0 - 0.2 10e9/L   Comprehensive metabolic panel    Collection Time: 12/20/17  7:36 AM   Result Value Ref Range    Sodium 140 133 - 144 mmol/L    Potassium 4.0 3.4 - 5.3 mmol/L    Chloride 105 94 - 109 mmol/L    Carbon Dioxide 27 20 - 32 mmol/L    Anion Gap 8 3 - 14 mmol/L    Glucose 98 70 - 99 mg/dL    Urea Nitrogen 18 7 - 30 mg/dL    Creatinine 1.02 0.66 - 1.25 mg/dL    GFR Estimate 76 >60 mL/min/1.7m2    GFR Estimate If Black >90 >60 mL/min/1.7m2    Calcium 9.2 8.5 - 10.1 mg/dL    Bilirubin Total 0.3 0.2 - 1.3 mg/dL    Albumin 3.6 3.4 - 5.0 g/dL    Protein Total 5.9 (L) 6.8 - 8.8 g/dL    Alkaline Phosphatase 74 40 - 150 U/L    ALT 19 0 - 70 U/L    AST 8 0 - 45 U/L   General PFT Lab (Please always keep checked)    Collection Time: 12/22/17  7:43 AM   Result Value Ref Range    FVC-Pred 4.68 L    FVC-Pre 3.91 L    FVC-%Pred-Pre 83 %    FEV1-Pre 3.47 L    FEV1-%Pred-Pre 94 %    FEV1FVC-Pred 79 %    FEV1FVC-Pre 89 %    FEFMax-Pred 9.37 L/sec    FEFMax-Pre 10.69 L/sec    FEFMax-%Pred-Pre 114 %    FEF2575-Pred 3.24 L/sec    FEF2575-Pre 4.75 L/sec    CKU7558-%Pred-Pre 146 %    ExpTime-Pre 5.04 sec    FIFMax-Pre 5.90 L/sec    VC-Pred 4.94 L    VC-Pre 4.00 L    VC-%Pred-Pre 80 %    IC-Pred 3.64 L    IC-Pre 2.43 L    IC-%Pred-Pre 66 %    ERV-Pred 1.30 L    ERV-Pre 1.57 L    ERV-%Pred-Pre 120 %    FEV1FEV6-Pred 80 %     FEV1FEV6-Pre 89 %    FRCPleth-Pred 3.50 L    FRCPleth-Pre 2.68 L    FRCPleth-%Pred-Pre 76 %    RVPleth-Pred 2.26 L    RVPleth-Pre 1.10 L    RVPleth-%Pred-Pre 48 %    TLCPleth-Pred 6.92 L    TLCPleth-Pre 5.10 L    TLCPleth-%Pred-Pre 73 %    DLCOunc-Pred 29.29 ml/min/mmHg    DLCOunc-Pre 20.16 ml/min/mmHg    DLCOunc-%Pred-Pre 68 %    DLCOcor-Pre 20.64 ml/min/mmHg    DLCOcor-%Pred-Pre 70 %    VA-Pre 5.08 L    VA-%Pred-Pre 76 %    FEV1SVC-Pred 74 %    FEV1SVC-Pre 87 %        FVC FEV1 TLC DLCO   [facility] 2.79 58% 2.54 68%   12.9 52%   8-3-2017 [facility] 3.34 2.85 4.89 17.63   9- 3.46 73% 3.13 85% 4.73 68% 19.77 67%   12- 3.91 83% 3.47 94% 5.10 73% 20.16 68%       I reviewed the pulmonary function test that was performed today.  PFT today shows mild restrictive lung disease with a mildly reduced diffusion. Spirometry and diffusion have improved compared to 3 months ago.  I reviewed labs: normal.    I reviewed results with the patient.      Assessment and plan:   Mr. Medrano is a 52-year-old who is here for follow up of scleroderma interstitial lung disease.   1.  Scleroderma interstitial lung disease.  PFT has improved on mycophenolate.  We will continue the current dose of 1500 mg twice daily.  There is no need to change to cyclophosphamide.  He will need the pneumonia vaccine (23 Valent) in 07/2018.  We will continue mycophenolate 1500 mg twice daily.  I encouraged him to continue physical activity as he is doing.  I recommended that he try OTC robitussin for cough.  If that does not work, then can try robitussion DM.   He will return in 3 months with full PFT.   2.  Immunosuppression monitoring.  Labs are acceptable.  I will check labs again in 3 months to monitor the mycophenolate.  He will continue Bactrim for Pneumocystis prophylaxis.  I have counseled him to avoid sick contacts.

## 2018-01-04 LAB
DLCOCOR-%PRED-PRE: 70 %
DLCOCOR-PRE: 20.64 ML/MIN/MMHG
DLCOUNC-%PRED-PRE: 68 %
DLCOUNC-PRE: 20.16 ML/MIN/MMHG
DLCOUNC-PRED: 29.29 ML/MIN/MMHG
ERV-%PRED-PRE: 120 %
ERV-PRE: 1.57 L
ERV-PRED: 1.3 L
EXPTIME-PRE: 5.04 SEC
FEF2575-%PRED-PRE: 146 %
FEF2575-PRE: 4.75 L/SEC
FEF2575-PRED: 3.24 L/SEC
FEFMAX-%PRED-PRE: 114 %
FEFMAX-PRE: 10.69 L/SEC
FEFMAX-PRED: 9.37 L/SEC
FEV1-%PRED-PRE: 94 %
FEV1-PRE: 3.47 L
FEV1FEV6-PRE: 89 %
FEV1FEV6-PRED: 80 %
FEV1FVC-PRE: 89 %
FEV1FVC-PRED: 79 %
FEV1SVC-PRE: 87 %
FEV1SVC-PRED: 74 %
FIFMAX-PRE: 5.9 L/SEC
FRCPLETH-%PRED-PRE: 76 %
FRCPLETH-PRE: 2.68 L
FRCPLETH-PRED: 3.5 L
FVC-%PRED-PRE: 83 %
FVC-PRE: 3.91 L
FVC-PRED: 4.68 L
IC-%PRED-PRE: 66 %
IC-PRE: 2.43 L
IC-PRED: 3.64 L
RVPLETH-%PRED-PRE: 48 %
RVPLETH-PRE: 1.1 L
RVPLETH-PRED: 2.26 L
TLCPLETH-%PRED-PRE: 73 %
TLCPLETH-PRE: 5.1 L
TLCPLETH-PRED: 6.92 L
VA-%PRED-PRE: 76 %
VA-PRE: 5.08 L
VC-%PRED-PRE: 80 %
VC-PRE: 4 L
VC-PRED: 4.94 L

## 2018-01-05 DIAGNOSIS — I48.0 PAROXYSMAL ATRIAL FIBRILLATION (H): ICD-10-CM

## 2018-01-05 RX ORDER — DILTIAZEM HYDROCHLORIDE 240 MG/1
240 CAPSULE, COATED, EXTENDED RELEASE ORAL DAILY
Qty: 90 CAPSULE | Refills: 3 | Status: SHIPPED | OUTPATIENT
Start: 2018-01-05 | End: 2019-01-30

## 2018-01-12 DIAGNOSIS — M19.90 INFLAMMATORY ARTHRITIS: ICD-10-CM

## 2018-01-12 DIAGNOSIS — J84.9 ILD (INTERSTITIAL LUNG DISEASE) (H): ICD-10-CM

## 2018-01-12 NOTE — TELEPHONE ENCOUNTER
Refill request for Prednisone. Current dose: 20mg    Medication last filled 7/25/17 Quantity 120 Refills 0    Last rheumatology office visit 9/13/17  Future rheumatology office visit none with you/ has apt with Dr. Acosta on 2/15/18    Lab Results   Component Value Date    WBC 7.7 12/20/2017    HGB 13.8 12/20/2017    MCV 96 12/20/2017    MCH 30.3 12/20/2017    MCHC 31.5 12/20/2017    RDW 12.7 12/20/2017     12/20/2017     Lab Results   Component Value Date    ALT 19 12/20/2017     Lab Results   Component Value Date    AST 8 12/20/2017     Lab Results   Component Value Date    CR 1.02 12/20/2017     No results found for: URIC

## 2018-01-15 RX ORDER — PREDNISONE 10 MG/1
10 TABLET ORAL DAILY
Qty: 90 TABLET | Refills: 0 | Status: SHIPPED | OUTPATIENT
Start: 2018-01-15 | End: 2018-06-26

## 2018-01-15 NOTE — TELEPHONE ENCOUNTER
Contacted pt to verify his current dose of prednisone. He is taking 10 mg of prednisone daily. Prescription set up to reflect this.     Last seen: 9/13/17  Pending appt: 2/5/18  Medication: prednisone 20 mg tabs   Last filled: unknown  Qty: 105  Lab:    Prescription changed to 10 mg tabs so that he doesn't need to split. Routed to Dr Acosta to review and approve.    VON LawsN RN  Rheumatology RN Coordinator  [facility]

## 2018-01-30 DIAGNOSIS — J84.9 ILD (INTERSTITIAL LUNG DISEASE) (H): ICD-10-CM

## 2018-01-30 DIAGNOSIS — M33.13 DERMATOMYOSITIS (H): ICD-10-CM

## 2018-01-30 DIAGNOSIS — M60.9 MYOSITIS, UNSPECIFIED MYOSITIS TYPE, UNSPECIFIED SITE: ICD-10-CM

## 2018-01-30 DIAGNOSIS — M13.0 POLYARTHRITIS: ICD-10-CM

## 2018-01-30 DIAGNOSIS — D89.89 ANTISYNTHETASE SYNDROME (H): ICD-10-CM

## 2018-01-30 RX ORDER — MYCOPHENOLATE MOFETIL 500 MG/1
1500 TABLET ORAL 2 TIMES DAILY
Qty: 180 TABLET | Refills: 2 | Status: SHIPPED | OUTPATIENT
Start: 2018-01-30 | End: 2018-05-08

## 2018-01-30 NOTE — TELEPHONE ENCOUNTER
mycophenolate (GENERIC EQUIVALENT) 500 MG    Last Written Prescription Date:  9/29/17  Last Fill Quantity: 180,   # refills: 3  Last Office Visit: 11/6/17  Future Office visit:  2/15/18    CBC RESULTS:   Recent Labs   Lab Test  12/20/17   0736   WBC  7.7   RBC  4.56   HGB  13.8   HCT  43.8   MCV  96   MCH  30.3   MCHC  31.5   RDW  12.7   PLT  395       Creatinine   Date Value Ref Range Status   12/20/2017 1.02 0.66 - 1.25 mg/dL Final   ]    Liver Function Studies -   Recent Labs   Lab Test  12/20/17   0736   PROTTOTAL  5.9*   ALBUMIN  3.6   BILITOTAL  0.3   ALKPHOS  74   AST  8   ALT  19                                            mycophenolate (GENERIC EQUIVALENT) 500 MG tablet  Take 3 tablets (1,500 mg) by mouth 2 times daily Monitoring labs required every 8 weeks for medication refills.       Disp: 180 tablet Refills: 3

## 2018-02-14 DIAGNOSIS — M33.13 DERMATOMYOSITIS (H): ICD-10-CM

## 2018-02-14 DIAGNOSIS — K21.9 GASTROESOPHAGEAL REFLUX DISEASE, ESOPHAGITIS PRESENCE NOT SPECIFIED: ICD-10-CM

## 2018-02-14 DIAGNOSIS — Z79.899 HIGH RISK MEDICATIONS (NOT ANTICOAGULANTS) LONG-TERM USE: ICD-10-CM

## 2018-02-14 DIAGNOSIS — J84.9 ILD (INTERSTITIAL LUNG DISEASE) (H): ICD-10-CM

## 2018-02-14 DIAGNOSIS — Z79.899 HIGH RISK MEDICATION USE: ICD-10-CM

## 2018-02-14 DIAGNOSIS — M34.9 SCLERODERMA (H): ICD-10-CM

## 2018-02-14 DIAGNOSIS — M34.9 SYSTEMIC SCLEROSIS (H): ICD-10-CM

## 2018-02-14 LAB
ALT SERPL W P-5'-P-CCNC: 20 U/L (ref 0–70)
AST SERPL W P-5'-P-CCNC: 10 U/L (ref 0–45)
BASOPHILS # BLD AUTO: 0 10E9/L (ref 0–0.2)
BASOPHILS NFR BLD AUTO: 0.5 %
CRP SERPL-MCNC: <2.9 MG/L (ref 0–8)
DIFFERENTIAL METHOD BLD: ABNORMAL
EOSINOPHIL # BLD AUTO: 0.1 10E9/L (ref 0–0.7)
EOSINOPHIL NFR BLD AUTO: 1.9 %
ERYTHROCYTE [DISTWIDTH] IN BLOOD BY AUTOMATED COUNT: 13.8 % (ref 10–15)
ERYTHROCYTE [SEDIMENTATION RATE] IN BLOOD BY WESTERGREN METHOD: 1 MM/H (ref 0–20)
HCT VFR BLD AUTO: 43.6 % (ref 40–53)
HGB BLD-MCNC: 13.7 G/DL (ref 13.3–17.7)
LYMPHOCYTES # BLD AUTO: 1.2 10E9/L (ref 0.8–5.3)
LYMPHOCYTES NFR BLD AUTO: 20.2 %
MCH RBC QN AUTO: 29 PG (ref 26.5–33)
MCHC RBC AUTO-ENTMCNC: 31.4 G/DL (ref 31.5–36.5)
MCV RBC AUTO: 92 FL (ref 78–100)
MONOCYTES # BLD AUTO: 0.9 10E9/L (ref 0–1.3)
MONOCYTES NFR BLD AUTO: 15.3 %
NEUTROPHILS # BLD AUTO: 3.6 10E9/L (ref 1.6–8.3)
NEUTROPHILS NFR BLD AUTO: 62.1 %
PLATELET # BLD AUTO: 349 10E9/L (ref 150–450)
RBC # BLD AUTO: 4.72 10E12/L (ref 4.4–5.9)
WBC # BLD AUTO: 5.8 10E9/L (ref 4–11)

## 2018-02-14 PROCEDURE — 80180 DRUG SCRN QUAN MYCOPHENOLATE: CPT | Performed by: INTERNAL MEDICINE

## 2018-02-14 PROCEDURE — 84450 TRANSFERASE (AST) (SGOT): CPT | Performed by: INTERNAL MEDICINE

## 2018-02-14 PROCEDURE — 85025 COMPLETE CBC W/AUTO DIFF WBC: CPT | Performed by: INTERNAL MEDICINE

## 2018-02-14 PROCEDURE — 84460 ALANINE AMINO (ALT) (SGPT): CPT | Performed by: INTERNAL MEDICINE

## 2018-02-14 PROCEDURE — 86140 C-REACTIVE PROTEIN: CPT | Performed by: INTERNAL MEDICINE

## 2018-02-14 PROCEDURE — 36415 COLL VENOUS BLD VENIPUNCTURE: CPT | Performed by: INTERNAL MEDICINE

## 2018-02-14 PROCEDURE — 85652 RBC SED RATE AUTOMATED: CPT | Performed by: INTERNAL MEDICINE

## 2018-02-15 ENCOUNTER — OFFICE VISIT (OUTPATIENT)
Dept: RHEUMATOLOGY | Facility: CLINIC | Age: 53
End: 2018-02-15
Attending: INTERNAL MEDICINE
Payer: COMMERCIAL

## 2018-02-15 VITALS
WEIGHT: 171.5 LBS | HEIGHT: 69 IN | HEART RATE: 66 BPM | DIASTOLIC BLOOD PRESSURE: 69 MMHG | BODY MASS INDEX: 25.4 KG/M2 | SYSTOLIC BLOOD PRESSURE: 113 MMHG | TEMPERATURE: 97.6 F

## 2018-02-15 DIAGNOSIS — Z79.899 HIGH RISK MEDICATIONS (NOT ANTICOAGULANTS) LONG-TERM USE: Primary | ICD-10-CM

## 2018-02-15 DIAGNOSIS — M34.9 SCLERODERMA (H): ICD-10-CM

## 2018-02-15 DIAGNOSIS — J84.9 ILD (INTERSTITIAL LUNG DISEASE) (H): ICD-10-CM

## 2018-02-15 DIAGNOSIS — M19.90 INFLAMMATORY ARTHRITIS: ICD-10-CM

## 2018-02-15 PROCEDURE — G0463 HOSPITAL OUTPT CLINIC VISIT: HCPCS | Mod: ZF

## 2018-02-15 RX ORDER — PREDNISONE 2.5 MG/1
7.5 TABLET ORAL DAILY
Qty: 270 TABLET | Refills: 1 | Status: SHIPPED | OUTPATIENT
Start: 2018-02-15 | End: 2018-11-06

## 2018-02-15 RX ORDER — SULFAMETHOXAZOLE AND TRIMETHOPRIM 400; 80 MG/1; MG/1
TABLET ORAL
Qty: 180 TABLET | Refills: 3 | Status: SHIPPED | OUTPATIENT
Start: 2018-02-15 | End: 2019-04-19

## 2018-02-15 ASSESSMENT — PAIN SCALES - GENERAL: PAINLEVEL: NO PAIN (0)

## 2018-02-15 NOTE — PROGRESS NOTES
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        PLAN AND RECOMMENDATIONS:      I certainly think we have to be pleased with how he is doing overall.  The real question is now whether we can go down a little bit on his prednisone given his RNA polymerase III antibody, as this places him at chronically higher risk of renal crisis.  All that is discussed with him today.       My preference would be to wait until he has his next set of pulmonary function tests with Dr. Dillon.  If those show stability at that time, I would consider tapering him down to 7.5 mg a day of prednisone and I have given him 2.5-mg tablets for that purpose.       We have also renewed his Bactrim today.       He did have a couple of heart palpitations on examination, but nothing that seemed prolonged enough to represent atrial fibrillation and, as noted, he will be having followup for that in May.       I did discuss with him that muscle cramps might be improved by Coenzyme Q10 or magnesium, and he will try those as well.       I will see him back in approximately 4 months, sooner p.r.n.                         History of Present Illness:   Kwaku Medrano is a 52 year old male who presents for The patient is here for consultative evaluation at the request of Dr. Golden, who has seen him on several prior occasions and has followed him for a presumptive  diagnosis of dermatomyositis.      For prior evaluation done by Dr. Golden, please see his most recent notes.       He has been seeing the patient for several months now after the patient initially developed inflammatory joint symptoms following a viral infection in 12/2016.  Initially these were really the dominant symptoms, but he then progressed to have some features consistent with inflammatory myositis including some muscle weakness.       Extensive serologic evaluation was unrevealing, with a negative SHERRELL by EIA, negative myositis panel, negative Marlene-1, and negative extractable nuclear antigens including topoisomerase.      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       With the institution of MMF which was dose escalated to 2 grams a day approximately 3 weeks ago and to 3 grams a day just about 2 weeks ago with no side effects, he has noted gradual improvement in his primary symptoms of pulmonary hypertension which are dry cough with deeper inspiration as well as some exertional dyspnea.  Those have improved somewhat, and he is able to get back to nearly his normal function as a .       He has had repeat pulmonary function tests done and a repeat HRCT done just in the last several weeks, and this shows quite significant fibrotic NSIP which I have reviewed today with Dr. Dillon in our Pulmonary Department who is seeing the patient tomorrow.  He is also due for repeat PFTs and a    6-minute walk test including desaturation tomorrow.       In addition, he is seeing a cardiologist tomorrow.  He developed atrial fibrillation a few weeks ago and has now been  placed on Eliquis for that as well as diltiazem for rate control.  He does note it, but does not feel particularly poorly with the atrial fibrillation.  Notably, this has occurred during a time frame when he is on high-dose steroids, but an echocardiogram also shows that he is having some left ventricular hypertrophy and a decreased ejection fraction which is likely diastolic.  He also has diastolic hypertension at this point.       In addition to these features, over the last few months he has had progressive skin thickening of his hands and now his forearms.  This initially manifested primarily as swelling in the hands but is now definitely including some skin thickening.  He is getting some subtle skin thickening in the shins as well as in the feet, and he is noticing some hair loss there.  In addition to these features, he is also getting cyanosis in both the hands and the feet.  Fortunately, he has not had the development of any digital ulcerations.       He clearly has GERD.  That maybe a little bit better currently despite the fact that Dr. Golden has eliminated using the proton pump inhibitor because of concerns about the interaction with CellCept.  He therefore put him only on a Zantac dosing currently.  He has been avoiding eating within 3 hours of bedtime and has been using a wedge, but he finds he slips down in the middle of the night.  Nonetheless, he denies significant dry cough first thing in the morning and does not feel his breathing is worse at that time.  He is not having any dysphagia, however.       He continues to have some subtle weakness in his thighs but does not feel like this impairs him in his ability to do things, and he is able to get in and out of a car, go up and down stairs, and do his work without particular problems.  He does think it might be a little better since he is on the higher dose prednisone.      For additional details of his medical history, please see   [facility]'s prior notes.  The remainder of a full 10-point review of systems including focused review on the scleroderma intake form is notable only for some weight loss that he says he was attempting.       His family history is notable for an aunt with lupus on his maternal side.        OCTOBER 10, 2017, INTERVAL HISTORY:  Since we have last seen him, he has been able to decrease his prednisone down to 10 mg a day.  He really has felt pretty stable in making that taper and has not felt any worse.      He has been religiously monitoring his blood pressures.  Those are reviewed today and they are all nicely within the normal limits.  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       He has been placed on diltiazem.  One week ago Monday he started noticing a lot of swelling in his ankles, potentially consistent with a known side effect of this drug.       He is tolerating his MMF well.  He has some dyspnea on exertion, but no chest pain.       He does believe his skin is overall stable.  He has not noticed any further skin thickening.  He is not sure he has noticed any real improvement in it, either.       The low-grade weakness he has experienced feels stable to him and not clearly worse.  Other manifestations of his disease process appear to be stable.       NOVEMBER 6, 2017, INTERVAL HISTORY:  Since we have last seen the patient, he is starting to feel significantly better.  His skin, in particular, feels better to him.  He is noting decreased stiffness in the skin, tendons and joints of the hands and it is now down to less than an hour most days in his right hand and very minimally in his left hand.  Occasionally it is a little longer than that.  Laboratory testing is reviewed and he is showing minimal evidence of toxicity, although there was an initial drop in his  hemoglobin and hematocrit, but that has apparently stabilized at 11.       His blood pressures are brought with him and they are very good with a high systolic pressure of 124 and the diastolics in the 60s-70s range.       He remains on the diltiazem which he was put on for rate control of atrial flutter.  He is due to see his cardiologist status post the ablation in the next week.       With respect to his diltiazem, it still gives him a little bit of peripheral edema, but he also thinks that his digital ulcerations and Raynaud's phenomena have been improved despite increasingly colder weather in the last several weeks.  He has had less pain and no new digital ulcers during that time.       He has also had very minimal GERD and dysphagia.  Eating can trigger his cough, however.      The other thing that triggers cough is climbing more than 2 flights of stairs.  He is still working and needs to do this at times.  In general, he feels like his breathing is better and his cough is better, however.     Feb. 15, 2018 INTERVAL HISTORY:  Since we have last seen this individual, he has generally felt his exercise tolerance has been improved.  His PFTs would reflect that based on the 12/27 PFTs which show that he had about a 20% overall improvement since we started him on the MMF.  He did see Dr. Dillon at that time.      He is now able to do most of his activities pretty well.  He can climb 2 flights of stairs with no difficulty.  He still coughs with greater levels of exertion and actually tried to climb 9 flights of stairs on his way out of the parking ramp to get here today and found he had some difficulties with that.       He does still get some occasional heart palpitations despite his prior ablation for atrial fibrillation but has not had any full-blown atrial fib episodes that he can identify.  He is getting an echocardiogram in May.       He is also getting repeat pulmonary function tests in March.      In terms of  muscle strength, he feels that is very good.  He gets some occasional muscle cramps at night that can bother him quite a bit, but he does not note any real weakness.       Raynaud's phenomena have also improved and he gets relatively few attacks and when he gets them can easily break the attacks within 15 minutes by rewarming.      He is not getting any significant GERD and has no other GI problems.      He does think his skin is improving somewhat as well.  With respect to his joints, he gets occasional but not too severe inflammatory morning stiffness in his hands, but also thinks that has improved.      He has no other new medical problems.                             Review of Systems:   Review Of Systems as above, otherwise:   Skin: negative  Eyes: negative  Ears/Nose/Throat: negative  Respiratory: No shortness of breath, dyspnea on exertion, cough, or hemoptysis  Cardiovascular: negative  Gastrointestinal: negative  Genitourinary: negative  Musculoskeletal: negative  Neurologic: negative  Psychiatric: negative  Hematologic/Lymphatic/Immunologic: negative  Endocrine: negative          Past Medical History:     Past Medical History:   Diagnosis Date     Atrial fibrillation and flutter (H) 07/2017     Fracture      GERD (gastroesophageal reflux disease)      Interstitial lung disease (H)     sclerderma ILD; present on CXR 3-2017 and CT 5-2017; dx confirmed by CT 9-2017 fibrotic NSIP pattern with increased fibrosis; started mycophenolate 7/2017     Scleroderma (H)     dx 9- at [facility] by Dr. Acosta       Patient Active Problem List    Diagnosis Date Noted     Scleroderma (H)      Priority: Medium     Diastolic hypertension 09/14/2017     Priority: Medium     High risk medications (not anticoagulants) long-term use 09/14/2017     Priority: Medium     Atrial fibrillation, unspecified type (H) 09/14/2017     Priority: Medium     Dermatomyositis (H) 07/25/2017     Priority: Medium     ILD (interstitial lung  disease) (H) 07/25/2017     Priority: Medium     Atrial fibrillation and flutter (H) 07/01/2017     Priority: Medium             Past Surgical History:     Past Surgical History:   Procedure Laterality Date     COLONOSCOPY N/A 7/19/2017    Procedure: COLONOSCOPY;  COLONOSCOPY;  Surgeon: Mita Jimenez MD;  Location:  GI     NO HISTORY OF SURGERY               Social History:     Social History   Substance Use Topics     Smoking status: Never Smoker     Smokeless tobacco: Former User     Types: Chew     Quit date: 1/1/1984     Alcohol use Yes      Comment: very rarely             Family History:     Family History   Problem Relation Age of Onset     Prostate Cancer Father      Lung Cancer Other      LUNG DISEASE No family hx of      Rheumatologic Disease No family hx of             Allergies:     Allergies   Allergen Reactions     Ampicillin Rash             Medications:     Current Outpatient Prescriptions   Medication Sig Dispense Refill     sulfamethoxazole-trimethoprim (BACTRIM) 400-80 MG per tablet Once daily for PJP prophylaxis. Start after bronchoscopy 180 tablet 3     predniSONE (DELTASONE) 2.5 MG tablet Take 3 tablets (7.5 mg) by mouth daily 270 tablet 1     mycophenolate (GENERIC EQUIVALENT) 500 MG tablet Take 3 tablets (1,500 mg) by mouth 2 times daily Monitoring labs required every 8 weeks for medication refills. 180 tablet 2     predniSONE (DELTASONE) 10 MG tablet Take 1 tablet (10 mg) by mouth daily 90 tablet 0     diltiazem (CARDIZEM CD) 240 MG 24 hr capsule Take 1 capsule (240 mg) by mouth daily 90 capsule 3     aspirin 81 MG EC tablet Take 1 tablet (81 mg) by mouth daily 30 tablet 1     lisinopril (PRINIVIL/ZESTRIL) 10 MG tablet Take 1 tablet (10 mg) by mouth daily 30 tablet 11     Blood Pressure Monitoring KIT 1 each three times a week Dr Acosta has asked you to check your blood pressure 2-3 times per week using your home monitor.  Please keep track of your findings in a journal and bring  "it to your appointments.   Contact this office if your blood pressure: the top number (systolic) is consistently 30 points higher than your normal BP or if the bottom number (diastolic) is consistently 20 points higher than your normal BP. 1 kit 1     pantoprazole (PROTONIX) 40 MG EC tablet Take 1 tablet (40 mg) by mouth daily 30 tablet 11            Physical Exam:   Blood pressure 113/69, pulse 66, temperature 97.6  F (36.4  C), temperature source Oral, height 1.753 m (5' 9\"), weight 77.8 kg (171 lb 8 oz).  Constitutional: [id number] cooperative  Eyes: nl EOM, PERRLA, vision, conjunctiva, sclera  ENT: nl external ears, nose, hearing, lips, teeth, gums, throat  No mucous membrane lesions, normal saliva pool  Neck: no mass or thyroid enlargement  Resp: lungs clear to auscultation, nl to palpation  CV: RRR, no murmurs, rubs , no edema  GI: no ABD mass or tenderness, no HSM  : not tested  Lymph: no cervical, supraclavicular, inguinal or epitrochlear nodes  MS: All TMJ, neck, shoulder, elbow, wrist, MCP/PIP/DIP, spine, hip, knee, ankle, and foot MTP/IP joints were examined and  found normal. No active synovitis or deformity. Full ROM.  Normal  strength. No dactylitis,  tenosynovitis, enthespathy   Skin: MRSS= 15, with 3+ involvement in bilat. H. 1+ forearms and face  .      Nailfold telangiectasias. No DU or pits. no nail pitting, alopecia, rash, nodules or lesions  Neuro: nl cranial nerves, strength, sensation, DTRs.   Psych: nl judgement, orientation, memory, affect.         Data:     Lab Results   Component Value Date    WBC 5.8 02/14/2018    WBC 7.7 12/20/2017    WBC 6.9 10/18/2017    HGB 13.7 02/14/2018    HGB 13.8 12/20/2017    HGB 11.0 (L) 10/18/2017    HCT 43.6 02/14/2018    HCT 43.8 12/20/2017    HCT 33.3 (L) 10/18/2017    MCV 92 02/14/2018    MCV 96 12/20/2017    MCV 93 10/18/2017     02/14/2018     12/20/2017     10/18/2017     Lab Results   Component Value Date    BUN 18 12/20/2017 "    BUN 12 10/18/2017    BUN 12 10/10/2017     No components found for: SEDRATE  Lab Results   Component Value Date    TSH 1.36 06/07/2017     Lab Results   Component Value Date    AST 10 02/14/2018    AST 8 12/20/2017    AST 16 10/10/2017    ALT 20 02/14/2018    ALT 19 12/20/2017    ALT 31 10/10/2017    ALKPHOS 74 12/20/2017    ALKPHOS 52 10/10/2017    ALKPHOS 99 03/10/2017     Reviewed Rheumatology lab flowsheet    Denny Acosta

## 2018-02-15 NOTE — NURSING NOTE
"Chief Complaint   Patient presents with     RECHECK     follow up with scleroderma, tzimmer cma       Initial /69  Pulse 66  Temp 97.6  F (36.4  C) (Oral)  Ht 1.753 m (5' 9\")  Wt 77.8 kg (171 lb 8 oz)  BMI 25.33 kg/m2 Estimated body mass index is 25.33 kg/(m^2) as calculated from the following:    Height as of this encounter: 1.753 m (5' 9\").    Weight as of this encounter: 77.8 kg (171 lb 8 oz).  Medication Reconciliation: complete    "

## 2018-02-15 NOTE — MR AVS SNAPSHOT
After Visit Summary   2/15/2018    Kwaku Medrano    MRN: [id number]           Patient Information     Date Of Birth          1965        Visit Information        Provider Department      2/15/2018 8:00 AM Denny Acosta MD [facility] Rheumatology        Today's Diagnoses     High risk medications (not anticoagulants) long-term use    -  1    ILD (interstitial lung disease) (H)        Inflammatory arthritis        Scleroderma (H)           Follow-ups after your visit        Your next 10 appointments already scheduled     Mar 30, 2018  7:30 AM CDT   FULL PULMONARY FUNCTION with  PFL A   [facility] Pulmonary Function Testing ([facility])    [address]  3rd Floor  [place] [address] [phone]            Mar 30, 2018  8:30 AM CDT   (Arrive by 8:15 AM)   Return Interstitial Lung with Elma Dillon MD   [facility] ([facility])    [address]  [address]  [place] [id number] [phone]            Jul 05, 2018  8:00 AM CDT   (Arrive by 7:45 AM)   Return Visit with Denny Acosta MD   [facility] Rheumatology ([facility])    [address]  [address]  [place] [id number] [phone]              Who to contact     If you have questions or need follow up information about today's clinic visit or your schedule please contact [facility] RHEUMATOLOGY directly at [phone].  Normal or non-critical lab and imaging results will be communicated to you by MyChart, letter or phone within 4 business days after the clinic has received the results. If you do not hear from us within 7 days, please contact the clinic through MyChart or phone. If you have a critical or abnormal lab result, we will notify you by phone as soon as possible.  Submit refill requests through [employer] or call your pharmacy and they will forward the refill request to us. Please allow 3  "business days for your refill to be completed.          Additional Information About Your Visit        [employer] Information     [employer] gives you secure access to your electronic health record. If you see a primary care provider, you can also send messages to your care team and make appointments. If you have questions, please call your primary care clinic.  If you do not have a primary care provider, please call [phone] and they will assist you.        Care EveryWhere ID     This is your Care EveryWhere ID. This could be used by other organizations to access your [place] medical records  [id number]        Your Vitals Were     Pulse Temperature Height BMI (Body Mass Index)          66 97.6  F (36.4  C) (Oral) 1.753 m (5' 9\") 25.33 kg/m2         Blood Pressure from Last 3 Encounters:   02/15/18 113/69   12/22/17 119/75   11/16/17 110/60    Weight from Last 3 Encounters:   02/15/18 77.8 kg (171 lb 8 oz)   12/22/17 79.4 kg (175 lb)   11/16/17 78 kg (172 lb)              Today, you had the following     No orders found for display         Today's Medication Changes          These changes are accurate as of 2/15/18 11:59 PM.  If you have any questions, ask your nurse or doctor.               These medicines have changed or have updated prescriptions.        Dose/Directions    * predniSONE 10 MG tablet   Commonly known as:  DELTASONE   This may have changed:  Another medication with the same name was changed. Make sure you understand how and when to take each.   Used for:  ILD (interstitial lung disease) (H), Inflammatory arthritis   Changed by:  Denny Acosta MD        Dose:  10 mg   Take 1 tablet (10 mg) by mouth daily   Quantity:  90 tablet   Refills:  0       * predniSONE 2.5 MG tablet   Commonly known as:  DELTASONE   This may have changed:    - medication strength  - how much to take   Used for:  ILD (interstitial lung disease) (H), Inflammatory arthritis, High risk medications (not anticoagulants) " long-term use, Scleroderma (H)   Changed by:  Denny Acosta MD        Dose:  7.5 mg   Take 3 tablets (7.5 mg) by mouth daily   Quantity:  270 tablet   Refills:  1       * Notice:  This list has 2 medication(s) that are the same as other medications prescribed for you. Read the directions carefully, and ask your doctor or other care provider to review them with you.      Stop taking these medicines if you haven't already. Please contact your care team if you have questions.     BENZONATATE PO   Stopped by:  Denny Acosta MD                Where to get your medicines      These medications were sent to [facility] [address] [place] [address] [address] AT [place] [address] [address], [facility] [address]    Hours:  24-hours Phone:  [phone]     predniSONE 2.5 MG tablet    sulfamethoxazole-trimethoprim 400-80 MG per tablet                Primary Care Provider Office Phone # Fax #    Stew Caldwell [phone]       [address] [place] [phone]        Equal Access to Services     [facility]: Hadii aad ku hadasho Soakuaali, waaxda luqadaha, qaybta kaalmada adeegyada, keegan gar . So [facility] [phone].    ATENCIÓN: Si habla español, tiene a rubalcava disposición servicios gratuitos de asistencia lingüística. [facility] [phone].    We comply with applicable federal civil rights laws and [place] laws. We do not discriminate on the basis of race, color, national origin, age, disability, sex, sexual orientation, or gender identity.            Thank you!     Thank you for choosing [facility] RHEUMATOLOGY  for your care. Our goal is always to provide you with excellent care. Hearing back from our patients is one way we can continue to improve our services. Please take a few minutes to complete the written survey that you may receive in the mail after your visit with us. Thank you!             Your Updated Medication List -  Protect others around you: Learn how to safely use, store and throw away your medicines at www.disposemymeds.org.          This list is accurate as of 2/15/18 11:59 PM.  Always use your most recent med list.                   Brand Name Dispense Instructions for use Diagnosis    aspirin 81 MG EC tablet     30 tablet    Take 1 tablet (81 mg) by mouth daily        Blood Pressure Monitoring Kit     1 kit    1 each three times a week Dr Acosta has asked you to check your blood pressure 2-3 times per week using your home monitor.  Please keep track of your findings in a journal and bring it to your appointments.  Contact this office if your blood pressure: the top number (systolic) is consistently 30 points higher than your normal BP or if the bottom number (diastolic) is consistently 20 points higher than your normal BP.    ILD (interstitial lung disease) (H), Dermatomyositis (H), Systemic sclerosis (H), Gastroesophageal reflux disease, esophagitis presence not specified, High risk medications (not anticoagulants) long-term use       diltiazem 240 MG 24 hr capsule    CARDIZEM CD    90 capsule    Take 1 capsule (240 mg) by mouth daily    Paroxysmal atrial fibrillation (H)       lisinopril 10 MG tablet    PRINIVIL/ZESTRIL    30 tablet    Take 1 tablet (10 mg) by mouth daily    Raynaud's disease without gangrene, Atrial fibrillation, unspecified type (H)       mycophenolate 500 MG tablet    GENERIC EQUIVALENT    180 tablet    Take 3 tablets (1,500 mg) by mouth 2 times daily Monitoring labs required every 8 weeks for medication refills.    ILD (interstitial lung disease) (H), Myositis, unspecified myositis type, unspecified site, Polyarthritis, Dermatomyositis (H), Antisynthetase syndrome (H)       pantoprazole 40 MG EC tablet    PROTONIX    30 tablet    Take 1 tablet (40 mg) by mouth daily    Gastroesophageal reflux disease, esophagitis presence not specified, Esophageal dysmotility       * predniSONE 10 MG tablet     DELTASONE    90 tablet    Take 1 tablet (10 mg) by mouth daily    ILD (interstitial lung disease) (H), Inflammatory arthritis       * predniSONE 2.5 MG tablet    DELTASONE    270 tablet    Take 3 tablets (7.5 mg) by mouth daily    ILD (interstitial lung disease) (H), Inflammatory arthritis, High risk medications (not anticoagulants) long-term use, Scleroderma (H)       sulfamethoxazole-trimethoprim 400-80 MG per tablet    BACTRIM    180 tablet    Once daily for PJP prophylaxis. Start after bronchoscopy    ILD (interstitial lung disease) (H), Inflammatory arthritis, High risk medications (not anticoagulants) long-term use, Scleroderma (H)       * Notice:  This list has 2 medication(s) that are the same as other medications prescribed for you. Read the directions carefully, and ask your doctor or other care provider to review them with you.

## 2018-02-15 NOTE — LETTER
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        PLAN AND RECOMMENDATIONS:      I certainly think we have to be pleased with how he is doing overall.  The real question is now whether we can go down a little bit on his prednisone given his RNA polymerase III antibody, as this places him at chronically higher risk of renal crisis.  All that is discussed with him today.       My preference would be to wait until he has his next set of pulmonary function tests with Dr. Dillon.  If those show stability at that time, I would consider tapering him down to 7.5 mg a day of prednisone and I have given him 2.5-mg tablets for that purpose.       We have also renewed his Bactrim today.       He did have a couple of heart palpitations on examination, but nothing that seemed prolonged enough to represent atrial fibrillation and, as noted, he will be having followup for that in May.       I did discuss with him that muscle cramps might be improved by Coenzyme Q10 or magnesium, and he will try those as well.       I will see him back in approximately 4 months, sooner p.r.n.                         History of Present Illness:   Kwaku Medrano is a 52 year old male who presents for The patient is here for consultative evaluation at the request of   [place], who has seen him on several prior occasions and has followed him for a presumptive diagnosis of dermatomyositis.      For prior evaluation done by Dr. Golden, please see his most recent notes.       He has been seeing the patient for several months now after the patient initially developed inflammatory joint symptoms following a viral infection in 12/2016.  Initially these were really the dominant symptoms, but he then progressed to have some features consistent with inflammatory myositis including some muscle weakness.       Extensive serologic evaluation was unrevealing, with a negative SHERRELL by EIA, negative myositis panel, negative Marlene-1, and negative extractable nuclear antigens including topoisomerase.      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       With the institution of MMF which was dose escalated to 2 grams a day approximately 3 weeks ago and to 3 grams a day just about 2 weeks ago with no side effects, he has noted gradual improvement in his primary symptoms of pulmonary hypertension which are dry cough with deeper inspiration as well as some exertional dyspnea.  Those have improved somewhat, and he is able to get back to nearly his normal function as a .       He has had repeat pulmonary function tests done and a repeat HRCT done just in the last several weeks, and this shows quite significant fibrotic NSIP which I have reviewed today with Dr. Dillon in our Pulmonary Department who is seeing the patient tomorrow.  He is also due for repeat PFTs and a    6-minute walk test including desaturation tomorrow.       In addition, he is seeing a  cardiologist tomorrow.  He developed atrial fibrillation a few weeks ago and has now been placed on Eliquis for that as well as diltiazem for rate control.  He does note it, but does not feel particularly poorly with the atrial fibrillation.  Notably, this has occurred during a time frame when he is on high-dose steroids, but an echocardiogram also shows that he is having some left ventricular hypertrophy and a decreased ejection fraction which is likely diastolic.  He also has diastolic hypertension at this point.       In addition to these features, over the last few months he has had progressive skin thickening of his hands and now his forearms.  This initially manifested primarily as swelling in the hands but is now definitely including some skin thickening.  He is getting some subtle skin thickening in the shins as well as in the feet, and he is noticing some hair loss there.  In addition to these features, he is also getting cyanosis in both the hands and the feet.  Fortunately, he has not had the development of any digital ulcerations.       He clearly has GERD.  That maybe a little bit better currently despite the fact that Dr. Golden has eliminated using the proton pump inhibitor because of concerns about the interaction with CellCept.  He therefore put him only on a Zantac dosing currently.  He has been avoiding eating within 3 hours of bedtime and has been using a wedge, but he finds he slips down in the middle of the night.  Nonetheless, he denies significant dry cough first thing in the morning and does not feel his breathing is worse at that time.  He is not having any dysphagia, however.       He continues to have some subtle weakness in his thighs but does not feel like this impairs him in his ability to do things, and he is able to get in and out of a car, go up and down stairs, and do his work without particular problems.  He does think it might be a little better since he is on the higher dose  prednisone.      For additional details of his medical history, please see Dr. Golden's prior notes.  The remainder of a full 10-point review of systems including focused review on the scleroderma intake form is notable only for some weight loss that he says he was attempting.       His family history is notable for an aunt with lupus on his maternal side.        OCTOBER 10, 2017, INTERVAL HISTORY:  Since we have last seen him, he has been able to decrease his prednisone down to 10 mg a day.  He really has felt pretty stable in making that taper and has not felt any worse.      He has been religiously monitoring his blood pressures.  Those are reviewed today and they are all nicely within the normal limits.  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       He has been placed on diltiazem.  One week ago Monday he started noticing a lot of swelling in his ankles, potentially consistent with a known side effect of this drug.       He is tolerating his MMF well.  He has some dyspnea on exertion, but no chest pain.       He does believe his skin is overall stable.  He has not noticed any further skin thickening.  He is not sure he has noticed any real improvement in it, either.       The low-grade weakness he has experienced feels stable to him and not clearly worse.  Other manifestations of his disease process appear to be stable.       NOVEMBER 6, 2017, INTERVAL HISTORY:  Since we have last seen the patient, he is starting to feel significantly better.  His skin, in particular, feels better to him.  He is noting decreased stiffness in the skin, tendons and joints of the hands and it is now down to less than an hour most days in his right hand and very minimally in his left hand.  Occasionally it is a little longer than that.  Laboratory testing is reviewed and he is  showing minimal evidence of toxicity, although there was an initial drop in his hemoglobin and hematocrit, but that has apparently stabilized at 11.       His blood pressures are brought with him and they are very good with a high systolic pressure of 124 and the diastolics in the 60s-70s range.       He remains on the diltiazem which he was put on for rate control of atrial flutter.  He is due to see his cardiologist status post the ablation in the next week.       With respect to his diltiazem, it still gives him a little bit of peripheral edema, but he also thinks that his digital ulcerations and Raynaud's phenomena have been improved despite increasingly colder weather in the last several weeks.  He has had less pain and no new digital ulcers during that time.       He has also had very minimal GERD and dysphagia.  Eating can trigger his cough, however.      The other thing that triggers cough is climbing more than 2 flights of stairs.  He is still working and needs to do this at times.  In general, he feels like his breathing is better and his cough is better, however.     Feb. 15, 2018 INTERVAL HISTORY:  Since we have last seen this individual, he has generally felt his exercise tolerance has been improved.  His PFTs would reflect that based on the 12/27 PFTs which show that he had about a 20% overall improvement since we started him on the MMF.  He did see Dr. Dillon at that time.      He is now able to do most of his activities pretty well.  He can climb 2 flights of stairs with no difficulty.  He still coughs with greater levels of exertion and actually tried to climb 9 flights of stairs on his way out of the parking ramp to get here today and found he had some difficulties with that.       He does still get some occasional heart palpitations despite his prior ablation for atrial fibrillation but has not had any full-blown atrial fib episodes that he can identify.  He is getting an echocardiogram in May.        He is also getting repeat pulmonary function tests in March.      In terms of muscle strength, he feels that is very good.  He gets some occasional muscle cramps at night that can bother him quite a bit, but he does not note any real weakness.       Raynaud's phenomena have also improved and he gets relatively few attacks and when he gets them can easily break the attacks within 15 minutes by rewarming.      He is not getting any significant GERD and has no other GI problems.      He does think his skin is improving somewhat as well.  With respect to his joints, he gets occasional but not too severe inflammatory morning stiffness in his hands, but also thinks that has improved.      He has no other new medical problems.                             Review of Systems:   Review Of Systems as above, otherwise:   Skin: negative  Eyes: negative  Ears/Nose/Throat: negative  Respiratory: No shortness of breath, dyspnea on exertion, cough, or hemoptysis  Cardiovascular: negative  Gastrointestinal: negative  Genitourinary: negative  Musculoskeletal: negative  Neurologic: negative  Psychiatric: negative  Hematologic/Lymphatic/Immunologic: negative  Endocrine: negative          Past Medical History:     Past Medical History:   Diagnosis Date     Atrial fibrillation and flutter (H) 07/2017     Fracture      GERD (gastroesophageal reflux disease)      Interstitial lung disease (H)     sclerderma ILD; present on CXR 3-2017 and CT 5-2017; dx confirmed by CT 9-2017 fibrotic NSIP pattern with increased fibrosis; started mycophenolate 7/2017     Scleroderma (H)     dx 9- at [facility] by Dr. Acosta       Patient Active Problem List    Diagnosis Date Noted     Scleroderma (H)      Priority: Medium     Diastolic hypertension 09/14/2017     Priority: Medium     High risk medications (not anticoagulants) long-term use 09/14/2017     Priority: Medium     Atrial fibrillation, unspecified type (H) 09/14/2017     Priority: Medium      Dermatomyositis (H) 07/25/2017     Priority: Medium     ILD (interstitial lung disease) (H) 07/25/2017     Priority: Medium     Atrial fibrillation and flutter (H) 07/01/2017     Priority: Medium             Past Surgical History:     Past Surgical History:   Procedure Laterality Date     COLONOSCOPY N/A 7/19/2017    Procedure: COLONOSCOPY;  COLONOSCOPY;  Surgeon: Mita Jimenez MD;  Location:  GI     NO HISTORY OF SURGERY               Social History:     Social History   Substance Use Topics     Smoking status: Never Smoker     Smokeless tobacco: Former User     Types: Chew     Quit date: 1/1/1984     Alcohol use Yes      Comment: very rarely             Family History:     Family History   Problem Relation Age of Onset     Prostate Cancer Father      Lung Cancer Other      LUNG DISEASE No family hx of      Rheumatologic Disease No family hx of             Allergies:     Allergies   Allergen Reactions     Ampicillin Rash             Medications:     Current Outpatient Prescriptions   Medication Sig Dispense Refill     sulfamethoxazole-trimethoprim (BACTRIM) 400-80 MG per tablet Once daily for PJP prophylaxis. Start after bronchoscopy 180 tablet 3     predniSONE (DELTASONE) 2.5 MG tablet Take 3 tablets (7.5 mg) by mouth daily 270 tablet 1     mycophenolate (GENERIC EQUIVALENT) 500 MG tablet Take 3 tablets (1,500 mg) by mouth 2 times daily Monitoring labs required every 8 weeks for medication refills. 180 tablet 2     predniSONE (DELTASONE) 10 MG tablet Take 1 tablet (10 mg) by mouth daily 90 tablet 0     diltiazem (CARDIZEM CD) 240 MG 24 hr capsule Take 1 capsule (240 mg) by mouth daily 90 capsule 3     aspirin 81 MG EC tablet Take 1 tablet (81 mg) by mouth daily 30 tablet 1     lisinopril (PRINIVIL/ZESTRIL) 10 MG tablet Take 1 tablet (10 mg) by mouth daily 30 tablet 11     Blood Pressure Monitoring KIT 1 each three times a week Dr Acosta has asked you to check your blood pressure 2-3 times per week using  "your home monitor.  Please keep track of your findings in a journal and bring it to your appointments.   Contact this office if your blood pressure: the top number (systolic) is consistently 30 points higher than your normal BP or if the bottom number (diastolic) is consistently 20 points higher than your normal BP. 1 kit 1     pantoprazole (PROTONIX) 40 MG EC tablet Take 1 tablet (40 mg) by mouth daily 30 tablet 11            Physical Exam:   Blood pressure 113/69, pulse 66, temperature 97.6  F (36.4  C), temperature source Oral, height 1.753 m (5' 9\"), weight 77.8 kg (171 lb 8 oz).  Constitutional: WD-WN-[id number] cooperative  Eyes: nl EOM, PERRLA, vision, conjunctiva, sclera  ENT: nl external ears, nose, hearing, lips, teeth, gums, throat  No mucous membrane lesions, normal saliva pool  Neck: no mass or thyroid enlargement  Resp: lungs clear to auscultation, nl to palpation  CV: RRR, no murmurs, rubs , no edema  GI: no ABD mass or tenderness, no HSM  : not tested  Lymph: no cervical, supraclavicular, inguinal or epitrochlear nodes  MS: All TMJ, neck, shoulder, elbow, wrist, MCP/PIP/DIP, spine, hip, knee, ankle, and foot MTP/IP joints were examined and  found normal. No active synovitis or deformity. Full ROM.  Normal  strength. No dactylitis,  tenosynovitis, enthespathy   Skin: MRSS= 15, with 3+ involvement in bilat. H. 1+ forearms and face  .      Nailfold telangiectasias. No DU or pits. no nail pitting, alopecia, rash, nodules or lesions  Neuro: nl cranial nerves, strength, sensation, DTRs.   Psych: nl judgement, orientation, memory, affect.         Data:     Lab Results   Component Value Date    WBC 5.8 02/14/2018    WBC 7.7 12/20/2017    WBC 6.9 10/18/2017    HGB 13.7 02/14/2018    HGB 13.8 12/20/2017    HGB 11.0 (L) 10/18/2017    HCT 43.6 02/14/2018    HCT 43.8 12/20/2017    HCT 33.3 (L) 10/18/2017    MCV 92 02/14/2018    MCV 96 12/20/2017    MCV 93 10/18/2017     02/14/2018     12/20/2017    "  10/18/2017     Lab Results   Component Value Date    BUN 18 12/20/2017    BUN 12 10/18/2017    BUN 12 10/10/2017     No components found for: SEDRATE  Lab Results   Component Value Date    TSH 1.36 06/07/2017     Lab Results   Component Value Date    AST 10 02/14/2018    AST 8 12/20/2017    AST 16 10/10/2017    ALT 20 02/14/2018    ALT 19 12/20/2017    ALT 31 10/10/2017    ALKPHOS 74 12/20/2017    ALKPHOS 52 10/10/2017    ALKPHOS 99 03/10/2017     Reviewed Rheumatology lab flowsheet      Denny Acosta MD

## 2018-02-16 LAB
MYCOPHENOLATE SERPL LC/MS/MS-MCNC: 9.71 MG/L (ref 1–3.5)
MYCOPHENOLATE-G SERPL LC/MS/MS-MCNC: 61.7 MG/L (ref 30–95)
TME LAST DOSE: 2100 H

## 2018-05-08 DIAGNOSIS — J84.9 ILD (INTERSTITIAL LUNG DISEASE) (H): ICD-10-CM

## 2018-05-08 DIAGNOSIS — M33.13 DERMATOMYOSITIS (H): ICD-10-CM

## 2018-05-08 DIAGNOSIS — M13.0 POLYARTHRITIS: ICD-10-CM

## 2018-05-08 DIAGNOSIS — D89.89 ANTISYNTHETASE SYNDROME (H): ICD-10-CM

## 2018-05-08 DIAGNOSIS — M60.9 MYOSITIS, UNSPECIFIED MYOSITIS TYPE, UNSPECIFIED SITE: ICD-10-CM

## 2018-05-09 RX ORDER — MYCOPHENOLATE MOFETIL 500 MG/1
1500 TABLET ORAL 2 TIMES DAILY
Qty: 180 TABLET | Refills: 2 | Status: SHIPPED | OUTPATIENT
Start: 2018-05-09 | End: 2018-08-28

## 2018-05-09 NOTE — TELEPHONE ENCOUNTER
mycophenolate (GENERIC EQUIVALENT) 500 MG tablet      Last Written Prescription Date:  1/30/18  Last Fill Quantity: 180,   # refills: 2  Last Office Visit: 2/15/18  Future Office visit:  7/5/18    CBC RESULTS:   Recent Labs   Lab Test  02/14/18   0741   WBC  5.8   RBC  4.72   HGB  13.7   HCT  43.6   MCV  92   MCH  29.0   MCHC  31.4*   RDW  13.8   PLT  349       Creatinine   Date Value Ref Range Status   12/20/2017 1.02 0.66 - 1.25 mg/dL Final   ]    Liver Function Studies -   Recent Labs   Lab Test  02/14/18   0741  12/20/17   0736   PROTTOTAL   --   5.9*   ALBUMIN   --   3.6   BILITOTAL   --   0.3   ALKPHOS   --   74   AST  10  8   ALT  20  19       Routing refill request to provider for review/approval because:  DMARD medication

## 2018-05-26 DIAGNOSIS — M33.13 DERMATOMYOSITIS (H): ICD-10-CM

## 2018-05-26 DIAGNOSIS — Z79.899 HIGH RISK MEDICATION USE: ICD-10-CM

## 2018-05-26 DIAGNOSIS — K21.9 GASTROESOPHAGEAL REFLUX DISEASE, ESOPHAGITIS PRESENCE NOT SPECIFIED: ICD-10-CM

## 2018-05-26 DIAGNOSIS — Z79.899 HIGH RISK MEDICATIONS (NOT ANTICOAGULANTS) LONG-TERM USE: ICD-10-CM

## 2018-05-26 DIAGNOSIS — M34.9 SYSTEMIC SCLEROSIS (H): ICD-10-CM

## 2018-05-26 DIAGNOSIS — J84.9 ILD (INTERSTITIAL LUNG DISEASE) (H): ICD-10-CM

## 2018-05-26 DIAGNOSIS — M34.9 SCLERODERMA (H): ICD-10-CM

## 2018-05-26 LAB
ALT SERPL W P-5'-P-CCNC: 21 U/L (ref 0–70)
AST SERPL W P-5'-P-CCNC: 13 U/L (ref 0–45)
BASOPHILS # BLD AUTO: 0 10E9/L (ref 0–0.2)
BASOPHILS NFR BLD AUTO: 0.3 %
CRP SERPL-MCNC: <2.9 MG/L (ref 0–8)
DIFFERENTIAL METHOD BLD: ABNORMAL
EOSINOPHIL # BLD AUTO: 0.1 10E9/L (ref 0–0.7)
EOSINOPHIL NFR BLD AUTO: 1 %
ERYTHROCYTE [DISTWIDTH] IN BLOOD BY AUTOMATED COUNT: 13.5 % (ref 10–15)
ERYTHROCYTE [SEDIMENTATION RATE] IN BLOOD BY WESTERGREN METHOD: 1 MM/H (ref 0–20)
HCT VFR BLD AUTO: 41.2 % (ref 40–53)
HGB BLD-MCNC: 13 G/DL (ref 13.3–17.7)
LYMPHOCYTES # BLD AUTO: 1.6 10E9/L (ref 0.8–5.3)
LYMPHOCYTES NFR BLD AUTO: 22.9 %
MCH RBC QN AUTO: 29.5 PG (ref 26.5–33)
MCHC RBC AUTO-ENTMCNC: 31.6 G/DL (ref 31.5–36.5)
MCV RBC AUTO: 94 FL (ref 78–100)
MONOCYTES # BLD AUTO: 0.9 10E9/L (ref 0–1.3)
MONOCYTES NFR BLD AUTO: 12.1 %
NEUTROPHILS # BLD AUTO: 4.5 10E9/L (ref 1.6–8.3)
NEUTROPHILS NFR BLD AUTO: 63.7 %
PLATELET # BLD AUTO: 342 10E9/L (ref 150–450)
RBC # BLD AUTO: 4.4 10E12/L (ref 4.4–5.9)
WBC # BLD AUTO: 7.1 10E9/L (ref 4–11)

## 2018-05-26 PROCEDURE — 85652 RBC SED RATE AUTOMATED: CPT | Performed by: INTERNAL MEDICINE

## 2018-05-26 PROCEDURE — 84450 TRANSFERASE (AST) (SGOT): CPT | Performed by: INTERNAL MEDICINE

## 2018-05-26 PROCEDURE — 84460 ALANINE AMINO (ALT) (SGPT): CPT | Performed by: INTERNAL MEDICINE

## 2018-05-26 PROCEDURE — 85025 COMPLETE CBC W/AUTO DIFF WBC: CPT | Performed by: INTERNAL MEDICINE

## 2018-05-26 PROCEDURE — 86140 C-REACTIVE PROTEIN: CPT | Performed by: INTERNAL MEDICINE

## 2018-05-26 PROCEDURE — 80180 DRUG SCRN QUAN MYCOPHENOLATE: CPT | Performed by: INTERNAL MEDICINE

## 2018-05-26 PROCEDURE — 36415 COLL VENOUS BLD VENIPUNCTURE: CPT | Performed by: INTERNAL MEDICINE

## 2018-05-28 ASSESSMENT — ENCOUNTER SYMPTOMS
RECTAL PAIN: 0
VOMITING: 0
HEMOPTYSIS: 0
BLOATING: 0
MYALGIAS: 0
BOWEL INCONTINENCE: 0
MUSCLE WEAKNESS: 0
NECK PAIN: 0
NAUSEA: 0
ABDOMINAL PAIN: 0
SPUTUM PRODUCTION: 1
SHORTNESS OF BREATH: 0
COUGH: 1
JOINT SWELLING: 0
COUGH DISTURBING SLEEP: 0
DIARRHEA: 0
SNORES LOUDLY: 0
DYSPNEA ON EXERTION: 0
CONSTIPATION: 0
BLOOD IN STOOL: 0
JAUNDICE: 0
MUSCLE CRAMPS: 1
HEARTBURN: 1
STIFFNESS: 1
BACK PAIN: 0
WHEEZING: 0
POSTURAL DYSPNEA: 0
ARTHRALGIAS: 0

## 2018-05-31 ENCOUNTER — OFFICE VISIT (OUTPATIENT)
Dept: PULMONOLOGY | Facility: CLINIC | Age: 53
End: 2018-05-31
Attending: INTERNAL MEDICINE
Payer: COMMERCIAL

## 2018-05-31 VITALS
OXYGEN SATURATION: 98 % | HEIGHT: 69 IN | RESPIRATION RATE: 17 BRPM | BODY MASS INDEX: 24.88 KG/M2 | HEART RATE: 51 BPM | WEIGHT: 168 LBS | DIASTOLIC BLOOD PRESSURE: 70 MMHG | SYSTOLIC BLOOD PRESSURE: 122 MMHG

## 2018-05-31 DIAGNOSIS — J84.9 ILD (INTERSTITIAL LUNG DISEASE) (H): ICD-10-CM

## 2018-05-31 DIAGNOSIS — J84.9 ILD (INTERSTITIAL LUNG DISEASE) (H): Primary | ICD-10-CM

## 2018-05-31 LAB
MYCOPHENOLATE SERPL LC/MS/MS-MCNC: 25.28 MG/L (ref 1–3.5)
MYCOPHENOLATE-G SERPL LC/MS/MS-MCNC: 111.6 MG/L (ref 30–95)
TME LAST DOSE: ABNORMAL H

## 2018-05-31 PROCEDURE — G0009 ADMIN PNEUMOCOCCAL VACCINE: HCPCS | Mod: ZF

## 2018-05-31 PROCEDURE — 90732 PPSV23 VACC 2 YRS+ SUBQ/IM: CPT | Mod: ZF | Performed by: INTERNAL MEDICINE

## 2018-05-31 PROCEDURE — 25000128 H RX IP 250 OP 636: Mod: ZF | Performed by: INTERNAL MEDICINE

## 2018-05-31 PROCEDURE — G0463 HOSPITAL OUTPT CLINIC VISIT: HCPCS | Mod: ZF

## 2018-05-31 RX ADMIN — PNEUMOCOCCAL VACCINE POLYVALENT 0.5 ML
25; 25; 25; 25; 25; 25; 25; 25; 25; 25; 25; 25; 25; 25; 25; 25; 25; 25; 25; 25; 25; 25; 25 INJECTION, SOLUTION INTRAMUSCULAR; SUBCUTANEOUS at 08:18

## 2018-05-31 ASSESSMENT — PAIN SCALES - GENERAL: PAINLEVEL: NO PAIN (0)

## 2018-05-31 NOTE — LETTER
5/31/2018      RE: Kwaku Medrano  [address] [facility] [address]       [facility]    Reason for Visit  Kwaku Medrano is a 52 year old year old male who is being seen for RECHECK (Scleroderma)    HPI    Mr. Medrano is a 52-year-old who is here for follow up of scleroderma interstitial lung disease.  Mr. Medrano has been on prednisone since about June, 2017 and started mycophenolate approximately in July, 2017.  He has been on the full dose of 1500 mg twice since Sept.  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    He denies dyspnea when he ascends 2 flights of stairs, paroxysmal dyspnea, fevers or chest pain.  He does endorse a minimal cough that is unchanged.  Benzonatate  and robitussin did not help the cough, and he does not want to take a medication for it.  He takes prednisone 10 mg daily and MMF 1500 mg twice daily.          Current Outpatient Prescriptions   Medication     aspirin 81 MG EC tablet     Blood Pressure Monitoring KIT     diltiazem (CARDIZEM CD) 240 MG 24 hr capsule     lisinopril (PRINIVIL/ZESTRIL) 10 MG tablet     mycophenolate (GENERIC EQUIVALENT) 500 MG tablet     pantoprazole (PROTONIX) 40 MG EC tablet     predniSONE (DELTASONE) 10 MG tablet     predniSONE (DELTASONE) 2.5 MG tablet     sulfamethoxazole-trimethoprim (BACTRIM) 400-80 MG per tablet     No current facility-administered medications for this visit.      Allergies   Allergen Reactions     Ampicillin Rash     Past Medical History:   Diagnosis Date     Atrial fibrillation and flutter (H) 07/2017     Fracture      GERD (gastroesophageal reflux disease)      Interstitial lung disease (H)     sclerderma ILD; present on CXR 3-2017 and CT 5-2017; dx confirmed by CT 9-2017 fibrotic NSIP pattern with increased fibrosis; started  "mycophenolate 7/2017     Scleroderma (H)     dx 9- at [facility] by Dr. Acosta       Past Surgical History:   Procedure Laterality Date     COLONOSCOPY N/A 7/19/2017    Procedure: COLONOSCOPY;  COLONOSCOPY;  Surgeon: Mita Jimenez MD;  Location:  GI     NO HISTORY OF SURGERY         Social History     Social History     Marital status: Single     Spouse name: N/A     Number of children: N/A     Years of education: N/A     Occupational History     Not on file.     Social History Main Topics     Smoking status: Never Smoker     Smokeless tobacco: Former User     Types: Chew     Quit date: 1/1/1984     Alcohol use Yes      Comment: very rarely     Drug use: No     Sexual activity: Not Currently     Other Topics Concern     Not on file     Social History Narrative    Work in [employer].  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  Denies exposure to hot tubs, silica, pet birds, mold.  Single, no children.       Family History   Problem Relation Age of Onset     Prostate Cancer Father      Lung Cancer Other      LUNG DISEASE No family hx of      Rheumatologic Disease No family hx of              ROS Pulmonary  A complete ROS was otherwise negative except as noted in the HPI.    Vitals: /70  Pulse 51  Resp 17  Ht 1.753 m (5' 9\")  Wt 76.2 kg (168 lb)  SpO2 98%  BMI 24.81 kg/m2    Exam:   GENERAL APPEARANCE: Well developed, well nourished, alert, and in no apparent distress.  RESP: good air flow throughout.  No crackles. No rhonchi. No wheezes.  CV: Normal S1, S2, regular rhythm, normal rate. No murmur.  No LE edema.   MS: extremities normal. No clubbing. No cyanosis.  SKIN: no rash on limited exam.  NEURO: Mentation intact, speech normal, normal gait and stance.  PSYCH: mentation appears normal. and affect normal/bright.    Results:  Recent Results (from the past 168 hour(s))   General PFT Lab (Please always keep checked)    Collection Time: 05/31/18  7:11 AM "   Result Value Ref Range    FVC-Pred 4.67 L    FVC-Pre 4.21 L    FVC-%Pred-Pre 90 %    FEV1-Pre 3.64 L    FEV1-%Pred-Pre 99 %    FEV1FVC-Pred 77 %    FEV1FVC-Pre 86 %    FEFMax-Pred 9.34 L/sec    FEFMax-Pre 9.99 L/sec    FEFMax-%Pred-Pre 106 %    FEF2575-Pred 3.21 L/sec    FEF2575-Pre 4.20 L/sec    EXD4645-%Pred-Pre 130 %    ExpTime-Pre 6.84 sec    FIFMax-Pre 3.95 L/sec    VC-Pred 4.93 L    VC-Pre 4.50 L    VC-%Pred-Pre 91 %    IC-Pred 3.57 L    IC-Pre 2.88 L    IC-%Pred-Pre 80 %    ERV-Pred 1.36 L    ERV-Pre 1.61 L    ERV-%Pred-Pre 118 %    FEV1FEV6-Pred 80 %    FEV1FEV6-Pre 86 %    FRCPleth-Pred 3.51 L    FRCPleth-Pre 3.07 L    FRCPleth-%Pred-Pre 87 %    RVPleth-Pred 2.27 L    RVPleth-Pre 1.46 L    RVPleth-%Pred-Pre 64 %    TLCPleth-Pred 6.92 L    TLCPleth-Pre 5.96 L    TLCPleth-%Pred-Pre 86 %    DLCOunc-Pred 29.19 ml/min/mmHg    DLCOunc-Pre 21.63 ml/min/mmHg    DLCOunc-%Pred-Pre 74 %    DLCOcor-Pre 22.73 ml/min/mmHg    DLCOcor-%Pred-Pre 77 %    VA-Pre 5.65 L    VA-%Pred-Pre 85 %    FEV1SVC-Pred 74 %    FEV1SVC-Pre 81 %         FVC FEV1 TLC DLCO   6- [facility] 2.79 58% 2.54 68%    12.9 52%   8-3-2017 [facility] 3.34 2.85 4.89 17.63   9- 3.46 73% 3.13 85% 4.73 68% 19.77 67%   12- 3.91 83% 3.47 94% 5.10 73% 20.16 68%   5- 4.21 90% 3.64 99% 5.96 86% 21.63 74%     I reviewed the pulmonary function test that was performed today.  PFT todapirometry and diffusion have improved compared to 3 months ago.  I reviewed labs: normal except for mild anemia.    I reviewed results with the patient.      Assessment and plan:   Mr. Medrano is a 52-year-old who is here for follow up of scleroderma interstitial lung disease.   1.  Scleroderma interstitial lung disease.  PFT has improved on mycophenolate.  We will continue the current dose of 1500 mg twice daily. I think it is reasonable to decrease prednisone to 7.5 mg daily.  We will give him the pneumonia vaccine (23 Valent) today.  I encouraged him to  continue physical activity as he is doing.   He does not want to try another medication for the cough as it is minimal.  He will return in 3 months with full PFT.   2.  Immunosuppression monitoring.  Labs are acceptable except for mild stable anemia.  I encouraged him to eat foods with iron.  I will check labs again in 3 months to monitor the mycophenolate.  He will continue Bactrim for Pneumocystis prophylaxis.  I have counseled him to avoid sick contacts.                 Elma Dillon MD

## 2018-05-31 NOTE — LETTER
5/31/2018      RE: Kwaku Medrano  [address] [address]       [facility]    Reason for Visit  Kwaku Medrano is a 52 year old year old male who is being seen for RECHECK (Scleroderma)    HPI    Mr. Medrano is a 52-year-old who is here for follow up of scleroderma interstitial lung disease.  Mr. Medrano has been on prednisone since about June, 2017 and started mycophenolate approximately in July, 2017.  He has been on the full dose of 1500 mg twice since Sept.  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    He denies dyspnea when he ascends 2 flights of stairs, paroxysmal dyspnea, fevers or chest pain.  He does endorse a minimal cough that is unchanged.  Benzonatate  and robitussin did not help the cough, and he does not want to take a medication for it.  He takes prednisone 10 mg daily and MMF 1500 mg twice daily.          Current Outpatient Prescriptions   Medication     aspirin 81 MG EC tablet     Blood Pressure Monitoring KIT     diltiazem (CARDIZEM CD) 240 MG 24 hr capsule     lisinopril (PRINIVIL/ZESTRIL) 10 MG tablet     mycophenolate (GENERIC EQUIVALENT) 500 MG tablet     pantoprazole (PROTONIX) 40 MG EC tablet     predniSONE (DELTASONE) 10 MG tablet     predniSONE (DELTASONE) 2.5 MG tablet     sulfamethoxazole-trimethoprim (BACTRIM) 400-80 MG per tablet     No current facility-administered medications for this visit.      Allergies   Allergen Reactions     Ampicillin Rash     Past Medical History:   Diagnosis Date     Atrial fibrillation and flutter (H) 07/2017     Fracture      GERD (gastroesophageal reflux disease)      Interstitial lung disease (H)     sclerderma ILD; present on CXR 3-2017 and CT 5-2017; dx confirmed by CT 9-2017 fibrotic NSIP pattern with increased fibrosis; started  "mycophenolate 7/2017     Scleroderma (H)     dx 9- at [facility] by Dr. Acosta       Past Surgical History:   Procedure Laterality Date     COLONOSCOPY N/A 7/19/2017    Procedure: COLONOSCOPY;  COLONOSCOPY;  Surgeon: Mita Jimenez MD;  Location:  GI     NO HISTORY OF SURGERY         Social History     Social History     Marital status: Single     Spouse name: N/A     Number of children: N/A     Years of education: N/A     Occupational History     Not on file.     Social History Main Topics     Smoking status: Never Smoker     Smokeless tobacco: Former User     Types: Chew     Quit date: 1/1/1984     Alcohol use Yes      Comment: very rarely     Drug use: No     Sexual activity: Not Currently     Other Topics Concern     Not on file     Social History Narrative    Work in [employer].  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  Denies exposure to hot tubs, silica, pet birds, mold.  Single, no children.       Family History   Problem Relation Age of Onset     Prostate Cancer Father      Lung Cancer Other      LUNG DISEASE No family hx of      Rheumatologic Disease No family hx of              ROS Pulmonary  A complete ROS was otherwise negative except as noted in the HPI.    Vitals: /70  Pulse 51  Resp 17  Ht 1.753 m (5' 9\")  Wt 76.2 kg (168 lb)  SpO2 98%  BMI 24.81 kg/m2    Exam:   GENERAL APPEARANCE: Well developed, well nourished, alert, and in no apparent distress.  RESP: good air flow throughout.  No crackles. No rhonchi. No wheezes.  CV: Normal S1, S2, regular rhythm, normal rate. No murmur.  No LE edema.   MS: extremities normal. No clubbing. No cyanosis.  SKIN: no rash on limited exam.  NEURO: Mentation intact, speech normal, normal gait and stance.  PSYCH: mentation appears normal. and affect normal/bright.    Results:  Recent Results (from the past 168 hour(s))   General PFT Lab (Please always keep checked)    Collection Time: 05/31/18  7:11 AM "   Result Value Ref Range    FVC-Pred 4.67 L    FVC-Pre 4.21 L    FVC-%Pred-Pre 90 %    FEV1-Pre 3.64 L    FEV1-%Pred-Pre 99 %    FEV1FVC-Pred 77 %    FEV1FVC-Pre 86 %    FEFMax-Pred 9.34 L/sec    FEFMax-Pre 9.99 L/sec    FEFMax-%Pred-Pre 106 %    FEF2575-Pred 3.21 L/sec    FEF2575-Pre 4.20 L/sec    MJO4683-%Pred-Pre 130 %    ExpTime-Pre 6.84 sec    FIFMax-Pre 3.95 L/sec    VC-Pred 4.93 L    VC-Pre 4.50 L    VC-%Pred-Pre 91 %    IC-Pred 3.57 L    IC-Pre 2.88 L    IC-%Pred-Pre 80 %    ERV-Pred 1.36 L    ERV-Pre 1.61 L    ERV-%Pred-Pre 118 %    FEV1FEV6-Pred 80 %    FEV1FEV6-Pre 86 %    FRCPleth-Pred 3.51 L    FRCPleth-Pre 3.07 L    FRCPleth-%Pred-Pre 87 %    RVPleth-Pred 2.27 L    RVPleth-Pre 1.46 L    RVPleth-%Pred-Pre 64 %    TLCPleth-Pred 6.92 L    TLCPleth-Pre 5.96 L    TLCPleth-%Pred-Pre 86 %    DLCOunc-Pred 29.19 ml/min/mmHg    DLCOunc-Pre 21.63 ml/min/mmHg    DLCOunc-%Pred-Pre 74 %    DLCOcor-Pre 22.73 ml/min/mmHg    DLCOcor-%Pred-Pre 77 %    VA-Pre 5.65 L    VA-%Pred-Pre 85 %    FEV1SVC-Pred 74 %    FEV1SVC-Pre 81 %         FVC FEV1 TLC DLCO   [facility] 2.79 58% 2.54 68%    12.9 52%   8-3-2017 [facility] 3.34 2.85 4.89 17.63   9- 3.46 73% 3.13 85% 4.73 68% 19.77 67%   12- 3.91 83% 3.47 94% 5.10 73% 20.16 68%   5- 4.21 90% 3.64 99% 5.96 86% 21.63 74%     I reviewed the pulmonary function test that was performed today.  PFT todapirometry and diffusion have improved compared to 3 months ago.  I reviewed labs: normal except for mild anemia.    I reviewed results with the patient.      Assessment and plan:   Mr. Medrano is a 52-year-old who is here for follow up of scleroderma interstitial lung disease.   1.  Scleroderma interstitial lung disease.  PFT has improved on mycophenolate.  We will continue the current dose of 1500 mg twice daily. I think it is reasonable to decrease prednisone to 7.5 mg daily.  We will give him the pneumonia vaccine (23 Valent) today.  I encouraged him to  continue physical activity as he is doing.   He does not want to try another medication for the cough as it is minimal.  He will return in 3 months with full PFT.   2.  Immunosuppression monitoring.  Labs are acceptable except for mild stable anemia.  I encouraged him to eat foods with iron.  I will check labs again in 3 months to monitor the mycophenolate.  He will continue Bactrim for Pneumocystis prophylaxis.  I have counseled him to avoid sick contacts.                 Elma Dillon MD

## 2018-05-31 NOTE — MR AVS SNAPSHOT
After Visit Summary   5/31/2018    Kwaku Medrano    MRN: [id number]           Patient Information     Date Of Birth          1965        Visit Information        Provider Department      5/31/2018 8:00 AM Elma Dillon MD [facility]        Today's Diagnoses     ILD (interstitial lung disease) (H)    -  1      Care Instructions    Eat food with ion.  Decrease prednisone to 7.5 mg daily.  Stay active.          Follow-ups after your visit        Follow-up notes from your care team     Return in about 3 months (around 8/31/2018).      Your next 10 appointments already scheduled     Jun 22, 2018  7:45 AM CDT   Ech Complete with [id number]   [facility] CV Echocardiography (Cardiovascular [facility] at [facility])    [address]  [address]  [place] [id number] [phone]           1. Please bring or wear a comfortable two-piece outfit. 2. You may eat, drink and take your normal medicines. 3. For any questions that cannot be answered, please contact the ordering physician            Jun 26, 2018  8:15 AM CDT   P EP RETURN with Radha Chavez MD   [facility] ([facility])    [address]  [place] [id number] [phone] OPT 2            Jul 05, 2018  8:00 AM CDT   (Arrive by 7:45 AM)   Return Visit with Denny Acosta MD   [facility] Rheumatology ([facility])    [address]  [address]  [place] [id number] [phone]            Aug 30, 2018  7:00 AM CDT   FULL PULMONARY FUNCTION with  PFL B   [facility] Pulmonary Function Testing ([facility])    [address]  3rd Floor  [place] [id number] [phone]            Aug 30, 2018  8:00 AM CDT   (Arrive by 7:45 AM)   RETURN SCLERODERMA with Elma Dillon MD   [facility] ([facility]  "[facility])    [address]  [address] [place] [id number] [phone]              Future tests that were ordered for you today     Open Future Orders        Priority Expected Expires Ordered    CBC with platelets differential Routine 8/31/2018 5/31/2019 5/31/2018    Comprehensive metabolic panel Routine 8/31/2018 5/31/2019 5/31/2018    General PFT Lab (Please always keep checked) Routine 8/31/2018 5/31/2019 5/31/2018    Pulmonary Function Test Routine 8/31/2018 5/31/2019 5/31/2018            Who to contact     If you have questions or need follow up information about today's clinic visit or your schedule please contact [facility] directly at [phone].  Normal or non-critical lab and imaging results will be communicated to you by [employer], letter or phone within 4 business days after the clinic has received the results. If you do not hear from us within 7 days, please contact the clinic through [employer] or phone. If you have a critical or abnormal lab result, we will notify you by phone as soon as possible.  Submit refill requests through [employer] or call your pharmacy and they will forward the refill request to us. Please allow 3 business days for your refill to be completed.          Additional Information About Your Visit        [employer] Information     [employer] gives you secure access to your electronic health record. If you see a primary care provider, you can also send messages to your care team and make appointments. If you have questions, please call your primary care clinic.  If you do not have a primary care provider, please call [phone] and they will assist you.        Care EveryWhere ID     This is your Care EveryWhere ID. This could be used by other organizations to access your [place] medical records  [id number]        Your Vitals Were     Pulse Respirations Height Pulse Oximetry BMI (Body Mass Index)       51 17 1.753 m (5' 9\") 98% 24.81 " kg/m2        Blood Pressure from Last 3 Encounters:   05/31/18 122/70   02/15/18 113/69   12/22/17 119/75    Weight from Last 3 Encounters:   05/31/18 76.2 kg (168 lb)   02/15/18 77.8 kg (171 lb 8 oz)   12/22/17 79.4 kg (175 lb)               Primary Care Provider Office Phone # Fax #    Stew Caldwell [phone]       [address] [place] [phone]        Equal Access to Services     [facility]: Hadii aad ku hadasho Soomaali, waaxda luqadaha, qaybta kaalmada adeegyada, keegan gar . So [facility] [phone].    ATENCIÓN: Si habla español, tiene a rubalcava disposición servicios gratuitos de asistencia lingüística. [facility] [phone].    We comply with applicable federal civil rights laws and [place] laws. We do not discriminate on the basis of race, color, national origin, age, disability, sex, sexual orientation, or gender identity.            Thank you!     Thank you for choosing [facility]  for your care. Our goal is always to provide you with excellent care. Hearing back from our patients is one way we can continue to improve our services. Please take a few minutes to complete the written survey that you may receive in the mail after your visit with us. Thank you!             Your Updated Medication List - Protect others around you: Learn how to safely use, store and throw away your medicines at www.disposemymeds.org.          This list is accurate as of 5/31/18  8:19 AM.  Always use your most recent med list.                   Brand Name Dispense Instructions for use Diagnosis    aspirin 81 MG EC tablet     30 tablet    Take 1 tablet (81 mg) by mouth daily        Blood Pressure Monitoring Kit     1 kit    1 each three times a week Dr Acosta has asked you to check your blood pressure 2-3 times per week using your home monitor.  Please keep track of your findings in a journal and bring it to your appointments.  Contact  this office if your blood pressure: the top number (systolic) is consistently 30 points higher than your normal BP or if the bottom number (diastolic) is consistently 20 points higher than your normal BP.    ILD (interstitial lung disease) (H), Dermatomyositis (H), Systemic sclerosis (H), Gastroesophageal reflux disease, esophagitis presence not specified, High risk medications (not anticoagulants) long-term use       diltiazem 240 MG 24 hr capsule    CARDIZEM CD    90 capsule    Take 1 capsule (240 mg) by mouth daily    Paroxysmal atrial fibrillation (H)       lisinopril 10 MG tablet    PRINIVIL/ZESTRIL    30 tablet    Take 1 tablet (10 mg) by mouth daily    Raynaud's disease without gangrene, Atrial fibrillation, unspecified type (H)       mycophenolate 500 MG tablet    GENERIC EQUIVALENT    180 tablet    Take 3 tablets (1,500 mg) by mouth 2 times daily Monitoring labs required every 8 weeks for medication refills.    ILD (interstitial lung disease) (H), Myositis, unspecified myositis type, unspecified site, Polyarthritis, Dermatomyositis (H), Antisynthetase syndrome (H)       pantoprazole 40 MG EC tablet    PROTONIX    30 tablet    Take 1 tablet (40 mg) by mouth daily    Gastroesophageal reflux disease, esophagitis presence not specified, Esophageal dysmotility       * predniSONE 10 MG tablet    DELTASONE    90 tablet    Take 1 tablet (10 mg) by mouth daily    ILD (interstitial lung disease) (H), Inflammatory arthritis       * predniSONE 2.5 MG tablet    DELTASONE    270 tablet    Take 3 tablets (7.5 mg) by mouth daily    ILD (interstitial lung disease) (H), Inflammatory arthritis, High risk medications (not anticoagulants) long-term use, Scleroderma (H)       sulfamethoxazole-trimethoprim 400-80 MG per tablet    BACTRIM    180 tablet    Once daily for PJP prophylaxis. Start after bronchoscopy    ILD (interstitial lung disease) (H), Inflammatory arthritis, High risk medications (not anticoagulants) long-term use,  Scleroderma (H)       * Notice:  This list has 2 medication(s) that are the same as other medications prescribed for you. Read the directions carefully, and ask your doctor or other care provider to review them with you.

## 2018-06-04 ENCOUNTER — TELEPHONE (OUTPATIENT)
Dept: RHEUMATOLOGY | Facility: CLINIC | Age: 53
End: 2018-06-04

## 2018-06-05 NOTE — PROGRESS NOTES
[facility]    Reason for Visit  Kwaku Medrano is a 52 year old year old male who is being seen for RECHECK (Scleroderma)    HPI    Mr. Medrano is a 52-year-old who is here for follow up of scleroderma interstitial lung disease.  Mr. Medrano has been on prednisone since about June, 2017 and started mycophenolate approximately in July, 2017.  He has been on the full dose of 1500 mg twice since Sept.  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   He denies dyspnea when he ascends 2 flights of stairs, paroxysmal dyspnea, fevers or chest pain.  He does endorse a minimal cough that is unchanged.  Benzonatate and robitussin did not help the cough, and he does not want to take a medication for it.  He takes prednisone 10 mg daily and MMF 1500 mg twice daily.          Current Outpatient Prescriptions   Medication     aspirin 81 MG EC tablet     Blood Pressure Monitoring KIT     diltiazem (CARDIZEM CD) 240 MG 24 hr capsule     lisinopril (PRINIVIL/ZESTRIL) 10 MG tablet     mycophenolate (GENERIC EQUIVALENT) 500 MG tablet     pantoprazole (PROTONIX) 40 MG EC tablet     predniSONE (DELTASONE) 10 MG tablet     predniSONE (DELTASONE) 2.5 MG tablet     sulfamethoxazole-trimethoprim (BACTRIM) 400-80 MG per tablet     No current facility-administered medications for this visit.      Allergies   Allergen Reactions     Ampicillin Rash     Past Medical History:   Diagnosis Date     Atrial fibrillation and flutter (H) 07/2017     Fracture      GERD (gastroesophageal reflux disease)      Interstitial lung disease (H)     sclerderma ILD; present on CXR 3-2017 and CT 5-2017; dx confirmed by CT 9-2017 fibrotic NSIP pattern with increased fibrosis; started mycophenolate 7/2017     Scleroderma (H)     dx 9- at [facility] by Dr. Acosta       Past Surgical  "History:   Procedure Laterality Date     COLONOSCOPY N/A 7/19/2017    Procedure: COLONOSCOPY;  COLONOSCOPY;  Surgeon: Mita Jimenez MD;  Location:  GI     NO HISTORY OF SURGERY         Social History     Social History     Marital status: Single     Spouse name: N/A     Number of children: N/A     Years of education: N/A     Occupational History     Not on file.     Social History Main Topics     Smoking status: Never Smoker     Smokeless tobacco: Former User     Types: Chew     Quit date: 1/1/1984     Alcohol use Yes      Comment: very rarely     Drug use: No     Sexual activity: Not Currently     Other Topics Concern     Not on file     Social History Narrative    Work in [employer].  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  Denies exposure to hot tubs, silica, pet birds, mold.  Single, no children.       Family History   Problem Relation Age of Onset     Prostate Cancer Father      Lung Cancer Other      LUNG DISEASE No family hx of      Rheumatologic Disease No family hx of              ROS Pulmonary  A complete ROS was otherwise negative except as noted in the HPI.    Vitals: /70  Pulse 51  Resp 17  Ht 1.753 m (5' 9\")  Wt 76.2 kg (168 lb)  SpO2 98%  BMI 24.81 kg/m2    Exam:   GENERAL APPEARANCE: Well developed, well nourished, alert, and in no apparent distress.  RESP: good air flow throughout.  No crackles. No rhonchi. No wheezes.  CV: Normal S1, S2, regular rhythm, normal rate. No murmur.  No LE edema.   MS: extremities normal. No clubbing. No cyanosis.  SKIN: no rash on limited exam.  NEURO: Mentation intact, speech normal, normal gait and stance.  PSYCH: mentation appears normal. and affect normal/bright.    Results:  Recent Results (from the past 168 hour(s))   General PFT Lab (Please always keep checked)    Collection Time: 05/31/18  7:11 AM   Result Value Ref Range    FVC-Pred 4.67 L    FVC-Pre 4.21 L    FVC-%Pred-Pre 90 %    FEV1-Pre 3.64 L    " FEV1-%Pred-Pre 99 %    FEV1FVC-Pred 77 %    FEV1FVC-Pre 86 %    FEFMax-Pred 9.34 L/sec    FEFMax-Pre 9.99 L/sec    FEFMax-%Pred-Pre 106 %    FEF2575-Pred 3.21 L/sec    FEF2575-Pre 4.20 L/sec    HRP1722-%Pred-Pre 130 %    ExpTime-Pre 6.84 sec    FIFMax-Pre 3.95 L/sec    VC-Pred 4.93 L    VC-Pre 4.50 L    VC-%Pred-Pre 91 %    IC-Pred 3.57 L    IC-Pre 2.88 L    IC-%Pred-Pre 80 %    ERV-Pred 1.36 L    ERV-Pre 1.61 L    ERV-%Pred-Pre 118 %    FEV1FEV6-Pred 80 %    FEV1FEV6-Pre 86 %    FRCPleth-Pred 3.51 L    FRCPleth-Pre 3.07 L    FRCPleth-%Pred-Pre 87 %    RVPleth-Pred 2.27 L    RVPleth-Pre 1.46 L    RVPleth-%Pred-Pre 64 %    TLCPleth-Pred 6.92 L    TLCPleth-Pre 5.96 L    TLCPleth-%Pred-Pre 86 %    DLCOunc-Pred 29.19 ml/min/mmHg    DLCOunc-Pre 21.63 ml/min/mmHg    DLCOunc-%Pred-Pre 74 %    DLCOcor-Pre 22.73 ml/min/mmHg    DLCOcor-%Pred-Pre 77 %    VA-Pre 5.65 L    VA-%Pred-Pre 85 %    FEV1SVC-Pred 74 %    FEV1SVC-Pre 81 %         FVC FEV1 TLC DLCO   [facility] 2.79 58% 2.54 68%    12.9 52%   8-3-2017 [facility] 3.34 2.85 4.89 17.63   9- 3.46 73% 3.13 85% 4.73 68% 19.77 67%   12- 3.91 83% 3.47 94% 5.10 73% 20.16 68%   5- 4.21 90% 3.64 99% 5.96 86% 21.63 74%     I reviewed the pulmonary function test that was performed today.  PFT todapirometry and diffusion have improved compared to 3 months ago.  I reviewed labs: normal except for mild anemia.    I reviewed results with the patient.      Assessment and plan:   Mr. Medrano is a 52-year-old who is here for follow up of scleroderma interstitial lung disease.   1.  Scleroderma interstitial lung disease.  PFT has improved on mycophenolate.  We will continue the current dose of 1500 mg twice daily. I think it is reasonable to decrease prednisone to 7.5 mg daily.  We will give him the pneumonia vaccine (23 Valent) today.  I encouraged him to continue physical activity as he is doing.  He does not want to try another medication for the cough as it is  minimal.  He will return in 3 months with full PFT.   2.  Immunosuppression monitoring.  Labs are acceptable except for mild stable anemia.  I encouraged him to eat foods with iron.  I will check labs again in 3 months to monitor the mycophenolate.  He will continue Bactrim for Pneumocystis prophylaxis.  I have counseled him to avoid sick contacts.

## 2018-06-22 ENCOUNTER — HOSPITAL ENCOUNTER (OUTPATIENT)
Dept: CARDIOLOGY | Facility: CLINIC | Age: 53
Discharge: HOME | End: 2018-06-22
Attending: INTERNAL MEDICINE | Admitting: INTERNAL MEDICINE
Payer: COMMERCIAL

## 2018-06-22 PROCEDURE — 93306 TTE W/DOPPLER COMPLETE: CPT | Mod: 26 | Performed by: INTERNAL MEDICINE

## 2018-06-22 PROCEDURE — 93306 TTE W/DOPPLER COMPLETE: CPT

## 2018-06-25 LAB
DLCOCOR-%PRED-PRE: 77 %
DLCOCOR-PRE: 22.73 ML/MIN/MMHG
DLCOUNC-%PRED-PRE: 74 %
DLCOUNC-PRE: 21.63 ML/MIN/MMHG
DLCOUNC-PRED: 29.19 ML/MIN/MMHG
ERV-%PRED-PRE: 118 %
ERV-PRE: 1.61 L
ERV-PRED: 1.36 L
EXPTIME-PRE: 6.84 SEC
FEF2575-%PRED-PRE: 130 %
FEF2575-PRE: 4.2 L/SEC
FEF2575-PRED: 3.21 L/SEC
FEFMAX-%PRED-PRE: 106 %
FEFMAX-PRE: 9.99 L/SEC
FEFMAX-PRED: 9.34 L/SEC
FEV1-%PRED-PRE: 99 %
FEV1-PRE: 3.64 L
FEV1FEV6-PRE: 86 %
FEV1FEV6-PRED: 80 %
FEV1FVC-PRE: 86 %
FEV1FVC-PRED: 77 %
FEV1SVC-PRE: 81 %
FEV1SVC-PRED: 74 %
FIFMAX-PRE: 3.95 L/SEC
FRCPLETH-%PRED-PRE: 87 %
FRCPLETH-PRE: 3.07 L
FRCPLETH-PRED: 3.51 L
FVC-%PRED-PRE: 90 %
FVC-PRE: 4.21 L
FVC-PRED: 4.67 L
IC-%PRED-PRE: 80 %
IC-PRE: 2.88 L
IC-PRED: 3.57 L
RVPLETH-%PRED-PRE: 64 %
RVPLETH-PRE: 1.46 L
RVPLETH-PRED: 2.27 L
TLCPLETH-%PRED-PRE: 86 %
TLCPLETH-PRE: 5.96 L
TLCPLETH-PRED: 6.92 L
VA-%PRED-PRE: 85 %
VA-PRE: 5.65 L
VC-%PRED-PRE: 91 %
VC-PRE: 4.5 L
VC-PRED: 4.93 L

## 2018-06-26 ENCOUNTER — OFFICE VISIT (OUTPATIENT)
Dept: CARDIOLOGY | Facility: CLINIC | Age: 53
End: 2018-06-26
Attending: INTERNAL MEDICINE
Payer: COMMERCIAL

## 2018-06-26 VITALS
BODY MASS INDEX: 25.02 KG/M2 | SYSTOLIC BLOOD PRESSURE: 104 MMHG | WEIGHT: 168.9 LBS | HEART RATE: 56 BPM | DIASTOLIC BLOOD PRESSURE: 62 MMHG | HEIGHT: 69 IN

## 2018-06-26 DIAGNOSIS — I48.91 ATRIAL FIBRILLATION, UNSPECIFIED TYPE (H): Primary | ICD-10-CM

## 2018-06-26 PROCEDURE — 93000 ELECTROCARDIOGRAM COMPLETE: CPT | Performed by: INTERNAL MEDICINE

## 2018-06-26 PROCEDURE — 99214 OFFICE O/P EST MOD 30 MIN: CPT | Performed by: INTERNAL MEDICINE

## 2018-06-26 NOTE — LETTER
6/26/2018    Stew Caldwell MD  [address] [facility] [address]    RE: Kwaku Medrano       Dear Colleague,    I had the pleasure of seeing Kwaku Medrano in the [facility].    HPI and Plan:   See dictation    Orders Placed This Encounter   Procedures     EKG 12-lead complete w/read - Clinics (performed today)       No orders of the defined types were placed in this encounter.      Medications Discontinued During This Encounter   Medication Reason     predniSONE (DELTASONE) 10 MG tablet Stopped by Patient         Encounter Diagnosis   Name Primary?     Atrial fibrillation, unspecified type (H) Yes       CURRENT MEDICATIONS:  Current Outpatient Prescriptions   Medication Sig Dispense Refill     aspirin 81 MG EC tablet Take 1 tablet (81 mg) by mouth daily 30 tablet 1     Blood Pressure Monitoring KIT 1 each three times a week Dr Acosta has asked you to check your blood pressure 2-3 times per week using your home monitor.  Please keep track of your findings in a journal and bring it to your appointments.   Contact this office if your blood pressure: the top number (systolic) is consistently 30 points higher than your normal BP or if the bottom number (diastolic) is consistently 20 points higher than your normal BP. 1 kit 1     diltiazem (CARDIZEM CD) 240 MG 24 hr capsule Take 1 capsule (240 mg) by mouth daily 90 capsule 3     lisinopril (PRINIVIL/ZESTRIL) 10 MG tablet Take 1 tablet (10 mg) by mouth daily 30 tablet 11     mycophenolate (GENERIC EQUIVALENT) 500 MG tablet Take 3 tablets (1,500 mg) by mouth 2 times daily Monitoring labs required every 8 weeks for medication refills. 180 tablet 2     pantoprazole (PROTONIX) 40 MG EC tablet Take 1 tablet (40 mg) by mouth daily 30 tablet 11     predniSONE (DELTASONE) 2.5 MG tablet Take 3 tablets (7.5 mg) by mouth daily 270 tablet 1     sulfamethoxazole-trimethoprim (BACTRIM) 400-80 MG per tablet Once daily for PJP prophylaxis.  Start after bronchoscopy 180 tablet 3       ALLERGIES     Allergies   Allergen Reactions     Ampicillin Rash       PAST MEDICAL HISTORY:  Past Medical History:   Diagnosis Date     Atrial fibrillation and flutter (H) 07/2017     Fracture      GERD (gastroesophageal reflux disease)      Interstitial lung disease (H)     sclerderma ILD; present on CXR 3-2017 and CT 5-2017; dx confirmed by CT 9-2017 fibrotic NSIP pattern with increased fibrosis; started mycophenolate 7/2017     Scleroderma (H)     dx 9- at [facility] by Dr. Acosta       PAST SURGICAL HISTORY:  Past Surgical History:   Procedure Laterality Date     COLONOSCOPY N/A 7/19/2017    Procedure: COLONOSCOPY;  COLONOSCOPY;  Surgeon: Mita Jimenez MD;  Location:  GI     NO HISTORY OF SURGERY         FAMILY HISTORY:  Family History   Problem Relation Age of Onset     Prostate Cancer Father      Lung Cancer Other      Family History Negative Mother      Family History Negative Maternal Grandmother      Family History Negative Maternal Grandfather      Family History Negative Paternal Grandmother      Other - See Comments Paternal Grandfather      Atherosclerosis     Family History Negative Brother      Family History Negative Sister      Family History Negative Brother      LUNG DISEASE No family hx of      Rheumatologic Disease No family hx of        SOCIAL HISTORY:  Social History     Social History     Marital status: Single     Spouse name: N/A     Number of children: N/A     Years of education: N/A     Social History Main Topics     Smoking status: Never Smoker     Smokeless tobacco: Former User     Types: Chew     Quit date: 1/1/1984     Alcohol use Yes      Comment: very rarely     Drug use: No     Sexual activity: Not Currently     Other Topics Concern     None     Social History Narrative    Work in [employer].  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  Denies exposure to hot tubs, silica, pet birds, mold.   Single, no children.       Review of Systems:  Skin:  Positive for   scleraderma   Eyes:  Positive for glasses    ENT:  Negative      Respiratory:  Negative       Cardiovascular:  Negative      Gastroenterology: Positive for heartburn;reflux    Genitourinary:  Negative      Musculoskeletal:  Negative      Neurologic:  Negative      Psychiatric:  Negative      Heme/Lymph/Imm:  Positive for allergies    Endocrine:  Negative        [id number]          Thank you for allowing me to participate in the care of your patient.      Sincerely,     Radha Chavez MD     [facility]    cc:   Radha Chavez MD  [address] [facility] [address]  [place] [address]

## 2018-06-26 NOTE — PROGRESS NOTES
HPI and Plan:   See dictation    Orders Placed This Encounter   Procedures     EKG 12-lead complete w/read - Clinics (performed today)       No orders of the defined types were placed in this encounter.      Medications Discontinued During This Encounter   Medication Reason     predniSONE (DELTASONE) 10 MG tablet Stopped by Patient         Encounter Diagnosis   Name Primary?     Atrial fibrillation, unspecified type (H) Yes       CURRENT MEDICATIONS:  Current Outpatient Prescriptions   Medication Sig Dispense Refill     aspirin 81 MG EC tablet Take 1 tablet (81 mg) by mouth daily 30 tablet 1     Blood Pressure Monitoring KIT 1 each three times a week Dr Acosta has asked you to check your blood pressure 2-3 times per week using your home monitor.  Please keep track of your findings in a journal and bring it to your appointments.   Contact this office if your blood pressure: the top number (systolic) is consistently 30 points higher than your normal BP or if the bottom number (diastolic) is consistently 20 points higher than your normal BP. 1 kit 1     diltiazem (CARDIZEM CD) 240 MG 24 hr capsule Take 1 capsule (240 mg) by mouth daily 90 capsule 3     lisinopril (PRINIVIL/ZESTRIL) 10 MG tablet Take 1 tablet (10 mg) by mouth daily 30 tablet 11     mycophenolate (GENERIC EQUIVALENT) 500 MG tablet Take 3 tablets (1,500 mg) by mouth 2 times daily Monitoring labs required every 8 weeks for medication refills. 180 tablet 2     pantoprazole (PROTONIX) 40 MG EC tablet Take 1 tablet (40 mg) by mouth daily 30 tablet 11     predniSONE (DELTASONE) 2.5 MG tablet Take 3 tablets (7.5 mg) by mouth daily 270 tablet 1     sulfamethoxazole-trimethoprim (BACTRIM) 400-80 MG per tablet Once daily for PJP prophylaxis. Start after bronchoscopy 180 tablet 3       ALLERGIES     Allergies   Allergen Reactions     Ampicillin Rash       PAST MEDICAL HISTORY:  Past Medical History:   Diagnosis Date     Atrial fibrillation and flutter (H) 07/2017      Fracture      GERD (gastroesophageal reflux disease)      Interstitial lung disease (H)     sclerderma ILD; present on CXR 3-2017 and CT 5-2017; dx confirmed by CT 9-2017 fibrotic NSIP pattern with increased fibrosis; started mycophenolate 7/2017     Scleroderma (H)     dx 9- at [facility] by Dr. Acosta       PAST SURGICAL HISTORY:  Past Surgical History:   Procedure Laterality Date     COLONOSCOPY N/A 7/19/2017    Procedure: COLONOSCOPY;  COLONOSCOPY;  Surgeon: Mita Jimenez MD;  Location:  GI     NO HISTORY OF SURGERY         FAMILY HISTORY:  Family History   Problem Relation Age of Onset     Prostate Cancer Father      Lung Cancer Other      Family History Negative Mother      Family History Negative Maternal Grandmother      Family History Negative Maternal Grandfather      Family History Negative Paternal Grandmother      Other - See Comments Paternal Grandfather      Atherosclerosis     Family History Negative Brother      Family History Negative Sister      Family History Negative Brother      LUNG DISEASE No family hx of      Rheumatologic Disease No family hx of        SOCIAL HISTORY:  Social History     Social History     Marital status: Single     Spouse name: N/A     Number of children: N/A     Years of education: N/A     Social History Main Topics     Smoking status: Never Smoker     Smokeless tobacco: Former User     Types: Chew     Quit date: 1/1/1984     Alcohol use Yes      Comment: very rarely     Drug use: No     Sexual activity: Not Currently     Other Topics Concern     None     Social History Narrative    Work in [employer].  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  Denies exposure to hot tubs, silica, pet birds, mold.  Single, no children.       Review of Systems:  Skin:  Positive for   scleraderma   Eyes:  Positive for glasses    ENT:  Negative      Respiratory:  Negative       Cardiovascular:  Negative      Gastroenterology: Positive for  heartburn;reflux    Genitourinary:  Negative      Musculoskeletal:  Negative      Neurologic:  Negative      Psychiatric:  Negative      Heme/Lymph/Imm:  Positive for allergies    Endocrine:  Negative        [id number]

## 2018-06-26 NOTE — PROGRESS NOTES
Service Date: 06/26/2018      HISTORY OF PRESENT ILLNESS:    I had the pleasure of seeing Mr. Kwaku Medrano, a delightful 52-year-old gentleman with the following issues:   1.  Paroxysmal atrial fibrillation, treated with diltiazem  mg daily.  Not on anticoagulation given [id number] score of 0.     2.  Typical atrial flutter, status post catheter ablation 10/2017.   3.  Scleroderma, on chronic steroids.   4.  Interstitial lung disease related to scleroderma.   5.  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      Mr. Medrano is here for followup.  He has been feeling well.  He has not had awareness of any unusual heart rhythm.  He is currently on prednisone 7.5 mg daily for his scleroderma.  He tells me that his lungs and joints feel well.  His Raynaud's has improved dramatically.  He does not have any ulcers in his fingers at this time.  His exercise capacity is much better compared to 1 year ago.      He works long hours.  He has not had chest pain with exertion, unusual dyspnea, syncope or near-syncope.      I note that he had a ZIO Patch cardiac monitor in 12/2017 that showed some fairly frequent PACs (burden of 3.8%) and a few runs of SVT, though none longer than 10 seconds.  He did not have sustained atrial fibrillation.      PHYSICAL EXAMINATION:   VITAL SIGNS:  Blood pressure 104/62, pulse 56 and regular, weight 76.6 kg.  Height 175 cm.   GENERAL:  He is in no distress.   NECK:  Supple, without carotid bruits, normal JVP.   LUNGS:  Clear.  No crackles or wheezes.   CARDIOVASCULAR:  Regular rhythm.  No gallop, murmur or rub.   ABDOMEN:  Soft, nontender.   EXTREMITIES:  No edema.      DIAGNOSTIC STUDIES:  Due to difficulty with the ECG machine today, the tracing has significant artifact.  However, the rhythm was clearly sinus around 50 beats per minute.      He had an echocardiogram on 06/22/2018 which showed normalization of LV function with EF now 65%.  Normal RV size and function.   Minimal aortic root dilatation at 3.8 cm.      IMPRESSION:   1.  Atrial arrhythmias.  Mr. Medrano has paroxysmal atrial fibrillation and is on treatment with diltiazem for rate control.  He had prior successful atrial flutter ablation.  He does not require anticoagulation given his [id number] score of 0.  It may be that his atrial fibrillation burden follows the activity of his scleroderma.  On the past several months, the scleroderma has been quiet on treatment and he has not had much atrial arrhythmia either.  I did discuss with him, however, that he will almost certainly have atrial fibrillation in the future.  I do expect it to be reasonably well rate controlled on diltiazem.      RECOMMENDATIONS:   1.  Continue diltiazem.   2.  Use his heart monitor to record resting heart rate.  Expectation would be for resting heart rate between 50 and 80 beats per minute.  If higher, particularly if it exceeds 100 at rest, it may signal recurrence of atrial fibrillation.  In that case, he should give us a call.  3.  Follow-up with us in the EP Clinic on an as-needed basis.      Thank you for the opportunity to be involved in this delightful gentleman's care.        JOHN TANG MD, FACC          cc:      Stew Caldwell MD    [facility]   [address] [place] [id number]          D: 2018   T: 2018   MT: JEISON      Name:     FLOR MEDRANO   MRN:      [id number]        Account:      [id number]   :      1965           Service Date: 2018      Document: [id number]

## 2018-06-26 NOTE — LETTER
6/26/2018      Stew Caldwell MD  [address] [facility] [address]      RE: Kwaku Medrano       Dear Colleague,    I had the pleasure of seeing Kwaku Medrano in the [facility].    Service Date: 06/26/2018      HISTORY OF PRESENT ILLNESS:    I had the pleasure of seeing Mr. Kwaku Medrano, a delightful 52-year-old gentleman with the following issues:   1.  Paroxysmal atrial fibrillation, treated with diltiazem  mg daily.  Not on anticoagulation given [id number] score of 0.     2.  Typical atrial flutter, status post catheter ablation 10/2017.   3.  Scleroderma, on chronic steroids.   4.  Interstitial lung disease related to scleroderma.   5.  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      Mr. Medrano is here for followup.  He has been feeling well.  He has not had awareness of any unusual heart rhythm.  He is currently on prednisone 7.5 mg daily for his scleroderma.  He tells me that his lungs and joints feel well.  His Raynaud's has improved dramatically.  He does not have any ulcers in his fingers at this time.  His exercise capacity is much better compared to 1 year ago.      He works long hours.  He has not had chest pain with exertion, unusual dyspnea, syncope or near-syncope.      I note that he had a ZIO Patch cardiac monitor in 12/2017 that showed some fairly frequent PACs (burden of 3.8%) and a few runs of SVT, though none longer than 10 seconds.  He did not have sustained atrial fibrillation.      PHYSICAL EXAMINATION:   VITAL SIGNS:  Blood pressure 104/62, pulse 56 and regular, weight 76.6 kg.  Height 175 cm.   GENERAL:  He is in no distress.   NECK:  Supple, without carotid bruits, normal JVP.   LUNGS:  Clear.  No crackles or wheezes.   CARDIOVASCULAR:  Regular rhythm.  No gallop, murmur or rub.   ABDOMEN:  Soft, nontender.   EXTREMITIES:  No edema.      DIAGNOSTIC STUDIES:  Due to difficulty with the ECG machine today, the tracing  has significant artifact.  However, the rhythm was clearly sinus around 50 beats per minute.      He had an echocardiogram on 2018 which showed normalization of LV function with EF now 65%.  Normal RV size and function.  Minimal aortic root dilatation at 3.8 cm.      IMPRESSION:   1.  Atrial arrhythmias.  Mr. Medrano has paroxysmal atrial fibrillation and is on treatment with diltiazem for rate control.  He had prior successful atrial flutter ablation.  He does not require anticoagulation given his [id number] score of 0.  It may be that his atrial fibrillation burden follows the activity of his scleroderma.  On the past several months, the scleroderma has been quiet on treatment and he has not had much atrial arrhythmia either.  I did discuss with him, however, that he will almost certainly have atrial fibrillation in the future.  I do expect it to be reasonably well rate controlled on diltiazem.      RECOMMENDATIONS:   1.  Continue diltiazem.   2.  Use his heart monitor to record resting heart rate.  Expectation would be for resting heart rate between 50 and 80 beats per minute.  If higher, particularly if it exceeds 100 at rest, it may signal recurrence of atrial fibrillation.  In that case, he should give us a call.  3.  Follow-up with us in the EP Clinic on an as-needed basis.      Thank you for the opportunity to be involved in this delightful gentleman's care.        JOHN TANG MD, [facility]          cc:      Stew Caldwell MD    [facility] ([facility]3]   [address] [place] [id number]          D: 2018   T: 2018   MT: JEISON      Name:     FLOR MEDRANO   MRN:      [id number]        Account:      [id number]   :      1965           Service Date: 2018      Document: [id number]           Outpatient Encounter Prescriptions as of 2018   Medication Sig Dispense Refill     aspirin 81 MG EC tablet Take 1 tablet (81 mg) by mouth daily 30 tablet 1     Blood Pressure  Monitoring KIT 1 each three times a week Dr Acosta has asked you to check your blood pressure 2-3 times per week using your home monitor.  Please keep track of your findings in a journal and bring it to your appointments.   Contact this office if your blood pressure: the top number (systolic) is consistently 30 points higher than your normal BP or if the bottom number (diastolic) is consistently 20 points higher than your normal BP. 1 kit 1     diltiazem (CARDIZEM CD) 240 MG 24 hr capsule Take 1 capsule (240 mg) by mouth daily 90 capsule 3     lisinopril (PRINIVIL/ZESTRIL) 10 MG tablet Take 1 tablet (10 mg) by mouth daily 30 tablet 11     mycophenolate (GENERIC EQUIVALENT) 500 MG tablet Take 3 tablets (1,500 mg) by mouth 2 times daily Monitoring labs required every 8 weeks for medication refills. 180 tablet 2     pantoprazole (PROTONIX) 40 MG EC tablet Take 1 tablet (40 mg) by mouth daily 30 tablet 11     predniSONE (DELTASONE) 2.5 MG tablet Take 3 tablets (7.5 mg) by mouth daily 270 tablet 1     sulfamethoxazole-trimethoprim (BACTRIM) 400-80 MG per tablet Once daily for PJP prophylaxis. Start after bronchoscopy 180 tablet 3     [DISCONTINUED] predniSONE (DELTASONE) 10 MG tablet Take 1 tablet (10 mg) by mouth daily 90 tablet 0     No facility-administered encounter medications on file as of 6/26/2018.        Again, thank you for allowing me to participate in the care of your patient.      Sincerely,    Radha Chavez MD     [facility]

## 2018-06-26 NOTE — MR AVS SNAPSHOT
After Visit Summary   6/26/2018    Kwaku Medrano    MRN: [id number]           Patient Information     Date Of Birth          1965        Visit Information        Provider Department      6/26/2018 8:15 AM Radha Chavez MD [facility]        Today's Diagnoses     Atrial fibrillation, unspecified type (H)    -  1       Follow-ups after your visit        Your next 10 appointments already scheduled     Jul 05, 2018  8:00 AM CDT   (Arrive by 7:45 AM)   Return Visit with Denny Acosta MD   [facility] Rheumatology ([facility])    [address]  [address]  [facility] [address]   [phone]            Aug 30, 2018  7:00 AM CDT   FULL PULMONARY FUNCTION with  PFL B   [facility] Pulmonary Function Testing ([facility])    [address]  3rd Floor  [facility] [id number] [phone]            Aug 30, 2018  8:00 AM CDT   (Arrive by 7:45 AM)   RETURN SCLERODERMA with Elma Dillon MD   [facility] ([facility])    [address]  [address]  [facility] [id number] [phone]              Who to contact     If you have questions or need follow up information about today's clinic visit or your schedule please contact [facility] directly at [phone].  Normal or non-critical lab and imaging results will be communicated to you by MyChart, letter or phone within 4 business days after the clinic has received the results. If you do not hear from us within 7 days, please contact the clinic through MyChart or phone. If you have a critical or abnormal lab result, we will notify you by phone as soon as possible.  Submit refill requests through [employer] or call your pharmacy and they will forward the refill request to us. Please allow 3 business days for your refill to be completed.     "      Additional Information About Your Visit        MyChart Information     [employer] gives you secure access to your electronic health record. If you see a primary care provider, you can also send messages to your care team and make appointments. If you have questions, please call your primary care clinic.  If you do not have a primary care provider, please call [phone] and they will assist you.        Care EveryWhere ID     This is your Care EveryWhere ID. This could be used by other organizations to access your [place] medical records  [id number]        Your Vitals Were     Pulse Height BMI (Body Mass Index)             56 1.753 m (5' 9\") 24.94 kg/m2          Blood Pressure from Last 3 Encounters:   06/26/18 104/62   05/31/18 122/70   02/15/18 113/69    Weight from Last 3 Encounters:   06/26/18 76.6 kg (168 lb 14.4 oz)   05/31/18 76.2 kg (168 lb)   02/15/18 77.8 kg (171 lb 8 oz)              We Performed the Following     EKG 12-lead complete w/read - Clinics (performed today)     Follow-Up with Electrophysiologist        Primary Care Provider Office Phone # Fax #    Stew Caldwell [phone]       [facility] [address] [place] [phone]        Equal Access to Services     ESTHER DE LA O : Hadii cindy ku hadasho Soomaali, waaxda luqadaha, qaybta kaalmada adeegyada, keegan boswell. So [facility] [phone].    ATENCIÓN: Si habla español, tiene a rubalcava disposición servicios gratuitos de asistencia lingüística. Llame al [phone].    We comply with applicable federal civil rights laws and [place] laws. We do not discriminate on the basis of race, color, national origin, age, disability, sex, sexual orientation, or gender identity.            Thank you!     Thank you for choosing [facility]  for your care. Our goal is always to provide you with excellent care. Hearing back from our patients is one way we can continue to improve " our services. Please take a few minutes to complete the written survey that you may receive in the mail after your visit with us. Thank you!             Your Updated Medication List - Protect others around you: Learn how to safely use, store and throw away your medicines at www.disposemymeds.org.          This list is accurate as of 6/26/18  8:53 AM.  Always use your most recent med list.                   Brand Name Dispense Instructions for use Diagnosis    aspirin 81 MG EC tablet     30 tablet    Take 1 tablet (81 mg) by mouth daily        Blood Pressure Monitoring Kit     1 kit    1 each three times a week Dr Acosta has asked you to check your blood pressure 2-3 times per week using your home monitor.  Please keep track of your findings in a journal and bring it to your appointments.  Contact this office if your blood pressure: the top number (systolic) is consistently 30 points higher than your normal BP or if the bottom number (diastolic) is consistently 20 points higher than your normal BP.    ILD (interstitial lung disease) (H), Dermatomyositis (H), Systemic sclerosis (H), Gastroesophageal reflux disease, esophagitis presence not specified, High risk medications (not anticoagulants) long-term use       diltiazem 240 MG 24 hr capsule    CARDIZEM CD    90 capsule    Take 1 capsule (240 mg) by mouth daily    Paroxysmal atrial fibrillation (H)       lisinopril 10 MG tablet    PRINIVIL/ZESTRIL    30 tablet    Take 1 tablet (10 mg) by mouth daily    Raynaud's disease without gangrene, Atrial fibrillation, unspecified type (H)       mycophenolate 500 MG tablet    GENERIC EQUIVALENT    180 tablet    Take 3 tablets (1,500 mg) by mouth 2 times daily Monitoring labs required every 8 weeks for medication refills.    ILD (interstitial lung disease) (H), Myositis, unspecified myositis type, unspecified site, Polyarthritis, Dermatomyositis (H), Antisynthetase syndrome (H)       pantoprazole 40 MG EC tablet    PROTONIX     30 tablet    Take 1 tablet (40 mg) by mouth daily    Gastroesophageal reflux disease, esophagitis presence not specified, Esophageal dysmotility       predniSONE 2.5 MG tablet    DELTASONE    270 tablet    Take 3 tablets (7.5 mg) by mouth daily    ILD (interstitial lung disease) (H), Inflammatory arthritis, High risk medications (not anticoagulants) long-term use, Scleroderma (H)       sulfamethoxazole-trimethoprim 400-80 MG per tablet    BACTRIM    180 tablet    Once daily for PJP prophylaxis. Start after bronchoscopy    ILD (interstitial lung disease) (H), Inflammatory arthritis, High risk medications (not anticoagulants) long-term use, Scleroderma (H)

## 2018-07-02 DIAGNOSIS — Z79.899 HIGH RISK MEDICATION USE: ICD-10-CM

## 2018-07-02 DIAGNOSIS — M33.13 DERMATOMYOSITIS (H): ICD-10-CM

## 2018-07-02 DIAGNOSIS — J84.9 ILD (INTERSTITIAL LUNG DISEASE) (H): ICD-10-CM

## 2018-07-02 DIAGNOSIS — M34.9 SCLERODERMA (H): ICD-10-CM

## 2018-07-02 LAB
ALBUMIN SERPL-MCNC: 3.7 G/DL (ref 3.4–5)
ALP SERPL-CCNC: 99 U/L (ref 40–150)
ALT SERPL W P-5'-P-CCNC: 22 U/L (ref 0–70)
ANION GAP SERPL CALCULATED.3IONS-SCNC: 7 MMOL/L (ref 3–14)
AST SERPL W P-5'-P-CCNC: 11 U/L (ref 0–45)
BASOPHILS # BLD AUTO: 0 10E9/L (ref 0–0.2)
BASOPHILS NFR BLD AUTO: 0.3 %
BILIRUB SERPL-MCNC: 0.4 MG/DL (ref 0.2–1.3)
BUN SERPL-MCNC: 18 MG/DL (ref 7–30)
CALCIUM SERPL-MCNC: 8.8 MG/DL (ref 8.5–10.1)
CHLORIDE SERPL-SCNC: 106 MMOL/L (ref 94–109)
CO2 SERPL-SCNC: 25 MMOL/L (ref 20–32)
CREAT SERPL-MCNC: 1 MG/DL (ref 0.66–1.25)
CRP SERPL-MCNC: <2.9 MG/L (ref 0–8)
DIFFERENTIAL METHOD BLD: ABNORMAL
EOSINOPHIL # BLD AUTO: 0.1 10E9/L (ref 0–0.7)
EOSINOPHIL NFR BLD AUTO: 0.9 %
ERYTHROCYTE [DISTWIDTH] IN BLOOD BY AUTOMATED COUNT: 13.6 % (ref 10–15)
ERYTHROCYTE [SEDIMENTATION RATE] IN BLOOD BY WESTERGREN METHOD: 1 MM/H (ref 0–20)
GFR SERPL CREATININE-BSD FRML MDRD: 78 ML/MIN/1.7M2
GLUCOSE SERPL-MCNC: 112 MG/DL (ref 70–99)
HCT VFR BLD AUTO: 43.7 % (ref 40–53)
HGB BLD-MCNC: 14.1 G/DL (ref 13.3–17.7)
LYMPHOCYTES # BLD AUTO: 1.7 10E9/L (ref 0.8–5.3)
LYMPHOCYTES NFR BLD AUTO: 14.5 %
MCH RBC QN AUTO: 30.2 PG (ref 26.5–33)
MCHC RBC AUTO-ENTMCNC: 32.3 G/DL (ref 31.5–36.5)
MCV RBC AUTO: 94 FL (ref 78–100)
MONOCYTES # BLD AUTO: 0.9 10E9/L (ref 0–1.3)
MONOCYTES NFR BLD AUTO: 7.8 %
NEUTROPHILS # BLD AUTO: 8.9 10E9/L (ref 1.6–8.3)
NEUTROPHILS NFR BLD AUTO: 76.5 %
PLATELET # BLD AUTO: 351 10E9/L (ref 150–450)
POTASSIUM SERPL-SCNC: 3.4 MMOL/L (ref 3.4–5.3)
PROT SERPL-MCNC: 6.5 G/DL (ref 6.8–8.8)
RBC # BLD AUTO: 4.67 10E12/L (ref 4.4–5.9)
SODIUM SERPL-SCNC: 138 MMOL/L (ref 133–144)
WBC # BLD AUTO: 11.6 10E9/L (ref 4–11)

## 2018-07-02 PROCEDURE — 80053 COMPREHEN METABOLIC PANEL: CPT | Performed by: INTERNAL MEDICINE

## 2018-07-02 PROCEDURE — 85025 COMPLETE CBC W/AUTO DIFF WBC: CPT | Performed by: INTERNAL MEDICINE

## 2018-07-02 PROCEDURE — 36415 COLL VENOUS BLD VENIPUNCTURE: CPT | Performed by: INTERNAL MEDICINE

## 2018-07-02 PROCEDURE — 85652 RBC SED RATE AUTOMATED: CPT | Performed by: INTERNAL MEDICINE

## 2018-07-02 PROCEDURE — 86140 C-REACTIVE PROTEIN: CPT | Performed by: INTERNAL MEDICINE

## 2018-07-02 NOTE — PROGRESS NOTES
[facility]  Rheumatology Clinic  Denny Acosta MD  2018     Name: Kwaku Medrano  MRN: [id number]  Age: 52 year old  : 1965  Referring provider: Mani Golden     Assessment and Plan:  # diffuse cutaneous systemic sclerosis, RAN ANDIE III +  with a modified Rodnan skin score  of 21 but fairly rapidly progressive skin    Patient is doing very well and his skin is improving, lungs appear stable. If stable with next PFTs favor taper of prednisone to 5 mg/day . Plan is to get first Shingrix today .    Follow-up:  RTC in 6 months    HPI:   Kwaku Medrano is a 52 year old male with a history of diffuse cutaneous systemic sclerosis who presents for follow up. He was last seen on 2/15/18 at which time he was doing well with his regimen of bactrim, cellcept, diltiazem, and prednisone 10mg. He was previously treated with higher doses of prednisone (60mg), and we plan to taper him to 7.5mg.    Today, the patient reports doing well lately. He denies any major ailments but does complain of some intermittent cough. He is currently managed by Dr. Dillon and dropped to 7.5mg prednisone successfully after his last meeting with her. He denies any increase in shortness of breath or other breathing issues. However, he notes some tingling sensations in his lungs. He denies any trouble tolerating cellcept.     He denies any trouble with swallowing but does have occasional reflux issues. He believes that this is caused by him occasionally coming home late and eating too much too close to bed time. He continues to take pantoprazole 40mg daily.    He denies any significant thickening in his skin. His hands were previously a big issue but have improved recently. He does occasionally experience some tingling in his hands however.     He occasionally has nighttime cramps in his legs that appears to be correlated with his eating habits.     He recently underwent an ablation for his atrial fibrillation and was discharged  by his cardiologist because he has been doing well for over 6 months. He denies any occurrence of heart issues after the ablation.      Review of Systems:   Pertinent items are noted in HPI, remainder of complete ROS is negative.    No recent problems with hearing or vision. No swallowing problems.   No breathing difficulty, shortness of breath, coughing, or wheezing  No chest pain or palpitations  No heart burn, indigestion, abdominal pain, nausea, vomiting, diarrhea  No urination problems, no bloody, cloudy urine, no dysuria  No numbing, weakness  No headaches or confusion  No rashes. No easy bleeding or bruising.   +occasional tingling in hands and in lungs  +nighttime cramping    Active Medications:     Current Outpatient Prescriptions:      aspirin 81 MG EC tablet, Take 1 tablet (81 mg) by mouth daily, Disp: 30 tablet, Rfl: 1     Blood Pressure Monitoring KIT, 1 each three times a week Dr Acosta has asked you to check your blood pressure 2-3 times per week using your home monitor.  Please keep track of your findings in a journal and bring it to your appointments.  Contact this office if your blood pressure: the top number (systolic) is consistently 30 points higher than your normal BP or if the bottom number (diastolic) is consistently 20 points higher than your normal BP., Disp: 1 kit, Rfl: 1     diltiazem (CARDIZEM CD) 240 MG 24 hr capsule, Take 1 capsule (240 mg) by mouth daily, Disp: 90 capsule, Rfl: 3     lisinopril (PRINIVIL/ZESTRIL) 10 MG tablet, Take 1 tablet (10 mg) by mouth daily, Disp: 30 tablet, Rfl: 11     mycophenolate (GENERIC EQUIVALENT) 500 MG tablet, Take 3 tablets (1,500 mg) by mouth 2 times daily Monitoring labs required every 8 weeks for medication refills., Disp: 180 tablet, Rfl: 2     pantoprazole (PROTONIX) 40 MG EC tablet, Take 1 tablet (40 mg) by mouth daily, Disp: 30 tablet, Rfl: 11     predniSONE (DELTASONE) 2.5 MG tablet, Take 3 tablets (7.5 mg) by mouth daily, Disp: 270 tablet,  "Rfl: 1     sulfamethoxazole-trimethoprim (BACTRIM) 400-80 MG per tablet, Once daily for PJP prophylaxis. Start after bronchoscopy, Disp: 180 tablet, Rfl: 3      Allergies:   Ampicillin      Past Medical History:  Atrial fibrillation and flutter  Fracture  GERD  ILD  Scleroderma  Dermatomyositis  Diastolic hypertension     Past Surgical History:  No pertinent surgical history    Family History:   Father - prostate cancer  Paternal grandfather - atherosclerosis  Maternal aunt - lupus  Other - lung cancer     Social History:   No smoking  Former tobacco chew use, quit 1984  Very rare alcohol use     Physical Exam:   /74  Pulse 51  Temp 98.6  F (37  C) (Oral)  Ht 1.753 m (5' 9\")  Wt 76.4 kg (168 lb 8 oz)  BMI 24.88 kg/m2   Wt Readings from Last 4 Encounters:   07/05/18 76.4 kg (168 lb 8 oz)   06/26/18 76.6 kg (168 lb 14.4 oz)   05/31/18 76.2 kg (168 lb)   02/15/18 77.8 kg (171 lb 8 oz)   Constitutional: Well-developed, appearing stated age; cooperative  Eyes: Normal EOM, PERRLA, vision, conjunctiva, sclera  ENT: Normal external ears, nose, hearing, lips, teeth, gums, throat. No mucous membrane lesions, normal saliva pool  Neck: No mass or thyroid enlargement  Resp: Lungs clear to auscultation, nl to palpation  CV: RRR, no murmurs, rubs or gallops, no edema  GI: No ABD mass or tenderness, no HSM  Lymph: No cervical, supraclavicular, inguinal or epitrochlear nodes  MS: The TMJ, neck, shoulder, elbow, wrist, MCP/PIP/DIP, spine, hip, knee, ankle, and foot MTP/IP joints were examined and found normal. No active synovitis or altered joint anatomy. Full joint ROM. Normal  strength. No dactylitis,  tenosynovitis, enthesopathy.  Skin: 2+ skin thickening in right hand, right fingers, and left fingers, 1+ in left hand and right arm. Trace-1+ in face; MRSS=9.  No nail pitting, alopecia, rash, nodules or lesions  Neuro: Normal cranial nerves, strength, sensation, DTRs.   Psych: Normal judgement, orientation, memory, " affect.    Laboratory:   Component      Latest Ref Rng & Units 5/26/2018 7/2/2018   WBC      4.0 - 11.0 10e9/L 7.1 11.6 (H)   RBC Count      4.4 - 5.9 10e12/L 4.40 4.67   Hemoglobin      13.3 - 17.7 g/dL 13.0 (L) 14.1   Hematocrit      40.0 - 53.0 % 41.2 43.7   MCV      78 - 100 fl 94 94   MCH      26.5 - 33.0 pg 29.5 30.2   MCHC      31.5 - 36.5 g/dL 31.6 32.3   RDW      10.0 - 15.0 % 13.5 13.6   Platelet Count      150 - 450 10e9/L 342 351   Diff Method       Automated Method Automated Method   % Neutrophils      % 63.7 76.5   % Lymphocytes      % 22.9 14.5   % Monocytes      % 12.1 7.8   % Eosinophils      % 1.0 0.9   % Basophils      % 0.3 0.3   Absolute Neutrophil      1.6 - 8.3 10e9/L 4.5 8.9 (H)   Absolute Lymphocytes      0.8 - 5.3 10e9/L 1.6 1.7   Absolute Monocytes      0.0 - 1.3 10e9/L 0.9 0.9   Absolute Eosinophils      0.0 - 0.7 10e9/L 0.1 0.1   Absolute Basophils      0.0 - 0.2 10e9/L 0.0 0.0   Sodium      133 - 144 mmol/L  138   Potassium      3.4 - 5.3 mmol/L  3.4   Chloride      94 - 109 mmol/L  106   Carbon Dioxide      20 - 32 mmol/L  25   Anion Gap      3 - 14 mmol/L  7   Glucose      70 - 99 mg/dL  112 (H)   Urea Nitrogen      7 - 30 mg/dL  18   Creatinine      0.66 - 1.25 mg/dL  1.00   GFR Estimate      >60 mL/min/1.7m2  78   GFR Estimate If Black      >60 mL/min/1.7m2  >90   Calcium      8.5 - 10.1 mg/dL  8.8   Bilirubin Total      0.2 - 1.3 mg/dL  0.4   Albumin      3.4 - 5.0 g/dL  3.7   Protein Total      6.8 - 8.8 g/dL  6.5 (L)   Alkaline Phosphatase      40 - 150 U/L  99   ALT      0 - 70 U/L 21 22   AST      0 - 45 U/L 13 11   Last Dose Mycophenolic Acid       05/26/2018 @ 0815    Mycophenolic Acid Mg/L      1.00 - 3.50 mg/L 25.28 (HH)    MPA Glucuronide Level      30.0 - 95.0 mg/L 111.6 (H)    CRP Inflammation      0.0 - 8.0 mg/L <2.9 <2.9   Sed Rate      0 - 20 mm/h 1 1     Scribe Disclosure:   Emory SMITH, am serving as a scribe to document services personally performed by Denny RICH  MD Karla at this visit, based upon the provider's statements to me. All documentation has been reviewed by the aforementioned provider prior to being entered into the official medical record.     Portions of this medical record were completed by a scribe. UPON MY REVIEW AND AUTHENTICATION BY ELECTRONIC SIGNATURE, this confirms (a) I performed the applicable clinical services, and (b) the record is accurate.    MADISON Acosta MD, PhD    Rheumatology

## 2018-07-05 ENCOUNTER — OFFICE VISIT (OUTPATIENT)
Dept: RHEUMATOLOGY | Facility: CLINIC | Age: 53
End: 2018-07-05
Attending: INTERNAL MEDICINE
Payer: COMMERCIAL

## 2018-07-05 VITALS
SYSTOLIC BLOOD PRESSURE: 115 MMHG | TEMPERATURE: 98.6 F | BODY MASS INDEX: 24.96 KG/M2 | HEART RATE: 51 BPM | HEIGHT: 69 IN | WEIGHT: 168.5 LBS | DIASTOLIC BLOOD PRESSURE: 74 MMHG

## 2018-07-05 DIAGNOSIS — M34.9 SCLERODERMA (H): ICD-10-CM

## 2018-07-05 DIAGNOSIS — J84.9 ILD (INTERSTITIAL LUNG DISEASE) (H): ICD-10-CM

## 2018-07-05 DIAGNOSIS — M33.13 DERMATOMYOSITIS (H): ICD-10-CM

## 2018-07-05 DIAGNOSIS — Z23 NEED FOR SHINGLES VACCINE: ICD-10-CM

## 2018-07-05 DIAGNOSIS — Z23 IMMUNIZATION DUE: Primary | ICD-10-CM

## 2018-07-05 DIAGNOSIS — Z79.899 HIGH RISK MEDICATIONS (NOT ANTICOAGULANTS) LONG-TERM USE: ICD-10-CM

## 2018-07-05 PROCEDURE — 90471 IMMUNIZATION ADMIN: CPT

## 2018-07-05 PROCEDURE — 25000125 ZZHC RX 250: Mod: ZF | Performed by: INTERNAL MEDICINE

## 2018-07-05 PROCEDURE — 90750 HZV VACC RECOMBINANT IM: CPT | Mod: ZF | Performed by: INTERNAL MEDICINE

## 2018-07-05 PROCEDURE — G0463 HOSPITAL OUTPT CLINIC VISIT: HCPCS | Mod: ZF

## 2018-07-05 RX ADMIN — ZOSTER VACCINE RECOMBINANT, ADJUVANTED 0.5 ML: KIT at 08:26

## 2018-07-05 ASSESSMENT — PAIN SCALES - GENERAL: PAINLEVEL: NO PAIN (0)

## 2018-07-05 NOTE — NURSING NOTE
"/74  Pulse 51  Temp 98.6  F (37  C) (Oral)  Ht 1.753 m (5' 9\")  Wt 76.4 kg (168 lb 8 oz)  BMI 24.88 kg/m2  Chief Complaint   Patient presents with     RECHECK     follow up with anti-synthetase syndrome, tzimmer cma       "

## 2018-07-05 NOTE — MR AVS SNAPSHOT
After Visit Summary   7/5/2018    Kwaku Medrano    MRN: [id number]           Patient Information     Date Of Birth          1965        Visit Information        Provider Department      7/5/2018 8:00 AM Denny Acosta MD [facility] Rheumatology        Today's Diagnoses     Immunization due    -  1    Need for shingles vaccine        Dermatomyositis (H)        ILD (interstitial lung disease) (H)        High risk medications (not anticoagulants) long-term use        Scleroderma (H)           Follow-ups after your visit        Your next 10 appointments already scheduled     Aug 30, 2018  7:00 AM CDT   FULL PULMONARY FUNCTION with UC PFL B   [facility] Pulmonary Function Testing ([facility])    [address]  3rd Floor  [facility] [address] [phone]            Aug 30, 2018  8:00 AM CDT   (Arrive by 7:45 AM)   RETURN SCLERODERMA with Elma Dillon MD   [facility] ([facility])    [address]  [address]  [facility] [address] [phone]            Jan 03, 2019  8:00 AM CST   (Arrive by 7:45 AM)   Return Visit with Denny Acosta MD   [facility] Rheumatology ([facility])    [address]  [address]  [facility] [address] [phone]              Who to contact     If you have questions or need follow up information about today's clinic visit or your schedule please contact [facility] RHEUMATOLOGY directly at [phone].  Normal or non-critical lab and imaging results will be communicated to you by MyChart, letter or phone within 4 business days after the clinic has received the results. If you do not hear from us within 7 days, please contact the clinic through MyChart or phone. If you have a critical or abnormal lab result, we will notify you by phone as soon as possible.  Submit refill requests through T[employer] or call your pharmacy and they will  "forward the refill request to us. Please allow 3 business days for your refill to be completed.          Additional Information About Your Visit        oboxohart Information     [employer] gives you secure access to your electronic health record. If you see a primary care provider, you can also send messages to your care team and make appointments. If you have questions, please call your primary care clinic.  If you do not have a primary care provider, please call [phone] and they will assist you.        Care EveryWhere ID     This is your Care EveryWhere ID. This could be used by other organizations to access your [place] medical records  [id number]        Your Vitals Were     Pulse Temperature Height BMI (Body Mass Index)          51 98.6  F (37  C) (Oral) 1.753 m (5' 9\") 24.88 kg/m2         Blood Pressure from Last 3 Encounters:   07/05/18 115/74   06/26/18 104/62   05/31/18 122/70    Weight from Last 3 Encounters:   07/05/18 76.4 kg (168 lb 8 oz)   06/26/18 76.6 kg (168 lb 14.4 oz)   05/31/18 76.2 kg (168 lb)              Today, you had the following     No orders found for display       Primary Care Provider Office Phone # Fax #    Stew Caldwell [phone]       [address] [place] [phone]        Equal Access to Services     ESTHER DE LA O : Hadii cindy horton hadasho Somt, waaxda luqadaha, qaybta kaalmada keegan summers . So [facility] [phone].    ATENCIÓN: Si habla español, tiene a rubalcava disposición servicios gratuitos de asistencia lingüística. Abimbola al [phone].    We comply with applicable federal civil rights laws and [place] laws. We do not discriminate on the basis of race, color, national origin, age, disability, sex, sexual orientation, or gender identity.            Thank you!     Thank you for choosing [facility] RHEUMATOLOGY  for your care. Our goal is always to provide you with excellent care. Hearing back from our patients is " one way we can continue to improve our services. Please take a few minutes to complete the written survey that you may receive in the mail after your visit with us. Thank you!             Your Updated Medication List - Protect others around you: Learn how to safely use, store and throw away your medicines at www.disposemymeds.org.          This list is accurate as of 7/5/18 11:59 PM.  Always use your most recent med list.                   Brand Name Dispense Instructions for use Diagnosis    aspirin 81 MG EC tablet     30 tablet    Take 1 tablet (81 mg) by mouth daily        Blood Pressure Monitoring Kit     1 kit    1 each three times a week Dr Acosta has asked you to check your blood pressure 2-3 times per week using your home monitor.  Please keep track of your findings in a journal and bring it to your appointments.  Contact this office if your blood pressure: the top number (systolic) is consistently 30 points higher than your normal BP or if the bottom number (diastolic) is consistently 20 points higher than your normal BP.    ILD (interstitial lung disease) (H), Dermatomyositis (H), Systemic sclerosis (H), Gastroesophageal reflux disease, esophagitis presence not specified, High risk medications (not anticoagulants) long-term use       diltiazem 240 MG 24 hr capsule    CARDIZEM CD    90 capsule    Take 1 capsule (240 mg) by mouth daily    Paroxysmal atrial fibrillation (H)       lisinopril 10 MG tablet    PRINIVIL/ZESTRIL    30 tablet    Take 1 tablet (10 mg) by mouth daily    Raynaud's disease without gangrene, Atrial fibrillation, unspecified type (H)       mycophenolate 500 MG tablet    GENERIC EQUIVALENT    180 tablet    Take 3 tablets (1,500 mg) by mouth 2 times daily Monitoring labs required every 8 weeks for medication refills.    ILD (interstitial lung disease) (H), Myositis, unspecified myositis type, unspecified site, Polyarthritis, Dermatomyositis (H), Antisynthetase syndrome (H)        pantoprazole 40 MG EC tablet    PROTONIX    30 tablet    Take 1 tablet (40 mg) by mouth daily    Gastroesophageal reflux disease, esophagitis presence not specified, Esophageal dysmotility       predniSONE 2.5 MG tablet    DELTASONE    270 tablet    Take 3 tablets (7.5 mg) by mouth daily    ILD (interstitial lung disease) (H), Inflammatory arthritis, High risk medications (not anticoagulants) long-term use, Scleroderma (H)       sulfamethoxazole-trimethoprim 400-80 MG per tablet    BACTRIM    180 tablet    Once daily for PJP prophylaxis. Start after bronchoscopy    ILD (interstitial lung disease) (H), Inflammatory arthritis, High risk medications (not anticoagulants) long-term use, Scleroderma (H)

## 2018-07-05 NOTE — LETTER
2018      RE: Kwaku Medrano  [address] [facility] [address]       [facility]  Rheumatology Clinic  Denny Acosta MD  2018     Name: Kwaku Medrano  MRN: [id number]  Age: 52 year old  : 1965  Referring provider: Mani Golden     Assessment and Plan:  # diffuse cutaneous systemic sclerosis, RAN ANDIE III +  with a modified Rodnan skin score  of 21 but fairly rapidly progressive skin    Patient is doing very well and his skin is improving, lungs appear stable. If stable with next PFTs favor taper of prednisone to 5 mg/day . Plan is to get first Shingrix today .    Follow-up:  RTC in 6 months    HPI:   Kwaku Medrano is a 52 year old male with a history of diffuse cutaneous systemic sclerosis who presents for follow up. He was last seen on 2/15/18 at which time he was doing well with his regimen of bactrim, cellcept, diltiazem, and prednisone 10mg. He was previously treated with higher doses of prednisone (60mg), and we plan to taper him to 7.5mg.    Today, the patient reports doing well lately. He denies any major ailments but does complain of some intermittent cough. He is currently managed by Dr. Dillon and dropped to 7.5mg prednisone successfully after his last meeting with her. He denies any increase in shortness of breath or other breathing issues. However, he notes some tingling sensations in his lungs. He denies any trouble tolerating cellcept.     He denies any trouble with swallowing but does have occasional reflux issues. He believes that this is caused by him occasionally coming home late and eating too much too close to bed time. He continues to take pantoprazole 40mg daily.    He denies any significant thickening in his skin. His hands were previously a big issue but have improved recently. He does occasionally experience some tingling in his hands however.     He occasionally has nighttime cramps in his legs that appears to be correlated with his eating  habits.     He recently underwent an ablation for his atrial fibrillation and was discharged by his cardiologist because he has been doing well for over 6 months. He denies any occurrence of heart issues after the ablation.      Review of Systems:   Pertinent items are noted in HPI, remainder of complete ROS is negative.    No recent problems with hearing or vision. No swallowing problems.   No breathing difficulty, shortness of breath, coughing, or wheezing  No chest pain or palpitations  No heart burn, indigestion, abdominal pain, nausea, vomiting, diarrhea  No urination problems, no bloody, cloudy urine, no dysuria  No numbing, weakness  No headaches or confusion  No rashes. No easy bleeding or bruising.   +occasional tingling in hands and in lungs  +nighttime cramping    Active Medications:     Current Outpatient Prescriptions:      aspirin 81 MG EC tablet, Take 1 tablet (81 mg) by mouth daily, Disp: 30 tablet, Rfl: 1     Blood Pressure Monitoring KIT, 1 each three times a week Dr Acosta has asked you to check your blood pressure 2-3 times per week using your home monitor.  Please keep track of your findings in a journal and bring it to your appointments.  Contact this office if your blood pressure: the top number (systolic) is consistently 30 points higher than your normal BP or if the bottom number (diastolic) is consistently 20 points higher than your normal BP., Disp: 1 kit, Rfl: 1     diltiazem (CARDIZEM CD) 240 MG 24 hr capsule, Take 1 capsule (240 mg) by mouth daily, Disp: 90 capsule, Rfl: 3     lisinopril (PRINIVIL/ZESTRIL) 10 MG tablet, Take 1 tablet (10 mg) by mouth daily, Disp: 30 tablet, Rfl: 11     mycophenolate (GENERIC EQUIVALENT) 500 MG tablet, Take 3 tablets (1,500 mg) by mouth 2 times daily Monitoring labs required every 8 weeks for medication refills., Disp: 180 tablet, Rfl: 2     pantoprazole (PROTONIX) 40 MG EC tablet, Take 1 tablet (40 mg) by mouth daily, Disp: 30 tablet, Rfl: 11      "predniSONE (DELTASONE) 2.5 MG tablet, Take 3 tablets (7.5 mg) by mouth daily, Disp: 270 tablet, Rfl: 1     sulfamethoxazole-trimethoprim (BACTRIM) 400-80 MG per tablet, Once daily for PJP prophylaxis. Start after bronchoscopy, Disp: 180 tablet, Rfl: 3      Allergies:   Ampicillin      Past Medical History:  Atrial fibrillation and flutter  Fracture  GERD  ILD  Scleroderma  Dermatomyositis  Diastolic hypertension     Past Surgical History:  No pertinent surgical history    Family History:   Father - prostate cancer  Paternal grandfather - atherosclerosis  Maternal aunt - lupus  Other - lung cancer     Social History:   No smoking  Former tobacco chew use, quit 1984  Very rare alcohol use     Physical Exam:   /74  Pulse 51  Temp 98.6  F (37  C) (Oral)  Ht 1.753 m (5' 9\")  Wt 76.4 kg (168 lb 8 oz)  BMI 24.88 kg/m2   Wt Readings from Last 4 Encounters:   07/05/18 76.4 kg (168 lb 8 oz)   06/26/18 76.6 kg (168 lb 14.4 oz)   05/31/18 76.2 kg (168 lb)   02/15/18 77.8 kg (171 lb 8 oz)   Constitutional: Well-developed, appearing stated age; cooperative  Eyes: Normal EOM, PERRLA, vision, conjunctiva, sclera  ENT: Normal external ears, nose, hearing, lips, teeth, gums, throat. No mucous membrane lesions, normal saliva pool  Neck: No mass or thyroid enlargement  Resp: Lungs clear to auscultation, nl to palpation  CV: RRR, no murmurs, rubs or gallops, no edema  GI: No ABD mass or tenderness, no HSM  Lymph: No cervical, supraclavicular, inguinal or epitrochlear nodes  MS: The TMJ, neck, shoulder, elbow, wrist, MCP/PIP/DIP, spine, hip, knee, ankle, and foot MTP/IP joints were examined and found normal. No active synovitis or altered joint anatomy. Full joint ROM. Normal  strength. No dactylitis,  tenosynovitis, enthesopathy.  Skin: 2+ skin thickening in right hand, right fingers, and left fingers, 1+ in left hand and right arm. Trace-1+ in face; MRSS=9.  No nail pitting, alopecia, rash, nodules or lesions  Neuro: " Normal cranial nerves, strength, sensation, DTRs.   Psych: Normal judgement, orientation, memory, affect.    Laboratory:   Component      Latest Ref Rng & Units 5/26/2018 7/2/2018   WBC      4.0 - 11.0 10e9/L 7.1 11.6 (H)   RBC Count      4.4 - 5.9 10e12/L 4.40 4.67   Hemoglobin      13.3 - 17.7 g/dL 13.0 (L) 14.1   Hematocrit      40.0 - 53.0 % 41.2 43.7   MCV      78 - 100 fl 94 94   MCH      26.5 - 33.0 pg 29.5 30.2   MCHC      31.5 - 36.5 g/dL 31.6 32.3   RDW      10.0 - 15.0 % 13.5 13.6   Platelet Count      150 - 450 10e9/L 342 351   Diff Method       Automated Method Automated Method   % Neutrophils      % 63.7 76.5   % Lymphocytes      % 22.9 14.5   % Monocytes      % 12.1 7.8   % Eosinophils      % 1.0 0.9   % Basophils      % 0.3 0.3   Absolute Neutrophil      1.6 - 8.3 10e9/L 4.5 8.9 (H)   Absolute Lymphocytes      0.8 - 5.3 10e9/L 1.6 1.7   Absolute Monocytes      0.0 - 1.3 10e9/L 0.9 0.9   Absolute Eosinophils      0.0 - 0.7 10e9/L 0.1 0.1   Absolute Basophils      0.0 - 0.2 10e9/L 0.0 0.0   Sodium      133 - 144 mmol/L  138   Potassium      3.4 - 5.3 mmol/L  3.4   Chloride      94 - 109 mmol/L  106   Carbon Dioxide      20 - 32 mmol/L  25   Anion Gap      3 - 14 mmol/L  7   Glucose      70 - 99 mg/dL  112 (H)   Urea Nitrogen      7 - 30 mg/dL  18   Creatinine      0.66 - 1.25 mg/dL  1.00   GFR Estimate      >60 mL/min/1.7m2  78   GFR Estimate If Black      >60 mL/min/1.7m2  >90   Calcium      8.5 - 10.1 mg/dL  8.8   Bilirubin Total      0.2 - 1.3 mg/dL  0.4   Albumin      3.4 - 5.0 g/dL  3.7   Protein Total      6.8 - 8.8 g/dL  6.5 (L)   Alkaline Phosphatase      40 - 150 U/L  99   ALT      0 - 70 U/L 21 22   AST      0 - 45 U/L 13 11   Last Dose Mycophenolic Acid       05/26/2018 @ 0815    Mycophenolic Acid Mg/L      1.00 - 3.50 mg/L 25.28 (HH)    MPA Glucuronide Level      30.0 - 95.0 mg/L 111.6 (H)    CRP Inflammation      0.0 - 8.0 mg/L <2.9 <2.9   Sed Rate      0 - 20 mm/h 1 1     Scribe  Disclosure:   I, Emory Nix, am serving as a scribe to document services personally performed by Denny Acosta MD at this visit, based upon the provider's statements to me. All documentation has been reviewed by the aforementioned provider prior to being entered into the official medical record.     Portions of this medical record were completed by a scribe. UPON MY REVIEW AND AUTHENTICATION BY ELECTRONIC SIGNATURE, this confirms (a) I performed the applicable clinical services, and (b) the record is accurate.    MADISON Acosta MD, PhD    Rheumatology

## 2018-08-25 ASSESSMENT — ENCOUNTER SYMPTOMS
SPUTUM PRODUCTION: 1
COUGH: 1
MUSCLE CRAMPS: 1
HEARTBURN: 1

## 2018-08-27 DIAGNOSIS — J84.9 ILD (INTERSTITIAL LUNG DISEASE) (H): ICD-10-CM

## 2018-08-27 LAB
ALBUMIN SERPL-MCNC: 3.8 G/DL (ref 3.4–5)
ALP SERPL-CCNC: 104 U/L (ref 40–150)
ALT SERPL W P-5'-P-CCNC: 18 U/L (ref 0–70)
ANION GAP SERPL CALCULATED.3IONS-SCNC: 6 MMOL/L (ref 3–14)
AST SERPL W P-5'-P-CCNC: 10 U/L (ref 0–45)
BASOPHILS # BLD AUTO: 0 10E9/L (ref 0–0.2)
BASOPHILS NFR BLD AUTO: 0.4 %
BILIRUB SERPL-MCNC: 0.4 MG/DL (ref 0.2–1.3)
BUN SERPL-MCNC: 15 MG/DL (ref 7–30)
CALCIUM SERPL-MCNC: 8.5 MG/DL (ref 8.5–10.1)
CHLORIDE SERPL-SCNC: 105 MMOL/L (ref 94–109)
CO2 SERPL-SCNC: 27 MMOL/L (ref 20–32)
CREAT SERPL-MCNC: 1.1 MG/DL (ref 0.66–1.25)
DIFFERENTIAL METHOD BLD: NORMAL
EOSINOPHIL # BLD AUTO: 0.1 10E9/L (ref 0–0.7)
EOSINOPHIL NFR BLD AUTO: 0.9 %
ERYTHROCYTE [DISTWIDTH] IN BLOOD BY AUTOMATED COUNT: 13.4 % (ref 10–15)
GFR SERPL CREATININE-BSD FRML MDRD: 69 ML/MIN/1.7M2
GLUCOSE SERPL-MCNC: 96 MG/DL (ref 70–99)
HCT VFR BLD AUTO: 44.7 % (ref 40–53)
HGB BLD-MCNC: 14.5 G/DL (ref 13.3–17.7)
LYMPHOCYTES # BLD AUTO: 1.6 10E9/L (ref 0.8–5.3)
LYMPHOCYTES NFR BLD AUTO: 18.3 %
MCH RBC QN AUTO: 30.1 PG (ref 26.5–33)
MCHC RBC AUTO-ENTMCNC: 32.4 G/DL (ref 31.5–36.5)
MCV RBC AUTO: 93 FL (ref 78–100)
MONOCYTES # BLD AUTO: 0.9 10E9/L (ref 0–1.3)
MONOCYTES NFR BLD AUTO: 10.3 %
NEUTROPHILS # BLD AUTO: 6.2 10E9/L (ref 1.6–8.3)
NEUTROPHILS NFR BLD AUTO: 70.1 %
PLATELET # BLD AUTO: 318 10E9/L (ref 150–450)
POTASSIUM SERPL-SCNC: 3.6 MMOL/L (ref 3.4–5.3)
PROT SERPL-MCNC: 6.5 G/DL (ref 6.8–8.8)
RBC # BLD AUTO: 4.82 10E12/L (ref 4.4–5.9)
SODIUM SERPL-SCNC: 138 MMOL/L (ref 133–144)
WBC # BLD AUTO: 8.9 10E9/L (ref 4–11)

## 2018-08-27 PROCEDURE — 80053 COMPREHEN METABOLIC PANEL: CPT | Performed by: INTERNAL MEDICINE

## 2018-08-27 PROCEDURE — 85025 COMPLETE CBC W/AUTO DIFF WBC: CPT | Performed by: INTERNAL MEDICINE

## 2018-08-27 PROCEDURE — 36415 COLL VENOUS BLD VENIPUNCTURE: CPT | Performed by: INTERNAL MEDICINE

## 2018-08-28 DIAGNOSIS — M33.13 DERMATOMYOSITIS (H): ICD-10-CM

## 2018-08-28 DIAGNOSIS — M13.0 POLYARTHRITIS: ICD-10-CM

## 2018-08-28 DIAGNOSIS — M60.9 MYOSITIS, UNSPECIFIED MYOSITIS TYPE, UNSPECIFIED SITE: ICD-10-CM

## 2018-08-28 DIAGNOSIS — J84.9 ILD (INTERSTITIAL LUNG DISEASE) (H): ICD-10-CM

## 2018-08-28 DIAGNOSIS — D89.89 ANTISYNTHETASE SYNDROME (H): ICD-10-CM

## 2018-08-29 RX ORDER — MYCOPHENOLATE MOFETIL 500 MG/1
1500 TABLET ORAL 2 TIMES DAILY
Qty: 180 TABLET | Refills: 2 | Status: SHIPPED | OUTPATIENT
Start: 2018-08-29 | End: 2018-12-16

## 2018-08-29 NOTE — TELEPHONE ENCOUNTER
mycophenolate (GENERIC EQUIVALENT) 500 MG  Last Written Prescription Date:  5/9/18  Last Fill Quantity: 180,   # refills: 2  Last Office Visit: 7/5/18  Future Office visit:  1/3/19    CBC RESULTS:   Recent Labs   Lab Test  08/27/18   1034   WBC  8.9   RBC  4.82   HGB  14.5   HCT  44.7   MCV  93   MCH  30.1   MCHC  32.4   RDW  13.4   PLT  318       Creatinine   Date Value Ref Range Status   08/27/2018 1.10 0.66 - 1.25 mg/dL Final   ]    Liver Function Studies -   Recent Labs   Lab Test  08/27/18   1034   PROTTOTAL  6.5*   ALBUMIN  3.8   BILITOTAL  0.4   ALKPHOS  104   AST  10   ALT  18       Routing refill request to provider for review/approval because:  DMARD

## 2018-08-30 ENCOUNTER — OFFICE VISIT (OUTPATIENT)
Dept: PULMONOLOGY | Facility: CLINIC | Age: 53
End: 2018-08-30
Attending: INTERNAL MEDICINE
Payer: COMMERCIAL

## 2018-08-30 VITALS
WEIGHT: 172 LBS | RESPIRATION RATE: 17 BRPM | OXYGEN SATURATION: 100 % | DIASTOLIC BLOOD PRESSURE: 72 MMHG | HEIGHT: 69 IN | BODY MASS INDEX: 25.48 KG/M2 | SYSTOLIC BLOOD PRESSURE: 116 MMHG | HEART RATE: 50 BPM

## 2018-08-30 DIAGNOSIS — J84.9 ILD (INTERSTITIAL LUNG DISEASE) (H): ICD-10-CM

## 2018-08-30 DIAGNOSIS — J84.9 ILD (INTERSTITIAL LUNG DISEASE) (H): Primary | ICD-10-CM

## 2018-08-30 PROCEDURE — G0463 HOSPITAL OUTPT CLINIC VISIT: HCPCS | Mod: ZF

## 2018-08-30 ASSESSMENT — PAIN SCALES - GENERAL: PAINLEVEL: NO PAIN (0)

## 2018-08-30 NOTE — LETTER
8/30/2018      RE: Kwaku Medrano  [address] [facility] [address]       [facility]    Reason for Visit  Kwaku Medrano is a 53 year old year old male who is being seen for RECHECK (Scleroderma)    HPI    Mr. Medrano is a 53-year-old who is here to follow-up scleroderma interstitial lung disease.  He has been on prednisone since approximately June 2017 and has slowly tapered down his dose.  He started mycophenolate in approximately July 2017.  He reports that work is busy.  He walks on average 12-13,000 steps per day by his estimate.  He denies any shortness of breath or dyspnea on exertion.  He is able to walk up one flight of stairs without getting winded.  He denies paroxysmal nocturnal dyspnea, syncope, or chest pain.  He works at a [employer] and does a lot of walking during the day.  He does endorse a dry cough that is unchanged.  He is able to make a closed fist.  He currently takes mycophenolate 1500 mg twice a day without side effects and prednisone 7.5 mg daily.        Current Outpatient Prescriptions   Medication     aspirin 81 MG EC tablet     diltiazem (CARDIZEM CD) 240 MG 24 hr capsule     lisinopril (PRINIVIL/ZESTRIL) 10 MG tablet     mycophenolate (GENERIC EQUIVALENT) 500 MG tablet     pantoprazole (PROTONIX) 40 MG EC tablet     predniSONE (DELTASONE) 2.5 MG tablet     sulfamethoxazole-trimethoprim (BACTRIM) 400-80 MG per tablet     Blood Pressure Monitoring KIT     No current facility-administered medications for this visit.      Allergies   Allergen Reactions     Ampicillin Rash     Past Medical History:   Diagnosis Date     Atrial fibrillation and flutter (H) 07/2017     Fracture      GERD (gastroesophageal reflux disease)      Interstitial lung disease (H)     sclerderma ILD; present on CXR 3-2017 and CT 5-2017; dx confirmed by CT 9-2017 fibrotic NSIP pattern with increased fibrosis; started mycophenolate 7/2017     Scleroderma (H)   "   dx 9- at [facility] by Dr. Acosta       Past Surgical History:   Procedure Laterality Date     COLONOSCOPY N/A 7/19/2017    Procedure: COLONOSCOPY;  COLONOSCOPY;  Surgeon: Mita Jimenez MD;  Location:  GI     NO HISTORY OF SURGERY         Social History     Social History     Marital status: Single     Spouse name: N/A     Number of children: N/A     Years of education: N/A     Occupational History     Not on file.     Social History Main Topics     Smoking status: Never Smoker     Smokeless tobacco: Former User     Types: Chew     Quit date: 1/1/1984     Alcohol use Yes      Comment: very rarely     Drug use: No     Sexual activity: Not Currently     Other Topics Concern     Not on file     Social History Narrative    Work in [employer].  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  Denies exposure to hot tubs, silica, pet birds, mold.  Single, no children.       Family History   Problem Relation Age of Onset     Prostate Cancer Father      Lung Cancer Other      Family History Negative Mother      Family History Negative Maternal Grandmother      Family History Negative Maternal Grandfather      Family History Negative Paternal Grandmother      Other - See Comments Paternal Grandfather      Atherosclerosis     Family History Negative Brother      Family History Negative Sister      Family History Negative Brother      LUNG DISEASE No family hx of      Rheumatologic Disease No family hx of              ROS Pulmonary  A complete ROS was otherwise negative except as noted in the HPI.    Vitals: /72  Pulse 50  Resp 17  Ht 1.753 m (5' 9\")  Wt 78 kg (172 lb)  SpO2 100%  BMI 25.4 kg/m2    Exam:   GENERAL APPEARANCE: Well developed, well nourished, alert, and in no apparent distress.  RESP: good air flow throughout.  No crackles. No rhonchi. No wheezes.  CV: Normal S1, S2, regular rhythm, normal rate. No murmur.  No LE edema.   MS: extremities normal. No " clubbing. No cyanosis.  SKIN: no rash on limited exam.  NEURO: Mentation intact, speech normal, normal gait and stance.  PSYCH: mentation appears normal. and affect normal/bright.    Results:  Recent Results (from the past 168 hour(s))   CBC with platelets differential    Collection Time: 08/27/18 10:34 AM   Result Value Ref Range    WBC 8.9 4.0 - 11.0 10e9/L    RBC Count 4.82 4.4 - 5.9 10e12/L    Hemoglobin 14.5 13.3 - 17.7 g/dL    Hematocrit 44.7 40.0 - 53.0 %    MCV 93 78 - 100 fl    MCH 30.1 26.5 - 33.0 pg    MCHC 32.4 31.5 - 36.5 g/dL    RDW 13.4 10.0 - 15.0 %    Platelet Count 318 150 - 450 10e9/L    Diff Method Automated Method     % Neutrophils 70.1 %    % Lymphocytes 18.3 %    % Monocytes 10.3 %    % Eosinophils 0.9 %    % Basophils 0.4 %    Absolute Neutrophil 6.2 1.6 - 8.3 10e9/L    Absolute Lymphocytes 1.6 0.8 - 5.3 10e9/L    Absolute Monocytes 0.9 0.0 - 1.3 10e9/L    Absolute Eosinophils 0.1 0.0 - 0.7 10e9/L    Absolute Basophils 0.0 0.0 - 0.2 10e9/L   Comprehensive metabolic panel    Collection Time: 08/27/18 10:34 AM   Result Value Ref Range    Sodium 138 133 - 144 mmol/L    Potassium 3.6 3.4 - 5.3 mmol/L    Chloride 105 94 - 109 mmol/L    Carbon Dioxide 27 20 - 32 mmol/L    Anion Gap 6 3 - 14 mmol/L    Glucose 96 70 - 99 mg/dL    Urea Nitrogen 15 7 - 30 mg/dL    Creatinine 1.10 0.66 - 1.25 mg/dL    GFR Estimate 69 >60 mL/min/1.7m2    GFR Estimate If Black 84 >60 mL/min/1.7m2    Calcium 8.5 8.5 - 10.1 mg/dL    Bilirubin Total 0.4 0.2 - 1.3 mg/dL    Albumin 3.8 3.4 - 5.0 g/dL    Protein Total 6.5 (L) 6.8 - 8.8 g/dL    Alkaline Phosphatase 104 40 - 150 U/L    ALT 18 0 - 70 U/L    AST 10 0 - 45 U/L   General PFT Lab (Please always keep checked)    Collection Time: 08/30/18  6:49 AM   Result Value Ref Range    FVC-Pred 4.66 L    FVC-Pre 4.19 L    FVC-%Pred-Pre 89 %    FEV1-Pre 3.64 L    FEV1-%Pred-Pre 99 %    FEV1FVC-Pred 77 %    FEV1FVC-Pre 87 %    FEFMax-Pred 9.33 L/sec    FEFMax-Pre 11.57 L/sec     FEFMax-%Pred-Pre 124 %    FEF2575-Pred 3.20 L/sec    FEF2575-Pre 4.14 L/sec    VUK5680-%Pred-Pre 129 %    ExpTime-Pre 6.39 sec    FIFMax-Pre 5.71 L/sec    VC-Pred 4.93 L    VC-Pre 4.04 L    VC-%Pred-Pre 81 %    IC-Pred 3.60 L    IC-Pre 2.54 L    IC-%Pred-Pre 70 %    ERV-Pred 1.33 L    ERV-Pre 1.50 L    ERV-%Pred-Pre 112 %    FEV1FEV6-Pred 80 %    FEV1FEV6-Pre 87 %    FRCPleth-Pred 3.51 L    FRCPleth-Pre 2.92 L    FRCPleth-%Pred-Pre 83 %    RVPleth-Pred 2.28 L    RVPleth-Pre 1.42 L    RVPleth-%Pred-Pre 62 %    TLCPleth-Pred 6.92 L    TLCPleth-Pre 5.46 L    TLCPleth-%Pred-Pre 78 %    DLCOunc-Pred 29.13 ml/min/mmHg    DLCOunc-Pre 19.94 ml/min/mmHg    DLCOunc-%Pred-Pre 68 %    DLCOcor-Pre 20.00 ml/min/mmHg    DLCOcor-%Pred-Pre 68 %    VA-Pre 5.37 L    VA-%Pred-Pre 80 %    FEV1SVC-Pred 74 %    FEV1SVC-Pre 90 %        FVC FEV1 TLC DLCO   6- [facility] 2.79 58% 2.54 68%    12.9 52%   8-3-2017 [facility] 3.34 2.85 4.89 17.63   9- 3.46 73% 3.13 85% 4.73 68% 19.77 67%   12- 3.91 83% 3.47 94% 5.10 73% 20.16 68%   5- 4.21 90% 3.64 99% 5.96 86% 21.63 74%   8- 4.19 89% 3.64 99% 5.46 78% 19.94 68%        I reviewed pulmonary function test that was performed today.  This shows mild restriction with a mildly reduced diffusion.  Spirometry is normal and stable.  TLC has dropped a little bit compared to 3 months ago although it is stable to improved compared to last year.  Diffusion is stable.  I also reviewed labs that were drawn today.  These are within normal limits.  He is no longer anemic.    I reviewed results with the patient.      Assessment and plan:   Mr. Robert is a 53-year-old who is here to follow-up scleroderma interstitial lung disease.  1.  Scleroderma ILD.  PFT is overall significantly improved compared to last year.  He has had an excellent response to mycophenolate.  We will continue the current dose of 1500 mg twice a day.  If his lung function remains stable, then we could consider  decreasing mycophenolate dose in a couple years.  He will continue prednisone 7.5 mg daily, with the goal of eventually tapering it off.  We will readdress this in 3 months when he comes back to clinic.  I have encouraged him to continue walking with a goal of more than 10,000 steps per day.  He will return in 3 months with full pulmonary function test.    2.  Immunosuppression monitoring.  Labs today are within normal limits.  I will check labs again in 3 months to monitor mycophenolate.  He will continue Bactrim for pneumocystis prophylaxis.  He should avoid sick contacts.  I also encouraged him to get the flu vaccine this fall.            Elma Dillon MD

## 2018-08-30 NOTE — MR AVS SNAPSHOT
After Visit Summary   8/30/2018    Kwaku Medrano    MRN: [id number]           Patient Information     Date Of Birth          1965        Visit Information        Provider Department      8/30/2018 8:00 AM Elma Dillon MD [facility]        Today's Diagnoses     ILD (interstitial lung disease) (H)    -  1       Follow-ups after your visit        Follow-up notes from your care team     Return in about 3 months (around 11/30/2018).      Your next 10 appointments already scheduled     Dec 13, 2018  7:00 AM CST   FULL PULMONARY FUNCTION with  PFL B   [facility] Pulmonary Function Testing ([facility])    [address]  3rd Floor  [place] [address] [phone]            Dec 13, 2018  8:00 AM CST   (Arrive by 7:45 AM)   RETURN SCLERODERMA with Elma Dillon MD   [facility] ([facility])    [address]  [address]  [place] [address] [phone]            Jan 03, 2019  8:00 AM CST   (Arrive by 7:45 AM)   Return Visit with Denny Acosta MD   [facility] Rheumatology ([facility])    [address]  [address]  [place] [address] [phone]              Future tests that were ordered for you today     Open Future Orders        Priority Expected Expires Ordered    CBC with platelets differential Routine 11/30/2018 8/30/2019 8/30/2018    Comprehensive metabolic panel Routine 11/30/2018 8/30/2019 8/30/2018    General PFT Lab (Please always keep checked) Routine 11/30/2018 8/30/2019 8/30/2018    Pulmonary Function Test Routine 11/30/2018 8/30/2019 8/30/2018            Who to contact     If you have questions or need follow up information about today's clinic visit or your schedule please contact [facility] directly at [phone].  Normal or non-critical lab and imaging results will be  "communicated to you by MyChart, letter or phone within 4 business days after the clinic has received the results. If you do not hear from us within 7 days, please contact the clinic through [employer] or phone. If you have a critical or abnormal lab result, we will notify you by phone as soon as possible.  Submit refill requests through [employer] or call your pharmacy and they will forward the refill request to us. Please allow 3 business days for your refill to be completed.          Additional Information About Your Visit        [employer] Information     [employer] gives you secure access to your electronic health record. If you see a primary care provider, you can also send messages to your care team and make appointments. If you have questions, please call your primary care clinic.  If you do not have a primary care provider, please call [phone] and they will assist you.        Care EveryWhere ID     This is your Care EveryWhere ID. This could be used by other organizations to access your [facility] medical records  [id number]        Your Vitals Were     Pulse Respirations Height Pulse Oximetry BMI (Body Mass Index)       50 17 1.753 m (5' 9\") 100% 25.4 kg/m2        Blood Pressure from Last 3 Encounters:   08/30/18 116/72   07/05/18 115/74   06/26/18 104/62    Weight from Last 3 Encounters:   08/30/18 78 kg (172 lb)   07/05/18 76.4 kg (168 lb 8 oz)   06/26/18 76.6 kg (168 lb 14.4 oz)               Primary Care Provider Office Phone # Fax #    Stew Caldwell [phone]       [address] [place] [id number]        Equal Access to Services     [facility]: Hadii cindy horton hadasholayinka Somt, waaxda luqadaha, qaybta kaalkeegan batista. So [facility] [phone].    ATENCIÓN: Si habla español, tiene a rubalcava disposición servicios gratuitos de asistencia lingüística. Abimbola al [phone].    We comply with applicable federal civil rights laws and [place] " laws. We do not discriminate on the basis of race, color, national origin, age, disability, sex, sexual orientation, or gender identity.            Thank you!     Thank you for choosing [facility]  for your care. Our goal is always to provide you with excellent care. Hearing back from our patients is one way we can continue to improve our services. Please take a few minutes to complete the written survey that you may receive in the mail after your visit with us. Thank you!             Your Updated Medication List - Protect others around you: Learn how to safely use, store and throw away your medicines at www.disposemymeds.org.          This list is accurate as of 8/30/18  1:23 PM.  Always use your most recent med list.                   Brand Name Dispense Instructions for use Diagnosis    aspirin 81 MG EC tablet     30 tablet    Take 1 tablet (81 mg) by mouth daily        Blood Pressure Monitoring Kit     1 kit    1 each three times a week Dr Acosta has asked you to check your blood pressure 2-3 times per week using your home monitor.  Please keep track of your findings in a journal and bring it to your appointments.  Contact this office if your blood pressure: the top number (systolic) is consistently 30 points higher than your normal BP or if the bottom number (diastolic) is consistently 20 points higher than your normal BP.    ILD (interstitial lung disease) (H), Dermatomyositis (H), Systemic sclerosis (H), Gastroesophageal reflux disease, esophagitis presence not specified, High risk medications (not anticoagulants) long-term use       diltiazem 240 MG 24 hr capsule    CARDIZEM CD    90 capsule    Take 1 capsule (240 mg) by mouth daily    Paroxysmal atrial fibrillation (H)       lisinopril 10 MG tablet    PRINIVIL/ZESTRIL    30 tablet    Take 1 tablet (10 mg) by mouth daily    Raynaud's disease without gangrene, Atrial fibrillation, unspecified type (H)       mycophenolate 500  MG tablet    GENERIC EQUIVALENT    180 tablet    Take 3 tablets (1,500 mg) by mouth 2 times daily * Monitoring labs required every 8 weeks for medication refills.    ILD (interstitial lung disease) (H), Myositis, unspecified myositis type, unspecified site, Polyarthritis, Dermatomyositis (H), Antisynthetase syndrome (H)       pantoprazole 40 MG EC tablet    PROTONIX    30 tablet    Take 1 tablet (40 mg) by mouth daily    Gastroesophageal reflux disease, esophagitis presence not specified, Esophageal dysmotility       predniSONE 2.5 MG tablet    DELTASONE    270 tablet    Take 3 tablets (7.5 mg) by mouth daily    ILD (interstitial lung disease) (H), Inflammatory arthritis, High risk medications (not anticoagulants) long-term use, Scleroderma (H)       sulfamethoxazole-trimethoprim 400-80 MG per tablet    BACTRIM    180 tablet    Once daily for PJP prophylaxis. Start after bronchoscopy    ILD (interstitial lung disease) (H), Inflammatory arthritis, High risk medications (not anticoagulants) long-term use, Scleroderma (H)

## 2018-08-30 NOTE — PROGRESS NOTES
[facility]stitial [facility] Disease [facility]    Reason for Visit  Kwaku Medrano is a 53 year old year old male who is being seen for RECHECK (Scleroderma)    HPI    Mr. Medrano is a 53-year-old who is here to follow-up scleroderma interstitial lung disease.  He has been on prednisone since approximately June 2017 and has slowly tapered down his dose.  He started mycophenolate in approximately July 2017.  He reports that work is busy.  He walks on average 12-13,000 steps per day by his estimate.  He denies any shortness of breath or dyspnea on exertion.  He is able to walk up one flight of stairs without getting winded.  He denies paroxysmal nocturnal dyspnea, syncope, or chest pain.  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  He does endorse a dry cough that is unchanged.  He is able to make a closed fist.  He currently takes mycophenolate 1500 mg twice a day without side effects and prednisone 7.5 mg daily.        Current Outpatient Prescriptions   Medication     aspirin 81 MG EC tablet     diltiazem (CARDIZEM CD) 240 MG 24 hr capsule     lisinopril (PRINIVIL/ZESTRIL) 10 MG tablet     mycophenolate (GENERIC EQUIVALENT) 500 MG tablet     pantoprazole (PROTONIX) 40 MG EC tablet     predniSONE (DELTASONE) 2.5 MG tablet     sulfamethoxazole-trimethoprim (BACTRIM) 400-80 MG per tablet     Blood Pressure Monitoring KIT     No current facility-administered medications for this visit.      Allergies   Allergen Reactions     Ampicillin Rash     Past Medical History:   Diagnosis Date     Atrial fibrillation and flutter (H) 07/2017     Fracture      GERD (gastroesophageal reflux disease)      Interstitial lung disease (H)     sclerderma ILD; present on CXR 3-2017 and CT 5-2017; dx confirmed by CT 9-2017 fibrotic NSIP pattern with increased fibrosis; started mycophenolate 7/2017     Scleroderma (H)     dx 9- at [facility] by Dr. Acosta       Past Surgical History:   Procedure  "Laterality Date     COLONOSCOPY N/A 7/19/2017    Procedure: COLONOSCOPY;  COLONOSCOPY;  Surgeon: Mita Jimenez MD;  Location: [facility]     NO HISTORY OF SURGERY         Social History     Social History     Marital status: Single     Spouse name: N/A     Number of children: N/A     Years of education: N/A     Occupational History     Not on file.     Social History Main Topics     Smoking status: Never Smoker     Smokeless tobacco: Former User     Types: Chew     Quit date: 1/1/1984     Alcohol use Yes      Comment: very rarely     Drug use: No     Sexual activity: Not Currently     Other Topics Concern     Not on file     Social History Narrative    Work in [employer].  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  Denies exposure to hot tubs, silica, pet birds, mold.  Single, no children.       Family History   Problem Relation Age of Onset     Prostate Cancer Father      Lung Cancer Other      Family History Negative Mother      Family History Negative Maternal Grandmother      Family History Negative Maternal Grandfather      Family History Negative Paternal Grandmother      Other - See Comments Paternal Grandfather      Atherosclerosis     Family History Negative Brother      Family History Negative Sister      Family History Negative Brother      LUNG DISEASE No family hx of      Rheumatologic Disease No family hx of              ROS Pulmonary  A complete ROS was otherwise negative except as noted in the HPI.    Vitals: /72  Pulse 50  Resp 17  Ht 1.753 m (5' 9\")  Wt 78 kg (172 lb)  SpO2 100%  BMI 25.4 kg/m2    Exam:   GENERAL APPEARANCE: Well developed, well nourished, alert, and in no apparent distress.  RESP: good air flow throughout.  No crackles. No rhonchi. No wheezes.  CV: Normal S1, S2, regular rhythm, normal rate. No murmur.  No LE edema.   MS: extremities normal. No clubbing. No cyanosis.  SKIN: no rash on limited exam.  NEURO: Mentation intact, speech " normal, normal gait and stance.  PSYCH: mentation appears normal. and affect normal/bright.    Results:  Recent Results (from the past 168 hour(s))   CBC with platelets differential    Collection Time: 08/27/18 10:34 AM   Result Value Ref Range    WBC 8.9 4.0 - 11.0 10e9/L    RBC Count 4.82 4.4 - 5.9 10e12/L    Hemoglobin 14.5 13.3 - 17.7 g/dL    Hematocrit 44.7 40.0 - 53.0 %    MCV 93 78 - 100 fl    MCH 30.1 26.5 - 33.0 pg    MCHC 32.4 31.5 - 36.5 g/dL    RDW 13.4 10.0 - 15.0 %    Platelet Count 318 150 - 450 10e9/L    Diff Method Automated Method     % Neutrophils 70.1 %    % Lymphocytes 18.3 %    % Monocytes 10.3 %    % Eosinophils 0.9 %    % Basophils 0.4 %    Absolute Neutrophil 6.2 1.6 - 8.3 10e9/L    Absolute Lymphocytes 1.6 0.8 - 5.3 10e9/L    Absolute Monocytes 0.9 0.0 - 1.3 10e9/L    Absolute Eosinophils 0.1 0.0 - 0.7 10e9/L    Absolute Basophils 0.0 0.0 - 0.2 10e9/L   Comprehensive metabolic panel    Collection Time: 08/27/18 10:34 AM   Result Value Ref Range    Sodium 138 133 - 144 mmol/L    Potassium 3.6 3.4 - 5.3 mmol/L    Chloride 105 94 - 109 mmol/L    Carbon Dioxide 27 20 - 32 mmol/L    Anion Gap 6 3 - 14 mmol/L    Glucose 96 70 - 99 mg/dL    Urea Nitrogen 15 7 - 30 mg/dL    Creatinine 1.10 0.66 - 1.25 mg/dL    GFR Estimate 69 >60 mL/min/1.7m2    GFR Estimate If Black 84 >60 mL/min/1.7m2    Calcium 8.5 8.5 - 10.1 mg/dL    Bilirubin Total 0.4 0.2 - 1.3 mg/dL    Albumin 3.8 3.4 - 5.0 g/dL    Protein Total 6.5 (L) 6.8 - 8.8 g/dL    Alkaline Phosphatase 104 40 - 150 U/L    ALT 18 0 - 70 U/L    AST 10 0 - 45 U/L   General PFT Lab (Please always keep checked)    Collection Time: 08/30/18  6:49 AM   Result Value Ref Range    FVC-Pred 4.66 L    FVC-Pre 4.19 L    FVC-%Pred-Pre 89 %    FEV1-Pre 3.64 L    FEV1-%Pred-Pre 99 %    FEV1FVC-Pred 77 %    FEV1FVC-Pre 87 %    FEFMax-Pred 9.33 L/sec    FEFMax-Pre 11.57 L/sec    FEFMax-%Pred-Pre 124 %    FEF2575-Pred 3.20 L/sec    FEF2575-Pre 4.14 L/sec     DWI3229-%Pred-Pre 129 %    ExpTime-Pre 6.39 sec    FIFMax-Pre 5.71 L/sec    VC-Pred 4.93 L    VC-Pre 4.04 L    VC-%Pred-Pre 81 %    IC-Pred 3.60 L    IC-Pre 2.54 L    IC-%Pred-Pre 70 %    ERV-Pred 1.33 L    ERV-Pre 1.50 L    ERV-%Pred-Pre 112 %    FEV1FEV6-Pred 80 %    FEV1FEV6-Pre 87 %    FRCPleth-Pred 3.51 L    FRCPleth-Pre 2.92 L    FRCPleth-%Pred-Pre 83 %    RVPleth-Pred 2.28 L    RVPleth-Pre 1.42 L    RVPleth-%Pred-Pre 62 %    TLCPleth-Pred 6.92 L    TLCPleth-Pre 5.46 L    TLCPleth-%Pred-Pre 78 %    DLCOunc-Pred 29.13 ml/min/mmHg    DLCOunc-Pre 19.94 ml/min/mmHg    DLCOunc-%Pred-Pre 68 %    DLCOcor-Pre 20.00 ml/min/mmHg    DLCOcor-%Pred-Pre 68 %    VA-Pre 5.37 L    VA-%Pred-Pre 80 %    FEV1SVC-Pred 74 %    FEV1SVC-Pre 90 %        FVC FEV1 TLC DLCO   6- [facility] 2.79 58% 2.54 68%    12.9 52%   8-3-2017 [facility] 3.34 2.85 4.89 17.63   9- 3.46 73% 3.13 85% 4.73 68% 19.77 67%   12- 3.91 83% 3.47 94% 5.10 73% 20.16 68%   5- 4.21 90% 3.64 99% 5.96 86% 21.63 74%   8- 4.19 89% 3.64 99% 5.46 78% 19.94 68%        I reviewed pulmonary function test that was performed today.  This shows mild restriction with a mildly reduced diffusion.  Spirometry is normal and stable.  TLC has dropped a little bit compared to 3 months ago although it is stable to improved compared to last year.  Diffusion is stable.  I also reviewed labs that were drawn today.  These are within normal limits.  He is no longer anemic.    I reviewed results with the patient.      Assessment and plan:   Mr. Robert is a 53-year-old who is here to follow-up scleroderma interstitial lung disease.  1.  Scleroderma ILD.  PFT is overall significantly improved compared to last year.  He has had an excellent response to mycophenolate.  We will continue the current dose of 1500 mg twice a day.  If his lung function remains stable, then we could consider decreasing mycophenolate dose in a couple years.  He will continue prednisone 7.5  mg daily, with the goal of eventually tapering it off.  We will readdress this in 3 months when he comes back to clinic.  I have encouraged him to continue walking with a goal of more than 10,000 steps per day.  He will return in 3 months with full pulmonary function test.    2.  Immunosuppression monitoring.  Labs today are within normal limits.  I will check labs again in 3 months to monitor mycophenolate.  He will continue Bactrim for pneumocystis prophylaxis.  He should avoid sick contacts.  I also encouraged him to get the flu vaccine this fall.

## 2018-08-31 LAB
DLCOCOR-%PRED-PRE: 68 %
DLCOCOR-PRE: 20 ML/MIN/MMHG
DLCOUNC-%PRED-PRE: 68 %
DLCOUNC-PRE: 19.94 ML/MIN/MMHG
DLCOUNC-PRED: 29.13 ML/MIN/MMHG
ERV-%PRED-PRE: 112 %
ERV-PRE: 1.5 L
ERV-PRED: 1.33 L
EXPTIME-PRE: 6.39 SEC
FEF2575-%PRED-PRE: 129 %
FEF2575-PRE: 4.14 L/SEC
FEF2575-PRED: 3.2 L/SEC
FEFMAX-%PRED-PRE: 124 %
FEFMAX-PRE: 11.57 L/SEC
FEFMAX-PRED: 9.33 L/SEC
FEV1-%PRED-PRE: 99 %
FEV1-PRE: 3.64 L
FEV1FEV6-PRE: 87 %
FEV1FEV6-PRED: 80 %
FEV1FVC-PRE: 87 %
FEV1FVC-PRED: 77 %
FEV1SVC-PRE: 90 %
FEV1SVC-PRED: 74 %
FIFMAX-PRE: 5.71 L/SEC
FRCPLETH-%PRED-PRE: 83 %
FRCPLETH-PRE: 2.92 L
FRCPLETH-PRED: 3.51 L
FVC-%PRED-PRE: 89 %
FVC-PRE: 4.19 L
FVC-PRED: 4.66 L
IC-%PRED-PRE: 70 %
IC-PRE: 2.54 L
IC-PRED: 3.6 L
RVPLETH-%PRED-PRE: 62 %
RVPLETH-PRE: 1.42 L
RVPLETH-PRED: 2.28 L
TLCPLETH-%PRED-PRE: 78 %
TLCPLETH-PRE: 5.46 L
TLCPLETH-PRED: 6.92 L
VA-%PRED-PRE: 80 %
VA-PRE: 5.37 L
VC-%PRED-PRE: 81 %
VC-PRE: 4.04 L
VC-PRED: 4.93 L

## 2018-09-10 DIAGNOSIS — I73.00 RAYNAUD'S DISEASE WITHOUT GANGRENE: ICD-10-CM

## 2018-09-10 DIAGNOSIS — I48.91 ATRIAL FIBRILLATION, UNSPECIFIED TYPE (H): ICD-10-CM

## 2018-09-10 RX ORDER — LISINOPRIL 10 MG/1
10 TABLET ORAL DAILY
Qty: 90 TABLET | Refills: 2 | Status: SHIPPED | OUTPATIENT
Start: 2018-09-10 | End: 2019-07-10

## 2018-09-10 NOTE — TELEPHONE ENCOUNTER
"Received refill request for:  Lisinopril  Last OV was: 6/26/2018 with Dr. Chavez  Labs/EKG: last BMP 8/27/2018  F/U scheduled: PRN per Dr. Chavez last OV note, \"Follow-up with us in the EP Clinic on an as-needed basis.\"  New script sent to: [facility]  "

## 2018-09-13 DIAGNOSIS — K22.4 ESOPHAGEAL DYSMOTILITY: ICD-10-CM

## 2018-09-13 DIAGNOSIS — K21.9 GASTROESOPHAGEAL REFLUX DISEASE, ESOPHAGITIS PRESENCE NOT SPECIFIED: ICD-10-CM

## 2018-09-13 RX ORDER — PANTOPRAZOLE SODIUM 40 MG/1
40 TABLET, DELAYED RELEASE ORAL DAILY
Qty: 30 TABLET | Refills: 10 | Status: SHIPPED | OUTPATIENT
Start: 2018-09-13 | End: 2019-09-20

## 2018-09-13 NOTE — TELEPHONE ENCOUNTER
pantoprazole (PROTONIX) 40 MG EC tablet      Last Written Prescription Date:  9-14-17  Last Fill Quantity: 30,   # refills: 11  Last Office Visit : 7-5-18  Future Office visit: none    Routing refill request to provider for review/approval because:  Drug warning- MEDIUM alert. Mycophenolate and pantoprazole.

## 2018-11-06 DIAGNOSIS — M34.9 SCLERODERMA (H): ICD-10-CM

## 2018-11-06 DIAGNOSIS — Z79.899 HIGH RISK MEDICATIONS (NOT ANTICOAGULANTS) LONG-TERM USE: ICD-10-CM

## 2018-11-06 DIAGNOSIS — M19.90 INFLAMMATORY ARTHRITIS: ICD-10-CM

## 2018-11-06 DIAGNOSIS — J84.9 ILD (INTERSTITIAL LUNG DISEASE) (H): ICD-10-CM

## 2018-11-07 RX ORDER — PREDNISONE 2.5 MG/1
7.5 TABLET ORAL DAILY
Qty: 270 TABLET | Refills: 1 | Status: SHIPPED | OUTPATIENT
Start: 2018-11-07 | End: 2019-09-11

## 2018-11-07 NOTE — TELEPHONE ENCOUNTER
predniSONE (DELTASONE) 2.5 MG tablet  Last Written Prescription Date:  2/15/18  Last Fill Quantity: 270,   # refills: 1  Last Office Visit : 7/5/18  Future Office visit:  1/3/19

## 2018-12-11 ASSESSMENT — ENCOUNTER SYMPTOMS
MYALGIAS: 1
ARTHRALGIAS: 1
COUGH: 1
MUSCLE CRAMPS: 1
SPUTUM PRODUCTION: 1

## 2018-12-12 DIAGNOSIS — J84.9 ILD (INTERSTITIAL LUNG DISEASE) (H): ICD-10-CM

## 2018-12-12 DIAGNOSIS — M33.13 DERMATOMYOSITIS (H): ICD-10-CM

## 2018-12-12 DIAGNOSIS — M34.9 SCLERODERMA (H): ICD-10-CM

## 2018-12-12 DIAGNOSIS — Z79.899 HIGH RISK MEDICATION USE: ICD-10-CM

## 2018-12-12 LAB
ALBUMIN SERPL-MCNC: 3.9 G/DL (ref 3.4–5)
ALP SERPL-CCNC: 109 U/L (ref 40–150)
ALT SERPL W P-5'-P-CCNC: 24 U/L (ref 0–70)
ANION GAP SERPL CALCULATED.3IONS-SCNC: 6 MMOL/L (ref 3–14)
AST SERPL W P-5'-P-CCNC: 23 U/L (ref 0–45)
BASOPHILS # BLD AUTO: 0 10E9/L (ref 0–0.2)
BASOPHILS NFR BLD AUTO: 0.3 %
BILIRUB SERPL-MCNC: 0.5 MG/DL (ref 0.2–1.3)
BUN SERPL-MCNC: 15 MG/DL (ref 7–30)
CALCIUM SERPL-MCNC: 9 MG/DL (ref 8.5–10.1)
CHLORIDE SERPL-SCNC: 101 MMOL/L (ref 94–109)
CO2 SERPL-SCNC: 25 MMOL/L (ref 20–32)
CREAT SERPL-MCNC: 1.15 MG/DL (ref 0.66–1.25)
CRP SERPL-MCNC: 4.7 MG/L (ref 0–8)
DIFFERENTIAL METHOD BLD: NORMAL
EOSINOPHIL # BLD AUTO: 0.1 10E9/L (ref 0–0.7)
EOSINOPHIL NFR BLD AUTO: 1 %
ERYTHROCYTE [DISTWIDTH] IN BLOOD BY AUTOMATED COUNT: 14.2 % (ref 10–15)
ERYTHROCYTE [SEDIMENTATION RATE] IN BLOOD BY WESTERGREN METHOD: 2 MM/H (ref 0–20)
GFR SERPL CREATININE-BSD FRML MDRD: 66 ML/MIN/1.7M2
GLUCOSE SERPL-MCNC: 105 MG/DL (ref 70–99)
HCT VFR BLD AUTO: 47.1 % (ref 40–53)
HGB BLD-MCNC: 15.1 G/DL (ref 13.3–17.7)
LYMPHOCYTES # BLD AUTO: 1 10E9/L (ref 0.8–5.3)
LYMPHOCYTES NFR BLD AUTO: 16.3 %
MCH RBC QN AUTO: 29.3 PG (ref 26.5–33)
MCHC RBC AUTO-ENTMCNC: 32.1 G/DL (ref 31.5–36.5)
MCV RBC AUTO: 91 FL (ref 78–100)
MONOCYTES # BLD AUTO: 1.1 10E9/L (ref 0–1.3)
MONOCYTES NFR BLD AUTO: 19 %
NEUTROPHILS # BLD AUTO: 3.7 10E9/L (ref 1.6–8.3)
NEUTROPHILS NFR BLD AUTO: 63.4 %
PLATELET # BLD AUTO: 320 10E9/L (ref 150–450)
POTASSIUM SERPL-SCNC: 3.8 MMOL/L (ref 3.4–5.3)
PROT SERPL-MCNC: 6.7 G/DL (ref 6.8–8.8)
RBC # BLD AUTO: 5.16 10E12/L (ref 4.4–5.9)
SODIUM SERPL-SCNC: 132 MMOL/L (ref 133–144)
WBC # BLD AUTO: 5.9 10E9/L (ref 4–11)

## 2018-12-12 PROCEDURE — 36415 COLL VENOUS BLD VENIPUNCTURE: CPT | Performed by: INTERNAL MEDICINE

## 2018-12-12 PROCEDURE — 85025 COMPLETE CBC W/AUTO DIFF WBC: CPT | Performed by: INTERNAL MEDICINE

## 2018-12-12 PROCEDURE — 80053 COMPREHEN METABOLIC PANEL: CPT | Performed by: INTERNAL MEDICINE

## 2018-12-12 PROCEDURE — 85652 RBC SED RATE AUTOMATED: CPT | Performed by: INTERNAL MEDICINE

## 2018-12-12 PROCEDURE — 86140 C-REACTIVE PROTEIN: CPT | Performed by: INTERNAL MEDICINE

## 2018-12-13 ENCOUNTER — TELEPHONE (OUTPATIENT)
Dept: PULMONOLOGY | Facility: CLINIC | Age: 53
End: 2018-12-13

## 2018-12-13 ENCOUNTER — OFFICE VISIT (OUTPATIENT)
Dept: PULMONOLOGY | Facility: CLINIC | Age: 53
End: 2018-12-13
Attending: INTERNAL MEDICINE
Payer: COMMERCIAL

## 2018-12-13 VITALS
BODY MASS INDEX: 25.18 KG/M2 | HEIGHT: 69 IN | DIASTOLIC BLOOD PRESSURE: 72 MMHG | SYSTOLIC BLOOD PRESSURE: 120 MMHG | HEART RATE: 57 BPM | WEIGHT: 170 LBS | RESPIRATION RATE: 17 BRPM | OXYGEN SATURATION: 99 %

## 2018-12-13 DIAGNOSIS — E87.1 LOW SODIUM LEVELS: Primary | ICD-10-CM

## 2018-12-13 DIAGNOSIS — J84.9 ILD (INTERSTITIAL LUNG DISEASE) (H): Primary | ICD-10-CM

## 2018-12-13 DIAGNOSIS — J84.9 ILD (INTERSTITIAL LUNG DISEASE) (H): ICD-10-CM

## 2018-12-13 DIAGNOSIS — J84.9 INTERSTITIAL LUNG DISEASE (H): ICD-10-CM

## 2018-12-13 PROCEDURE — 90686 IIV4 VACC NO PRSV 0.5 ML IM: CPT | Mod: ZF | Performed by: INTERNAL MEDICINE

## 2018-12-13 PROCEDURE — G0008 ADMIN INFLUENZA VIRUS VAC: HCPCS | Mod: ZF

## 2018-12-13 PROCEDURE — 25000128 H RX IP 250 OP 636: Mod: ZF | Performed by: INTERNAL MEDICINE

## 2018-12-13 PROCEDURE — G0463 HOSPITAL OUTPT CLINIC VISIT: HCPCS | Mod: 25

## 2018-12-13 RX ADMIN — INFLUENZA A VIRUS A/MICHIGAN/45/2015 X-275 (H1N1) ANTIGEN (FORMALDEHYDE INACTIVATED), INFLUENZA A VIRUS A/SINGAPORE/INFIMH-16-0019/2016 IVR-186 (H3N2) ANTIGEN (FORMALDEHYDE INACTIVATED), INFLUENZA B VIRUS B/PHUKET/3073/2013 ANTIGEN (FORMALDEHYDE INACTIVATED), AND INFLUENZA B VIRUS B/MARYLAND/15/2016 BX-69A ANTIGEN (FORMALDEHYDE INACTIVATED) 0.5 ML: 15; 15; 15; 15 INJECTION, SUSPENSION INTRAMUSCULAR at 08:06

## 2018-12-13 ASSESSMENT — MIFFLIN-ST. JEOR: SCORE: 1596.49

## 2018-12-13 ASSESSMENT — PAIN SCALES - GENERAL: PAINLEVEL: NO PAIN (0)

## 2018-12-13 NOTE — PROGRESS NOTES
[facility] Interstitial Lung Disease Clinic    Reason for Visit  Kwaku Medrano is a 53 year old year old male who is being seen for RECHECK (Scleroderma)    HPI    Mr. Tamezis a 53-year-old with scleroderma interstitial lung disease who is here for follow-up.  He was started on mycophenolate and prednisone in approximately July 2017 and has done well.  His first appointment in our interstitial lung disease clinic was in August 2017.  He reports he is doing well from a pulmonary standpoint.  He did have stomach flu 5 days ago but has recovered.  He reports that he lost about 8 pounds.  He continues to work at the [employer] full-time and estimates that he walks approximately 15,000 steps per day.  He is able to walk up one flight of stairs without feeling short of breath and denies paroxysmal nocturnal dyspnea.  He endorses a chronic cough without change.  He sometimes coughs up a small amount of sputum.  He is still able to make a fist.  He has noticed no worsening of skin thickening or tightness.  He reports no heartburn symptoms unless he eats late at night or eats certain foods.  He takes pantoprazole for GERD.  He takes mycophenolate 1500 mg twice a day and prednisone 7.5 mg a day.  He has not yet had the influenza vaccine.        Current Outpatient Medications   Medication     aspirin 81 MG EC tablet     diltiazem (CARDIZEM CD) 240 MG 24 hr capsule     lisinopril (PRINIVIL/ZESTRIL) 10 MG tablet     mycophenolate (GENERIC EQUIVALENT) 500 MG tablet     pantoprazole (PROTONIX) 40 MG EC tablet     predniSONE (DELTASONE) 2.5 MG tablet     sulfamethoxazole-trimethoprim (BACTRIM) 400-80 MG per tablet     Blood Pressure Monitoring KIT     No current facility-administered medications for this visit.      Allergies   Allergen Reactions     Ampicillin Rash     Past Medical History:   Diagnosis Date     Atrial fibrillation and flutter (H) 07/2017     Fracture      GERD (gastroesophageal reflux disease)       Interstitial lung disease (H)     sclerderma ILD; present on CXR 3-2017 and CT 5-2017; dx confirmed by CT 9-2017 fibrotic NSIP pattern with increased fibrosis; started mycophenolate 7/2017     Scleroderma (H)     dx 9- at [facility] by Dr. Acosta       Past Surgical History:   Procedure Laterality Date     COLONOSCOPY N/A 7/19/2017    Procedure: COLONOSCOPY;  COLONOSCOPY;  Surgeon: Mita Jimenez MD;  Location:  GI     NO HISTORY OF SURGERY         Social History     Socioeconomic History     Marital status: Single     Spouse name: Not on file     Number of children: Not on file     Years of education: Not on file     Highest education level: Not on file   Social Needs     Financial resource strain: Not on file     Food insecurity - worry: Not on file     Food insecurity - inability: Not on file     Transportation needs - medical: Not on file     Transportation needs - non-medical: Not on file   Occupational History     Not on file   Tobacco Use     Smoking status: Never Smoker     Smokeless tobacco: Former User     Types: Chew   Substance and Sexual Activity     Alcohol use: Yes     Comment: very rarely     Drug use: No     Sexual activity: Not Currently   Other Topics Concern     Parent/sibling w/ CABG, MI or angioplasty before 65F 55M? Not Asked   Social History Narrative    Work in [employer].  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  Denies exposure to hot tubs, silica, pet birds, mold.  Single, no children.       Family History   Problem Relation Age of Onset     Prostate Cancer Father      Lung Cancer Other      Family History Negative Mother      Family History Negative Maternal Grandmother      Family History Negative Maternal Grandfather      Family History Negative Paternal Grandmother      Other - See Comments Paternal Grandfather         Atherosclerosis     Family History Negative Brother      Family History Negative Sister      Family History Negative  "Brother      LUNG DISEASE No family hx of      Rheumatologic Disease No family hx of              ROS Pulmonary  A complete ROS was otherwise negative except as noted in the HPI.    Vitals: /72   Pulse 57   Resp 17   Ht 1.753 m (5' 9\")   Wt 77.1 kg (170 lb)   SpO2 99%   BMI 25.10 kg/m      Exam:   GENERAL APPEARANCE: Well developed, well nourished, alert, and in no apparent distress.  RESP: good air flow throughout.  No crackles. No rhonchi. No wheezes.  CV: Normal S1, S2, regular rhythm, normal rate. No murmur.  No LE edema.   MS: extremities normal. No clubbing. No cyanosis.  He can make a fist.  SKIN: no rash on limited exam.  NEURO: Mentation intact, speech normal, normal gait and stance.  PSYCH: mentation appears normal. and affect normal/bright.    Results:  Recent Results (from the past 168 hour(s))   Comprehensive metabolic panel    Collection Time: 12/12/18  9:09 AM   Result Value Ref Range    Sodium 132 (L) 133 - 144 mmol/L    Potassium 3.8 3.4 - 5.3 mmol/L    Chloride 101 94 - 109 mmol/L    Carbon Dioxide 25 20 - 32 mmol/L    Anion Gap 6 3 - 14 mmol/L    Glucose 105 (H) 70 - 99 mg/dL    Urea Nitrogen 15 7 - 30 mg/dL    Creatinine 1.15 0.66 - 1.25 mg/dL    GFR Estimate 66 >60 mL/min/1.7m2    GFR Estimate If Black 80 >60 mL/min/1.7m2    Calcium 9.0 8.5 - 10.1 mg/dL    Bilirubin Total 0.5 0.2 - 1.3 mg/dL    Albumin 3.9 3.4 - 5.0 g/dL    Protein Total 6.7 (L) 6.8 - 8.8 g/dL    Alkaline Phosphatase 109 40 - 150 U/L    ALT 24 0 - 70 U/L    AST 23 0 - 45 U/L   CBC with platelets differential    Collection Time: 12/12/18  9:09 AM   Result Value Ref Range    WBC 5.9 4.0 - 11.0 10e9/L    RBC Count 5.16 4.4 - 5.9 10e12/L    Hemoglobin 15.1 13.3 - 17.7 g/dL    Hematocrit 47.1 40.0 - 53.0 %    MCV 91 78 - 100 fl    MCH 29.3 26.5 - 33.0 pg    MCHC 32.1 31.5 - 36.5 g/dL    RDW 14.2 10.0 - 15.0 %    Platelet Count 320 150 - 450 10e9/L    % Neutrophils 63.4 %    % Lymphocytes 16.3 %    % Monocytes 19.0 %    % " Eosinophils 1.0 %    % Basophils 0.3 %    Absolute Neutrophil 3.7 1.6 - 8.3 10e9/L    Absolute Lymphocytes 1.0 0.8 - 5.3 10e9/L    Absolute Monocytes 1.1 0.0 - 1.3 10e9/L    Absolute Eosinophils 0.1 0.0 - 0.7 10e9/L    Absolute Basophils 0.0 0.0 - 0.2 10e9/L    Diff Method Automated Method    Erythrocyte sedimentation rate auto    Collection Time: 12/12/18  9:09 AM   Result Value Ref Range    Sed Rate 2 0 - 20 mm/h   CRP inflammation    Collection Time: 12/12/18  9:09 AM   Result Value Ref Range    CRP Inflammation 4.7 0.0 - 8.0 mg/L   General PFT Lab (Please always keep checked)    Collection Time: 12/13/18  6:58 AM   Result Value Ref Range    FVC-Pred 4.65 L    FVC-Pre 4.19 L    FVC-%Pred-Pre 90 %    FEV1-Pre 3.68 L    FEV1-%Pred-Pre 101 %    FEV1FVC-Pred 77 %    FEV1FVC-Pre 88 %    FEFMax-Pred 9.31 L/sec    FEFMax-Pre 10.44 L/sec    FEFMax-%Pred-Pre 112 %    FEF2575-Pred 3.18 L/sec    FEF2575-Pre 4.68 L/sec    GDE3194-%Pred-Pre 147 %    ExpTime-Pre 6.22 sec    FIFMax-Pre 6.06 L/sec    VC-Pred 4.92 L    VC-Pre 4.58 L    VC-%Pred-Pre 93 %    IC-Pred 3.54 L    IC-Pre 2.76 L    IC-%Pred-Pre 77 %    ERV-Pred 1.38 L    ERV-Pre 1.82 L    ERV-%Pred-Pre 131 %    FEV1FEV6-Pred 80 %    FEV1FEV6-Pre 88 %    FRCPleth-Pred 3.51 L    FRCPleth-Pre 3.41 L    FRCPleth-%Pred-Pre 97 %    RVPleth-Pred 2.29 L    RVPleth-Pre 1.59 L    RVPleth-%Pred-Pre 69 %    TLCPleth-Pred 6.92 L    TLCPleth-Pre 6.17 L    TLCPleth-%Pred-Pre 89 %    DLCOunc-Pred 29.06 ml/min/mmHg    DLCOunc-Pre 21.74 ml/min/mmHg    DLCOunc-%Pred-Pre 74 %    DLCOcor-Pre 21.44 ml/min/mmHg    DLCOcor-%Pred-Pre 73 %    VA-Pre 5.95 L    VA-%Pred-Pre 89 %    FEV1SVC-Pred 74 %    FEV1SVC-Pre 80 %         FVC FEV1 TLC DLCO   [facility]  MMF and pred 7/2017 2.79 58% 2.54 68%    12.9 52%   8-3-2017 [facility] 3.34 2.85 4.89 17.63   9- 3.46 73% 3.13 85% 4.73 68% 19.77 67%   12- 3.91 83% 3.47 94% 5.10 73% 20.16 68%   5- 4.21 90% 3.64 99% 5.96 86% 21.63 74%    8- 4.19 89% 3.64 99% 5.46 78% 19.94 68%   12- 4.19 90% 3.68 101% 6.17 89% 21.74 74%        I reviewed labs.  Of note is that serum sodium is a little low.  I also reviewed PFT that was performed today.  This shows normal spirometry, diffusion, and lung volumes.  Lung function is stable and overall much improved compared to last year.    I reviewed results with the patient.      Assessment and plan:   Mr. Robert is a 53-year-old with scleroderma interstitial lung disease who is here for follow-up.  1.  Scleroderma ILD.  Lung function is in the normal range and stable and overall improved compared to last year.  He will continue mycophenolate 1500 mg twice a day.  I would consider decreasing the dose to 1000 mg twice a day in a couple years if he remains stable.  It is reasonable to decrease prednisone to 5 mg daily, and he will start doing that.  I have encouraged him to continue walking as he is doing.  He will return in 3 months with full PFT.  2.  Immunosuppression monitoring of high risk medication.  Serum sodium is a little low, and I will recheck next week.  This has not been a problem for him before.  He will continue Bactrim for pneumocystis prophylaxis.  He should avoid sick contacts.  We will give him the flu vaccine today.

## 2018-12-13 NOTE — LETTER
12/13/2018      RE: Kwaku Merdano  [address] [id number]       [facility] Interstitial Lung Disease Clinic    Reason for Visit  Kwaku Medrano is a 53 year old year old male who is being seen for RECHECK (Scleroderma)    HPI    Mr. Catalanemis a 53-year-old with scleroderma interstitial lung disease who is here for follow-up.  He was started on mycophenolate and prednisone in approximately July 2017 and has done well.  His first appointment in our interstitial lung disease clinic was in August 2017.  He reports he is doing well from a pulmonary standpoint.  He did have stomach flu 5 days ago but has recovered.  He reports that he lost about 8 pounds.  He continues to work at the "[employer] full-time and estimates that he walks approximately 15,000 steps per day.  He is able to walk up one flight of stairs without feeling short of breath and denies paroxysmal nocturnal dyspnea.  He endorses a chronic cough without change.  He sometimes coughs up a small amount of sputum.  He is still able to make a fist.  He has noticed no worsening of skin thickening or tightness.  He reports no heartburn symptoms unless he eats late at night or eats certain foods.  He takes pantoprazole for GERD.  He takes mycophenolate 1500 mg twice a day and prednisone 7.5 mg a day.  He has not yet had the influenza vaccine.        Current Outpatient Medications   Medication     aspirin 81 MG EC tablet     diltiazem (CARDIZEM CD) 240 MG 24 hr capsule     lisinopril (PRINIVIL/ZESTRIL) 10 MG tablet     mycophenolate (GENERIC EQUIVALENT) 500 MG tablet     pantoprazole (PROTONIX) 40 MG EC tablet     predniSONE (DELTASONE) 2.5 MG tablet     sulfamethoxazole-trimethoprim (BACTRIM) 400-80 MG per tablet     Blood Pressure Monitoring KIT     No current facility-administered medications for this visit.      Allergies   Allergen Reactions     Ampicillin Rash     Past Medical History:   Diagnosis Date     Atrial  fibrillation and flutter (H) 07/2017     Fracture      GERD (gastroesophageal reflux disease)      Interstitial lung disease (H)     sclerderma ILD; present on CXR 3-2017 and CT 5-2017; dx confirmed by CT 9-2017 fibrotic NSIP pattern with increased fibrosis; started mycophenolate 7/2017     Scleroderma (H)     dx 9- at [facility] by Dr. Acosta       Past Surgical History:   Procedure Laterality Date     COLONOSCOPY N/A 7/19/2017    Procedure: COLONOSCOPY;  COLONOSCOPY;  Surgeon: Mita Jimenez MD;  Location:  GI     NO HISTORY OF SURGERY         Social History     Socioeconomic History     Marital status: Single     Spouse name: Not on file     Number of children: Not on file     Years of education: Not on file     Highest education level: Not on file   Social Needs     Financial resource strain: Not on file     Food insecurity - worry: Not on file     Food insecurity - inability: Not on file     Transportation needs - medical: Not on file     Transportation needs - non-medical: Not on file   Occupational History     Not on file   Tobacco Use     Smoking status: Never Smoker     Smokeless tobacco: Former User     Types: Chew   Substance and Sexual Activity     Alcohol use: Yes     Comment: very rarely     Drug use: No     Sexual activity: Not Currently   Other Topics Concern     Parent/sibling w/ CABG, MI or angioplasty before 65F 55M? Not Asked   Social History Narrative    Work in [employer].  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  Denies exposure to hot tubs, silica, pet birds, mold.  Single, no children.       Family History   Problem Relation Age of Onset     Prostate Cancer Father      Lung Cancer Other      Family History Negative Mother      Family History Negative Maternal Grandmother      Family History Negative Maternal Grandfather      Family History Negative Paternal Grandmother      Other - See Comments Paternal Grandfather         Atherosclerosis      "Family History Negative Brother      Family History Negative Sister      Family History Negative Brother      LUNG DISEASE No family hx of      Rheumatologic Disease No family hx of              ROS Pulmonary  A complete ROS was otherwise negative except as noted in the HPI.    Vitals: /72   Pulse 57   Resp 17   Ht 1.753 m (5' 9\")   Wt 77.1 kg (170 lb)   SpO2 99%   BMI 25.10 kg/m       Exam:   GENERAL APPEARANCE: Well developed, well nourished, alert, and in no apparent distress.  RESP: good air flow throughout.  No crackles. No rhonchi. No wheezes.  CV: Normal S1, S2, regular rhythm, normal rate. No murmur.  No LE edema.   MS: extremities normal. No clubbing. No cyanosis.  He can make a fist.  SKIN: no rash on limited exam.  NEURO: Mentation intact, speech normal, normal gait and stance.  PSYCH: mentation appears normal. and affect normal/bright.    Results:  Recent Results (from the past 168 hour(s))   Comprehensive metabolic panel    Collection Time: 12/12/18  9:09 AM   Result Value Ref Range    Sodium 132 (L) 133 - 144 mmol/L    Potassium 3.8 3.4 - 5.3 mmol/L    Chloride 101 94 - 109 mmol/L    Carbon Dioxide 25 20 - 32 mmol/L    Anion Gap 6 3 - 14 mmol/L    Glucose 105 (H) 70 - 99 mg/dL    Urea Nitrogen 15 7 - 30 mg/dL    Creatinine 1.15 0.66 - 1.25 mg/dL    GFR Estimate 66 >60 mL/min/1.7m2    GFR Estimate If Black 80 >60 mL/min/1.7m2    Calcium 9.0 8.5 - 10.1 mg/dL    Bilirubin Total 0.5 0.2 - 1.3 mg/dL    Albumin 3.9 3.4 - 5.0 g/dL    Protein Total 6.7 (L) 6.8 - 8.8 g/dL    Alkaline Phosphatase 109 40 - 150 U/L    ALT 24 0 - 70 U/L    AST 23 0 - 45 U/L   CBC with platelets differential    Collection Time: 12/12/18  9:09 AM   Result Value Ref Range    WBC 5.9 4.0 - 11.0 10e9/L    RBC Count 5.16 4.4 - 5.9 10e12/L    Hemoglobin 15.1 13.3 - 17.7 g/dL    Hematocrit 47.1 40.0 - 53.0 %    MCV 91 78 - 100 fl    MCH 29.3 26.5 - 33.0 pg    MCHC 32.1 31.5 - 36.5 g/dL    RDW 14.2 10.0 - 15.0 %    Platelet " Count 320 150 - 450 10e9/L    % Neutrophils 63.4 %    % Lymphocytes 16.3 %    % Monocytes 19.0 %    % Eosinophils 1.0 %    % Basophils 0.3 %    Absolute Neutrophil 3.7 1.6 - 8.3 10e9/L    Absolute Lymphocytes 1.0 0.8 - 5.3 10e9/L    Absolute Monocytes 1.1 0.0 - 1.3 10e9/L    Absolute Eosinophils 0.1 0.0 - 0.7 10e9/L    Absolute Basophils 0.0 0.0 - 0.2 10e9/L    Diff Method Automated Method    Erythrocyte sedimentation rate auto    Collection Time: 12/12/18  9:09 AM   Result Value Ref Range    Sed Rate 2 0 - 20 mm/h   CRP inflammation    Collection Time: 12/12/18  9:09 AM   Result Value Ref Range    CRP Inflammation 4.7 0.0 - 8.0 mg/L   General PFT Lab (Please always keep checked)    Collection Time: 12/13/18  6:58 AM   Result Value Ref Range    FVC-Pred 4.65 L    FVC-Pre 4.19 L    FVC-%Pred-Pre 90 %    FEV1-Pre 3.68 L    FEV1-%Pred-Pre 101 %    FEV1FVC-Pred 77 %    FEV1FVC-Pre 88 %    FEFMax-Pred 9.31 L/sec    FEFMax-Pre 10.44 L/sec    FEFMax-%Pred-Pre 112 %    FEF2575-Pred 3.18 L/sec    FEF2575-Pre 4.68 L/sec    CBU4743-%Pred-Pre 147 %    ExpTime-Pre 6.22 sec    FIFMax-Pre 6.06 L/sec    VC-Pred 4.92 L    VC-Pre 4.58 L    VC-%Pred-Pre 93 %    IC-Pred 3.54 L    IC-Pre 2.76 L    IC-%Pred-Pre 77 %    ERV-Pred 1.38 L    ERV-Pre 1.82 L    ERV-%Pred-Pre 131 %    FEV1FEV6-Pred 80 %    FEV1FEV6-Pre 88 %    FRCPleth-Pred 3.51 L    FRCPleth-Pre 3.41 L    FRCPleth-%Pred-Pre 97 %    RVPleth-Pred 2.29 L    RVPleth-Pre 1.59 L    RVPleth-%Pred-Pre 69 %    TLCPleth-Pred 6.92 L    TLCPleth-Pre 6.17 L    TLCPleth-%Pred-Pre 89 %    DLCOunc-Pred 29.06 ml/min/mmHg    DLCOunc-Pre 21.74 ml/min/mmHg    DLCOunc-%Pred-Pre 74 %    DLCOcor-Pre 21.44 ml/min/mmHg    DLCOcor-%Pred-Pre 73 %    VA-Pre 5.95 L    VA-%Pred-Pre 89 %    FEV1SVC-Pred 74 %    FEV1SVC-Pre 80 %         FVC FEV1 TLC DLCO   [facility]  MMF and pred 7/2017 2.79 58% 2.54 68%    12.9 52%   8-3-2017 [facility] 3.34 2.85 4.89 17.63   9- 3.46 73% 3.13 85% 4.73 68% 19.77  67%   12- 3.91 83% 3.47 94% 5.10 73% 20.16 68%   5- 4.21 90% 3.64 99% 5.96 86% 21.63 74%   8- 4.19 89% 3.64 99% 5.46 78% 19.94 68%   12- 4.19 90% 3.68 101% 6.17 89% 21.74 74%        I reviewed labs.  Of note is that serum sodium is a little low.  I also reviewed PFT that was performed today.  This shows normal spirometry, diffusion, and lung volumes.  Lung function is stable and overall much improved compared to last year.    I reviewed results with the patient.      Assessment and plan:   Mr. Robert is a 53-year-old with scleroderma interstitial lung disease who is here for follow-up.  1.  Scleroderma ILD.  Lung function is in the normal range and stable and overall improved compared to last year.  He will continue mycophenolate 1500 mg twice a day.  I would consider decreasing the dose to 1000 mg twice a day in a couple years if he remains stable.  It is reasonable to decrease prednisone to 5 mg daily, and he will start doing that.  I have encouraged him to continue walking as he is doing.  He will return in 3 months with full PFT.  2.  Immunosuppression monitoring of high risk medication.  Serum sodium is a little low, and I will recheck next week.  This has not been a problem for him before.  He will continue Bactrim for pneumocystis prophylaxis.  He should avoid sick contacts.  We will give him the flu vaccine today.        Elma Dillon MD

## 2018-12-13 NOTE — TELEPHONE ENCOUNTER
----- Message from Elma Dillon MD sent at 12/13/2018  8:12 AM CST -----  Regarding: need repeat lab  Please let him know that serum sodium was a little low and set up a recheck next week.  Thanks.

## 2018-12-14 DIAGNOSIS — D89.89 ANTISYNTHETASE SYNDROME (H): ICD-10-CM

## 2018-12-14 DIAGNOSIS — M60.9 MYOSITIS, UNSPECIFIED MYOSITIS TYPE, UNSPECIFIED SITE: ICD-10-CM

## 2018-12-14 DIAGNOSIS — M33.13 DERMATOMYOSITIS (H): ICD-10-CM

## 2018-12-14 DIAGNOSIS — J84.9 ILD (INTERSTITIAL LUNG DISEASE) (H): ICD-10-CM

## 2018-12-14 DIAGNOSIS — M13.0 POLYARTHRITIS: ICD-10-CM

## 2018-12-15 NOTE — TELEPHONE ENCOUNTER
mycophenolate (GENERIC EQUIVALENT) 500 MG tablet  Last Written Prescription Date:  8/29/18  Last Fill Quantity: 180,   # refills: 2  Last Office Visit: 7/5/18  Future Office visit:  1/3/19    CBC RESULTS:   Recent Labs   Lab Test 12/12/18  0909   WBC 5.9   RBC 5.16   HGB 15.1   HCT 47.1   MCV 91   MCH 29.3   MCHC 32.1   RDW 14.2          Creatinine   Date Value Ref Range Status   12/12/2018 1.15 0.66 - 1.25 mg/dL Final   ]    Liver Function Studies -   Recent Labs   Lab Test 12/12/18  0909   PROTTOTAL 6.7*   ALBUMIN 3.9   BILITOTAL 0.5   ALKPHOS 109   AST 23   ALT 24       Routing refill request to provider for review/approval because: protocol

## 2018-12-16 RX ORDER — MYCOPHENOLATE MOFETIL 500 MG/1
1500 TABLET ORAL 2 TIMES DAILY
Qty: 180 TABLET | Refills: 2 | Status: SHIPPED | OUTPATIENT
Start: 2018-12-16 | End: 2019-04-16

## 2018-12-18 LAB
DLCOCOR-%PRED-PRE: 73 %
DLCOCOR-PRE: 21.44 ML/MIN/MMHG
DLCOUNC-%PRED-PRE: 74 %
DLCOUNC-PRE: 21.74 ML/MIN/MMHG
DLCOUNC-PRED: 29.06 ML/MIN/MMHG
ERV-%PRED-PRE: 131 %
ERV-PRE: 1.82 L
ERV-PRED: 1.38 L
EXPTIME-PRE: 6.22 SEC
FEF2575-%PRED-PRE: 147 %
FEF2575-PRE: 4.68 L/SEC
FEF2575-PRED: 3.18 L/SEC
FEFMAX-%PRED-PRE: 112 %
FEFMAX-PRE: 10.44 L/SEC
FEFMAX-PRED: 9.31 L/SEC
FEV1-%PRED-PRE: 101 %
FEV1-PRE: 3.68 L
FEV1FEV6-PRE: 88 %
FEV1FEV6-PRED: 80 %
FEV1FVC-PRE: 88 %
FEV1FVC-PRED: 77 %
FEV1SVC-PRE: 80 %
FEV1SVC-PRED: 74 %
FIFMAX-PRE: 6.06 L/SEC
FRCPLETH-%PRED-PRE: 97 %
FRCPLETH-PRE: 3.41 L
FRCPLETH-PRED: 3.51 L
FVC-%PRED-PRE: 90 %
FVC-PRE: 4.19 L
FVC-PRED: 4.65 L
IC-%PRED-PRE: 77 %
IC-PRE: 2.76 L
IC-PRED: 3.54 L
RVPLETH-%PRED-PRE: 69 %
RVPLETH-PRE: 1.59 L
RVPLETH-PRED: 2.29 L
TLCPLETH-%PRED-PRE: 89 %
TLCPLETH-PRE: 6.17 L
TLCPLETH-PRED: 6.92 L
VA-%PRED-PRE: 89 %
VA-PRE: 5.95 L
VC-%PRED-PRE: 93 %
VC-PRE: 4.58 L
VC-PRED: 4.92 L

## 2019-01-21 ASSESSMENT — ENCOUNTER SYMPTOMS
COUGH: 1
STIFFNESS: 1
SPUTUM PRODUCTION: 1
ARTHRALGIAS: 1
HOARSE VOICE: 1
FATIGUE: 1

## 2019-01-26 DIAGNOSIS — Z79.899 HIGH RISK MEDICATION USE: ICD-10-CM

## 2019-01-26 DIAGNOSIS — E87.1 LOW SODIUM LEVELS: ICD-10-CM

## 2019-01-26 DIAGNOSIS — M34.9 SCLERODERMA (H): ICD-10-CM

## 2019-01-26 DIAGNOSIS — M33.13 DERMATOMYOSITIS (H): ICD-10-CM

## 2019-01-26 LAB
ALT SERPL W P-5'-P-CCNC: 26 U/L (ref 0–70)
AST SERPL W P-5'-P-CCNC: 14 U/L (ref 0–45)
BASOPHILS # BLD AUTO: 0 10E9/L (ref 0–0.2)
BASOPHILS NFR BLD AUTO: 0.6 %
DIFFERENTIAL METHOD BLD: NORMAL
EOSINOPHIL # BLD AUTO: 0.1 10E9/L (ref 0–0.7)
EOSINOPHIL NFR BLD AUTO: 1.8 %
ERYTHROCYTE [DISTWIDTH] IN BLOOD BY AUTOMATED COUNT: 13 % (ref 10–15)
HCT VFR BLD AUTO: 43.1 % (ref 40–53)
HGB BLD-MCNC: 13.7 G/DL (ref 13.3–17.7)
LYMPHOCYTES # BLD AUTO: 1.2 10E9/L (ref 0.8–5.3)
LYMPHOCYTES NFR BLD AUTO: 18.7 %
MCH RBC QN AUTO: 29.2 PG (ref 26.5–33)
MCHC RBC AUTO-ENTMCNC: 31.8 G/DL (ref 31.5–36.5)
MCV RBC AUTO: 92 FL (ref 78–100)
MONOCYTES # BLD AUTO: 0.9 10E9/L (ref 0–1.3)
MONOCYTES NFR BLD AUTO: 14 %
NEUTROPHILS # BLD AUTO: 4 10E9/L (ref 1.6–8.3)
NEUTROPHILS NFR BLD AUTO: 64.9 %
PLATELET # BLD AUTO: 356 10E9/L (ref 150–450)
RBC # BLD AUTO: 4.69 10E12/L (ref 4.4–5.9)
SODIUM SERPL-SCNC: 138 MMOL/L (ref 133–144)
WBC # BLD AUTO: 6.2 10E9/L (ref 4–11)

## 2019-01-26 PROCEDURE — 84460 ALANINE AMINO (ALT) (SGPT): CPT | Performed by: INTERNAL MEDICINE

## 2019-01-26 PROCEDURE — 84450 TRANSFERASE (AST) (SGOT): CPT | Performed by: INTERNAL MEDICINE

## 2019-01-26 PROCEDURE — 84295 ASSAY OF SERUM SODIUM: CPT | Performed by: INTERNAL MEDICINE

## 2019-01-26 PROCEDURE — 36415 COLL VENOUS BLD VENIPUNCTURE: CPT | Performed by: INTERNAL MEDICINE

## 2019-01-26 PROCEDURE — 85025 COMPLETE CBC W/AUTO DIFF WBC: CPT | Performed by: INTERNAL MEDICINE

## 2019-01-28 ENCOUNTER — OFFICE VISIT (OUTPATIENT)
Dept: PULMONOLOGY | Facility: CLINIC | Age: 54
End: 2019-01-28
Attending: INTERNAL MEDICINE
Payer: COMMERCIAL

## 2019-01-28 ENCOUNTER — ANCILLARY PROCEDURE (OUTPATIENT)
Dept: GENERAL RADIOLOGY | Facility: CLINIC | Age: 54
End: 2019-01-28
Payer: COMMERCIAL

## 2019-01-28 VITALS
WEIGHT: 166 LBS | BODY MASS INDEX: 24.59 KG/M2 | OXYGEN SATURATION: 99 % | HEIGHT: 69 IN | HEART RATE: 63 BPM | DIASTOLIC BLOOD PRESSURE: 75 MMHG | SYSTOLIC BLOOD PRESSURE: 120 MMHG

## 2019-01-28 DIAGNOSIS — M34.9 SCLERODERMA (H): ICD-10-CM

## 2019-01-28 DIAGNOSIS — D84.9 IMMUNOSUPPRESSED STATUS (H): Primary | ICD-10-CM

## 2019-01-28 DIAGNOSIS — Z23 ENCOUNTER FOR IMMUNIZATION: ICD-10-CM

## 2019-01-28 DIAGNOSIS — J84.9 ILD (INTERSTITIAL LUNG DISEASE) (H): ICD-10-CM

## 2019-01-28 DIAGNOSIS — M25.532 LEFT WRIST PAIN: ICD-10-CM

## 2019-01-28 PROCEDURE — 25000581 ZZH RX MED A9270 GY (STAT IND- M) 250: Mod: ZF | Performed by: INTERNAL MEDICINE

## 2019-01-28 PROCEDURE — 90471 IMMUNIZATION ADMIN: CPT | Mod: ZF

## 2019-01-28 PROCEDURE — G0463 HOSPITAL OUTPT CLINIC VISIT: HCPCS | Mod: 25

## 2019-01-28 PROCEDURE — 90750 HZV VACC RECOMBINANT IM: CPT | Mod: ZF | Performed by: INTERNAL MEDICINE

## 2019-01-28 RX ADMIN — ZOSTER VACCINE RECOMBINANT, ADJUVANTED 0.5 ML: KIT at 17:52

## 2019-01-28 ASSESSMENT — PAIN SCALES - GENERAL: PAINLEVEL: NO PAIN (0)

## 2019-01-28 ASSESSMENT — MIFFLIN-ST. JEOR: SCORE: 1578.35

## 2019-01-28 NOTE — PROGRESS NOTES
[facility]  Rheumatology Clinic  Denny Acosta MD  2019     Name: Kwaku Medrano  MRN: [id number]  Age: 53 year old  : 1965  Referring provider: Stew Caldwell     Assessment and Plan:  # Diffuse cutaneous systemic sclerosis, RNA ANDIE III +  with a modified Rodnan skin score of 21 but fairly rapidly progressive skin.  # ILD, follows with Pulm.  Overall stable on current regimen although left wrist with pain and decreased ROM since decreasing prednisone from 10 mg daily in 2018, which is concerning for involvement of his systemic sclerosis. Alternatively, as it is limited to one joint, it could also be overuse related to work in the setting of systemic sclerosis. Discussed options of steroid joint infection versus initiation of Plaquenil however it is not bothersome to the point that he would like to take additional medications. We would like to get an x-ray today as if there is evidence of joint damage, we would more strongly recommend one of these two options. In meantime, will continue Cellcept and prednisone at current dose. Continue to follow with pulm.  - Continue Cellcept 1500 mg BID  - Continue prednisone 5 mg daily  - Continue Bactrim for PJP ppx  - Continue follow-up with pulm  - Left wrist x-ray today  - Consider steroid joint infection versus Plaquenil  - Monitoring labs last 19 wnl    # Immunosuppressed status.  # Need for immunization.  Second Shingrix vaccine today.    Patient discussed and evaluated with Dr. Acosta.    Kaila Villavicencio MD  Medicine-Dermatology PGY-5  [phone]    Follow-up: Return to clinic in 6 months, sooner if concerns.     HPI:   Kwaku Medrano is a 53 year old male with a history of diffuse cutaneous systemic sclerosis who presents for follow up.  I last evaluated this patient on 2018; please see this note for further details. At that time he was doing very well. His skin was improving and his lungs appeared stable. If stable with next  PFTs we favored taper of prednisone to 5 mg/day.     He last saw Dr. Dillon in Pulmonology on 12/13/2018 at which time prednisone was decreased to 5 mg daily.    Since his last visit, he has overall been doing well. Had the flu in December unfortunately before receiving his flu shot but is now recovering. Continues Cellcept 1500 mg BID and prednisone 5 mg daily. Since decreasing prednisone from 10 to 7.5 mg daily in August, he does note that his left wrist has progressively been getting more painful and he has reduced range of motion. He thought initially it was related to work but it has persisted. No other joint symptoms. No numbness or tingling in left hand (but does have some numbness/tingling in right hand). No weakness.    Breathing is doing okay. Has a persistent cough - used to be dry but now for the past 9 months is occasionally productive of sputum. Also currently with a cold, runny nose, and post-nasal drip. No nausea, no vomiting, no diarrhea, no fevers, no chills (other than recent episode of the flu). Heartburn well-controlled on PPI. No troubles swallowing. Raynaud's better controlled and no ulcers.    Received the Shingrix shot in July 2018 but did not yet get the second shot. He did not have a reaction to the first Shingrix.    Review of Systems:   Pertinent items are noted in HPI or as below, remainder of complete ROS is negative.      No recent problems with hearing or vision. No swallowing problems.   No breathing difficulty, shortness of breath, or wheezing. + chronic cough as above  No chest pain or palpitations  No heart burn, indigestion, abdominal pain, nausea, vomiting, diarrhea  No urination problems, no bloody, cloudy urine, no dysuria  No numbing, tingling, weakness  No headaches or confusion  No rashes. No easy bleeding or bruising.     Active Medications:     Current Outpatient Medications:      aspirin 81 MG EC tablet, Take 1 tablet (81 mg) by mouth daily, Disp: 30 tablet, Rfl: 1      "Blood Pressure Monitoring KIT, 1 each three times a week Dr Acosta has asked you to check your blood pressure 2-3 times per week using your home monitor.  Please keep track of your findings in a journal and bring it to your appointments.  Contact this office if your blood pressure: the top number (systolic) is consistently 30 points higher than your normal BP or if the bottom number (diastolic) is consistently 20 points higher than your normal BP., Disp: 1 kit, Rfl: 1     diltiazem (CARDIZEM CD) 240 MG 24 hr capsule, Take 1 capsule (240 mg) by mouth daily, Disp: 90 capsule, Rfl: 3     lisinopril (PRINIVIL/ZESTRIL) 10 MG tablet, Take 1 tablet (10 mg) by mouth daily, Disp: 90 tablet, Rfl: 2     mycophenolate (GENERIC EQUIVALENT) 500 MG tablet, Take 3 tablets (1,500 mg) by mouth 2 times daily * Monitoring labs required every 8 weeks for medication refills., Disp: 180 tablet, Rfl: 2     pantoprazole (PROTONIX) 40 MG EC tablet, Take 1 tablet (40 mg) by mouth daily, Disp: 30 tablet, Rfl: 10     predniSONE (DELTASONE) 2.5 MG tablet, Take 3 tablets (7.5 mg) by mouth daily, Disp: 270 tablet, Rfl: 1     sulfamethoxazole-trimethoprim (BACTRIM) 400-80 MG per tablet, Once daily for PJP prophylaxis. Start after bronchoscopy, Disp: 180 tablet, Rfl: 3      Allergies:   Ampicillin     Past Medical History:  Atrial fibrillation and flutter - h/o ablation  Fracture  GERD  ILD  Scleroderma  Dermatomyositis  Diastolic hypertension     Past Surgical History:  No pertinent surgical history.     Family History:   Father - prostate cancer  Paternal grandfather - atherosclerosis  Maternal aunt - lupus  Other - lung cancer     Social History:   No smoking  Former tobacco chew use, quit 1984  Very rare alcohol use      Physical Exam:   /75 (BP Location: Right arm, Cuff Size: Adult Regular)   Pulse 63   Ht 1.753 m (5' 9\")   Wt 75.3 kg (166 lb)   SpO2 99%   BMI 24.51 kg/m     Wt Readings from Last 4 Encounters:   01/28/19 75.3 kg " (166 lb)   12/13/18 77.1 kg (170 lb)   08/30/18 78 kg (172 lb)   07/05/18 76.4 kg (168 lb 8 oz)     Constitutional: Well-developed, appearing stated age; cooperative  Eyes: Normal EOM, PERRLA, vision, conjunctiva, sclera  ENT: Normal external ears, nose, hearing, lips, teeth, gums, throat. No mucous membrane lesions, normal saliva pool. Posterior pharynx with mild erythema.  Neck: No mass or thyroid enlargement  Resp: Lungs clear to auscultation, nl to palpation  CV: RRR, no murmurs, rubs or gallops, no edema  GI: No ABD mass or tenderness, no HSM  Lymph: No cervical, supraclavicular nodes  MS: The TMJ, neck, shoulder, elbow, wrist, MCP/PIP/DIP, spine, hip, knee, ankle, and foot MTP/IP joints were examined and found normal except for left wrist which has mild thickening and slightly decreased flexion. No active synovitis or altered joint anatomy. Full joint ROM other than as above. Normal  strength. No dactylitis,  tenosynovitis, enthesopathy.  Skin: mild thickening in R>L hands. No nail pitting, alopecia, rash, nodules or lesions  Neuro: Normal cranial nerves, strength, sensation, DTRs.   Psych: Normal judgement, orientation, memory, affect.    Laboratory:   Component      Latest Ref Rng & Units 1/26/2019   WBC      4.0 - 11.0 10e9/L 6.2   RBC Count      4.4 - 5.9 10e12/L 4.69   Hemoglobin      13.3 - 17.7 g/dL 13.7   Hematocrit      40.0 - 53.0 % 43.1   MCV      78 - 100 fl 92   MCH      26.5 - 33.0 pg 29.2   MCHC      31.5 - 36.5 g/dL 31.8   RDW      10.0 - 15.0 % 13.0   Platelet Count      150 - 450 10e9/L 356   % Neutrophils      % 64.9   % Lymphocytes      % 18.7   % Monocytes      % 14.0   % Eosinophils      % 1.8   % Basophils      % 0.6   Absolute Neutrophil      1.6 - 8.3 10e9/L 4.0   Absolute Lymphocytes      0.8 - 5.3 10e9/L 1.2   Absolute Monocytes      0.0 - 1.3 10e9/L 0.9   Absolute Eosinophils      0.0 - 0.7 10e9/L 0.1   Absolute Basophils      0.0 - 0.2 10e9/L 0.0   Diff Method       Automated  Method   AST      0 - 45 U/L 14   ALT      0 - 70 U/L 26   Sodium      133 - 144 mmol/L 138      PFTs 12/13/18  FEV1 101%.  FVC 90%.  FEV1/FVC 88%  DLCO cor 73%.    Scribe Disclosure:   I, Nicho PowellSuryaKat, am serving as a scribe to document services personally performed by Denny Acosta MD at this visit, based upon the provider's statements to me. All documentation has been reviewed by the aforementioned provider prior to being entered into the official medical record.     Portions of this medical record were completed by a scribe. UPON MY REVIEW AND AUTHENTICATION BY ELECTRONIC SIGNATURE, this confirms (a) I performed the applicable clinical services, and (b) the record is accurate.    Answers for HPI/ROS submitted by the patient on 1/21/2019   General Symptoms: Yes  Skin Symptoms: Yes  HENT Symptoms: Yes  EYE SYMPTOMS: No  HEART SYMPTOMS: Yes  LUNG SYMPTOMS: Yes  INTESTINAL SYMPTOMS: No  URINARY SYMPTOMS: Yes  REPRODUCTIVE SYMPTOMS: No  SKELETAL SYMPTOMS: Yes  BLOOD SYMPTOMS: No  NERVOUS SYSTEM SYMPTOMS: No  MENTAL HEALTH SYMPTOMS: No  Fatigue: Yes  Itching: Yes   Voice hoarseness: Yes  Cough: Yes  Sputum or phlegm: Yes  Varicose veins: Yes  Frequent nighttime urination: Yes  Joint pain: Yes  Joint stiffness: Yes    I saw the patient with the resident.  My exam and recomendations are as described.      Denny Acosta MD

## 2019-01-28 NOTE — PATIENT INSTRUCTIONS
We are glad you are doing overall well.    For the left wrist, we do wonder if this is related to the systemic sclerosis. Overuse at work could also make symptoms worse.  We would like to check wrist x-rays to see if any joint damage.  If we see damage, options for treatment could be:  - inject steroid into the joint.  - start a medication called Plaquenil (hydroxychloroquine) that can help with joint pains.    Return in 6 months, sooner if concerns.

## 2019-01-28 NOTE — NURSING NOTE
Chief Complaint   Patient presents with     Follow Up     POSSIBLE ANTI-SYNTHETASE SYNDROME     Vitals were taken and medications were reconciled.     Dylan Sow, RMA  4:59 PM

## 2019-01-28 NOTE — LETTER
2019       RE: Kwaku Medrano  [address] [id number]     Dear Colleague,    Thank you for referring your patient, Kwaku Medrano, to the [facility] FOR LUNG SCIENCE AND HE[facility] at [facility]. Please see a copy of my visit note below.    [facility]  Rheumatology Clinic  Denny Acosta MD  2019     Name: Kwaku Medrano  MRN: [id number]  Age: 53 year old  : 1965  Referring provider: Stew Caldwell     Assessment and Plan:  #  Diffuse cutaneous systemic sclerosis, RNA ANDIE III +  with a modified Rodnan skin score of 21 but fairly rapidly progressive skin.  # ILD, follows with Pulm.  Overall stable on current regimen although left wrist with pain and decreased ROM since decreasing prednisone from 10 mg daily in 2018, which is concerning for involvement of his systemic sclerosis. Alternatively, as it is limited to one joint, it could also be overuse related to work in the setting of systemic sclerosis. Discussed options of steroid joint infection versus initiation of Plaquenil however it is not bothersome to the point that he would like to take additional medications. We would like to get an x-ray today as if there is evidence of joint damage, we would more strongly recommend one of these two options. In meantime, will continue Cellcept and prednisone at current dose. Continue to follow with pulm.  - Continue Cellcept 1500 mg BID  - Continue prednisone 5 mg daily  - Continue Bactrim for PJP ppx  - Continue follow-up with pulm  - Left wrist x-ray today  - Consider steroid joint infection versus Plaquenil  - Monitoring labs last 19 wnl    # Immunosuppressed status.  # Need for immunization.  Second Shingrix vaccine today.    Patient discussed and evaluated with Dr. Acosta.    Kaila Villavicencio MD  Medicine-Dermatology PGY-5  [phone]    Follow-up: Return to clinic in 6 months, sooner if concerns.     HPI:   Kwaku  Mitchell is a 53 year old male with a history of diffuse cutaneous systemic sclerosis who presents for follow up.  I last evaluated this patient on 7/5/2018; please see this note for further details. At that time he was doing very well. His skin was improving and his lungs appeared stable. If stable with next PFTs we favored taper of prednisone to 5 mg/day.     He last saw Dr. Dillon in Pulmonology on 12/13/2018 at which time prednisone was decreased to 5 mg daily.    Since his last visit, he has overall been doing well. Had the flu in December unfortunately before receiving his flu shot but is now recovering. Continues Cellcept 1500 mg BID and prednisone 5 mg daily. Since decreasing prednisone from 10 to 7.5 mg daily in August, he does note that his left wrist has progressively been getting more painful and he has reduced range of motion. He thought initially it was related to work but it has persisted. No other joint symptoms. No numbness or tingling in left hand (but does have some numbness/tingling in right hand). No weakness.    Breathing is doing okay. Has a persistent cough - used to be dry but now for the past 9 months is occasionally productive of sputum. Also currently with a cold, runny nose, and post-nasal drip. No nausea, no vomiting, no diarrhea, no fevers, no chills (other than recent episode of the flu). Heartburn well-controlled on PPI. No troubles swallowing. Raynaud's better controlled and no ulcers.    Received the Shingrix shot in July 2018 but did not yet get the second shot. He did not have a reaction to the first Shingrix.    Review of Systems:   Pertinent items are noted in HPI or as below, remainder of complete ROS is negative.      No recent problems with hearing or vision. No swallowing problems.   No breathing difficulty, shortness of breath, or wheezing. + chronic cough as above  No chest pain or palpitations  No heart burn, indigestion, abdominal pain, nausea, vomiting, diarrhea  No  urination problems, no bloody, cloudy urine, no dysuria  No numbing, tingling, weakness  No headaches or confusion  No rashes. No easy bleeding or bruising.     Active Medications:     Current Outpatient Medications:      aspirin 81 MG EC tablet, Take 1 tablet (81 mg) by mouth daily, Disp: 30 tablet, Rfl: 1     Blood Pressure Monitoring KIT, 1 each three times a week Dr Acosta has asked you to check your blood pressure 2-3 times per week using your home monitor.  Please keep track of your findings in a journal and bring it to your appointments.  Contact this office if your blood pressure: the top number (systolic) is consistently 30 points higher than your normal BP or if the bottom number (diastolic) is consistently 20 points higher than your normal BP., Disp: 1 kit, Rfl: 1     diltiazem (CARDIZEM CD) 240 MG 24 hr capsule, Take 1 capsule (240 mg) by mouth daily, Disp: 90 capsule, Rfl: 3     lisinopril (PRINIVIL/ZESTRIL) 10 MG tablet, Take 1 tablet (10 mg) by mouth daily, Disp: 90 tablet, Rfl: 2     mycophenolate (GENERIC EQUIVALENT) 500 MG tablet, Take 3 tablets (1,500 mg) by mouth 2 times daily * Monitoring labs required every 8 weeks for medication refills., Disp: 180 tablet, Rfl: 2     pantoprazole (PROTONIX) 40 MG EC tablet, Take 1 tablet (40 mg) by mouth daily, Disp: 30 tablet, Rfl: 10     predniSONE (DELTASONE) 2.5 MG tablet, Take 3 tablets (7.5 mg) by mouth daily, Disp: 270 tablet, Rfl: 1     sulfamethoxazole-trimethoprim (BACTRIM) 400-80 MG per tablet, Once daily for PJP prophylaxis. Start after bronchoscopy, Disp: 180 tablet, Rfl: 3      Allergies:   Ampicillin     Past Medical History:  Atrial fibrillation and flutter - h/o ablation  Fracture  GERD  ILD  Scleroderma  Dermatomyositis  Diastolic hypertension     Past Surgical History:  No pertinent surgical history.     Family History:   Father - prostate cancer  Paternal grandfather - atherosclerosis  Maternal aunt - lupus  Other - lung cancer     Social  "History:   No smoking  Former tobacco chew use, quit 1984  Very rare alcohol use       Physical Exam:   /75 (BP Location: Right arm, Cuff Size: Adult Regular)   Pulse 63   Ht 1.753 m (5' 9\")   Wt 75.3 kg (166 lb)   SpO2 99%   BMI 24.51 kg/m      Wt Readings from Last 4 Encounters:   01/28/19 75.3 kg (166 lb)   12/13/18 77.1 kg (170 lb)   08/30/18 78 kg (172 lb)   07/05/18 76.4 kg (168 lb 8 oz)     Constitutional: Well-developed, appearing stated age; cooperative  Eyes: Normal EOM, PERRLA, vision, conjunctiva, sclera  ENT: Normal external ears, nose, hearing, lips, teeth, gums, throat. No mucous membrane lesions, normal saliva pool. Posterior pharynx with mild erythema.  Neck: No mass or thyroid enlargement  Resp: Lungs clear to auscultation, nl to palpation  CV: RRR, no murmurs, rubs or gallops, no edema  GI: No ABD mass or tenderness, no HSM  Lymph: No cervical, supraclavicular nodes  MS: The TMJ, neck, shoulder, elbow, wrist, MCP/PIP/DIP, spine, hip, knee, ankle, and foot MTP/IP joints were examined and found normal except for left wrist which has mild thickening and slightly decreased flexion. No active synovitis or altered joint anatomy. Full joint ROM  other than as above. Normal  strength. No dactylitis,  tenosynovitis, enthesopathy.  Skin:  mild thickening in R>L hands. No nail pitting, alopecia, rash, nodules or lesions  Neuro: Normal cranial nerves, strength, sensation, DTRs.   Psych: Normal judgement, orientation, memory, affect.    Laboratory:   Component      Latest Ref Rng & Units 1/26/2019   WBC      4.0 - 11.0 10e9/L 6.2   RBC Count      4.4 - 5.9 10e12/L 4.69   Hemoglobin      13.3 - 17.7 g/dL 13.7   Hematocrit      40.0 - 53.0 % 43.1   MCV      78 - 100 fl 92   MCH      26.5 - 33.0 pg 29.2   MCHC      31.5 - 36.5 g/dL 31.8   RDW      10.0 - 15.0 % 13.0   Platelet Count      150 - 450 10e9/L 356   % Neutrophils      % 64.9   % Lymphocytes      % 18.7   % Monocytes      % 14.0   % " Eosinophils      % 1.8   % Basophils      % 0.6   Absolute Neutrophil      1.6 - 8.3 10e9/L 4.0   Absolute Lymphocytes      0.8 - 5.3 10e9/L 1.2   Absolute Monocytes      0.0 - 1.3 10e9/L 0.9   Absolute Eosinophils      0.0 - 0.7 10e9/L 0.1   Absolute Basophils      0.0 - 0.2 10e9/L 0.0   Diff Method       Automated Method   AST      0 - 45 U/L 14   ALT      0 - 70 U/L 26   Sodium      133 - 144 mmol/L 138      PFTs 12/13/18  FEV1 101%.  FVC 90%.  FEV1/FVC 88%  DLCO cor 73%.    Scribe Disclosure:   I, Nicho Grande, am serving as a scribe to document services personally performed by Denny Acosta MD at this visit, based upon the provider's statements to me. All documentation has been reviewed by the aforementioned provider prior to being entered into the official medical record.     Portions of this medical record were completed by a scribe. UPON MY REVIEW AND AUTHENTICATION BY ELECTRONIC SIGNATURE, this confirms (a) I performed the applicable clinical services, and (b) the record is accurate.    Answers for HPI/ROS submitted by the patient on 1/21/2019   General Symptoms: Yes  Skin Symptoms: Yes  HENT Symptoms: Yes  EYE SYMPTOMS: No  HEART SYMPTOMS: Yes  LUNG SYMPTOMS: Yes  INTESTINAL SYMPTOMS: No  URINARY SYMPTOMS: Yes  REPRODUCTIVE SYMPTOMS: No  SKELETAL SYMPTOMS: Yes  BLOOD SYMPTOMS: No  NERVOUS SYSTEM SYMPTOMS: No  MENTAL HEALTH SYMPTOMS: No  Fatigue: Yes  Itching: Yes   Voice hoarseness: Yes  Cough: Yes  Sputum or phlegm: Yes  Varicose veins: Yes  Frequent nighttime urination: Yes  Joint pain: Yes  Joint stiffness: Yes    I saw the patient with the resident.  My exam and recomendations are as described.        Denny Acosta MD

## 2019-01-30 DIAGNOSIS — I48.0 PAROXYSMAL ATRIAL FIBRILLATION (H): ICD-10-CM

## 2019-01-30 RX ORDER — DILTIAZEM HYDROCHLORIDE 240 MG/1
240 CAPSULE, COATED, EXTENDED RELEASE ORAL DAILY
Qty: 90 CAPSULE | Refills: 1 | Status: SHIPPED | OUTPATIENT
Start: 2019-01-30 | End: 2019-10-04

## 2019-02-14 ENCOUNTER — OFFICE VISIT (OUTPATIENT)
Dept: URGENT CARE | Facility: URGENT CARE | Age: 54
End: 2019-02-14
Payer: COMMERCIAL

## 2019-02-14 ENCOUNTER — ANCILLARY PROCEDURE (OUTPATIENT)
Dept: GENERAL RADIOLOGY | Facility: CLINIC | Age: 54
End: 2019-02-14
Payer: COMMERCIAL

## 2019-02-14 VITALS
WEIGHT: 164.56 LBS | BODY MASS INDEX: 24.3 KG/M2 | HEART RATE: 63 BPM | OXYGEN SATURATION: 99 % | SYSTOLIC BLOOD PRESSURE: 127 MMHG | DIASTOLIC BLOOD PRESSURE: 75 MMHG | TEMPERATURE: 97.9 F

## 2019-02-14 DIAGNOSIS — R07.89 CHEST WALL DISCOMFORT: ICD-10-CM

## 2019-02-14 DIAGNOSIS — R07.89 CHEST WALL DISCOMFORT: Primary | ICD-10-CM

## 2019-02-14 PROCEDURE — 99213 OFFICE O/P EST LOW 20 MIN: CPT | Performed by: FAMILY MEDICINE

## 2019-02-14 PROCEDURE — 71101 X-RAY EXAM UNILAT RIBS/CHEST: CPT | Mod: LT

## 2019-02-15 NOTE — PATIENT INSTRUCTIONS
Take tylenol for discomfort. 500-650mg every 4-6 hours; Okay to take with ibuprofen    Sleep on the non-affected side for comfort at night    If symptoms get worse: difficulty breathing, increasing pain then please come in to be seen immediately

## 2019-02-15 NOTE — PROGRESS NOTES
Subjective:   Kwaku Medrano is a 53 year old male who presents for   Chief Complaint   Patient presents with     Urgent Care     left side chest pain/injury - reports last friday night he went to open car door and hit chest into edge of car door.      Taking deep breaths has discomfort at the same spot but does feel discomfort radiating to back at times.   No chest pressure. No pain radiating to his left arm.     Did crack a rib 30 years ago right lower area but this feels differently . Did do some shoveling this past weekend. 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       Patient Active Problem List    Diagnosis Date Noted     Interstitial lung disease (H)      Priority: Medium     sclerderma ILD; present on CXR 3-2017 and CT 5-2017; dx confirmed by CT 9-2017 fibrotic NSIP pattern with increased fibrosis; started mycophenolate 7/2017       Scleroderma (H)      Priority: Medium     Diastolic hypertension 09/14/2017     Priority: Medium     High risk medications (not anticoagulants) long-term use 09/14/2017     Priority: Medium     Atrial fibrillation, unspecified type (H) 09/14/2017     Priority: Medium     Dermatomyositis (H) 07/25/2017     Priority: Medium     ILD (interstitial lung disease) (H) 07/25/2017     Priority: Medium     Atrial fibrillation and flutter (H) 07/01/2017     Priority: Medium       Current Outpatient Medications   Medication     aspirin 81 MG EC tablet     Blood Pressure Monitoring KIT     diltiazem ER COATED BEADS (CARDIZEM CD) 240 MG 24 hr capsule     lisinopril (PRINIVIL/ZESTRIL) 10 MG tablet     mycophenolate (GENERIC EQUIVALENT) 500 MG tablet     pantoprazole (PROTONIX) 40 MG EC tablet     predniSONE (DELTASONE) 2.5 MG tablet     sulfamethoxazole-trimethoprim (BACTRIM) 400-80 MG per tablet     No current facility-administered medications for this visit.      ROS:  As above per HPI    Objective:   /75   Pulse 63   Temp 97.9  F (36.6  C) (Oral)   Wt 74.6 kg  (164 lb 9 oz)   SpO2 99%   BMI 24.30 kg/m  , Body mass index is 24.3 kg/m .  Gen:  NAD, well-nourished, sitting in chair comfortably  HEENT: EOMI, sclera anicteric, Head normocephalic, ; nares patent; moist mucous membranes  Neck: trachea midline, no thyromegaly  CV:  Hemodynamically stable, RRR  Pulm:  no increased work of breathing , CTAB, no wheezes/rales/rhonchi   Chest wall: pain with palpation of left upper chest at 3rd/4th ribs. No bruising of chest.   Extrem: no cyanosis, edema or clubbing  Skin: no obvious rashes or abnormalities  Psych: Euthymic, linear thoughts, normal rate of speech    3view CXR: minimal atelectasis at bases, no pneumothorax, no obvious fractures, no cardiomegaly per my read  Assessment & Plan:     Kwaku Medrano, 53 year old male who presents with:  Chest wall discomfort  Contusion from car door - no signs of fracture. CXR without evidence of pneumothorax. Analgesics as needed for pain. Rest laying on the other side of chest. F/u as needed  - XR Ribs & Chest Left G/E 3 Views      Cesar Fraser MD   [place] [facility]    The use of Dragon/Viptable dictation services may have been used to construct the content in this note; any grammatical or spelling errors are non-intentional. Please contact the author of this note directly if you are in need of any clarification.

## 2019-03-27 DIAGNOSIS — J84.9 INTERSTITIAL LUNG DISEASE (H): ICD-10-CM

## 2019-03-27 LAB
ALBUMIN SERPL-MCNC: 3.9 G/DL (ref 3.4–5)
ALP SERPL-CCNC: 170 U/L (ref 40–150)
ALT SERPL W P-5'-P-CCNC: 22 U/L (ref 0–70)
ANION GAP SERPL CALCULATED.3IONS-SCNC: 5 MMOL/L (ref 3–14)
AST SERPL W P-5'-P-CCNC: 16 U/L (ref 0–45)
BASOPHILS # BLD AUTO: 0 10E9/L (ref 0–0.2)
BASOPHILS NFR BLD AUTO: 0.4 %
BILIRUB SERPL-MCNC: 0.6 MG/DL (ref 0.2–1.3)
BUN SERPL-MCNC: 17 MG/DL (ref 7–30)
CALCIUM SERPL-MCNC: 9.1 MG/DL (ref 8.5–10.1)
CHLORIDE SERPL-SCNC: 104 MMOL/L (ref 94–109)
CO2 SERPL-SCNC: 26 MMOL/L (ref 20–32)
CREAT SERPL-MCNC: 1.08 MG/DL (ref 0.66–1.25)
DIFFERENTIAL METHOD BLD: NORMAL
EOSINOPHIL # BLD AUTO: 0.1 10E9/L (ref 0–0.7)
EOSINOPHIL NFR BLD AUTO: 1.4 %
ERYTHROCYTE [DISTWIDTH] IN BLOOD BY AUTOMATED COUNT: 13.7 % (ref 10–15)
GFR SERPL CREATININE-BSD FRML MDRD: 76 ML/MIN/{1.73_M2}
GLUCOSE SERPL-MCNC: 100 MG/DL (ref 70–99)
HCT VFR BLD AUTO: 44.4 % (ref 40–53)
HGB BLD-MCNC: 14.6 G/DL (ref 13.3–17.7)
LYMPHOCYTES # BLD AUTO: 1.3 10E9/L (ref 0.8–5.3)
LYMPHOCYTES NFR BLD AUTO: 17.2 %
MCH RBC QN AUTO: 29.3 PG (ref 26.5–33)
MCHC RBC AUTO-ENTMCNC: 32.9 G/DL (ref 31.5–36.5)
MCV RBC AUTO: 89 FL (ref 78–100)
MONOCYTES # BLD AUTO: 1 10E9/L (ref 0–1.3)
MONOCYTES NFR BLD AUTO: 12.6 %
NEUTROPHILS # BLD AUTO: 5.3 10E9/L (ref 1.6–8.3)
NEUTROPHILS NFR BLD AUTO: 68.4 %
PLATELET # BLD AUTO: 336 10E9/L (ref 150–450)
POTASSIUM SERPL-SCNC: 4.7 MMOL/L (ref 3.4–5.3)
PROT SERPL-MCNC: 6.8 G/DL (ref 6.8–8.8)
RBC # BLD AUTO: 4.98 10E12/L (ref 4.4–5.9)
SODIUM SERPL-SCNC: 135 MMOL/L (ref 133–144)
WBC # BLD AUTO: 7.7 10E9/L (ref 4–11)

## 2019-03-27 PROCEDURE — 80053 COMPREHEN METABOLIC PANEL: CPT | Performed by: INTERNAL MEDICINE

## 2019-03-27 PROCEDURE — 36415 COLL VENOUS BLD VENIPUNCTURE: CPT | Performed by: INTERNAL MEDICINE

## 2019-03-27 PROCEDURE — 85025 COMPLETE CBC W/AUTO DIFF WBC: CPT | Performed by: INTERNAL MEDICINE

## 2019-03-27 ASSESSMENT — ENCOUNTER SYMPTOMS
COUGH: 1
ARTHRALGIAS: 1
HOARSE VOICE: 1
MUSCLE CRAMPS: 1
SPUTUM PRODUCTION: 1

## 2019-03-29 ENCOUNTER — OFFICE VISIT (OUTPATIENT)
Dept: PULMONOLOGY | Facility: CLINIC | Age: 54
End: 2019-03-29
Attending: INTERNAL MEDICINE
Payer: COMMERCIAL

## 2019-03-29 VITALS
WEIGHT: 165 LBS | HEIGHT: 69 IN | RESPIRATION RATE: 17 BRPM | OXYGEN SATURATION: 99 % | SYSTOLIC BLOOD PRESSURE: 118 MMHG | HEART RATE: 53 BPM | BODY MASS INDEX: 24.44 KG/M2 | DIASTOLIC BLOOD PRESSURE: 73 MMHG

## 2019-03-29 DIAGNOSIS — J84.9 INTERSTITIAL LUNG DISEASE (H): ICD-10-CM

## 2019-03-29 DIAGNOSIS — J84.9 ILD (INTERSTITIAL LUNG DISEASE) (H): Primary | ICD-10-CM

## 2019-03-29 LAB
DLCOCOR-%PRED-PRE: 73 %
DLCOCOR-PRE: 20.27 ML/MIN/MMHG
DLCOUNC-%PRED-PRE: 73 %
DLCOUNC-PRE: 20.27 ML/MIN/MMHG
DLCOUNC-PRED: 27.64 ML/MIN/MMHG
ERV-%PRED-PRE: 129 %
ERV-PRE: 1.79 L
ERV-PRED: 1.39 L
EXPTIME-PRE: 6.69 SEC
FEF2575-%PRED-PRE: 124 %
FEF2575-PRE: 4 L/SEC
FEF2575-PRED: 3.21 L/SEC
FEFMAX-%PRED-PRE: 121 %
FEFMAX-PRE: 11.35 L/SEC
FEFMAX-PRED: 9.33 L/SEC
FEV1-%PRED-PRE: 98 %
FEV1-PRE: 3.6 L
FEV1FEV6-PRE: 86 %
FEV1FEV6-PRED: 80 %
FEV1FVC-PRE: 85 %
FEV1FVC-PRED: 78 %
FEV1SVC-PRE: 83 %
FEV1SVC-PRED: 74 %
FIFMAX-PRE: 7.06 L/SEC
FRCPLETH-%PRED-PRE: 86 %
FRCPLETH-PRE: 3.03 L
FRCPLETH-PRED: 3.51 L
FVC-%PRED-PRE: 90 %
FVC-PRE: 4.21 L
FVC-PRED: 4.66 L
IC-%PRED-PRE: 71 %
IC-PRE: 2.53 L
IC-PRED: 3.55 L
RVPLETH-%PRED-PRE: 54 %
RVPLETH-PRE: 1.23 L
RVPLETH-PRED: 2.28 L
TLCPLETH-%PRED-PRE: 80 %
TLCPLETH-PRE: 5.56 L
TLCPLETH-PRED: 6.92 L
VA-%PRED-PRE: 85 %
VA-PRE: 5.42 L
VC-%PRED-PRE: 87 %
VC-PRE: 4.33 L
VC-PRED: 4.93 L

## 2019-03-29 PROCEDURE — G0463 HOSPITAL OUTPT CLINIC VISIT: HCPCS | Mod: ZF

## 2019-03-29 ASSESSMENT — PAIN SCALES - GENERAL: PAINLEVEL: MILD PAIN (2)

## 2019-03-29 ASSESSMENT — MIFFLIN-ST. JEOR: SCORE: 1573.82

## 2019-03-29 NOTE — PROGRESS NOTES
[facility] Interstitial Lung Disease Clinic    Reason for Visit  Kwaku Medrano is a 53 year old year old male who is being seen for RECHECK (Scleroderma)    HPI    Mr. Medrano is a 53-year-old with scleroderma interstitial lung disease who is here for follow-up.  He has been on mycophenolate since approximately July 2017 with his first appointment in the interstitial lung disease clinic in August 2017.  He has done well with mycophenolate and is tolerating it well.  He takes 1500 mg twice a day.  He continues to work full-time at the [employer] and averages 16-17,000 steps per day.  He is able to walk up one flight of stairs without feeling short of breath.  In addition to the mycophenolate, his prednisone dose currently is 5 mg/day, and Dr. Acosta and he are slowly tapering that down.  Mr. Medrano does endorse an occasional cough with occasional sputum production.  He denies syncope.  In February, he hit himself in the chest with a car door when he was trying to open the car door.  He then jammed the same area on a palate handle at work, so he continues to have some pain from those injuries.  He did go to an urgent care to be evaluated, and there were no rib fractures per his report.        Current Outpatient Medications   Medication     aspirin 81 MG EC tablet     diltiazem ER COATED BEADS (CARDIZEM CD) 240 MG 24 hr capsule     lisinopril (PRINIVIL/ZESTRIL) 10 MG tablet     mycophenolate (GENERIC EQUIVALENT) 500 MG tablet     pantoprazole (PROTONIX) 40 MG EC tablet     predniSONE (DELTASONE) 2.5 MG tablet     sulfamethoxazole-trimethoprim (BACTRIM) 400-80 MG per tablet     Blood Pressure Monitoring KIT     No current facility-administered medications for this visit.      Allergies   Allergen Reactions     Ampicillin Rash     Past Medical History:   Diagnosis Date     Atrial fibrillation and flutter (H) 07/2017     Fracture      GERD (gastroesophageal reflux disease)      Interstitial lung disease  (H)     sclerderma ILD; present on CXR 3-2017 and CT 5-2017; dx confirmed by CT 9-2017 fibrotic NSIP pattern with increased fibrosis; started mycophenolate 7/2017     Scleroderma (H)     dx 9- at [facility] by Dr. Acosta       Past Surgical History:   Procedure Laterality Date     COLONOSCOPY N/A 7/19/2017    Procedure: COLONOSCOPY;  COLONOSCOPY;  Surgeon: Mita Jimenez MD;  Location:  GI     NO HISTORY OF SURGERY         Social History     Socioeconomic History     Marital status: Single     Spouse name: Not on file     Number of children: Not on file     Years of education: Not on file     Highest education level: Not on file   Occupational History     Not on file   Social Needs     Financial resource strain: Not on file     Food insecurity:     Worry: Not on file     Inability: Not on file     Transportation needs:     Medical: Not on file     Non-medical: Not on file   Tobacco Use     Smoking status: Never Smoker     Smokeless tobacco: Former User     Types: Chew   Substance and Sexual Activity     Alcohol use: Yes     Comment: very rarely     Drug use: No     Sexual activity: Not Currently   Lifestyle     Physical activity:     Days per week: Not on file     Minutes per session: Not on file     Stress: Not on file   Relationships     Social connections:     Talks on phone: Not on file     Gets together: Not on file     Attends Congregation service: Not on file     Active member of club or organization: Not on file     Attends meetings of clubs or organizations: Not on file     Relationship status: Not on file     Intimate partner violence:     Fear of current or ex partner: Not on file     Emotionally abused: Not on file     Physically abused: Not on file     Forced sexual activity: Not on file   Other Topics Concern     Parent/sibling w/ CABG, MI or angioplasty before 65F 55M? Not Asked   Social History Narrative    Work in [employer].  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  Denies exposure to hot tubs, silica, pet birds, mold.  Single, no children.       Family History   Problem Relation Age of Onset     Prostate Cancer Father      Lung Cancer Other      Family History Negative Mother      Family History Negative Maternal Grandmother      Family History Negative Maternal Grandfather      Family History Negative Paternal Grandmother      Other - See Comments Paternal Grandfather         Atherosclerosis     Family History Negative Brother      Family History Negative Sister      Family History Negative Brother      LUNG DISEASE No family hx of      Rheumatologic Disease No family hx of              ROS Pulmonary  A complete ROS was otherwise negative except as noted in the HPI.    Vitals: /73   Pulse 53   Resp 17   Ht 1.753 m (5' 9\")   Wt 74.8 kg (165 lb)   SpO2 99%   BMI 24.37 kg/m      Exam:   GENERAL APPEARANCE: Well developed, well nourished, alert, and in no apparent distress.  RESP: good air flow throughout.  No crackles. No rhonchi. No wheezes.  CV: Normal S1, S2, regular rhythm, normal rate. No murmur.  No LE edema.   MS: extremities normal. No clubbing. No cyanosis.  SKIN: no rash on limited exam.  No fingertip ulcers.  NEURO: Mentation intact, speech normal, normal gait and stance.  PSYCH: mentation appears normal. and affect normal/bright.    Results:  Recent Results (from the past 168 hour(s))   Comprehensive metabolic panel    Collection Time: 03/27/19  8:07 AM   Result Value Ref Range    Sodium 135 133 - 144 mmol/L    Potassium 4.7 3.4 - 5.3 mmol/L    Chloride 104 94 - 109 mmol/L    Carbon Dioxide 26 20 - 32 mmol/L    Anion Gap 5 3 - 14 mmol/L    Glucose 100 (H) 70 - 99 mg/dL    Urea Nitrogen 17 7 - 30 mg/dL    Creatinine 1.08 0.66 - 1.25 mg/dL    GFR Estimate 76 >60 mL/min/[1.73_m2]    GFR Estimate If Black 89 >60 mL/min/[1.73_m2]    Calcium 9.1 8.5 - 10.1 mg/dL    Bilirubin Total 0.6 0.2 - 1.3 mg/dL    Albumin 3.9 3.4 - 5.0 g/dL    Protein " Total 6.8 6.8 - 8.8 g/dL    Alkaline Phosphatase 170 (H) 40 - 150 U/L    ALT 22 0 - 70 U/L    AST 16 0 - 45 U/L   CBC with platelets differential    Collection Time: 03/27/19  8:07 AM   Result Value Ref Range    WBC 7.7 4.0 - 11.0 10e9/L    RBC Count 4.98 4.4 - 5.9 10e12/L    Hemoglobin 14.6 13.3 - 17.7 g/dL    Hematocrit 44.4 40.0 - 53.0 %    MCV 89 78 - 100 fl    MCH 29.3 26.5 - 33.0 pg    MCHC 32.9 31.5 - 36.5 g/dL    RDW 13.7 10.0 - 15.0 %    Platelet Count 336 150 - 450 10e9/L    % Neutrophils 68.4 %    % Lymphocytes 17.2 %    % Monocytes 12.6 %    % Eosinophils 1.4 %    % Basophils 0.4 %    Absolute Neutrophil 5.3 1.6 - 8.3 10e9/L    Absolute Lymphocytes 1.3 0.8 - 5.3 10e9/L    Absolute Monocytes 1.0 0.0 - 1.3 10e9/L    Absolute Eosinophils 0.1 0.0 - 0.7 10e9/L    Absolute Basophils 0.0 0.0 - 0.2 10e9/L    Diff Method Automated Method    General PFT Lab (Please always keep checked)    Collection Time: 03/29/19  6:53 AM   Result Value Ref Range    FVC-Pred 4.66 L    FVC-Pre 4.21 L    FVC-%Pred-Pre 90 %    FEV1-Pre 3.60 L    FEV1-%Pred-Pre 98 %    FEV1FVC-Pred 78 %    FEV1FVC-Pre 85 %    FEFMax-Pred 9.33 L/sec    FEFMax-Pre 11.35 L/sec    FEFMax-%Pred-Pre 121 %    FEF2575-Pred 3.21 L/sec    FEF2575-Pre 4.00 L/sec    QFW3964-%Pred-Pre 124 %    ExpTime-Pre 6.69 sec    FIFMax-Pre 7.06 L/sec    VC-Pred 4.93 L    VC-Pre 4.33 L    VC-%Pred-Pre 87 %    IC-Pred 3.55 L    IC-Pre 2.53 L    IC-%Pred-Pre 71 %    ERV-Pred 1.39 L    ERV-Pre 1.79 L    ERV-%Pred-Pre 129 %    FEV1FEV6-Pred 80 %    FEV1FEV6-Pre 86 %    FRCPleth-Pred 3.51 L    FRCPleth-Pre 3.03 L    FRCPleth-%Pred-Pre 86 %    RVPleth-Pred 2.28 L    RVPleth-Pre 1.23 L    RVPleth-%Pred-Pre 54 %    TLCPleth-Pred 6.92 L    TLCPleth-Pre 5.56 L    TLCPleth-%Pred-Pre 80 %    DLCOunc-Pred 27.64 ml/min/mmHg    DLCOunc-Pre 20.27 ml/min/mmHg    DLCOunc-%Pred-Pre 73 %    DLCOcor-Pre 20.27 ml/min/mmHg    DLCOcor-%Pred-Pre 73 %    VA-Pre 5.42 L    VA-%Pred-Pre 85 %     FEV1SVC-Pred 74 %    FEV1SVC-Pre 83 %         FVC FEV1 TLC DLCO   [facility]  MMF and pred 7/2017 2.79 58% 2.54 68%    12.9 52%   8-3-2017 [facility] 3.34 2.85 4.89 17.63   9- 3.46 73% 3.13 85% 4.73 68% 19.77 67%   12- 3.91 83% 3.47 94% 5.10 73% 20.16 68%   5- 4.21 90% 3.64 99% 5.96 86% 21.63 74%   8- 4.19 89% 3.64 99% 5.46 78% 19.94 68%   12- 4.19 90% 3.68 101% 6.17 89% 21.74 74%   3- 4.21 90% 3.60 98% 5.56 80% 20.27 73%       I reviewed pulmonary function test that was performed today.  This shows normal spirometry, lung volumes, and diffusion.  Lung function is stable and significantly improved compared to June 2017 outside PFT.  I also reviewed labs that were performed today.  Sodium is back up to the normal range.  The only abnormality today is a minimally elevated alkaline phosphatase.    I reviewed results with the patient.      Assessment and plan:   Mr. Medrano is a 53-year-old with scleroderma interstitial lung disease who is here for follow-up.  1.  Scleroderma ILD.  He has no significant shortness of breath, and lung function is now normal.  He has had a dramatic response to mycophenolate, and we will continue 1500 mg twice a day.  Potentially, we could decrease the dose to 1000 mg twice a day in the future if he remains stable.  His prednisone dose is down to 5 mg/day, and Dr. Acosta is tapering that slowly.  I have encouraged him to continue walking as he is doing.  We discussed how long he would need to continue mycophenolate, and my recommendation is that he continue to take it long-term and potentially lifelong.  He is in agreement with the plan.  We also briefly discussed lung transplant as an option in the future, however with his current normal lung function he does not need to pursue that at this time.  He will return in 3 months with full PFT.  2.  High risk medication monitoring.  Labs today are within normal limits except for a minimally elevated  alkaline phosphatase.  It has been normal in the past.  We will recheck that when he returns in 3 months.  He will continue Bactrim for pneumocystis prophylaxis.  We talked about the need for him to avoid sick contacts.  I also recommended that he get an annual skin check by dermatology.  We will continue mycophenolate 1500 mg twice a day.

## 2019-03-29 NOTE — LETTER
3/29/2019      RE: Kwaku Medrano  [address] [address]       [facility] Lung Disease Clinic    Reason for Visit  Kwaku Medrano is a 53 year old year old male who is being seen for RECHECK (Scleroderma)    HPI    Mr. Medrano is a 53-year-old with scleroderma interstitial lung disease who is here for follow-up.  He has been on mycophenolate since approximately July 2017 with his first appointment in the interstitial lung disease clinic in August 2017.  He has done well with mycophenolate and is tolerating it well.  He takes 1500 mg twice a day.  He continues to work full-time at the [employer] and averages 16-17,000 steps per day.  He is able to walk up one flight of stairs without feeling short of breath.  In addition to the mycophenolate, his prednisone dose currently is 5 mg/day, and Dr. Acosta and he are slowly tapering that down.  Mr. Medrano does endorse an occasional cough with occasional sputum production.  He denies syncope.  In February, he hit himself in the chest with a car door when he was trying to open the car door.  He then jammed the same area on a palate handle at work, so he continues to have some pain from those injuries.  He did go to an urgent care to be evaluated, and there were no rib fractures per his report.        Current Outpatient Medications   Medication     aspirin 81 MG EC tablet     diltiazem ER COATED BEADS (CARDIZEM CD) 240 MG 24 hr capsule     lisinopril (PRINIVIL/ZESTRIL) 10 MG tablet     mycophenolate (GENERIC EQUIVALENT) 500 MG tablet     pantoprazole (PROTONIX) 40 MG EC tablet     predniSONE (DELTASONE) 2.5 MG tablet     sulfamethoxazole-trimethoprim (BACTRIM) 400-80 MG per tablet     Blood Pressure Monitoring KIT     No current facility-administered medications for this visit.      Allergies   Allergen Reactions     Ampicillin Rash     Past Medical History:   Diagnosis Date     Atrial fibrillation and flutter (H) 07/2017      Fracture      GERD (gastroesophageal reflux disease)      Interstitial lung disease (H)     sclerderma ILD; present on CXR 3-2017 and CT 5-2017; dx confirmed by CT 9-2017 fibrotic NSIP pattern with increased fibrosis; started mycophenolate 7/2017     Scleroderma (H)     dx 9- at [facility] by Dr. Acosta       Past Surgical History:   Procedure Laterality Date     COLONOSCOPY N/A 7/19/2017    Procedure: COLONOSCOPY;  COLONOSCOPY;  Surgeon: Mita Jimenez MD;  Location:  GI     NO HISTORY OF SURGERY         Social History     Socioeconomic History     Marital status: Single     Spouse name: Not on file     Number of children: Not on file     Years of education: Not on file     Highest education level: Not on file   Occupational History     Not on file   Social Needs     Financial resource strain: Not on file     Food insecurity:     Worry: Not on file     Inability: Not on file     Transportation needs:     Medical: Not on file     Non-medical: Not on file   Tobacco Use     Smoking status: Never Smoker     Smokeless tobacco: Former User     Types: Chew   Substance and Sexual Activity     Alcohol use: Yes     Comment: very rarely     Drug use: No     Sexual activity: Not Currently   Lifestyle     Physical activity:     Days per week: Not on file     Minutes per session: Not on file     Stress: Not on file   Relationships     Social connections:     Talks on phone: Not on file     Gets together: Not on file     Attends Confucianism service: Not on file     Active member of club or organization: Not on file     Attends meetings of clubs or organizations: Not on file     Relationship status: Not on file     Intimate partner violence:     Fear of current or ex partner: Not on file     Emotionally abused: Not on file     Physically abused: Not on file     Forced sexual activity: Not on file   Other Topics Concern     Parent/sibling w/ CABG, MI or angioplasty before 65F 55M? Not Asked   Social History  "Narrative    Work in [employer].  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  Denies exposure to hot tubs, silica, pet birds, mold.  Single, no children.       Family History   Problem Relation Age of Onset     Prostate Cancer Father      Lung Cancer Other      Family History Negative Mother      Family History Negative Maternal Grandmother      Family History Negative Maternal Grandfather      Family History Negative Paternal Grandmother      Other - See Comments Paternal Grandfather         Atherosclerosis     Family History Negative Brother      Family History Negative Sister      Family History Negative Brother      LUNG DISEASE No family hx of      Rheumatologic Disease No family hx of              ROS Pulmonary  A complete ROS was otherwise negative except as noted in the HPI.    Vitals: /73   Pulse 53   Resp 17   Ht 1.753 m (5' 9\")   Wt 74.8 kg (165 lb)   SpO2 99%   BMI 24.37 kg/m       Exam:   GENERAL APPEARANCE: Well developed, well nourished, alert, and in no apparent distress.  RESP: good air flow throughout.  No crackles. No rhonchi. No wheezes.  CV: Normal S1, S2, regular rhythm, normal rate. No murmur.  No LE edema.   MS: extremities normal. No clubbing. No cyanosis.  SKIN: no rash on limited exam.  No fingertip ulcers.  NEURO: Mentation intact, speech normal, normal gait and stance.  PSYCH: mentation appears normal. and affect normal/bright.    Results:  Recent Results (from the past 168 hour(s))   Comprehensive metabolic panel    Collection Time: 03/27/19  8:07 AM   Result Value Ref Range    Sodium 135 133 - 144 mmol/L    Potassium 4.7 3.4 - 5.3 mmol/L    Chloride 104 94 - 109 mmol/L    Carbon Dioxide 26 20 - 32 mmol/L    Anion Gap 5 3 - 14 mmol/L    Glucose 100 (H) 70 - 99 mg/dL    Urea Nitrogen 17 7 - 30 mg/dL    Creatinine 1.08 0.66 - 1.25 mg/dL    GFR Estimate 76 >60 mL/min/[1.73_m2]    GFR Estimate If Black 89 >60 mL/min/[1.73_m2]    Calcium 9.1 8.5 - " 10.1 mg/dL    Bilirubin Total 0.6 0.2 - 1.3 mg/dL    Albumin 3.9 3.4 - 5.0 g/dL    Protein Total 6.8 6.8 - 8.8 g/dL    Alkaline Phosphatase 170 (H) 40 - 150 U/L    ALT 22 0 - 70 U/L    AST 16 0 - 45 U/L   CBC with platelets differential    Collection Time: 03/27/19  8:07 AM   Result Value Ref Range    WBC 7.7 4.0 - 11.0 10e9/L    RBC Count 4.98 4.4 - 5.9 10e12/L    Hemoglobin 14.6 13.3 - 17.7 g/dL    Hematocrit 44.4 40.0 - 53.0 %    MCV 89 78 - 100 fl    MCH 29.3 26.5 - 33.0 pg    MCHC 32.9 31.5 - 36.5 g/dL    RDW 13.7 10.0 - 15.0 %    Platelet Count 336 150 - 450 10e9/L    % Neutrophils 68.4 %    % Lymphocytes 17.2 %    % Monocytes 12.6 %    % Eosinophils 1.4 %    % Basophils 0.4 %    Absolute Neutrophil 5.3 1.6 - 8.3 10e9/L    Absolute Lymphocytes 1.3 0.8 - 5.3 10e9/L    Absolute Monocytes 1.0 0.0 - 1.3 10e9/L    Absolute Eosinophils 0.1 0.0 - 0.7 10e9/L    Absolute Basophils 0.0 0.0 - 0.2 10e9/L    Diff Method Automated Method    General PFT Lab (Please always keep checked)    Collection Time: 03/29/19  6:53 AM   Result Value Ref Range    FVC-Pred 4.66 L    FVC-Pre 4.21 L    FVC-%Pred-Pre 90 %    FEV1-Pre 3.60 L    FEV1-%Pred-Pre 98 %    FEV1FVC-Pred 78 %    FEV1FVC-Pre 85 %    FEFMax-Pred 9.33 L/sec    FEFMax-Pre 11.35 L/sec    FEFMax-%Pred-Pre 121 %    FEF2575-Pred 3.21 L/sec    FEF2575-Pre 4.00 L/sec    XML8065-%Pred-Pre 124 %    ExpTime-Pre 6.69 sec    FIFMax-Pre 7.06 L/sec    VC-Pred 4.93 L    VC-Pre 4.33 L    VC-%Pred-Pre 87 %    IC-Pred 3.55 L    IC-Pre 2.53 L    IC-%Pred-Pre 71 %    ERV-Pred 1.39 L    ERV-Pre 1.79 L    ERV-%Pred-Pre 129 %    FEV1FEV6-Pred 80 %    FEV1FEV6-Pre 86 %    FRCPleth-Pred 3.51 L    FRCPleth-Pre 3.03 L    FRCPleth-%Pred-Pre 86 %    RVPleth-Pred 2.28 L    RVPleth-Pre 1.23 L    RVPleth-%Pred-Pre 54 %    TLCPleth-Pred 6.92 L    TLCPleth-Pre 5.56 L    TLCPleth-%Pred-Pre 80 %    DLCOunc-Pred 27.64 ml/min/mmHg    DLCOunc-Pre 20.27 ml/min/mmHg    DLCOunc-%Pred-Pre 73 %    DLCOcor-Pre  20.27 ml/min/mmHg    DLCOcor-%Pred-Pre 73 %    VA-Pre 5.42 L    VA-%Pred-Pre 85 %    FEV1SVC-Pred 74 %    FEV1SVC-Pre 83 %         FVC FEV1 TLC DLCO   [facility]  MMF and pred 7/2017 2.79 58% 2.54 68%    12.9 52%   8-3-2017 [facility] 3.34 2.85 4.89 17.63   9- 3.46 73% 3.13 85% 4.73 68% 19.77 67%   12- 3.91 83% 3.47 94% 5.10 73% 20.16 68%   5- 4.21 90% 3.64 99% 5.96 86% 21.63 74%   8- 4.19 89% 3.64 99% 5.46 78% 19.94 68%   12- 4.19 90% 3.68 101% 6.17 89% 21.74 74%   3- 4.21 90% 3.60 98% 5.56 80% 20.27 73%       I reviewed pulmonary function test that was performed today.  This shows normal spirometry, lung volumes, and diffusion.  Lung function is stable and significantly improved compared to June 2017 outside PFT.  I also reviewed labs that were performed today.  Sodium is back up to the normal range.  The only abnormality today is a minimally elevated alkaline phosphatase.    I reviewed results with the patient.      Assessment and plan:   Mr. Medrano is a 53-year-old with scleroderma interstitial lung disease who is here for follow-up.  1.  Scleroderma ILD.  He has no significant shortness of breath, and lung function is now normal.  He has had a dramatic response to mycophenolate, and we will continue 1500 mg twice a day.  Potentially, we could decrease the dose to 1000 mg twice a day in the future if he remains stable.  His prednisone dose is down to 5 mg/day, and Dr. Acosta is tapering that slowly.  I have encouraged him to continue walking as he is doing.  We discussed how long he would need to continue mycophenolate, and my recommendation is that he continue to take it long-term and potentially lifelong.  He is in agreement with the plan.  We also briefly discussed lung transplant as an option in the future, however with his current normal lung function he does not need to pursue that at this time.  He will return in 3 months with full PFT.  2.  High risk  medication monitoring.  Labs today are within normal limits except for a minimally elevated alkaline phosphatase.  It has been normal in the past.  We will recheck that when he returns in 3 months.  He will continue Bactrim for pneumocystis prophylaxis.  We talked about the need for him to avoid sick contacts.  I also recommended that he get an annual skin check by dermatology.  We will continue mycophenolate 1500 mg twice a day.        \\\\      Elma Dillon MD

## 2019-03-29 NOTE — LETTER
3/29/2019       RE: Kwaku Medrano  [address] [address]     Dear Colleague,    Thank you for referring your patient, Kwaku Medrano, to the [facility] LUNG SCIENCE AND HEALTH at [facility]. Please see a copy of my visit note below.    [facility]    Reason for Visit  Kwaku Medrano is a 53 year old year old male who is being seen for RECHECK (Scleroderma)    HPI    Mr. Medrano is a 53-year-old with scleroderma interstitial lung disease who is here for follow-up.  He has been on mycophenolate since approximately July 2017 with his first appointment in the interstitial lung disease clinic in August 2017.  He has done well with mycophenolate and is tolerating it well.  He takes 1500 mg twice a day.  He continues to work full-time at the [employer] and averages 16-17,000 steps per day.  He is able to walk up one flight of stairs without feeling short of breath.  In addition to the mycophenolate, his prednisone dose currently is 5 mg/day, and Dr. Acosta and he are slowly tapering that down.  Mr. Medrano does endorse an occasional cough with occasional sputum production.  He denies syncope.  In February, he hit himself in the chest with a car door when he was trying to open the car door.  He then jammed the same area on a palate handle at work, so he continues to have some pain from those injuries.  He did go to an urgent care to be evaluated, and there were no rib fractures per his report.        Current Outpatient Medications   Medication     aspirin 81 MG EC tablet     diltiazem ER COATED BEADS (CARDIZEM CD) 240 MG 24 hr capsule     lisinopril (PRINIVIL/ZESTRIL) 10 MG tablet     mycophenolate (GENERIC EQUIVALENT) 500 MG tablet     pantoprazole (PROTONIX) 40 MG EC tablet     predniSONE (DELTASONE) 2.5 MG tablet     sulfamethoxazole-trimethoprim (BACTRIM) 400-80 MG per tablet     Blood Pressure  Monitoring KIT     No current facility-administered medications for this visit.      Allergies   Allergen Reactions     Ampicillin Rash     Past Medical History:   Diagnosis Date     Atrial fibrillation and flutter (H) 07/2017     Fracture      GERD (gastroesophageal reflux disease)      Interstitial lung disease (H)     sclerderma ILD; present on CXR 3-2017 and CT 5-2017; dx confirmed by CT 9-2017 fibrotic NSIP pattern with increased fibrosis; started mycophenolate 7/2017     Scleroderma (H)     dx 9- at [facility] by Dr. Acosta       Past Surgical History:   Procedure Laterality Date     COLONOSCOPY N/A 7/19/2017    Procedure: COLONOSCOPY;  COLONOSCOPY;  Surgeon: Mita Jimenez MD;  Location: [facility]     NO HISTORY OF SURGERY         Social History     Socioeconomic History     Marital status: Single     Spouse name: Not on file     Number of children: Not on file     Years of education: Not on file     Highest education level: Not on file   Occupational History     Not on file   Social Needs     Financial resource strain: Not on file     Food insecurity:     Worry: Not on file     Inability: Not on file     Transportation needs:     Medical: Not on file     Non-medical: Not on file   Tobacco Use     Smoking status: Never Smoker     Smokeless tobacco: Former User     Types: Chew   Substance and Sexual Activity     Alcohol use: Yes     Comment: very rarely     Drug use: No     Sexual activity: Not Currently   Lifestyle     Physical activity:     Days per week: Not on file     Minutes per session: Not on file     Stress: Not on file   Relationships     Social connections:     Talks on phone: Not on file     Gets together: Not on file     Attends Islam service: Not on file     Active member of club or organization: Not on file     Attends meetings of clubs or organizations: Not on file     Relationship status: Not on file     Intimate partner violence:     Fear of current or ex partner: Not on file      "Emotionally abused: Not on file     Physically abused: Not on file     Forced sexual activity: Not on file   Other Topics Concern     Parent/sibling w/ CABG, MI or angioplasty before 65F 55M? Not Asked   Social History Narrative    Work in [employer].  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  Denies exposure to hot tubs, silica, pet birds, mold.  Single, no children.       Family History   Problem Relation Age of Onset     Prostate Cancer Father      Lung Cancer Other      Family History Negative Mother      Family History Negative Maternal Grandmother      Family History Negative Maternal Grandfather      Family History Negative Paternal Grandmother      Other - See Comments Paternal Grandfather         Atherosclerosis     Family History Negative Brother      Family History Negative Sister      Family History Negative Brother      LUNG DISEASE No family hx of      Rheumatologic Disease No family hx of              ROS Pulmonary  A complete ROS was otherwise negative except as noted in the HPI.    Vitals: /73   Pulse 53   Resp 17   Ht 1.753 m (5' 9\")   Wt 74.8 kg (165 lb)   SpO2 99%   BMI 24.37 kg/m       Exam:   GENERAL APPEARANCE: Well developed, well nourished, alert, and in no apparent distress.  RESP: good air flow throughout.  No crackles. No rhonchi. No wheezes.  CV: Normal S1, S2, regular rhythm, normal rate. No murmur.  No LE edema.   MS: extremities normal. No clubbing. No cyanosis.  SKIN: no rash on limited exam.  No fingertip ulcers.  NEURO: Mentation intact, speech normal, normal gait and stance.  PSYCH: mentation appears normal. and affect normal/bright.    Results:  Recent Results (from the past 168 hour(s))   Comprehensive metabolic panel    Collection Time: 03/27/19  8:07 AM   Result Value Ref Range    Sodium 135 133 - 144 mmol/L    Potassium 4.7 3.4 - 5.3 mmol/L    Chloride 104 94 - 109 mmol/L    Carbon Dioxide 26 20 - 32 mmol/L    Anion Gap 5 3 - 14 " mmol/L    Glucose 100 (H) 70 - 99 mg/dL    Urea Nitrogen 17 7 - 30 mg/dL    Creatinine 1.08 0.66 - 1.25 mg/dL    GFR Estimate 76 >60 mL/min/[1.73_m2]    GFR Estimate If Black 89 >60 mL/min/[1.73_m2]    Calcium 9.1 8.5 - 10.1 mg/dL    Bilirubin Total 0.6 0.2 - 1.3 mg/dL    Albumin 3.9 3.4 - 5.0 g/dL    Protein Total 6.8 6.8 - 8.8 g/dL    Alkaline Phosphatase 170 (H) 40 - 150 U/L    ALT 22 0 - 70 U/L    AST 16 0 - 45 U/L   CBC with platelets differential    Collection Time: 03/27/19  8:07 AM   Result Value Ref Range    WBC 7.7 4.0 - 11.0 10e9/L    RBC Count 4.98 4.4 - 5.9 10e12/L    Hemoglobin 14.6 13.3 - 17.7 g/dL    Hematocrit 44.4 40.0 - 53.0 %    MCV 89 78 - 100 fl    MCH 29.3 26.5 - 33.0 pg    MCHC 32.9 31.5 - 36.5 g/dL    RDW 13.7 10.0 - 15.0 %    Platelet Count 336 150 - 450 10e9/L    % Neutrophils 68.4 %    % Lymphocytes 17.2 %    % Monocytes 12.6 %    % Eosinophils 1.4 %    % Basophils 0.4 %    Absolute Neutrophil 5.3 1.6 - 8.3 10e9/L    Absolute Lymphocytes 1.3 0.8 - 5.3 10e9/L    Absolute Monocytes 1.0 0.0 - 1.3 10e9/L    Absolute Eosinophils 0.1 0.0 - 0.7 10e9/L    Absolute Basophils 0.0 0.0 - 0.2 10e9/L    Diff Method Automated Method    General PFT Lab (Please always keep checked)    Collection Time: 03/29/19  6:53 AM   Result Value Ref Range    FVC-Pred 4.66 L    FVC-Pre 4.21 L    FVC-%Pred-Pre 90 %    FEV1-Pre 3.60 L    FEV1-%Pred-Pre 98 %    FEV1FVC-Pred 78 %    FEV1FVC-Pre 85 %    FEFMax-Pred 9.33 L/sec    FEFMax-Pre 11.35 L/sec    FEFMax-%Pred-Pre 121 %    FEF2575-Pred 3.21 L/sec    FEF2575-Pre 4.00 L/sec    XZH9492-%Pred-Pre 124 %    ExpTime-Pre 6.69 sec    FIFMax-Pre 7.06 L/sec    VC-Pred 4.93 L    VC-Pre 4.33 L    VC-%Pred-Pre 87 %    IC-Pred 3.55 L    IC-Pre 2.53 L    IC-%Pred-Pre 71 %    ERV-Pred 1.39 L    ERV-Pre 1.79 L    ERV-%Pred-Pre 129 %    FEV1FEV6-Pred 80 %    FEV1FEV6-Pre 86 %    FRCPleth-Pred 3.51 L    FRCPleth-Pre 3.03 L    FRCPleth-%Pred-Pre 86 %    RVPleth-Pred 2.28 L     RVPleth-Pre 1.23 L    RVPleth-%Pred-Pre 54 %    TLCPleth-Pred 6.92 L    TLCPleth-Pre 5.56 L    TLCPleth-%Pred-Pre 80 %    DLCOunc-Pred 27.64 ml/min/mmHg    DLCOunc-Pre 20.27 ml/min/mmHg    DLCOunc-%Pred-Pre 73 %    DLCOcor-Pre 20.27 ml/min/mmHg    DLCOcor-%Pred-Pre 73 %    VA-Pre 5.42 L    VA-%Pred-Pre 85 %    FEV1SVC-Pred 74 %    FEV1SVC-Pre 83 %         FVC FEV1 TLC DLCO   [facility]  MMF and pred 7/2017 2.79 58% 2.54 68%    12.9 52%   8-3-2017 [facility] 3.34 2.85 4.89 17.63   9- 3.46 73% 3.13 85% 4.73 68% 19.77 67%   12- 3.91 83% 3.47 94% 5.10 73% 20.16 68%   5- 4.21 90% 3.64 99% 5.96 86% 21.63 74%   8- 4.19 89% 3.64 99% 5.46 78% 19.94 68%   12- 4.19 90% 3.68 101% 6.17 89% 21.74 74%   3- 4.21 90% 3.60 98% 5.56 80% 20.27 73%       I reviewed pulmonary function test that was performed today.  This shows normal spirometry, lung volumes, and diffusion.  Lung function is stable and significantly improved compared to June 2017 outside PFT.  I also reviewed labs that were performed today.  Sodium is back up to the normal range.  The only abnormality today is a minimally elevated alkaline phosphatase.    I reviewed results with the patient.      Assessment and plan:   Mr. Medrano is a 53-year-old with scleroderma interstitial lung disease who is here for follow-up.  1.  Scleroderma ILD.  He has no significant shortness of breath, and lung function is now normal.  He has had a dramatic response to mycophenolate, and we will continue 1500 mg twice a day.  Potentially, we could decrease the dose to 1000 mg twice a day in the future if he remains stable.  His prednisone dose is down to 5 mg/day, and Dr. Acosta is tapering that slowly.  I have encouraged him to continue walking as he is doing.  We discussed how long he would need to continue mycophenolate, and my recommendation is that he continue to take it long-term and potentially lifelong.  He is in agreement with the plan.   We also briefly discussed lung transplant as an option in the future, however with his current normal lung function he does not need to pursue that at this time.  He will return in 3 months with full PFT.  2.  High risk medication monitoring.  Labs today are within normal limits except for a minimally elevated alkaline phosphatase.  It has been normal in the past.  We will recheck that when he returns in 3 months.  He will continue Bactrim for pneumocystis prophylaxis.  We talked about the need for him to avoid sick contacts.  I also recommended that he get an annual skin check by dermatology.  We will continue mycophenolate 1500 mg twice a day.              Again, thank you for allowing me to participate in the care of your patient.      Sincerely,    Elma Dillon MD

## 2019-04-16 DIAGNOSIS — J84.9 ILD (INTERSTITIAL LUNG DISEASE) (H): ICD-10-CM

## 2019-04-16 DIAGNOSIS — M13.0 POLYARTHRITIS: ICD-10-CM

## 2019-04-16 DIAGNOSIS — M33.13 DERMATOMYOSITIS (H): ICD-10-CM

## 2019-04-16 DIAGNOSIS — M60.9 MYOSITIS, UNSPECIFIED MYOSITIS TYPE, UNSPECIFIED SITE: ICD-10-CM

## 2019-04-16 DIAGNOSIS — D89.89 ANTISYNTHETASE SYNDROME (H): ICD-10-CM

## 2019-04-17 RX ORDER — MYCOPHENOLATE MOFETIL 500 MG/1
1500 TABLET ORAL 2 TIMES DAILY
Qty: 180 TABLET | Refills: 2 | Status: SHIPPED | OUTPATIENT
Start: 2019-04-17 | End: 2019-08-26

## 2019-04-17 NOTE — TELEPHONE ENCOUNTER
mycophenolate (GENERIC EQUIVALENT) 500 MG tablet      Last Written Prescription Date:  12/16/2018  Last Fill Quantity: 180,   # refills: 2  Last Office Visit: 01/28/2019  Future Office visit:  07/23/2019    CBC RESULTS:   Recent Labs   Lab Test 03/27/19  0807   WBC 7.7   RBC 4.98   HGB 14.6   HCT 44.4   MCV 89   MCH 29.3   MCHC 32.9   RDW 13.7          Creatinine   Date Value Ref Range Status   03/27/2019 1.08 0.66 - 1.25 mg/dL Final   ]    Liver Function Studies -   Recent Labs   Lab Test 03/27/19  0807   PROTTOTAL 6.8   ALBUMIN 3.9   BILITOTAL 0.6   ALKPHOS 170*   AST 16   ALT 22       Routing refill request to provider for review/approval because: per protocol

## 2019-04-19 ENCOUNTER — MYC REFILL (OUTPATIENT)
Dept: RHEUMATOLOGY | Facility: CLINIC | Age: 54
End: 2019-04-19

## 2019-04-19 DIAGNOSIS — M19.90 INFLAMMATORY ARTHRITIS: ICD-10-CM

## 2019-04-19 DIAGNOSIS — Z79.899 HIGH RISK MEDICATIONS (NOT ANTICOAGULANTS) LONG-TERM USE: ICD-10-CM

## 2019-04-19 DIAGNOSIS — M34.9 SCLERODERMA (H): ICD-10-CM

## 2019-04-19 DIAGNOSIS — J84.9 ILD (INTERSTITIAL LUNG DISEASE) (H): ICD-10-CM

## 2019-04-19 NOTE — TELEPHONE ENCOUNTER
sulfamethoxazole-trimethoprim (BACTRIM) 400-80 MG per tablet       Last Written Prescription Date:  2-15-18  Last Fill Quantity: 180,   # refills: 3  Last Office Visit : 7-5-18  Future Office visit:  7-23-19    Routing refill request to provider for review/approval because:  Not on protocol.      Kathleen M Doege RN

## 2019-04-22 RX ORDER — SULFAMETHOXAZOLE AND TRIMETHOPRIM 400; 80 MG/1; MG/1
TABLET ORAL
Qty: 180 TABLET | Refills: 3 | Status: SHIPPED | OUTPATIENT
Start: 2019-04-22 | End: 2020-08-07

## 2019-07-10 DIAGNOSIS — I48.91 ATRIAL FIBRILLATION, UNSPECIFIED TYPE (H): ICD-10-CM

## 2019-07-10 DIAGNOSIS — I73.00 RAYNAUD'S DISEASE WITHOUT GANGRENE: ICD-10-CM

## 2019-07-10 RX ORDER — LISINOPRIL 10 MG/1
10 TABLET ORAL DAILY
Qty: 90 TABLET | Refills: 0 | Status: SHIPPED | OUTPATIENT
Start: 2019-07-10 | End: 2019-10-21

## 2019-07-10 NOTE — TELEPHONE ENCOUNTER
Medication Refilled: lisinopril  Last office visit: 6/26/18  Last Labs/EKG: NA  Next office visit: NA - pt graduated from [facility] - further refills to be routed to PMD  Pharmacy sent to: erinn Marmolejo RN

## 2019-07-18 ENCOUNTER — MYC MEDICAL ADVICE (OUTPATIENT)
Dept: RHEUMATOLOGY | Facility: CLINIC | Age: 54
End: 2019-07-18

## 2019-07-18 DIAGNOSIS — M33.13 DERMATOMYOSITIS (H): Primary | ICD-10-CM

## 2019-07-18 DIAGNOSIS — M34.9 SCLERODERMA (H): ICD-10-CM

## 2019-07-18 DIAGNOSIS — Z79.899 HIGH RISK MEDICATIONS (NOT ANTICOAGULANTS) LONG-TERM USE: ICD-10-CM

## 2019-07-18 DIAGNOSIS — M33.13 DERMATOMYOSITIS (H): ICD-10-CM

## 2019-07-18 DIAGNOSIS — J84.9 ILD (INTERSTITIAL LUNG DISEASE) (H): ICD-10-CM

## 2019-07-18 LAB
ALBUMIN SERPL-MCNC: 3.5 G/DL (ref 3.4–5)
ALP SERPL-CCNC: 124 U/L (ref 40–150)
ALT SERPL W P-5'-P-CCNC: 27 U/L (ref 0–70)
ANION GAP SERPL CALCULATED.3IONS-SCNC: 8 MMOL/L (ref 3–14)
AST SERPL W P-5'-P-CCNC: 14 U/L (ref 0–45)
BASOPHILS # BLD AUTO: 0 10E9/L (ref 0–0.2)
BASOPHILS NFR BLD AUTO: 0.4 %
BILIRUB SERPL-MCNC: 0.4 MG/DL (ref 0.2–1.3)
BUN SERPL-MCNC: 18 MG/DL (ref 7–30)
CALCIUM SERPL-MCNC: 8.3 MG/DL (ref 8.5–10.1)
CHLORIDE SERPL-SCNC: 107 MMOL/L (ref 94–109)
CO2 SERPL-SCNC: 23 MMOL/L (ref 20–32)
CREAT SERPL-MCNC: 1.04 MG/DL (ref 0.66–1.25)
CRP SERPL-MCNC: <2.9 MG/L (ref 0–8)
DIFFERENTIAL METHOD BLD: NORMAL
EOSINOPHIL # BLD AUTO: 0.3 10E9/L (ref 0–0.7)
EOSINOPHIL NFR BLD AUTO: 4.1 %
ERYTHROCYTE [DISTWIDTH] IN BLOOD BY AUTOMATED COUNT: 13.4 % (ref 10–15)
GFR SERPL CREATININE-BSD FRML MDRD: 80 ML/MIN/{1.73_M2}
GLUCOSE SERPL-MCNC: 96 MG/DL (ref 70–99)
HCT VFR BLD AUTO: 42.5 % (ref 40–53)
HGB BLD-MCNC: 13.9 G/DL (ref 13.3–17.7)
LYMPHOCYTES # BLD AUTO: 1.3 10E9/L (ref 0.8–5.3)
LYMPHOCYTES NFR BLD AUTO: 18.6 %
MCH RBC QN AUTO: 29.8 PG (ref 26.5–33)
MCHC RBC AUTO-ENTMCNC: 32.7 G/DL (ref 31.5–36.5)
MCV RBC AUTO: 91 FL (ref 78–100)
MONOCYTES # BLD AUTO: 1 10E9/L (ref 0–1.3)
MONOCYTES NFR BLD AUTO: 13.7 %
NEUTROPHILS # BLD AUTO: 4.5 10E9/L (ref 1.6–8.3)
NEUTROPHILS NFR BLD AUTO: 63.2 %
PLATELET # BLD AUTO: 323 10E9/L (ref 150–450)
POTASSIUM SERPL-SCNC: 4 MMOL/L (ref 3.4–5.3)
PROT SERPL-MCNC: 6 G/DL (ref 6.8–8.8)
RBC # BLD AUTO: 4.67 10E12/L (ref 4.4–5.9)
SODIUM SERPL-SCNC: 138 MMOL/L (ref 133–144)
WBC # BLD AUTO: 7.2 10E9/L (ref 4–11)

## 2019-07-18 PROCEDURE — 80053 COMPREHEN METABOLIC PANEL: CPT | Performed by: INTERNAL MEDICINE

## 2019-07-18 PROCEDURE — 86140 C-REACTIVE PROTEIN: CPT | Performed by: INTERNAL MEDICINE

## 2019-07-18 PROCEDURE — 36415 COLL VENOUS BLD VENIPUNCTURE: CPT | Performed by: INTERNAL MEDICINE

## 2019-07-18 PROCEDURE — 85025 COMPLETE CBC W/AUTO DIFF WBC: CPT | Performed by: INTERNAL MEDICINE

## 2019-07-18 NOTE — TELEPHONE ENCOUNTER
Standing med (Mycophenolate) monitoring lab orders completed. Pt notified. He did have labs done today for another provider, will just need CRP and ESR, need to see if these can be added. contacted the [facility] lab and was only able to add the CRP so this was done.    Responded to pt.    Pérez Ayala, VONN RN  Rheumatology RN Coordinator  [facility]

## 2019-07-23 ENCOUNTER — OFFICE VISIT (OUTPATIENT)
Dept: RHEUMATOLOGY | Facility: CLINIC | Age: 54
End: 2019-07-23
Payer: COMMERCIAL

## 2019-07-23 VITALS
SYSTOLIC BLOOD PRESSURE: 124 MMHG | DIASTOLIC BLOOD PRESSURE: 75 MMHG | BODY MASS INDEX: 24.75 KG/M2 | HEART RATE: 57 BPM | OXYGEN SATURATION: 98 % | WEIGHT: 167.6 LBS

## 2019-07-23 DIAGNOSIS — M33.13 DERMATOMYOSITIS (H): Primary | ICD-10-CM

## 2019-07-23 DIAGNOSIS — Z79.899 HIGH RISK MEDICATIONS (NOT ANTICOAGULANTS) LONG-TERM USE: ICD-10-CM

## 2019-07-23 DIAGNOSIS — M60.9 MYOSITIS, UNSPECIFIED MYOSITIS TYPE, UNSPECIFIED SITE: ICD-10-CM

## 2019-07-23 DIAGNOSIS — M19.90 INFLAMMATORY ARTHRITIS: ICD-10-CM

## 2019-07-23 DIAGNOSIS — J84.9 ILD (INTERSTITIAL LUNG DISEASE) (H): ICD-10-CM

## 2019-07-23 PROCEDURE — G0463 HOSPITAL OUTPT CLINIC VISIT: HCPCS | Mod: ZF

## 2019-07-23 ASSESSMENT — PAIN SCALES - GENERAL: PAINLEVEL: NO PAIN (0)

## 2019-07-23 NOTE — NURSING NOTE
"Chief Complaint   Patient presents with     RECHECK     6 MONTH F/U     Vital signs:      BP: 124/75 Pulse: 57     SpO2: 98 %       Weight: 76 kg (167 lb 9.6 oz)  Estimated body mass index is 24.75 kg/m  as calculated from the following:    Height as of 3/29/19: 1.753 m (5' 9\").    Weight as of this encounter: 76 kg (167 lb 9.6 oz).        Mulu Mcmahon    "

## 2019-07-28 ASSESSMENT — ENCOUNTER SYMPTOMS
STIFFNESS: 0
COUGH: 1
SHORTNESS OF BREATH: 0
DIARRHEA: 0
HEMOPTYSIS: 0
BOWEL INCONTINENCE: 0
CONSTIPATION: 0
SNORES LOUDLY: 0
COUGH DISTURBING SLEEP: 0
MYALGIAS: 0
BACK PAIN: 0
NAUSEA: 0
HEARTBURN: 1
BLOOD IN STOOL: 0
DYSPNEA ON EXERTION: 0
SPUTUM PRODUCTION: 1
VOMITING: 0
JOINT SWELLING: 0
BLOATING: 0
MUSCLE CRAMPS: 1
MUSCLE WEAKNESS: 0
ARTHRALGIAS: 1
ABDOMINAL PAIN: 0
WHEEZING: 0
JAUNDICE: 0
NECK PAIN: 0
POSTURAL DYSPNEA: 0
RECTAL PAIN: 0

## 2019-08-02 ENCOUNTER — OFFICE VISIT (OUTPATIENT)
Dept: PULMONOLOGY | Facility: CLINIC | Age: 54
End: 2019-08-02
Attending: INTERNAL MEDICINE
Payer: COMMERCIAL

## 2019-08-02 VITALS
BODY MASS INDEX: 24.71 KG/M2 | SYSTOLIC BLOOD PRESSURE: 118 MMHG | HEART RATE: 50 BPM | WEIGHT: 166.8 LBS | RESPIRATION RATE: 16 BRPM | HEIGHT: 69 IN | DIASTOLIC BLOOD PRESSURE: 73 MMHG | OXYGEN SATURATION: 99 %

## 2019-08-02 DIAGNOSIS — J84.9 ILD (INTERSTITIAL LUNG DISEASE) (H): ICD-10-CM

## 2019-08-02 DIAGNOSIS — J84.9 ILD (INTERSTITIAL LUNG DISEASE) (H): Primary | ICD-10-CM

## 2019-08-02 PROCEDURE — G0463 HOSPITAL OUTPT CLINIC VISIT: HCPCS | Mod: ZF

## 2019-08-02 ASSESSMENT — PAIN SCALES - GENERAL: PAINLEVEL: MILD PAIN (2)

## 2019-08-02 ASSESSMENT — MIFFLIN-ST. JEOR: SCORE: 1576.98

## 2019-08-02 NOTE — NURSING NOTE
Chief Complaint   Patient presents with     RECHECK     Scleroderma    Medications reviewed and vital signs taken.   Robert Gaona CMA

## 2019-08-02 NOTE — LETTER
8/2/2019       RE: Kwaku Medrano  [address] [address]     Dear Colleague,    Thank you for referring your patient, Kwaku Medrano, to the [facility] FOR LUNG SCIENCE AND HEALTH at [facility]. Please see a copy of my visit note below.    [facility]    Reason for Visit  Kwaku Medrano is a 54 year old year old male who is being seen for RECHECK (Scleroderma)    HPI     Mr. Medrano is a 54-year-old with scleroderma interstitial lung disease who is here for follow-up.  He has been on mycophenolate since approximately July 2017 with his first appointment in the interstitial lung disease clinic in August 2017.  Today, he reports that he is doing well.  He continues to have a chronic cough which is unchanged.  He occasionally coughs up a little bit of sputum.  He averages 16-17,000 steps per day and denies dyspnea on exertion.  He usually breathes faster with activity but does not feel short of breath.  He is able to walk up a flight of stairs without feeling short of breath.  He denies fevers or diarrhea or fingertip ulcers.  He currently takes mycophenolate 1500 mg twice a day and denies side effects.  He also takes prednisone 5 mg daily which is managed by Dr. Acosta.        Current Outpatient Medications   Medication     aspirin 81 MG EC tablet     Blood Pressure Monitoring KIT     diltiazem ER COATED BEADS (CARDIZEM CD) 240 MG 24 hr capsule     lisinopril (PRINIVIL/ZESTRIL) 10 MG tablet     mycophenolate (GENERIC EQUIVALENT) 500 MG tablet     pantoprazole (PROTONIX) 40 MG EC tablet     predniSONE (DELTASONE) 2.5 MG tablet     sulfamethoxazole-trimethoprim (BACTRIM) 400-80 MG tablet     No current facility-administered medications for this visit.      Allergies   Allergen Reactions     Ampicillin Rash     Past Medical History:   Diagnosis Date     Atrial fibrillation and flutter (H) 07/2017      Fracture      GERD (gastroesophageal reflux disease)      Interstitial lung disease (H)     sclerderma ILD; present on CXR 3-2017 and CT 5-2017; dx confirmed by CT 9-2017 fibrotic NSIP pattern with increased fibrosis; started mycophenolate 7/2017     Scleroderma (H)     dx 9- at [facility] by Dr. Acosta       Past Surgical History:   Procedure Laterality Date     COLONOSCOPY N/A 7/19/2017    Procedure: COLONOSCOPY;  COLONOSCOPY;  Surgeon: Mita Jimenez MD;  Location:  GI     NO HISTORY OF SURGERY         Social History     Socioeconomic History     Marital status: Single     Spouse name: Not on file     Number of children: Not on file     Years of education: Not on file     Highest education level: Not on file   Occupational History     Not on file   Social Needs     Financial resource strain: Not on file     Food insecurity:     Worry: Not on file     Inability: Not on file     Transportation needs:     Medical: Not on file     Non-medical: Not on file   Tobacco Use     Smoking status: Never Smoker     Smokeless tobacco: Former User     Types: Chew   Substance and Sexual Activity     Alcohol use: Yes     Comment: very rarely     Drug use: No     Sexual activity: Not Currently   Lifestyle     Physical activity:     Days per week: Not on file     Minutes per session: Not on file     Stress: Not on file   Relationships     Social connections:     Talks on phone: Not on file     Gets together: Not on file     Attends [employer] service: Not on file     Active member of club or organization: Not on file     Attends meetings of clubs or organizations: Not on file     Relationship status: Not on file     Intimate partner violence:     Fear of current or ex partner: Not on file     Emotionally abused: Not on file     Physically abused: Not on file     Forced sexual activity: Not on file   Other Topics Concern     Parent/sibling w/ CABG, MI or angioplasty before 65F 55M? Not Asked   Social History Narrative     "Work in [employer].  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  Denies exposure to hot tubs, silica, pet birds, mold.  Single, no children.       Family History   Problem Relation Age of Onset     Prostate Cancer Father      Lung Cancer Other      Family History Negative Mother      Family History Negative Maternal Grandmother      Family History Negative Maternal Grandfather      Family History Negative Paternal Grandmother      Other - See Comments Paternal Grandfather         Atherosclerosis     Family History Negative Brother      Family History Negative Sister      Family History Negative Brother      LUNG DISEASE No family hx of      Rheumatologic Disease No family hx of              ROS Pulmonary  A complete ROS was otherwise negative except as noted in the HPI.    Vitals: /73   Pulse 50   Resp 16   Ht 1.753 m (5' 9\")   Wt 75.7 kg (166 lb 12.8 oz)   SpO2 99%   BMI 24.63 kg/m       Exam:   GENERAL APPEARANCE: Well developed, well nourished, alert, and in no apparent distress.  RESP: good air flow throughout.  No crackles. No rhonchi. No wheezes.  CV: Normal S1, S2, regular rhythm, normal rate. No murmur.  No LE edema.   MS: extremities normal. No clubbing. No cyanosis.  SKIN: no rash on limited exam.  NEURO: Mentation intact, speech normal, normal gait and stance.  PSYCH: mentation appears normal. and affect normal/bright.    Results:  Recent Results (from the past 168 hour(s))   General PFT Lab (Please always keep checked)    Collection Time: 08/02/19  7:10 AM   Result Value Ref Range    FVC-Pred 4.63 L    FVC-Pre 4.37 L    FVC-%Pred-Pre 94 %    FEV1-Pre 3.74 L    FEV1-%Pred-Pre 103 %    FEV1FVC-Pred 78 %    FEV1FVC-Pre 86 %    FEFMax-Pred 9.27 L/sec    FEFMax-Pre 11.14 L/sec    FEFMax-%Pred-Pre 120 %    FEF2575-Pred 3.15 L/sec    FEF2575-Pre 3.57 L/sec    CAK2089-%Pred-Pre 113 %    ExpTime-Pre 6.96 sec    FIFMax-Pre 6.02 L/sec    VC-Pred 4.91 L    VC-Pre 4.54 L    " VC-%Pred-Pre 92 %    IC-Pred 3.55 L    IC-Pre 2.53 L    IC-%Pred-Pre 71 %    ERV-Pred 1.36 L    ERV-Pre 2.01 L    ERV-%Pred-Pre 147 %    FEV1FEV6-Pred 80 %    FEV1FEV6-Pre 86 %    FRCPleth-Pred 3.52 L    FRCPleth-Pre 3.14 L    FRCPleth-%Pred-Pre 89 %    RVPleth-Pred 2.30 L    RVPleth-Pre 1.13 L    RVPleth-%Pred-Pre 49 %    TLCPleth-Pred 6.92 L    TLCPleth-Pre 5.67 L    TLCPleth-%Pred-Pre 81 %    DLCOunc-Pred 27.47 ml/min/mmHg    DLCOunc-Pre 20.31 ml/min/mmHg    DLCOunc-%Pred-Pre 73 %    DLCOcor-Pre 20.73 ml/min/mmHg    DLCOcor-%Pred-Pre 75 %    VA-Pre 5.34 L    VA-%Pred-Pre 84 %    FEV1SVC-Pred 74 %    FEV1SVC-Pre 83 %         FVC FEV1 TLC DLCO   [facility]  MMF and pred 7/2017 2.79 58% 2.54 68%    12.9 52%   8-3-2017 [facility] 3.34 2.85 4.89 17.63   9- 3.46 73% 3.13 85% 4.73 68% 19.77 67%   12- 3.91 83% 3.47 94% 5.10 73% 20.16 68%   5- 4.21 90% 3.64 99% 5.96 86% 21.63 74%   8- 4.19 89% 3.64 99% 5.46 78% 19.94 68%   12- 4.19 90% 3.68 101% 6.17 89% 21.74 74%   3- 4.21 90% 3.60 98% 5.56 80% 20.27 73%   8-2-2019 4.37 94% 3.74 103% 5.67 81% 21.31 73%       I reviewed pulmonary function test that was performed today.  This shows normal spirometry, total lung capacity, and diffusion.  Lung function is stable and overall improved compared to June 2017.  I reviewed labs that were performed today, and they are within normal limits. CRP is normal.    I reviewed results with the patient.      Assessment and plan:   Mr. Medrano is a 54-year-old with scleroderma interstitial lung disease who is here for follow-up.  1 scleroderma interstitial lung disease.  Mr. Medrano has had a good response to mycophenolate with a significant improvement and now stability in his lung function.  He is tolerating mycophenolate well at 1500 mg twice a day.  We will continue with this dose.  I think we could consider decreasing the dose of mycophenolate to 1000 mg twice a day in the next couple years  if he remains stable.  He is happy with the plan to continue with his current regimen.  Dr. Acosta is managing his prednisone dose which is currently 5 mg daily.  I am okay to start to taper this if Dr. Domínguez would like to do that.  I encouraged Mr. Medrano to continue walking 16-17,000 steps per day.  I will see him back in 4 months with full PFT and 6-minute walk test.    2.  High risk medication monitoring.  Labs today are normal.  He should have labs checked again in 3 months to monitor mycophenolate.  I recommended that he see a dermatologist for skin check.  He should get his flu vaccine in October.  He also should continue Bactrim for pneumocystis prophylaxis.            Again, thank you for allowing me to participate in the care of your patient.      Sincerely,    Elma Dillon MD

## 2019-08-02 NOTE — PROGRESS NOTES
[facility]    Reason for Visit  Kwaku Medrano is a 54 year old year old male who is being seen for RECHECK (Scleroderma)    HPI     Mr. Medrano is a 54-year-old with scleroderma interstitial lung disease who is here for follow-up.  He has been on mycophenolate since approximately July 2017 with his first appointment in the interstitial lung disease clinic in August 2017.  Today, he reports that he is doing well.  He continues to have a chronic cough which is unchanged.  He occasionally coughs up a little bit of sputum.  He averages 16-17,000 steps per day and denies dyspnea on exertion.  He usually breathes faster with activity but does not feel short of breath.  He is able to walk up a flight of stairs without feeling short of breath.  He denies fevers or diarrhea or fingertip ulcers.  He currently takes mycophenolate 1500 mg twice a day and denies side effects.  He also takes prednisone 5 mg daily which is managed by Dr. Acosta.        Current Outpatient Medications   Medication     aspirin 81 MG EC tablet     Blood Pressure Monitoring KIT     diltiazem ER COATED BEADS (CARDIZEM CD) 240 MG 24 hr capsule     lisinopril (PRINIVIL/ZESTRIL) 10 MG tablet     mycophenolate (GENERIC EQUIVALENT) 500 MG tablet     pantoprazole (PROTONIX) 40 MG EC tablet     predniSONE (DELTASONE) 2.5 MG tablet     sulfamethoxazole-trimethoprim (BACTRIM) 400-80 MG tablet     No current facility-administered medications for this visit.      Allergies   Allergen Reactions     Ampicillin Rash     Past Medical History:   Diagnosis Date     Atrial fibrillation and flutter (H) 07/2017     Fracture      GERD (gastroesophageal reflux disease)      Interstitial lung disease (H)     sclerderma ILD; present on CXR 3-2017 and CT 5-2017; dx confirmed by CT 9-2017 fibrotic NSIP pattern with increased fibrosis; started mycophenolate 7/2017     Scleroderma (H)     dx 9- at [facility] by Dr. Acosta        Past Surgical History:   Procedure Laterality Date     COLONOSCOPY N/A 7/19/2017    Procedure: COLONOSCOPY;  COLONOSCOPY;  Surgeon: Mita Jimenez MD;  Location:  GI     NO HISTORY OF SURGERY         Social History     Socioeconomic History     Marital status: Single     Spouse name: Not on file     Number of children: Not on file     Years of education: Not on file     Highest education level: Not on file   Occupational History     Not on file   Social Needs     Financial resource strain: Not on file     Food insecurity:     Worry: Not on file     Inability: Not on file     Transportation needs:     Medical: Not on file     Non-medical: Not on file   Tobacco Use     Smoking status: Never Smoker     Smokeless tobacco: Former User     Types: Chew   Substance and Sexual Activity     Alcohol use: Yes     Comment: very rarely     Drug use: No     Sexual activity: Not Currently   Lifestyle     Physical activity:     Days per week: Not on file     Minutes per session: Not on file     Stress: Not on file   Relationships     Social connections:     Talks on phone: Not on file     Gets together: Not on file     Attends Mu-ism service: Not on file     Active member of club or organization: Not on file     Attends meetings of clubs or organizations: Not on file     Relationship status: Not on file     Intimate partner violence:     Fear of current or ex partner: Not on file     Emotionally abused: Not on file     Physically abused: Not on file     Forced sexual activity: Not on file   Other Topics Concern     Parent/sibling w/ CABG, MI or angioplasty before 65F 55M? Not Asked   Social History Narrative    Work in [employer].  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  Denies exposure to hot tubs, silica, pet birds, mold.  Single, no children.       Family History   Problem Relation Age of Onset     Prostate Cancer Father      Lung Cancer Other      Family History Negative  "Mother      Family History Negative Maternal Grandmother      Family History Negative Maternal Grandfather      Family History Negative Paternal Grandmother      Other - See Comments Paternal Grandfather         Atherosclerosis     Family History Negative Brother      Family History Negative Sister      Family History Negative Brother      LUNG DISEASE No family hx of      Rheumatologic Disease No family hx of              ROS Pulmonary  A complete ROS was otherwise negative except as noted in the HPI.    Vitals: /73   Pulse 50   Resp 16   Ht 1.753 m (5' 9\")   Wt 75.7 kg (166 lb 12.8 oz)   SpO2 99%   BMI 24.63 kg/m      Exam:   GENERAL APPEARANCE: Well developed, well nourished, alert, and in no apparent distress.  RESP: good air flow throughout.  No crackles. No rhonchi. No wheezes.  CV: Normal S1, S2, regular rhythm, normal rate. No murmur.  No LE edema.   MS: extremities normal. No clubbing. No cyanosis.  SKIN: no rash on limited exam.  NEURO: Mentation intact, speech normal, normal gait and stance.  PSYCH: mentation appears normal. and affect normal/bright.    Results:  Recent Results (from the past 168 hour(s))   General PFT Lab (Please always keep checked)    Collection Time: 08/02/19  7:10 AM   Result Value Ref Range    FVC-Pred 4.63 L    FVC-Pre 4.37 L    FVC-%Pred-Pre 94 %    FEV1-Pre 3.74 L    FEV1-%Pred-Pre 103 %    FEV1FVC-Pred 78 %    FEV1FVC-Pre 86 %    FEFMax-Pred 9.27 L/sec    FEFMax-Pre 11.14 L/sec    FEFMax-%Pred-Pre 120 %    FEF2575-Pred 3.15 L/sec    FEF2575-Pre 3.57 L/sec    THT5371-%Pred-Pre 113 %    ExpTime-Pre 6.96 sec    FIFMax-Pre 6.02 L/sec    VC-Pred 4.91 L    VC-Pre 4.54 L    VC-%Pred-Pre 92 %    IC-Pred 3.55 L    IC-Pre 2.53 L    IC-%Pred-Pre 71 %    ERV-Pred 1.36 L    ERV-Pre 2.01 L    ERV-%Pred-Pre 147 %    FEV1FEV6-Pred 80 %    FEV1FEV6-Pre 86 %    FRCPleth-Pred 3.52 L    FRCPleth-Pre 3.14 L    FRCPleth-%Pred-Pre 89 %    RVPleth-Pred 2.30 L    RVPleth-Pre 1.13 L    " RVPleth-%Pred-Pre 49 %    TLCPleth-Pred 6.92 L    TLCPleth-Pre 5.67 L    TLCPleth-%Pred-Pre 81 %    DLCOunc-Pred 27.47 ml/min/mmHg    DLCOunc-Pre 20.31 ml/min/mmHg    DLCOunc-%Pred-Pre 73 %    DLCOcor-Pre 20.73 ml/min/mmHg    DLCOcor-%Pred-Pre 75 %    VA-Pre 5.34 L    VA-%Pred-Pre 84 %    FEV1SVC-Pred 74 %    FEV1SVC-Pre 83 %         FVC FEV1 TLC DLCO   [facility]  MMF and pred 7/2017 2.79 58% 2.54 68%    12.9 52%   8-3-2017 [facility] 3.34 2.85 4.89 17.63   9- 3.46 73% 3.13 85% 4.73 68% 19.77 67%   12- 3.91 83% 3.47 94% 5.10 73% 20.16 68%   5- 4.21 90% 3.64 99% 5.96 86% 21.63 74%   8- 4.19 89% 3.64 99% 5.46 78% 19.94 68%   12- 4.19 90% 3.68 101% 6.17 89% 21.74 74%   3- 4.21 90% 3.60 98% 5.56 80% 20.27 73%   8-2-2019 4.37 94% 3.74 103% 5.67 81% 21.31 73%       I reviewed pulmonary function test that was performed today.  This shows normal spirometry, total lung capacity, and diffusion.  Lung function is stable and overall improved compared to June 2017.  I reviewed labs that were performed today, and they are within normal limits. CRP is normal.    I reviewed results with the patient.      Assessment and plan:   Mr. Medrano is a 54-year-old with scleroderma interstitial lung disease who is here for follow-up.  1 scleroderma interstitial lung disease.  Mr. Medrano has had a good response to mycophenolate with a significant improvement and now stability in his lung function.  He is tolerating mycophenolate well at 1500 mg twice a day.  We will continue with this dose.  I think we could consider decreasing the dose of mycophenolate to 1000 mg twice a day in the next couple years if he remains stable.  He is happy with the plan to continue with his current regimen.  Dr. Acosta is managing his prednisone dose which is currently 5 mg daily.  I am okay to start to taper this if Dr. Domínguez would like to do that.  I encouraged Mr. Medarno to continue walking 16-17,000 steps per  day.  I will see him back in 4 months with full PFT and 6-minute walk test.    2.  High risk medication monitoring.  Labs today are normal.  He should have labs checked again in 3 months to monitor mycophenolate.  I recommended that he see a dermatologist for skin check.  He should get his flu vaccine in October.  He also should continue Bactrim for pneumocystis prophylaxis.

## 2019-08-05 LAB
DLCOCOR-%PRED-PRE: 75 %
DLCOCOR-PRE: 20.73 ML/MIN/MMHG
DLCOUNC-%PRED-PRE: 73 %
DLCOUNC-PRE: 20.31 ML/MIN/MMHG
DLCOUNC-PRED: 27.47 ML/MIN/MMHG
ERV-%PRED-PRE: 147 %
ERV-PRE: 2.01 L
ERV-PRED: 1.36 L
EXPTIME-PRE: 6.96 SEC
FEF2575-%PRED-PRE: 113 %
FEF2575-PRE: 3.57 L/SEC
FEF2575-PRED: 3.15 L/SEC
FEFMAX-%PRED-PRE: 120 %
FEFMAX-PRE: 11.14 L/SEC
FEFMAX-PRED: 9.27 L/SEC
FEV1-%PRED-PRE: 103 %
FEV1-PRE: 3.74 L
FEV1FEV6-PRE: 86 %
FEV1FEV6-PRED: 80 %
FEV1FVC-PRE: 86 %
FEV1FVC-PRED: 78 %
FEV1SVC-PRE: 83 %
FEV1SVC-PRED: 74 %
FIFMAX-PRE: 6.02 L/SEC
FRCPLETH-%PRED-PRE: 89 %
FRCPLETH-PRE: 3.14 L
FRCPLETH-PRED: 3.52 L
FVC-%PRED-PRE: 94 %
FVC-PRE: 4.37 L
FVC-PRED: 4.63 L
IC-%PRED-PRE: 71 %
IC-PRE: 2.53 L
IC-PRED: 3.55 L
RVPLETH-%PRED-PRE: 49 %
RVPLETH-PRE: 1.13 L
RVPLETH-PRED: 2.3 L
TLCPLETH-%PRED-PRE: 81 %
TLCPLETH-PRE: 5.67 L
TLCPLETH-PRED: 6.92 L
VA-%PRED-PRE: 84 %
VA-PRE: 5.34 L
VC-%PRED-PRE: 92 %
VC-PRE: 4.54 L
VC-PRED: 4.91 L

## 2019-08-21 DIAGNOSIS — D89.89 ANTISYNTHETASE SYNDROME (H): ICD-10-CM

## 2019-08-21 DIAGNOSIS — M33.13 DERMATOMYOSITIS (H): ICD-10-CM

## 2019-08-21 DIAGNOSIS — M60.9 MYOSITIS, UNSPECIFIED MYOSITIS TYPE, UNSPECIFIED SITE: ICD-10-CM

## 2019-08-21 DIAGNOSIS — J84.9 ILD (INTERSTITIAL LUNG DISEASE) (H): ICD-10-CM

## 2019-08-21 DIAGNOSIS — M13.0 POLYARTHRITIS: ICD-10-CM

## 2019-08-26 ENCOUNTER — MYC REFILL (OUTPATIENT)
Dept: RHEUMATOLOGY | Facility: CLINIC | Age: 54
End: 2019-08-26

## 2019-08-26 DIAGNOSIS — M33.13 DERMATOMYOSITIS (H): ICD-10-CM

## 2019-08-26 DIAGNOSIS — M13.0 POLYARTHRITIS: ICD-10-CM

## 2019-08-26 DIAGNOSIS — J84.9 ILD (INTERSTITIAL LUNG DISEASE) (H): ICD-10-CM

## 2019-08-26 DIAGNOSIS — M60.9 MYOSITIS, UNSPECIFIED MYOSITIS TYPE, UNSPECIFIED SITE: ICD-10-CM

## 2019-08-26 DIAGNOSIS — D89.89 ANTISYNTHETASE SYNDROME (H): ICD-10-CM

## 2019-08-27 RX ORDER — MYCOPHENOLATE MOFETIL 500 MG/1
1500 TABLET ORAL 2 TIMES DAILY
Qty: 180 TABLET | Refills: 2 | Status: SHIPPED | OUTPATIENT
Start: 2019-08-27 | End: 2020-01-04

## 2019-08-27 RX ORDER — MYCOPHENOLATE MOFETIL 500 MG/1
1500 TABLET ORAL 2 TIMES DAILY
Qty: 180 TABLET | Refills: 2 | Status: SHIPPED | OUTPATIENT
Start: 2019-08-27 | End: 2020-01-21

## 2019-08-27 NOTE — TELEPHONE ENCOUNTER
mycophenolate (GENERIC EQUIVALENT) 500 MG tablet      Last Written Prescription Date:  4-17-19  Last Fill Quantity: 180,   # refills: 2  Last Office Visit: 7-23-19  Future Office visit:  1-21-20    CBC RESULTS:   Recent Labs   Lab Test 07/18/19  0726   WBC 7.2   RBC 4.67   HGB 13.9   HCT 42.5   MCV 91   MCH 29.8   MCHC 32.7   RDW 13.4          Creatinine   Date Value Ref Range Status   07/18/2019 1.04 0.66 - 1.25 mg/dL Final   ]    Liver Function Studies -   Recent Labs   Lab Test 07/18/19  0726   PROTTOTAL 6.0*   ALBUMIN 3.5   BILITOTAL 0.4   ALKPHOS 124   AST 14   ALT 27       Kathleen M Doege RN

## 2019-08-27 NOTE — TELEPHONE ENCOUNTER
Last seen: 7/23/19  Pending appt: 1/21/20  Medication: Mycophenolate    Last given: 4/17/19  Qty: 180 with 2 refills  Lab:    WBC   Date Value Ref Range Status   07/18/2019 7.2 4.0 - 11.0 10e9/L Final     RBC Count   Date Value Ref Range Status   07/18/2019 4.67 4.4 - 5.9 10e12/L Final     Hemoglobin   Date Value Ref Range Status   07/18/2019 13.9 13.3 - 17.7 g/dL Final     Hematocrit   Date Value Ref Range Status   07/18/2019 42.5 40.0 - 53.0 % Final     MCV   Date Value Ref Range Status   07/18/2019 91 78 - 100 fl Final     MCH   Date Value Ref Range Status   07/18/2019 29.8 26.5 - 33.0 pg Final     Platelet Count   Date Value Ref Range Status   07/18/2019 323 150 - 450 10e9/L Final     % Lymphocytes   Date Value Ref Range Status   07/18/2019 18.6 % Final     AST   Date Value Ref Range Status   07/18/2019 14 0 - 45 U/L Final     ALT   Date Value Ref Range Status   07/18/2019 27 0 - 70 U/L Final     Albumin   Date Value Ref Range Status   07/18/2019 3.5 3.4 - 5.0 g/dL Final     Alkaline Phosphatase   Date Value Ref Range Status   07/18/2019 124 40 - 150 U/L Final     Creatinine   Date Value Ref Range Status   07/18/2019 1.04 0.66 - 1.25 mg/dL Final     GFR Estimate   Date Value Ref Range Status   07/18/2019 80 >60 mL/min/[1.73_m2] Final     Comment:     Non  GFR Calc  Starting 12/18/2018, serum creatinine based estimated GFR (eGFR) will be   calculated using the Chronic Kidney Disease Epidemiology Collaboration   (CKD-EPI) equation.       GFR Estimate If Black   Date Value Ref Range Status   07/18/2019 >90 >60 mL/min/[1.73_m2] Final     Comment:      GFR Calc  Starting 12/18/2018, serum creatinine based estimated GFR (eGFR) will be   calculated using the Chronic Kidney Disease Epidemiology Collaboration   (CKD-EPI) equation.       Sed Rate   Date Value Ref Range Status   12/12/2018 2 0 - 20 mm/h Final     CRP Inflammation   Date Value Ref Range Status   07/18/2019 <2.9 0.0 - 8.0 mg/L  Pt has a dry cough x 1 day but pt was irritable on 5.16.  Sibling has a croup cough.  Pls call mom to discuss.  Mom requests a call back today.   Final     Met standing order criteria, routed to MD for review and approval.    VNO LawsN RN  Rheumatology RN Coordinator  [facility]

## 2019-09-11 ENCOUNTER — MYC REFILL (OUTPATIENT)
Dept: RHEUMATOLOGY | Facility: CLINIC | Age: 54
End: 2019-09-11

## 2019-09-11 DIAGNOSIS — J84.9 ILD (INTERSTITIAL LUNG DISEASE) (H): ICD-10-CM

## 2019-09-11 DIAGNOSIS — M19.90 INFLAMMATORY ARTHRITIS: ICD-10-CM

## 2019-09-11 DIAGNOSIS — M34.9 SCLERODERMA (H): ICD-10-CM

## 2019-09-11 DIAGNOSIS — Z79.899 HIGH RISK MEDICATIONS (NOT ANTICOAGULANTS) LONG-TERM USE: ICD-10-CM

## 2019-09-12 RX ORDER — PREDNISONE 1 MG/1
4 TABLET ORAL DAILY
Qty: 360 TABLET | Refills: 1 | Status: SHIPPED | OUTPATIENT
Start: 2019-09-12 | End: 2020-05-07

## 2019-09-12 NOTE — TELEPHONE ENCOUNTER
Contacted pt to check on his status. Kwaku states he is doing great-no issues with his breathing or and would be willing to taper his prednisone down to 4 mg daily. Informed pt that we will send the 1 mg tab prescription to his pharmacy. Asked pt to call if he notices any changes in his symptoms. Kwaku is agreeable to this plan.    Date: 9/12/2019    Time of Call: 3:06 PM     Diagnosis:  ILD/Dermatolmyositis     [ VORB ] Ordering provider: Denny Acosta MD PhD  Order:  Prednisone 4 mg by mouth once daily. 90 day supply and 4 refills     Order received by: RIN Laws RN      Follow-up/additional notes: above order sent to pt's pharmacy.    RIN Laws RN  Rheumatology RN Coordinator  [facility]

## 2019-09-12 NOTE — TELEPHONE ENCOUNTER
Last seen: 7/23/19 with Dr Acosta and 8/2/19 with Dr Dillon  Pending appt:1/21/20  Medication: prednisone    Last given: 11/7/19  Qty: 270 with 1 refill  Lab:    WBC   Date Value Ref Range Status   07/18/2019 7.2 4.0 - 11.0 10e9/L Final     RBC Count   Date Value Ref Range Status   07/18/2019 4.67 4.4 - 5.9 10e12/L Final     Hemoglobin   Date Value Ref Range Status   07/18/2019 13.9 13.3 - 17.7 g/dL Final     Hematocrit   Date Value Ref Range Status   07/18/2019 42.5 40.0 - 53.0 % Final     MCV   Date Value Ref Range Status   07/18/2019 91 78 - 100 fl Final     MCH   Date Value Ref Range Status   07/18/2019 29.8 26.5 - 33.0 pg Final     Platelet Count   Date Value Ref Range Status   07/18/2019 323 150 - 450 10e9/L Final     % Lymphocytes   Date Value Ref Range Status   07/18/2019 18.6 % Final     AST   Date Value Ref Range Status   07/18/2019 14 0 - 45 U/L Final     ALT   Date Value Ref Range Status   07/18/2019 27 0 - 70 U/L Final     Albumin   Date Value Ref Range Status   07/18/2019 3.5 3.4 - 5.0 g/dL Final     Alkaline Phosphatase   Date Value Ref Range Status   07/18/2019 124 40 - 150 U/L Final     Creatinine   Date Value Ref Range Status   07/18/2019 1.04 0.66 - 1.25 mg/dL Final     GFR Estimate   Date Value Ref Range Status   07/18/2019 80 >60 mL/min/[1.73_m2] Final     Comment:     Non  GFR Calc  Starting 12/18/2018, serum creatinine based estimated GFR (eGFR) will be   calculated using the Chronic Kidney Disease Epidemiology Collaboration   (CKD-EPI) equation.       GFR Estimate If Black   Date Value Ref Range Status   07/18/2019 >90 >60 mL/min/[1.73_m2] Final     Comment:      GFR Calc  Starting 12/18/2018, serum creatinine based estimated GFR (eGFR) will be   calculated using the Chronic Kidney Disease Epidemiology Collaboration   (CKD-EPI) equation.       Sed Rate   Date Value Ref Range Status   12/12/2018 2 0 - 20 mm/h Final     CRP Inflammation   Date Value Ref Range  Status   07/18/2019 <2.9 0.0 - 8.0 mg/L Final

## 2019-09-20 DIAGNOSIS — K21.9 GASTROESOPHAGEAL REFLUX DISEASE, ESOPHAGITIS PRESENCE NOT SPECIFIED: Primary | ICD-10-CM

## 2019-09-20 DIAGNOSIS — K22.4 ESOPHAGEAL DYSMOTILITY: ICD-10-CM

## 2019-09-20 RX ORDER — PANTOPRAZOLE SODIUM 40 MG/1
40 TABLET, DELAYED RELEASE ORAL DAILY
Qty: 30 TABLET | Refills: 10 | Status: SHIPPED | OUTPATIENT
Start: 2019-09-20 | End: 2021-01-01

## 2019-10-02 DIAGNOSIS — I48.0 PAROXYSMAL ATRIAL FIBRILLATION (H): ICD-10-CM

## 2019-10-02 NOTE — TELEPHONE ENCOUNTER
diltiazem ER COATED BEADS (CARDIZEM CD) 240 MG 24 hr capsule      Last Written Prescription Date:  1/30/19    Last Fill Quantity: 90,   # refills: 1  Last Office Visit : 7/23/19  Future Office visit:  1/21/20    Routing refill request to provider for review/approval because:    From Department:   [facility]

## 2019-10-04 RX ORDER — DILTIAZEM HYDROCHLORIDE 240 MG/1
240 CAPSULE, COATED, EXTENDED RELEASE ORAL DAILY
Qty: 90 CAPSULE | Refills: 0 | Status: SHIPPED | OUTPATIENT
Start: 2019-10-04 | End: 2020-01-21

## 2019-10-04 NOTE — TELEPHONE ENCOUNTER
Continue with diltiazem ER 240mg daily- pt doesn't need to follow with Dr. Chavez- called pt to get further refills from PCP.

## 2019-10-18 DIAGNOSIS — M33.13 DERMATOMYOSITIS (H): ICD-10-CM

## 2019-10-18 DIAGNOSIS — M34.9 SCLERODERMA (H): ICD-10-CM

## 2019-10-18 DIAGNOSIS — Z79.899 HIGH RISK MEDICATIONS (NOT ANTICOAGULANTS) LONG-TERM USE: ICD-10-CM

## 2019-10-18 LAB
ALT SERPL W P-5'-P-CCNC: 25 U/L (ref 0–70)
AST SERPL W P-5'-P-CCNC: 12 U/L (ref 0–45)
BASOPHILS # BLD AUTO: 0 10E9/L (ref 0–0.2)
BASOPHILS NFR BLD AUTO: 0.5 %
CREAT SERPL-MCNC: 1.08 MG/DL (ref 0.66–1.25)
CRP SERPL-MCNC: <2.9 MG/L (ref 0–8)
DIFFERENTIAL METHOD BLD: NORMAL
EOSINOPHIL # BLD AUTO: 0.2 10E9/L (ref 0–0.7)
EOSINOPHIL NFR BLD AUTO: 2.6 %
ERYTHROCYTE [DISTWIDTH] IN BLOOD BY AUTOMATED COUNT: 13.3 % (ref 10–15)
ERYTHROCYTE [SEDIMENTATION RATE] IN BLOOD BY WESTERGREN METHOD: 1 MM/H (ref 0–20)
GFR SERPL CREATININE-BSD FRML MDRD: 76 ML/MIN/{1.73_M2}
HCT VFR BLD AUTO: 42.1 % (ref 40–53)
HGB BLD-MCNC: 13.9 G/DL (ref 13.3–17.7)
LYMPHOCYTES # BLD AUTO: 1.4 10E9/L (ref 0.8–5.3)
LYMPHOCYTES NFR BLD AUTO: 25.1 %
MCH RBC QN AUTO: 29.7 PG (ref 26.5–33)
MCHC RBC AUTO-ENTMCNC: 33 G/DL (ref 31.5–36.5)
MCV RBC AUTO: 90 FL (ref 78–100)
MONOCYTES # BLD AUTO: 0.8 10E9/L (ref 0–1.3)
MONOCYTES NFR BLD AUTO: 14.4 %
NEUTROPHILS # BLD AUTO: 3.3 10E9/L (ref 1.6–8.3)
NEUTROPHILS NFR BLD AUTO: 57.4 %
PLATELET # BLD AUTO: 330 10E9/L (ref 150–450)
RBC # BLD AUTO: 4.68 10E12/L (ref 4.4–5.9)
WBC # BLD AUTO: 5.7 10E9/L (ref 4–11)

## 2019-10-18 PROCEDURE — 82565 ASSAY OF CREATININE: CPT | Performed by: INTERNAL MEDICINE

## 2019-10-18 PROCEDURE — 84460 ALANINE AMINO (ALT) (SGPT): CPT | Performed by: INTERNAL MEDICINE

## 2019-10-18 PROCEDURE — 85025 COMPLETE CBC W/AUTO DIFF WBC: CPT | Performed by: INTERNAL MEDICINE

## 2019-10-18 PROCEDURE — 84450 TRANSFERASE (AST) (SGOT): CPT | Performed by: INTERNAL MEDICINE

## 2019-10-18 PROCEDURE — 36415 COLL VENOUS BLD VENIPUNCTURE: CPT | Performed by: INTERNAL MEDICINE

## 2019-10-18 PROCEDURE — 85652 RBC SED RATE AUTOMATED: CPT | Performed by: INTERNAL MEDICINE

## 2019-10-18 PROCEDURE — 86140 C-REACTIVE PROTEIN: CPT | Performed by: INTERNAL MEDICINE

## 2019-10-19 ENCOUNTER — MYC MEDICAL ADVICE (OUTPATIENT)
Dept: RHEUMATOLOGY | Facility: CLINIC | Age: 54
End: 2019-10-19

## 2019-10-19 DIAGNOSIS — I73.00 RAYNAUD'S DISEASE WITHOUT GANGRENE: ICD-10-CM

## 2019-10-19 DIAGNOSIS — I48.91 ATRIAL FIBRILLATION, UNSPECIFIED TYPE (H): ICD-10-CM

## 2019-10-21 RX ORDER — LISINOPRIL 10 MG/1
10 TABLET ORAL DAILY
Qty: 90 TABLET | Refills: 0 | Status: SHIPPED | OUTPATIENT
Start: 2019-10-21 | End: 2020-01-21

## 2019-11-07 ENCOUNTER — HEALTH MAINTENANCE LETTER (OUTPATIENT)
Age: 54
End: 2019-11-07

## 2019-11-08 ASSESSMENT — ENCOUNTER SYMPTOMS
WEIGHT LOSS: 0
SLEEP DISTURBANCES DUE TO BREATHING: 0
HEARTBURN: 1
POSTURAL DYSPNEA: 0
NAUSEA: 0
ORTHOPNEA: 0
JAUNDICE: 0
STIFFNESS: 0
SPUTUM PRODUCTION: 1
BLOATING: 0
HEMOPTYSIS: 0
SNORES LOUDLY: 0
BACK PAIN: 0
BOWEL INCONTINENCE: 0
COUGH DISTURBING SLEEP: 0
DYSPNEA ON EXERTION: 1
DIARRHEA: 0
RECTAL PAIN: 0
ALTERED TEMPERATURE REGULATION: 0
SHORTNESS OF BREATH: 0
POOR WOUND HEALING: 0
LEG PAIN: 0
NECK PAIN: 0
EXERCISE INTOLERANCE: 0
INCREASED ENERGY: 1
DECREASED APPETITE: 0
MUSCLE CRAMPS: 1
POLYPHAGIA: 0
HALLUCINATIONS: 0
VOMITING: 0
BLOOD IN STOOL: 0
WEIGHT GAIN: 0
WHEEZING: 0
ABDOMINAL PAIN: 0
POLYDIPSIA: 0
SKIN CHANGES: 0
NIGHT SWEATS: 0
CONSTIPATION: 0
CHILLS: 0
SYNCOPE: 0
NAIL CHANGES: 0
ARTHRALGIAS: 1
HYPERTENSION: 0
LIGHT-HEADEDNESS: 0
MYALGIAS: 0
FATIGUE: 1
PALPITATIONS: 0
MUSCLE WEAKNESS: 0
COUGH: 1
JOINT SWELLING: 0
FEVER: 0
HYPOTENSION: 0

## 2019-11-21 DIAGNOSIS — Z79.899 HIGH RISK MEDICATIONS (NOT ANTICOAGULANTS) LONG-TERM USE: ICD-10-CM

## 2019-11-21 DIAGNOSIS — J84.9 ILD (INTERSTITIAL LUNG DISEASE) (H): ICD-10-CM

## 2019-11-21 DIAGNOSIS — M34.9 SCLERODERMA (H): ICD-10-CM

## 2019-11-21 DIAGNOSIS — M33.13 DERMATOMYOSITIS (H): ICD-10-CM

## 2019-11-21 LAB
ALBUMIN SERPL-MCNC: 3.7 G/DL (ref 3.4–5)
ALP SERPL-CCNC: 140 U/L (ref 40–150)
ALT SERPL W P-5'-P-CCNC: 24 U/L (ref 0–70)
ANION GAP SERPL CALCULATED.3IONS-SCNC: 6 MMOL/L (ref 3–14)
AST SERPL W P-5'-P-CCNC: 11 U/L (ref 0–45)
BILIRUB SERPL-MCNC: 0.4 MG/DL (ref 0.2–1.3)
BUN SERPL-MCNC: 19 MG/DL (ref 7–30)
CALCIUM SERPL-MCNC: 8.8 MG/DL (ref 8.5–10.1)
CHLORIDE SERPL-SCNC: 106 MMOL/L (ref 94–109)
CO2 SERPL-SCNC: 25 MMOL/L (ref 20–32)
CREAT SERPL-MCNC: 1.02 MG/DL (ref 0.66–1.25)
CRP SERPL-MCNC: <2.9 MG/L (ref 0–8)
DIFFERENTIAL METHOD BLD: NORMAL
EOSINOPHIL # BLD AUTO: 0.3 10E9/L (ref 0–0.7)
EOSINOPHIL NFR BLD AUTO: 4 %
ERYTHROCYTE [DISTWIDTH] IN BLOOD BY AUTOMATED COUNT: 13.4 % (ref 10–15)
GFR SERPL CREATININE-BSD FRML MDRD: 82 ML/MIN/{1.73_M2}
GLUCOSE SERPL-MCNC: 95 MG/DL (ref 70–99)
HCT VFR BLD AUTO: 42.8 % (ref 40–53)
HGB BLD-MCNC: 13.8 G/DL (ref 13.3–17.7)
LYMPHOCYTES # BLD AUTO: 1.1 10E9/L (ref 0.8–5.3)
LYMPHOCYTES NFR BLD AUTO: 17 %
MCH RBC QN AUTO: 29.2 PG (ref 26.5–33)
MCHC RBC AUTO-ENTMCNC: 32.2 G/DL (ref 31.5–36.5)
MCV RBC AUTO: 91 FL (ref 78–100)
MONOCYTES # BLD AUTO: 0.6 10E9/L (ref 0–1.3)
MONOCYTES NFR BLD AUTO: 9 %
NEUTROPHILS # BLD AUTO: 4.4 10E9/L (ref 1.6–8.3)
NEUTROPHILS NFR BLD AUTO: 70 %
PLATELET # BLD AUTO: 347 10E9/L (ref 150–450)
PLATELET # BLD EST: NORMAL 10*3/UL
POTASSIUM SERPL-SCNC: 4.3 MMOL/L (ref 3.4–5.3)
PROT SERPL-MCNC: 6.2 G/DL (ref 6.8–8.8)
RBC # BLD AUTO: 4.73 10E12/L (ref 4.4–5.9)
RBC MORPH BLD: NORMAL
SODIUM SERPL-SCNC: 136 MMOL/L (ref 133–144)
WBC # BLD AUTO: 6.4 10E9/L (ref 4–11)

## 2019-11-21 PROCEDURE — 36415 COLL VENOUS BLD VENIPUNCTURE: CPT | Performed by: INTERNAL MEDICINE

## 2019-11-21 PROCEDURE — 80053 COMPREHEN METABOLIC PANEL: CPT | Performed by: INTERNAL MEDICINE

## 2019-11-21 PROCEDURE — 85025 COMPLETE CBC W/AUTO DIFF WBC: CPT | Performed by: INTERNAL MEDICINE

## 2019-11-21 PROCEDURE — 86140 C-REACTIVE PROTEIN: CPT | Performed by: INTERNAL MEDICINE

## 2019-11-22 ENCOUNTER — OFFICE VISIT (OUTPATIENT)
Dept: PULMONOLOGY | Facility: CLINIC | Age: 54
End: 2019-11-22
Attending: INTERNAL MEDICINE
Payer: COMMERCIAL

## 2019-11-22 VITALS
WEIGHT: 164 LBS | SYSTOLIC BLOOD PRESSURE: 120 MMHG | BODY MASS INDEX: 24.29 KG/M2 | OXYGEN SATURATION: 99 % | HEART RATE: 52 BPM | DIASTOLIC BLOOD PRESSURE: 76 MMHG | RESPIRATION RATE: 17 BRPM | HEIGHT: 69 IN

## 2019-11-22 DIAGNOSIS — J84.9 ILD (INTERSTITIAL LUNG DISEASE) (H): Primary | ICD-10-CM

## 2019-11-22 DIAGNOSIS — J84.9 ILD (INTERSTITIAL LUNG DISEASE) (H): ICD-10-CM

## 2019-11-22 LAB
6 MIN WALK (FT): 1830 FT
6 MIN WALK (M): 558 M

## 2019-11-22 PROCEDURE — G0463 HOSPITAL OUTPT CLINIC VISIT: HCPCS | Mod: ZF

## 2019-11-22 ASSESSMENT — PAIN SCALES - GENERAL: PAINLEVEL: NO PAIN (0)

## 2019-11-22 ASSESSMENT — MIFFLIN-ST. JEOR: SCORE: 1564.28

## 2019-11-22 NOTE — LETTER
11/22/2019       RE: Kwaku Medrano  [address] [facility] [address] [id number]     Dear Colleague,    Thank you for referring your patient, Kwaku Medrano, to the [facility] at [facility]. Please see a copy of my visit note below.    [facility]    Reason for Visit  Kwaku Medrano is a 54 year old year old male who is being seen for RECHECK (Scleroderma )    HPI    Mr. Medrano is a 54-year-old with scleroderma interstitial lung disease who is here for follow-up.  He has been on mycophenolate since approximately July 2017 with his first appointment in the interstitial lung disease clinic in August 2017.    Today, he reports he is doing well.  He continues to walk 16-17,000 steps per day at work.  He works in a warehouse.  He denies dyspnea on exertion when he walks up one flight of stairs, paroxysmal nocturnal dyspnea, wheezing, or fingertip ulcers.  He does endorse a chronic cough with minimal sputum production that is unchanged.  He also endorses postnasal drip.  He tried Tessalon Perles in the past without any benefit.  He denies nocturnal cough.  He does endorse heartburn although pantoprazole helps.  He takes the pantoprazole at nighttime once a day.  In addition, he reports dry itchy skin.  He takes mycophenolate 1500 mg twice daily and denies diarrhea, nausea, or vomiting.  His prednisone dose has been tapered down to 4 mg daily.        Current Outpatient Medications   Medication     aspirin 81 MG EC tablet     diltiazem ER COATED BEADS (CARDIZEM CD) 240 MG 24 hr capsule     lisinopril (PRINIVIL/ZESTRIL) 10 MG tablet     mycophenolate (GENERIC EQUIVALENT) 500 MG tablet     pantoprazole (PROTONIX) 40 MG EC tablet     predniSONE (DELTASONE) 1 MG tablet     sulfamethoxazole-trimethoprim (BACTRIM) 400-80 MG tablet     Blood Pressure Monitoring KIT     mycophenolate (GENERIC EQUIVALENT) 500 MG  tablet     No current facility-administered medications for this visit.      Allergies   Allergen Reactions     Ampicillin Rash     Past Medical History:   Diagnosis Date     Atrial fibrillation and flutter (H) 07/2017     Fracture      GERD (gastroesophageal reflux disease)      Interstitial lung disease (H)     sclerderma ILD; present on CXR 3-2017 and CT 5-2017; dx confirmed by CT 9-2017 fibrotic NSIP pattern with increased fibrosis; started mycophenolate 7/2017     Scleroderma (H)     dx 9- at [facility] by Dr. Acosta       Past Surgical History:   Procedure Laterality Date     COLONOSCOPY N/A 7/19/2017    Procedure: COLONOSCOPY;  COLONOSCOPY;  Surgeon: Mita Jimenez MD;  Location: [facility]     NO HISTORY OF SURGERY         Social History     Socioeconomic History     Marital status: Single     Spouse name: Not on file     Number of children: Not on file     Years of education: Not on file     Highest education level: Not on file   Occupational History     Not on file   Social Needs     Financial resource strain: Not on file     Food insecurity:     Worry: Not on file     Inability: Not on file     Transportation needs:     Medical: Not on file     Non-medical: Not on file   Tobacco Use     Smoking status: Never Smoker     Smokeless tobacco: Former User     Types: Chew   Substance and Sexual Activity     Alcohol use: Yes     Comment: very rarely     Drug use: No     Sexual activity: Not Currently   Lifestyle     Physical activity:     Days per week: Not on file     Minutes per session: Not on file     Stress: Not on file   Relationships     Social connections:     Talks on phone: Not on file     Gets together: Not on file     Attends Anabaptist service: Not on file     Active member of club or organization: Not on file     Attends meetings of clubs or organizations: Not on file     Relationship status: Not on file     Intimate partner violence:     Fear of current or ex partner: Not on file      "Emotionally abused: Not on file     Physically abused: Not on file     Forced sexual activity: Not on file   Other Topics Concern     Parent/sibling w/ CABG, MI or angioplasty before 65F 55M? Not Asked   Social History Narrative    Work in [employer].  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  Denies exposure to hot tubs, silica, pet birds, mold.  Single, no children.       Family History   Problem Relation Age of Onset     Prostate Cancer Father      Lung Cancer Other      Family History Negative Mother      Family History Negative Maternal Grandmother      Family History Negative Maternal Grandfather      Family History Negative Paternal Grandmother      Other - See Comments Paternal Grandfather         Atherosclerosis     Family History Negative Brother      Family History Negative Sister      Family History Negative Brother      LUNG DISEASE No family hx of      Rheumatologic Disease No family hx of              ROS Pulmonary  A complete ROS was otherwise negative except as noted in the HPI.    Vitals: /76   Pulse 52   Resp 17   Ht 1.753 m (5' 9\")   Wt 74.4 kg (164 lb)   SpO2 99%   BMI 24.22 kg/m       Exam:   GENERAL APPEARANCE: Well developed, well nourished, alert, and in no apparent distress.  RESP: good air flow throughout.  No crackles. No rhonchi. No wheezes.  CV: Normal S1, S2, regular rhythm, normal rate. No murmur.  No LE edema.   MS: extremities normal. No clubbing. No cyanosis.  SKIN: no rash on limited exam.  + dry skin.  NEURO: Mentation intact, speech normal, normal gait and stance.  PSYCH: mentation appears normal. and affect normal/bright.    Results:  Recent Results (from the past 168 hour(s))   CBC with platelets differential    Collection Time: 11/21/19  8:58 AM   Result Value Ref Range    WBC 6.4 4.0 - 11.0 10e9/L    RBC Count 4.73 4.4 - 5.9 10e12/L    Hemoglobin 13.8 13.3 - 17.7 g/dL    Hematocrit 42.8 40.0 - 53.0 %    MCV 91 78 - 100 fl    MCH " 29.2 26.5 - 33.0 pg    MCHC 32.2 31.5 - 36.5 g/dL    RDW 13.4 10.0 - 15.0 %    Platelet Count 347 150 - 450 10e9/L    % Neutrophils 70.0 %    % Lymphocytes 17.0 %    % Monocytes 9.0 %    % Eosinophils 4.0 %    Absolute Neutrophil 4.4 1.6 - 8.3 10e9/L    Absolute Lymphocytes 1.1 0.8 - 5.3 10e9/L    Absolute Monocytes 0.6 0.0 - 1.3 10e9/L    Absolute Eosinophils 0.3 0.0 - 0.7 10e9/L    Diff Method Manual Differential     RBC Morphology Normal     Platelet Estimate       Automated count confirmed.  Platelet morphology is normal.   Comprehensive metabolic panel    Collection Time: 11/21/19  8:58 AM   Result Value Ref Range    Sodium 136 133 - 144 mmol/L    Potassium 4.3 3.4 - 5.3 mmol/L    Chloride 106 94 - 109 mmol/L    Carbon Dioxide 25 20 - 32 mmol/L    Anion Gap 6 3 - 14 mmol/L    Glucose 95 70 - 99 mg/dL    Urea Nitrogen 19 7 - 30 mg/dL    Creatinine 1.02 0.66 - 1.25 mg/dL    GFR Estimate 82 >60 mL/min/[1.73_m2]    GFR Estimate If Black >90 >60 mL/min/[1.73_m2]    Calcium 8.8 8.5 - 10.1 mg/dL    Bilirubin Total 0.4 0.2 - 1.3 mg/dL    Albumin 3.7 3.4 - 5.0 g/dL    Protein Total 6.2 (L) 6.8 - 8.8 g/dL    Alkaline Phosphatase 140 40 - 150 U/L    ALT 24 0 - 70 U/L    AST 11 0 - 45 U/L   CRP inflammation    Collection Time: 11/21/19  8:58 AM   Result Value Ref Range    CRP Inflammation <2.9 0.0 - 8.0 mg/L   6 minute walk test    Collection Time: 11/22/19 12:00 AM   Result Value Ref Range    6 min walk (FT) 1,830 ft    6 Min Walk (M) 558 m   General PFT Lab (Please always keep checked)    Collection Time: 11/22/19  7:20 AM   Result Value Ref Range    FVC-Pred 4.63 L    FVC-Pre 4.30 L    FVC-%Pred-Pre 92 %    FEV1-Pre 3.65 L    FEV1-%Pred-Pre 101 %    FEV1FVC-Pred 78 %    FEV1FVC-Pre 85 %    FEFMax-Pred 9.27 L/sec    FEFMax-Pre 10.87 L/sec    FEFMax-%Pred-Pre 117 %    FEF2575-Pred 3.15 L/sec    FEF2575-Pre 3.85 L/sec    BXC7204-%Pred-Pre 122 %    ExpTime-Pre 6.96 sec    FIFMax-Pre 5.61 L/sec    VC-Pred 4.91 L    VC-Pre  4.30 L    VC-%Pred-Pre 87 %    IC-Pred 3.52 L    IC-Pre 2.54 L    IC-%Pred-Pre 72 %    ERV-Pred 1.39 L    ERV-Pre 1.75 L    ERV-%Pred-Pre 126 %    FEV1FEV6-Pred 80 %    FEV1FEV6-Pre 85 %    FRCPleth-Pred 3.52 L    FRCPleth-Pre 3.27 L    FRCPleth-%Pred-Pre 93 %    RVPleth-Pred 2.30 L    RVPleth-Pre 1.52 L    RVPleth-%Pred-Pre 65 %    TLCPleth-Pred 6.92 L    TLCPleth-Pre 5.81 L    TLCPleth-%Pred-Pre 83 %    DLCOunc-Pred 27.47 ml/min/mmHg    DLCOunc-Pre 20.51 ml/min/mmHg    DLCOunc-%Pred-Pre 74 %    DLCOcor-Pre 21.00 ml/min/mmHg    DLCOcor-%Pred-Pre 76 %    VA-Pre 5.58 L    VA-%Pred-Pre 87 %    FEV1SVC-Pred 74 %    FEV1SVC-Pre 85 %         FVC FEV1 TLC DLCO   [facility]  MMF and pred 7/2017 2.79 58% 2.54 68%    12.9 52%   8-3-2017 [facility] 3.34 2.85 4.89 17.63   9- 3.46 73% 3.13 85% 4.73 68% 19.77 67%   12- 3.91 83% 3.47 94% 5.10 73% 20.16 68%   5- 4.21 90% 3.64 99% 5.96 86% 21.63 74%   8- 4.19 89% 3.64 99% 5.46 78% 19.94 68%   12- 4.19 90% 3.68 101% 6.17 89% 21.74 74%   3- 4.21 90% 3.60 98% 5.56 80% 20.27 73%   8-2-2019 4.37 94% 3.74 103% 5.67 81% 21.31 73%   11- 4.30 92% 3.65 101% 5.81 83% 20.51 74%       I reviewed PFT and 6-minute walk test today.  PFT is normal and stable and overall significantly improved compared to June 2017.  6-minute walk test today shows normal walk distance on room air without significant desaturation or hypoxia.  I reviewed labs today, and they are within normal limits.    I reviewed results with the patient.      Assessment and plan:   Mr. Medrano is a 54-year-old with scleroderma interstitial lung disease who is here for follow-up.  1.  Scleroderma ILD.  PFT is now normal, and he has had good response to mycophenolate.  We will continue 1500 mg twice a day.  He will likely be on this dose for several more years and probably on mycophenolate for the rest of his life.  In terms of the prednisone, he takes only 4 mg daily and I am okay  with a very slow taper of the prednisone and would like to get him off eventually if Dr. Acosta is in agreement.  I encouraged Mr. Medrano to continue walking regularly as he is doing at work.  There is no need to add nintedanib at this time.  He will return in 3 months with full PFT.  2.  High risk medication monitoring.  Labs are normal today.  I will check labs again in 3 months to monitor mycophenolate.  He did receive the flu vaccine.  He should continue Bactrim for pneumocystis prophylaxis.  3.  Postnasal drip.  I asked him to try nasal saline spray twice a day.  If this does not work, then I would recommend Flonase steroid nasal spray.                      Again, thank you for allowing me to participate in the care of your patient.      Sincerely,    Elma Dillon MD

## 2019-11-22 NOTE — PROGRESS NOTES
[facility]    Reason for Visit  Kwaku Medrano is a 54 year old year old male who is being seen for RECHECK (Scleroderma )    HPI    Mr. Medrano is a 54-year-old with scleroderma interstitial lung disease who is here for follow-up.  He has been on mycophenolate since approximately July 2017 with his first appointment in the interstitial lung disease clinic in August 2017.   Today, he reports he is doing well.  He continues to walk 16-17,000 steps per day at work.  He works in a warehouse.  He denies dyspnea on exertion when he walks up one flight of stairs, paroxysmal nocturnal dyspnea, wheezing, or fingertip ulcers.  He does endorse a chronic cough with minimal sputum production that is unchanged.  He also endorses postnasal drip.  He tried Tessalon Perles in the past without any benefit.  He denies nocturnal cough.  He does endorse heartburn although pantoprazole helps.  He takes the pantoprazole at nighttime once a day.  In addition, he reports dry itchy skin.  He takes mycophenolate 1500 mg twice daily and denies diarrhea, nausea, or vomiting.  His prednisone dose has been tapered down to 4 mg daily.        Current Outpatient Medications   Medication     aspirin 81 MG EC tablet     diltiazem ER COATED BEADS (CARDIZEM CD) 240 MG 24 hr capsule     lisinopril (PRINIVIL/ZESTRIL) 10 MG tablet     mycophenolate (GENERIC EQUIVALENT) 500 MG tablet     pantoprazole (PROTONIX) 40 MG EC tablet     predniSONE (DELTASONE) 1 MG tablet     sulfamethoxazole-trimethoprim (BACTRIM) 400-80 MG tablet     Blood Pressure Monitoring KIT     mycophenolate (GENERIC EQUIVALENT) 500 MG tablet     No current facility-administered medications for this visit.      Allergies   Allergen Reactions     Ampicillin Rash     Past Medical History:   Diagnosis Date     Atrial fibrillation and flutter (H) 07/2017     Fracture      GERD (gastroesophageal reflux disease)      Interstitial lung disease (H)      sclerderma ILD; present on CXR 3-2017 and CT 5-2017; dx confirmed by CT 9-2017 fibrotic NSIP pattern with increased fibrosis; started mycophenolate 7/2017     Scleroderma (H)     dx 9- at [facility] by Dr. Acosta       Past Surgical History:   Procedure Laterality Date     COLONOSCOPY N/A 7/19/2017    Procedure: COLONOSCOPY;  COLONOSCOPY;  Surgeon: Mita Jimenez MD;  Location:  GI     NO HISTORY OF SURGERY         Social History     Socioeconomic History     Marital status: Single     Spouse name: Not on file     Number of children: Not on file     Years of education: Not on file     Highest education level: Not on file   Occupational History     Not on file   Social Needs     Financial resource strain: Not on file     Food insecurity:     Worry: Not on file     Inability: Not on file     Transportation needs:     Medical: Not on file     Non-medical: Not on file   Tobacco Use     Smoking status: Never Smoker     Smokeless tobacco: Former User     Types: Chew   Substance and Sexual Activity     Alcohol use: Yes     Comment: very rarely     Drug use: No     Sexual activity: Not Currently   Lifestyle     Physical activity:     Days per week: Not on file     Minutes per session: Not on file     Stress: Not on file   Relationships     Social connections:     Talks on phone: Not on file     Gets together: Not on file     Attends Shinto service: Not on file     Active member of club or organization: Not on file     Attends meetings of clubs or organizations: Not on file     Relationship status: Not on file     Intimate partner violence:     Fear of current or ex partner: Not on file     Emotionally abused: Not on file     Physically abused: Not on file     Forced sexual activity: Not on file   Other Topics Concern     Parent/sibling w/ CABG, MI or angioplasty before 65F 55M? Not Asked   Social History Narrative    Work in [employer].  Worked on car brakes during teen years, but otherwise no asbestos  "exposure.  Denies exposure to hot tubs, silica, pet birds, mold.  Single, no children.       Family History   Problem Relation Age of Onset     Prostate Cancer Father      Lung Cancer Other      Family History Negative Mother      Family History Negative Maternal Grandmother      Family History Negative Maternal Grandfather      Family History Negative Paternal Grandmother      Other - See Comments Paternal Grandfather         Atherosclerosis     Family History Negative Brother      Family History Negative Sister      Family History Negative Brother      LUNG DISEASE No family hx of      Rheumatologic Disease No family hx of              ROS Pulmonary  A complete ROS was otherwise negative except as noted in the HPI.    Vitals: /76   Pulse 52   Resp 17   Ht 1.753 m (5' 9\")   Wt 74.4 kg (164 lb)   SpO2 99%   BMI 24.22 kg/m      Exam:   GENERAL APPEARANCE: Well developed, well nourished, alert, and in no apparent distress.  RESP: good air flow throughout.  No crackles. No rhonchi. No wheezes.  CV: Normal S1, S2, regular rhythm, normal rate. No murmur.  No LE edema.   MS: extremities normal. No clubbing. No cyanosis.  SKIN: no rash on limited exam.  + dry skin.  NEURO: Mentation intact, speech normal, normal gait and stance.  PSYCH: mentation appears normal. and affect normal/bright.    Results:  Recent Results (from the past 168 hour(s))   CBC with platelets differential    Collection Time: 11/21/19  8:58 AM   Result Value Ref Range    WBC 6.4 4.0 - 11.0 10e9/L    RBC Count 4.73 4.4 - 5.9 10e12/L    Hemoglobin 13.8 13.3 - 17.7 g/dL    Hematocrit 42.8 40.0 - 53.0 %    MCV 91 78 - 100 fl    MCH 29.2 26.5 - 33.0 pg    MCHC 32.2 31.5 - 36.5 g/dL    RDW 13.4 10.0 - 15.0 %    Platelet Count 347 150 - 450 10e9/L    % Neutrophils 70.0 %    % Lymphocytes 17.0 %    % Monocytes 9.0 %    % Eosinophils 4.0 %    Absolute Neutrophil 4.4 1.6 - 8.3 10e9/L    Absolute Lymphocytes 1.1 0.8 - 5.3 10e9/L    Absolute Monocytes " 0.6 0.0 - 1.3 10e9/L    Absolute Eosinophils 0.3 0.0 - 0.7 10e9/L    Diff Method Manual Differential     RBC Morphology Normal     Platelet Estimate       Automated count confirmed.  Platelet morphology is normal.   Comprehensive metabolic panel    Collection Time: 11/21/19  8:58 AM   Result Value Ref Range    Sodium 136 133 - 144 mmol/L    Potassium 4.3 3.4 - 5.3 mmol/L    Chloride 106 94 - 109 mmol/L    Carbon Dioxide 25 20 - 32 mmol/L    Anion Gap 6 3 - 14 mmol/L    Glucose 95 70 - 99 mg/dL    Urea Nitrogen 19 7 - 30 mg/dL    Creatinine 1.02 0.66 - 1.25 mg/dL    GFR Estimate 82 >60 mL/min/[1.73_m2]    GFR Estimate If Black >90 >60 mL/min/[1.73_m2]    Calcium 8.8 8.5 - 10.1 mg/dL    Bilirubin Total 0.4 0.2 - 1.3 mg/dL    Albumin 3.7 3.4 - 5.0 g/dL    Protein Total 6.2 (L) 6.8 - 8.8 g/dL    Alkaline Phosphatase 140 40 - 150 U/L    ALT 24 0 - 70 U/L    AST 11 0 - 45 U/L   CRP inflammation    Collection Time: 11/21/19  8:58 AM   Result Value Ref Range    CRP Inflammation <2.9 0.0 - 8.0 mg/L   6 minute walk test    Collection Time: 11/22/19 12:00 AM   Result Value Ref Range    6 min walk (FT) 1,830 ft    6 Min Walk (M) 558 m   General PFT Lab (Please always keep checked)    Collection Time: 11/22/19  7:20 AM   Result Value Ref Range    FVC-Pred 4.63 L    FVC-Pre 4.30 L    FVC-%Pred-Pre 92 %    FEV1-Pre 3.65 L    FEV1-%Pred-Pre 101 %    FEV1FVC-Pred 78 %    FEV1FVC-Pre 85 %    FEFMax-Pred 9.27 L/sec    FEFMax-Pre 10.87 L/sec    FEFMax-%Pred-Pre 117 %    FEF2575-Pred 3.15 L/sec    FEF2575-Pre 3.85 L/sec    EXM9718-%Pred-Pre 122 %    ExpTime-Pre 6.96 sec    FIFMax-Pre 5.61 L/sec    VC-Pred 4.91 L    VC-Pre 4.30 L    VC-%Pred-Pre 87 %    IC-Pred 3.52 L    IC-Pre 2.54 L    IC-%Pred-Pre 72 %    ERV-Pred 1.39 L    ERV-Pre 1.75 L    ERV-%Pred-Pre 126 %    FEV1FEV6-Pred 80 %    FEV1FEV6-Pre 85 %    FRCPleth-Pred 3.52 L    FRCPleth-Pre 3.27 L    FRCPleth-%Pred-Pre 93 %    RVPleth-Pred 2.30 L    RVPleth-Pre 1.52 L     RVPleth-%Pred-Pre 65 %    TLCPleth-Pred 6.92 L    TLCPleth-Pre 5.81 L    TLCPleth-%Pred-Pre 83 %    DLCOunc-Pred 27.47 ml/min/mmHg    DLCOunc-Pre 20.51 ml/min/mmHg    DLCOunc-%Pred-Pre 74 %    DLCOcor-Pre 21.00 ml/min/mmHg    DLCOcor-%Pred-Pre 76 %    VA-Pre 5.58 L    VA-%Pred-Pre 87 %    FEV1SVC-Pred 74 %    FEV1SVC-Pre 85 %         FVC FEV1 TLC DLCO   [facility]  MMF and pred 7/2017 2.79 58% 2.54 68%    12.9 52%   8-3-2017 [facility] 3.34 2.85 4.89 17.63   9- 3.46 73% 3.13 85% 4.73 68% 19.77 67%   12- 3.91 83% 3.47 94% 5.10 73% 20.16 68%   5- 4.21 90% 3.64 99% 5.96 86% 21.63 74%   8- 4.19 89% 3.64 99% 5.46 78% 19.94 68%   12- 4.19 90% 3.68 101% 6.17 89% 21.74 74%   3- 4.21 90% 3.60 98% 5.56 80% 20.27 73%   8-2-2019 4.37 94% 3.74 103% 5.67 81% 21.31 73%   11- 4.30 92% 3.65 101% 5.81 83% 20.51 74%       I reviewed PFT and 6-minute walk test today.  PFT is normal and stable and overall significantly improved compared to June 2017.  6-minute walk test today shows normal walk distance on room air without significant desaturation or hypoxia.  I reviewed labs today, and they are within normal limits.    I reviewed results with the patient.      Assessment and plan:   Mr. Medrano is a 54-year-old with scleroderma interstitial lung disease who is here for follow-up.  1.  Scleroderma ILD.  PFT is now normal, and he has had good response to mycophenolate.  We will continue 1500 mg twice a day.  He will likely be on this dose for several more years and probably on mycophenolate for the rest of his life.  In terms of the prednisone, he takes only 4 mg daily and I am okay with a very slow taper of the prednisone and would like to get him off eventually if Dr. Acosta is in agreement.  I encouraged Mr. Medrano to continue walking regularly as he is doing at work.  There is no need to add nintedanib at this time.  He will return in 3 months with full PFT.  2.  High risk  medication monitoring.  Labs are normal today.  I will check labs again in 3 months to monitor mycophenolate.  He did receive the flu vaccine.  He should continue Bactrim for pneumocystis prophylaxis.  3.  Postnasal drip.  I asked him to try nasal saline spray twice a day.  If this does not work, then I would recommend Flonase steroid nasal spray.

## 2019-11-25 LAB
DLCOCOR-%PRED-PRE: 76 %
DLCOCOR-PRE: 21 ML/MIN/MMHG
DLCOUNC-%PRED-PRE: 74 %
DLCOUNC-PRE: 20.51 ML/MIN/MMHG
DLCOUNC-PRED: 27.47 ML/MIN/MMHG
ERV-%PRED-PRE: 126 %
ERV-PRE: 1.75 L
ERV-PRED: 1.39 L
EXPTIME-PRE: 6.96 SEC
FEF2575-%PRED-PRE: 122 %
FEF2575-PRE: 3.85 L/SEC
FEF2575-PRED: 3.15 L/SEC
FEFMAX-%PRED-PRE: 117 %
FEFMAX-PRE: 10.87 L/SEC
FEFMAX-PRED: 9.27 L/SEC
FEV1-%PRED-PRE: 101 %
FEV1-PRE: 3.65 L
FEV1FEV6-PRE: 85 %
FEV1FEV6-PRED: 80 %
FEV1FVC-PRE: 85 %
FEV1FVC-PRED: 78 %
FEV1SVC-PRE: 85 %
FEV1SVC-PRED: 74 %
FIFMAX-PRE: 5.61 L/SEC
FRCPLETH-%PRED-PRE: 93 %
FRCPLETH-PRE: 3.27 L
FRCPLETH-PRED: 3.52 L
FVC-%PRED-PRE: 92 %
FVC-PRE: 4.3 L
FVC-PRED: 4.63 L
IC-%PRED-PRE: 72 %
IC-PRE: 2.54 L
IC-PRED: 3.52 L
RVPLETH-%PRED-PRE: 65 %
RVPLETH-PRE: 1.52 L
RVPLETH-PRED: 2.3 L
TLCPLETH-%PRED-PRE: 83 %
TLCPLETH-PRE: 5.81 L
TLCPLETH-PRED: 6.92 L
VA-%PRED-PRE: 87 %
VA-PRE: 5.58 L
VC-%PRED-PRE: 87 %
VC-PRE: 4.3 L
VC-PRED: 4.91 L

## 2020-01-01 ENCOUNTER — HEALTH MAINTENANCE LETTER (OUTPATIENT)
Age: 55
End: 2020-01-01

## 2020-01-02 DIAGNOSIS — M13.0 POLYARTHRITIS: ICD-10-CM

## 2020-01-02 DIAGNOSIS — M60.9 MYOSITIS, UNSPECIFIED MYOSITIS TYPE, UNSPECIFIED SITE: ICD-10-CM

## 2020-01-02 DIAGNOSIS — D89.89 ANTISYNTHETASE SYNDROME (H): ICD-10-CM

## 2020-01-02 DIAGNOSIS — M33.13 DERMATOMYOSITIS (H): ICD-10-CM

## 2020-01-02 DIAGNOSIS — J84.9 ILD (INTERSTITIAL LUNG DISEASE) (H): ICD-10-CM

## 2020-01-04 RX ORDER — MYCOPHENOLATE MOFETIL 500 MG/1
1500 TABLET ORAL 2 TIMES DAILY
Qty: 180 TABLET | Refills: 2 | Status: SHIPPED | OUTPATIENT
Start: 2020-01-04 | End: 2020-06-04

## 2020-01-04 NOTE — TELEPHONE ENCOUNTER
mycophenolate (GENERIC EQUIVALENT) 500 MG tablet     Last Written Prescription Date:  8/27/19  Last Fill Quantity: 180,   # refills: 2  Last Office Visit:7/23/19  Future Office visit:  1/21/20      CBC RESULTS:   Recent Labs   Lab Test 11/21/19  0858   WBC 6.4   RBC 4.73   HGB 13.8   HCT 42.8   MCV 91   MCH 29.2   MCHC 32.2   RDW 13.4          Creatinine   Date Value Ref Range Status   11/21/2019 1.02 0.66 - 1.25 mg/dL Final   ]    Liver Function Studies -   Recent Labs   Lab Test 11/21/19  0858   PROTTOTAL 6.2*   ALBUMIN 3.7   BILITOTAL 0.4   ALKPHOS 140   AST 11   ALT 24       Routing refill request to provider for review/approval because:  provider  review required.

## 2020-01-09 NOTE — PROGRESS NOTES
DISCHARGE INSTRUCTION PROVIDED TO PT AND  AT BEDSIDE. EDUCATED PT ON FOLLOW UP WITH 
MD, MEDICATIONS REGIMENS, SIDE EFFECTS, WOUND CARE AND DISEASE MANAGEMENT. ANSWERED ALL PT'S 
QUESTIONS AND PT WAS AWARE TO FOLLOW UP WITH MD AFTER DC AND AWARE HOME HEALTH WILL CONTACT 
HER. REMOVED IV, CANNULA INTACT AND NO BLEEDING AT IV SITE. REMOVED ALL ARM BANDS. WOUND VAC 
ATTACHED AND ROBB IN PLACE. PROVIDED WOUND CARE SUPPLIES (DRESSING). PRESCRIPTION PROVIDED 
TO PATIENT. PT IS UP TO DATE WITH FLU VACCINE. PT'S  CHECKED ALL THE CABINETS AND 
TOOK ALL PT'S BELONGINGS HOME. CNA WHEEL CHAIR PT TO THE FRONT LOBBY. PT IS GOING TO DC HOME 
ACCOMPANY WITH . PT IS IN STABLE CONDITION. Results released to [facility]:  Your prostate test was normal. Good.       Sincerely    Mani Golden MD  [facility]

## 2020-01-17 DIAGNOSIS — Z79.899 HIGH RISK MEDICATIONS (NOT ANTICOAGULANTS) LONG-TERM USE: ICD-10-CM

## 2020-01-17 DIAGNOSIS — M33.13 DERMATOMYOSITIS (H): ICD-10-CM

## 2020-01-17 DIAGNOSIS — M34.9 SCLERODERMA (H): ICD-10-CM

## 2020-01-17 LAB
ALT SERPL W P-5'-P-CCNC: 17 U/L (ref 0–70)
AST SERPL W P-5'-P-CCNC: 12 U/L (ref 0–45)
BASOPHILS # BLD AUTO: 0 10E9/L (ref 0–0.2)
BASOPHILS NFR BLD AUTO: 0.2 %
CREAT SERPL-MCNC: 0.91 MG/DL (ref 0.66–1.25)
CRP SERPL-MCNC: <2.9 MG/L (ref 0–8)
DIFFERENTIAL METHOD BLD: ABNORMAL
EOSINOPHIL # BLD AUTO: 0.2 10E9/L (ref 0–0.7)
EOSINOPHIL NFR BLD AUTO: 2.8 %
ERYTHROCYTE [DISTWIDTH] IN BLOOD BY AUTOMATED COUNT: 13.2 % (ref 10–15)
ERYTHROCYTE [SEDIMENTATION RATE] IN BLOOD BY WESTERGREN METHOD: 4 MM/H (ref 0–20)
GFR SERPL CREATININE-BSD FRML MDRD: >90 ML/MIN/{1.73_M2}
HCT VFR BLD AUTO: 39 % (ref 40–53)
HGB BLD-MCNC: 12.4 G/DL (ref 13.3–17.7)
LYMPHOCYTES # BLD AUTO: 1.1 10E9/L (ref 0.8–5.3)
LYMPHOCYTES NFR BLD AUTO: 19.7 %
MCH RBC QN AUTO: 29 PG (ref 26.5–33)
MCHC RBC AUTO-ENTMCNC: 31.8 G/DL (ref 31.5–36.5)
MCV RBC AUTO: 91 FL (ref 78–100)
MONOCYTES # BLD AUTO: 0.8 10E9/L (ref 0–1.3)
MONOCYTES NFR BLD AUTO: 14.1 %
NEUTROPHILS # BLD AUTO: 3.4 10E9/L (ref 1.6–8.3)
NEUTROPHILS NFR BLD AUTO: 63.2 %
PLATELET # BLD AUTO: 291 10E9/L (ref 150–450)
RBC # BLD AUTO: 4.27 10E12/L (ref 4.4–5.9)
WBC # BLD AUTO: 5.4 10E9/L (ref 4–11)

## 2020-01-17 PROCEDURE — 85652 RBC SED RATE AUTOMATED: CPT | Performed by: INTERNAL MEDICINE

## 2020-01-17 PROCEDURE — 84460 ALANINE AMINO (ALT) (SGPT): CPT | Performed by: INTERNAL MEDICINE

## 2020-01-17 PROCEDURE — 82565 ASSAY OF CREATININE: CPT | Performed by: INTERNAL MEDICINE

## 2020-01-17 PROCEDURE — 36415 COLL VENOUS BLD VENIPUNCTURE: CPT | Performed by: INTERNAL MEDICINE

## 2020-01-17 PROCEDURE — 86140 C-REACTIVE PROTEIN: CPT | Performed by: INTERNAL MEDICINE

## 2020-01-17 PROCEDURE — 85025 COMPLETE CBC W/AUTO DIFF WBC: CPT | Performed by: INTERNAL MEDICINE

## 2020-01-17 PROCEDURE — 84450 TRANSFERASE (AST) (SGOT): CPT | Performed by: INTERNAL MEDICINE

## 2020-01-21 ENCOUNTER — OFFICE VISIT (OUTPATIENT)
Dept: RHEUMATOLOGY | Facility: CLINIC | Age: 55
End: 2020-01-21
Attending: INTERNAL MEDICINE
Payer: COMMERCIAL

## 2020-01-21 VITALS
HEART RATE: 69 BPM | HEIGHT: 69 IN | TEMPERATURE: 97.4 F | WEIGHT: 165.9 LBS | SYSTOLIC BLOOD PRESSURE: 154 MMHG | DIASTOLIC BLOOD PRESSURE: 86 MMHG | BODY MASS INDEX: 24.57 KG/M2

## 2020-01-21 DIAGNOSIS — I48.91 ATRIAL FIBRILLATION, UNSPECIFIED TYPE (H): ICD-10-CM

## 2020-01-21 DIAGNOSIS — I48.0 PAROXYSMAL ATRIAL FIBRILLATION (H): ICD-10-CM

## 2020-01-21 DIAGNOSIS — I73.00 RAYNAUD'S DISEASE WITHOUT GANGRENE: ICD-10-CM

## 2020-01-21 DIAGNOSIS — M33.13 DERMATOMYOSITIS (H): Primary | ICD-10-CM

## 2020-01-21 DIAGNOSIS — Z51.81 MEDICATION MONITORING ENCOUNTER: ICD-10-CM

## 2020-01-21 PROCEDURE — G0463 HOSPITAL OUTPT CLINIC VISIT: HCPCS | Mod: ZF

## 2020-01-21 RX ORDER — LISINOPRIL 10 MG/1
10 TABLET ORAL DAILY
Qty: 90 TABLET | Refills: 3 | Status: SHIPPED | OUTPATIENT
Start: 2020-01-21 | End: 2021-01-01

## 2020-01-21 RX ORDER — DILTIAZEM HYDROCHLORIDE 180 MG/1
180 CAPSULE, COATED, EXTENDED RELEASE ORAL DAILY
Qty: 90 CAPSULE | Refills: 3 | Status: SHIPPED | OUTPATIENT
Start: 2020-01-21 | End: 2021-01-01

## 2020-01-21 ASSESSMENT — MIFFLIN-ST. JEOR: SCORE: 1572.9

## 2020-01-21 ASSESSMENT — PAIN SCALES - GENERAL: PAINLEVEL: NO PAIN (0)

## 2020-01-21 NOTE — LETTER
2020      RE: Kwaku Medrano  [address] [address]       [facility]  Rheumatology Clinic  Denny Acosta MD  2020     Name: Kwaku Medrano  MRN: [id number]  Age: 54 year old  : 1965  Referring provider: Referred Self     Assessment and Plan:  # Dermatomyositis  # Paroxysmal atrial fibrillation   # Raynaud's disease without gangrene    We reviewed his medications today and will decrease dose of diltiazem to 180 mg daily. He no longer needs rate control for atrial fibrillation and Raynaud's has been well controlled. Elevated blood pressure today was an anomaly but he will continue to monitor blood pressures at home. If he notices higher blood pressures or if Raynaud's worsens, we will increase diltiazem back to 240 mg daily. He will continue lisinopril 10 mg daily.     He denies shortness of breath but has coughing episodes with vigorous exercise. Diffusion capacity and volumes have been stable on PFTs.     Labs were reviewed with the patient, which showed slightly low hemoglobin, which seems to be an anomaly, but were otherwise normal.     He has left wrist pain but no other wrist or muscle pain or weakness. He does have an area of swelling over the left foot, likely related to a tendon. If this does not resolve, we could start a short prednisone burst.     He will continue Cellcept 1500 mg BID and prednisone 4 mg daily.     Follow-up: 6 months     HPI:   Kwaku Medrano is a 54 year old male with a history of diffuse cutaneous systemic sclerosis who presents for follow up. He was last seen on 2019. Disease was well controlled on his regimen of Cellcept 1500 mg daily and prednisone 5 mg daily.      He last saw Dr. Dillon on 2019. She noted normal PFTs and good response to mycophenolate. He was continued on 1500 mg daily. He was on 4 mg prednisone daily and she was okay with a very slow taper.     Today, he reports that he is doing well.  He has a history of  atrial fibrillation and artrial flutter. He had ablation for atrial flutter and since then has not noticed any symptoms of atrial fibrillation. He is on diltiazem presumably for rate control and Raynaud's. Raynaud's has not been troublesome and he notes that the other day he was able to shovel snow without gloves on. He checks blood pressures weekly and notes that they typically run around 120-125/70. He denies shortness of breath but begins coughing after strenuous exercise. He denies any lightheadedness upon standing. He has not had leg swelling but notes that this past Friday he developed foot pain and then by Sunday developed swelling to the top of the foot. He denies history of similar swelling. He continues to have left wrist pain. No muscle weakness.     He continues on Cellcept 1500 mg BID and prednisone 4 mg daily.     Review of Systems:   Pertinent items are noted in HPI or as below, remainder of complete ROS is negative.      No recent problems with hearing or vision. No swallowing problems.   No breathing difficulty, shortness of breath, coughing, or wheezing  No chest pain or palpitations  No heart burn, indigestion, abdominal pain, nausea, vomiting, diarrhea  No urination problems, no bloody, cloudy urine, no dysuria  No numbing, tingling, weakness  No headaches or confusion  No rashes. No easy bleeding or bruising.     Active Medications:     Current Outpatient Medications:      aspirin 81 MG EC tablet, Take 1 tablet (81 mg) by mouth daily, Disp: 30 tablet, Rfl: 1     Blood Pressure Monitoring KIT, 1 each three times a week Dr Acosta has asked you to check your blood pressure 2-3 times per week using your home monitor.  Please keep track of your findings in a journal and bring it to your appointments.  Contact this office if your blood pressure: the top number (systolic) is consistently 30 points higher than your normal BP or if the bottom number (diastolic) is consistently 20 points higher than your  "normal BP., Disp: 1 kit, Rfl: 1     diltiazem ER COATED BEADS (CARDIZEM CD/CARTIA XT) 180 MG 24 hr capsule, Take 1 capsule (180 mg) by mouth daily, Disp: 90 capsule, Rfl: 3     lisinopril (PRINIVIL/ZESTRIL) 10 MG tablet, Take 1 tablet (10 mg) by mouth daily, Disp: 90 tablet, Rfl: 3     mycophenolate (GENERIC EQUIVALENT) 500 MG tablet, Take 3 tablets (1,500 mg) by mouth 2 times daily, Disp: 180 tablet, Rfl: 2     pantoprazole (PROTONIX) 40 MG EC tablet, Take 1 tablet (40 mg) by mouth daily, Disp: 30 tablet, Rfl: 10     predniSONE (DELTASONE) 1 MG tablet, Take 4 tablets (4 mg) by mouth daily, Disp: 360 tablet, Rfl: 1     sulfamethoxazole-trimethoprim (BACTRIM) 400-80 MG tablet, Once daily for PJP prophylaxis. Start after bronchoscopy, Disp: 180 tablet, Rfl: 3      Allergies:   Ampicillin      Past Medical History:  Atrial fibrillation and flutter - h/o ablation  Fracture  GERD  ILD  Scleroderma  Dermatomyositis  Diastolic hypertension     Past Surgical History:  No pertinent surgical history.     Family History:   Father - prostate cancer  Paternal grandfather - atherosclerosis  Maternal aunt - lupus  Other - lung cancer      Social History:   No smoking  Former tobacco chew use, quit 1984  Very rare alcohol use       Physical Exam:   BP (!) 154/86   Pulse 69   Temp 97.4  F (36.3  C) (Oral)   Ht 1.753 m (5' 9\")   Wt 75.3 kg (165 lb 14.4 oz)   BMI 24.50 kg/m      Wt Readings from Last 4 Encounters:   01/21/20 75.3 kg (165 lb 14.4 oz)   11/22/19 74.4 kg (164 lb)   08/02/19 75.7 kg (166 lb 12.8 oz)   07/23/19 76 kg (167 lb 9.6 oz)     Constitutional: Well-developed, appearing stated age; cooperative  Eyes: Normal EOM, PERRLA, vision, conjunctiva, sclera  ENT: Normal external ears, nose, hearing, lips, teeth, gums, throat. No mucous membrane lesions, normal saliva pool  Neck: No mass or thyroid enlargement  Resp: Lungs clear to auscultation, nl to palpation  CV: RRR, no murmurs, rubs or gallops, no edema  GI: No ABD " mass or tenderness, no HSM  : Not tested  Lymph: No cervical, supraclavicular, inguinal or epitrochlear nodes  MS: The TMJ, neck, shoulder, elbow, MCP/PIP/DIP, spine, hip, knee, and ankle joints were examined and found normal. Full joint ROM. Normal  strength.  Area of swelling that is slightly tender extending from the medial aspect of his first toe all the way out to his fifth toe, starting at the proximal tarsal area extending to the tarsal phalangeal area. Tender in the left wrist with restricted range of motion with flexion and extension in about 20-30 .    Skin: A few scattered telangiectasias. He has some very subtle cyanosis in his fingers. 2+ skin thickening in the fingers and 1+ in the dorsum in the right hand, none on the left hand.   Neuro: Normal cranial nerves, strength, sensation, DTRs.   Psych: Normal judgement, orientation, memory, affect.     Laboratory:   RHEUM RESULTS Latest Ref Rng & Units 10/18/2019 11/21/2019 1/17/2020   SED RATE 0 - 20 mm/h 1 - 4   CRP, INFLAMMATION 0.0 - 8.0 mg/L <2.9 <2.9 <2.9   CK TOTAL 30 - 300 U/L - - -   RHEUMATOID FACTOR <20 IU/mL - - -   SHERRELL SCREEN BY EIA <1.0 - - -   AST 0 - 45 U/L 12 11 12   ALT 0 - 70 U/L 25 24 17   ALBUMIN 3.4 - 5.0 g/dL - 3.7 -   WBC 4.0 - 11.0 10e9/L 5.7 6.4 5.4   RBC 4.4 - 5.9 10e12/L 4.68 4.73 4.27(L)   HGB 13.3 - 17.7 g/dL 13.9 13.8 12.4(L)   HCT 40.0 - 53.0 % 42.1 42.8 39.0(L)   MCV 78 - 100 fl 90 91 91   MCHC 31.5 - 36.5 g/dL 33.0 32.2 31.8   RDW 10.0 - 15.0 % 13.3 13.4 13.2    - 450 10e9/L 330 347 291   CREATININE 0.66 - 1.25 mg/dL 1.08 1.02 0.91   GFR ESTIMATE, IF BLACK >60 mL/min/[1.73:m2] 88 >90 >90   GFR ESTIMATE >60 mL/min/[1.73:m2] 76 82 >90    - 1,620 mg/dL - - -   HEPATITIS C ANTIBODY NR - - -       Rheumatoid Factor   Date Value Ref Range Status   06/22/2017 <20 <20 IU/mL Final     Cyclic Citrullinated Peptide Antibody, IgG   Date Value Ref Range Status   06/22/2017 1 <7 U/mL Final     Comment:     Negative      Scleroderma Antibody Scl-70 DAVID IgG   Date Value Ref Range Status   09/14/2017 <0.2 0.0 - 0.9 AI Final     Comment:     Negative  Antibody index (AI) values reflect qualitative changes in antibody   concentration that cannot be directly associated with clinical condition or   disease state.       SSA (Ro) (DAVID) Antibody, IgG   Date Value Ref Range Status   06/07/2017  0.0 - 0.9 AI Final    <0.2  Negative   Antibody index (AI) values reflect qualitative changes in antibody   concentration that cannot be directly associated with clinical condition or   disease state.       SSB (La) (DAVID) Antibody, IgG   Date Value Ref Range Status   06/07/2017  0.0 - 0.9 AI Final    <0.2  Negative   Antibody index (AI) values reflect qualitative changes in antibody   concentration that cannot be directly associated with clinical condition or   disease state.       SHERRELL interpretation   Date Value Ref Range Status   09/14/2017 Positive (A) NEG^Negative Final     Comment:                                        Reference range:  <1:40  NEGATIVE  1:40 - 1:80  BORDERLINE POSITIVE  >1:80 POSITIVE  Cytoplasmic pattern observed. The SHERRELL test is intended to detect antibodies   directed against the cell nucleus. However, antibodies against other cellular   structures may also be detected. These are often non-specific, although some   clinically significant antibodies directed against cytoplasmic components may   be observed. Follow-up testing may be considered if clinically indicated.       SHERRELL pattern 1   Date Value Ref Range Status   09/14/2017 SPECKLED  Final     SHERRELL titer 1   Date Value Ref Range Status   09/14/2017 1:160  Final     SHERRELL Screen by EIA   Date Value Ref Range Status   06/22/2017 <1.0  Interpretation:  Negative   <1.0 Final     Hepatitis B Core Cici   Date Value Ref Range Status   07/25/2017 Nonreactive NR Final     Hep B Surface Agn   Date Value Ref Range Status   07/25/2017 Nonreactive NR Final     RNA Polymerase III Cici  IgG   Date Value Ref Range Status   09/14/2017 2 0 - 19 Units Final     Comment:     (Note)  INTERPRETIVE INFORMATION: RNA Polymerase III Antibody, IgG   19 Units or less ......Negative   20 - 39 Units .........Weak Positive   40 - 80 Units .........Moderate Positive   81 Units or greater ...Strong Positive  The presence of RNA polymerase III IgG antibodies is   considered diagnostic of systemic sclerosis (SSc). RNA   polymerase III IgG antibodies occur in about 11-23 percent   of SSc patients, and typically in the absence of   anti-centromere and anti-Scl-70 antibodies. The presence of   RNA polymerase III IgG antibodies may be predictive of an   increased risk of skin involvement and hypertensive renal   failure associated with the diffuse cutaneous form of SSc.  A negative result indicates no detectable IgG antibodies to   the dominant antigen of RNA polymerase III and does not   rule out the possibility of SSc. False-positive results may   also occur due to non-specific binding of immune complexes.   Strong clinical correlation is recommended.  If clinical suspicion remains, consider additional testing   for other antibodies associated with SSc, including   centromere, Scl-70, U3-RNP, PM/Scl, or Th/To.  Performed by [email or web address],  [address] [place] [id number] [phone]  [email or web address], Manny Everett MD, Lab. Director       Neutrophil Cytoplasmic IgG Antibody   Date Value Ref Range Status   06/22/2017   Final    <1:20  Reference range: <1:20  (Note)  The ANCA IFA is <1:20; therefore, no further testing will  be performed.  INTERPRETIVE INFORMATION: Anti-Neutrophil Cyto Ab, IgG  Neutrophil Cytoplasmic Antibodies (C-ANCA = granular  cytoplasmic staining, P-ANCA = perinuclear staining) are  found in the serum of over 90 percent of patients with  certain necrotizing systemic vasculitides, and usually in  less than 5 percent of patients with collagen vascular  disease or arthritis.  Performed by [email or web address],  [address] [place] [id number] [phone]  [email or web address], Manny Everett MD, Lab. Director       Proteinase 3 Antibody IgG   Date Value Ref Range Status   06/07/2017  0.0 - 0.9 AI Final    <0.2  Negative   Antibody index (AI) values reflect qualitative changes in antibody   concentration that cannot be directly associated with clinical condition or   disease state.       Myeloperoxidase Antibody IgG   Date Value Ref Range Status   06/07/2017  0.0 - 0.9 AI Final    <0.2  Negative   Antibody index (AI) values reflect qualitative changes in antibody   concentration that cannot be directly associated with clinical condition or   disease state.         Albumin Fraction   Date Value Ref Range Status   06/07/2017 3.4 (L) 3.7 - 5.1 g/dL Final     Alpha 2 Fraction   Date Value Ref Range Status   06/07/2017 0.9 0.5 - 0.9 g/dL Final     Beta Fraction   Date Value Ref Range Status   06/07/2017 0.7 0.6 - 1.0 g/dL Final     Gamma Fraction   Date Value Ref Range Status   06/07/2017 0.8 0.7 - 1.6 g/dL Final     Monoclonal Peak   Date Value Ref Range Status   06/07/2017 0.0 0.0 g/dL Final     ELP Interpretation:   Date Value Ref Range Status   06/07/2017   Final    Hypoalbuminemia.  No monoclonal protein seen. Pathologic significance requires   clinical correlation.  KALYN Cline M.D., Ph.D., Pathologist.       IGG   Date Value Ref Range Status   06/22/2017 721 [id number] - 1,620 mg/dL Final     Kat DAVID Antibody IgG   Date Value Ref Range Status   06/07/2017  0.0 - 0.9 AI Final    <0.2  Negative   Antibody index (AI) values reflect qualitative changes in antibody   concentration that cannot be directly associated with clinical condition or   disease state.       Scleroderma Antibody Scl-70 DAVID IgG   Date Value Ref Range Status   09/14/2017 <0.2 0.0 - 0.9 AI Final     Comment:     Negative  Antibody index (AI) values reflect qualitative changes in antibody   concentration that cannot be directly associated with clinical  condition or   disease state.         Scribe Disclosure:  I, Charlene Villatoro, am serving as a scribe to document services personally performed by Denny Acosta MD at this visit, based upon the provider's statements to me. All documentation has been reviewed by the aforementioned provider prior to being entered into the official medical record.     MADISON Acosta MD, PhD    Rheumatology

## 2020-01-21 NOTE — PROGRESS NOTES
[facility]  Rheumatology Clinic  Denny Acosta MD  2020     Name: Kwaku Medrano  MRN: [id number]  Age: 54 year old  : 1965  Referring provider: Referred Self     Assessment and Plan:  # Dermatomyositis  # Paroxysmal atrial fibrillation   # Raynaud's disease without gangrene    We reviewed his medications today and will decrease dose of diltiazem to 180 mg daily. He no longer needs rate control for atrial fibrillation and Raynaud's has been well controlled. Elevated blood pressure today was an anomaly but he will continue to monitor blood pressures at home. If he notices higher blood pressures or if Raynaud's worsens, we will increase diltiazem back to 240 mg daily. He will continue lisinopril 10 mg daily.     He denies shortness of breath but has coughing episodes with vigorous exercise. Diffusion capacity and volumes have been stable on PFTs.     Labs were reviewed with the patient, which showed slightly low hemoglobin, which seems to be an anomaly, but were otherwise normal.     He has left wrist pain but no other wrist or muscle pain or weakness. He does have an area of swelling over the left foot, likely related to a tendon. If this does not resolve, we could start a short prednisone burst.     He will continue Cellcept 1500 mg BID and prednisone 4 mg daily.     Follow-up: 6 months     HPI:   Kwaku Medrano is a 54 year old male with a history of diffuse cutaneous systemic sclerosis who presents for follow up. He was last seen on 2019. Disease was well controlled on his regimen of Cellcept 1500 mg daily and prednisone 5 mg daily.      He last saw Dr. Dillon on 2019. She noted normal PFTs and good response to mycophenolate. He was continued on 1500 mg daily. He was on 4 mg prednisone daily and she was okay with a very slow taper.     Today, he reports that he is doing well.  He has a history of atrial fibrillation and artrial flutter. He had ablation for atrial flutter and since then  has not noticed any symptoms of atrial fibrillation. He is on diltiazem presumably for rate control and Raynaud's. Raynaud's has not been troublesome and he notes that the other day he was able to shovel snow without gloves on. He checks blood pressures weekly and notes that they typically run around 120-125/70. He denies shortness of breath but begins coughing after strenuous exercise. He denies any lightheadedness upon standing. He has not had leg swelling but notes that this past Friday he developed foot pain and then by Sunday developed swelling to the top of the foot. He denies history of similar swelling. He continues to have left wrist pain. No muscle weakness.     He continues on Cellcept 1500 mg BID and prednisone 4 mg daily.     Review of Systems:   Pertinent items are noted in HPI or as below, remainder of complete ROS is negative.      No recent problems with hearing or vision. No swallowing problems.   No breathing difficulty, shortness of breath, coughing, or wheezing  No chest pain or palpitations  No heart burn, indigestion, abdominal pain, nausea, vomiting, diarrhea  No urination problems, no bloody, cloudy urine, no dysuria  No numbing, tingling, weakness  No headaches or confusion  No rashes. No easy bleeding or bruising.     Active Medications:     Current Outpatient Medications:      aspirin 81 MG EC tablet, Take 1 tablet (81 mg) by mouth daily, Disp: 30 tablet, Rfl: 1     Blood Pressure Monitoring KIT, 1 each three times a week Dr Acosta has asked you to check your blood pressure 2-3 times per week using your home monitor.  Please keep track of your findings in a journal and bring it to your appointments.  Contact this office if your blood pressure: the top number (systolic) is consistently 30 points higher than your normal BP or if the bottom number (diastolic) is consistently 20 points higher than your normal BP., Disp: 1 kit, Rfl: 1     diltiazem ER COATED BEADS (CARDIZEM CD/CARTIA XT) 180  "MG 24 hr capsule, Take 1 capsule (180 mg) by mouth daily, Disp: 90 capsule, Rfl: 3     lisinopril (PRINIVIL/ZESTRIL) 10 MG tablet, Take 1 tablet (10 mg) by mouth daily, Disp: 90 tablet, Rfl: 3     mycophenolate (GENERIC EQUIVALENT) 500 MG tablet, Take 3 tablets (1,500 mg) by mouth 2 times daily, Disp: 180 tablet, Rfl: 2     pantoprazole (PROTONIX) 40 MG EC tablet, Take 1 tablet (40 mg) by mouth daily, Disp: 30 tablet, Rfl: 10     predniSONE (DELTASONE) 1 MG tablet, Take 4 tablets (4 mg) by mouth daily, Disp: 360 tablet, Rfl: 1     sulfamethoxazole-trimethoprim (BACTRIM) 400-80 MG tablet, Once daily for PJP prophylaxis. Start after bronchoscopy, Disp: 180 tablet, Rfl: 3      Allergies:   Ampicillin      Past Medical History:  Atrial fibrillation and flutter - h/o ablation  Fracture  GERD  ILD  Scleroderma  Dermatomyositis  Diastolic hypertension     Past Surgical History:  No pertinent surgical history.     Family History:   Father - prostate cancer  Paternal grandfather - atherosclerosis  Maternal aunt - lupus  Other - lung cancer      Social History:   No smoking  Former tobacco chew use, quit 1984  Very rare alcohol use      Physical Exam:   BP (!) 154/86   Pulse 69   Temp 97.4  F (36.3  C) (Oral)   Ht 1.753 m (5' 9\")   Wt 75.3 kg (165 lb 14.4 oz)   BMI 24.50 kg/m     Wt Readings from Last 4 Encounters:   01/21/20 75.3 kg (165 lb 14.4 oz)   11/22/19 74.4 kg (164 lb)   08/02/19 75.7 kg (166 lb 12.8 oz)   07/23/19 76 kg (167 lb 9.6 oz)     Constitutional: Well-developed, appearing stated age; cooperative  Eyes: Normal EOM, PERRLA, vision, conjunctiva, sclera  ENT: Normal external ears, nose, hearing, lips, teeth, gums, throat. No mucous membrane lesions, normal saliva pool  Neck: No mass or thyroid enlargement  Resp: Lungs clear to auscultation, nl to palpation  CV: RRR, no murmurs, rubs or gallops, no edema  GI: No ABD mass or tenderness, no HSM  : Not tested  Lymph: No cervical, supraclavicular, inguinal or " epitrochlear nodes  MS: The TMJ, neck, shoulder, elbow, MCP/PIP/DIP, spine, hip, knee, and ankle joints were examined and found normal. Full joint ROM. Normal  strength.  Area of swelling that is slightly tender extending from the medial aspect of his first toe all the way out to his fifth toe, starting at the proximal tarsal area extending to the tarsal phalangeal area. Tender in the left wrist with restricted range of motion with flexion and extension in about 20-30 .   Skin: A few scattered telangiectasias. He has some very subtle cyanosis in his fingers. 2+ skin thickening in the fingers and 1+ in the dorsum in the right hand, none on the left hand.   Neuro: Normal cranial nerves, strength, sensation, DTRs.   Psych: Normal judgement, orientation, memory, affect.     Laboratory:   RHEUM RESULTS Latest Ref Rng & Units 10/18/2019 11/21/2019 1/17/2020   SED RATE 0 - 20 mm/h 1 - 4   CRP, INFLAMMATION 0.0 - 8.0 mg/L <2.9 <2.9 <2.9   CK TOTAL 30 - 300 U/L - - -   RHEUMATOID FACTOR <20 IU/mL - - -   SHERRELL SCREEN BY EIA <1.0 - - -   AST 0 - 45 U/L 12 11 12   ALT 0 - 70 U/L 25 24 17   ALBUMIN 3.4 - 5.0 g/dL - 3.7 -   WBC 4.0 - 11.0 10e9/L 5.7 6.4 5.4   RBC 4.4 - 5.9 10e12/L 4.68 4.73 4.27(L)   HGB 13.3 - 17.7 g/dL 13.9 13.8 12.4(L)   HCT 40.0 - 53.0 % 42.1 42.8 39.0(L)   MCV 78 - 100 fl 90 91 91   MCHC 31.5 - 36.5 g/dL 33.0 32.2 31.8   RDW 10.0 - 15.0 % 13.3 13.4 13.2    - 450 10e9/L 330 347 291   CREATININE 0.66 - 1.25 mg/dL 1.08 1.02 0.91   GFR ESTIMATE, IF BLACK >60 mL/min/[1.73:m2] 88 >90 >90   GFR ESTIMATE >60 mL/min/[1.73:m2] 76 82 >90    - 1,620 mg/dL - - -   HEPATITIS C ANTIBODY NR - - -       Rheumatoid Factor   Date Value Ref Range Status   06/22/2017 <20 <20 IU/mL Final     Cyclic Citrullinated Peptide Antibody, IgG   Date Value Ref Range Status   06/22/2017 1 <7 U/mL Final     Comment:     Negative     Scleroderma Antibody Scl-70 DAVID IgG   Date Value Ref Range Status   09/14/2017 <0.2 0.0 - 0.9  AI Final     Comment:     Negative  Antibody index (AI) values reflect qualitative changes in antibody   concentration that cannot be directly associated with clinical condition or   disease state.       SSA (Ro) (DAVID) Antibody, IgG   Date Value Ref Range Status   06/07/2017  0.0 - 0.9 AI Final    <0.2  Negative   Antibody index (AI) values reflect qualitative changes in antibody   concentration that cannot be directly associated with clinical condition or   disease state.       SSB (La) (DAVID) Antibody, IgG   Date Value Ref Range Status   06/07/2017  0.0 - 0.9 AI Final    <0.2  Negative   Antibody index (AI) values reflect qualitative changes in antibody   concentration that cannot be directly associated with clinical condition or   disease state.       SHERRELL interpretation   Date Value Ref Range Status   09/14/2017 Positive (A) NEG^Negative Final     Comment:                                        Reference range:  <1:40  NEGATIVE  1:40 - 1:80  BORDERLINE POSITIVE  >1:80 POSITIVE  Cytoplasmic pattern observed. The SHERRELL test is intended to detect antibodies   directed against the cell nucleus. However, antibodies against other cellular   structures may also be detected. These are often non-specific, although some   clinically significant antibodies directed against cytoplasmic components may   be observed. Follow-up testing may be considered if clinically indicated.       SHERRELL pattern 1   Date Value Ref Range Status   09/14/2017 SPECKLED  Final     SHERRELL titer 1   Date Value Ref Range Status   09/14/2017 1:160  Final     SHERRELL Screen by EIA   Date Value Ref Range Status   06/22/2017 <1.0  Interpretation:  Negative   <1.0 Final     Hepatitis B Core Cici   Date Value Ref Range Status   07/25/2017 Nonreactive NR Final     Hep B Surface Agn   Date Value Ref Range Status   07/25/2017 Nonreactive NR Final     RNA Polymerase III Cici IgG   Date Value Ref Range Status   09/14/2017 2 0 - 19 Units Final     Comment:      (Note)  INTERPRETIVE INFORMATION: RNA Polymerase III Antibody, IgG   19 Units or less ......Negative   20 - 39 Units .........Weak Positive   40 - 80 Units .........Moderate Positive   81 Units or greater ...Strong Positive  The presence of RNA polymerase III IgG antibodies is   considered diagnostic of systemic sclerosis (SSc). RNA   polymerase III IgG antibodies occur in about 11-23 percent   of SSc patients, and typically in the absence of   anti-centromere and anti-Scl-70 antibodies. The presence of   RNA polymerase III IgG antibodies may be predictive of an   increased risk of skin involvement and hypertensive renal   failure associated with the diffuse cutaneous form of SSc.  A negative result indicates no detectable IgG antibodies to   the dominant antigen of RNA polymerase III and does not   rule out the possibility of SSc. False-positive results may   also occur due to non-specific binding of immune complexes.   Strong clinical correlation is recommended.  If clinical suspicion remains, consider additional testing   for other antibodies associated with SSc, including   centromere, Scl-70, U3-RNP, PM/Scl, or Th/To.  Performed by [facility],  [address] [facility] [place], [place] [address] [phone]  [email or web address], Manny Everett MD, Lab. Director       Neutrophil Cytoplasmic IgG Antibody   Date Value Ref Range Status   06/22/2017   Final    <1:20  Reference range: <1:20  (Note)  The ANCA IFA is <1:20; therefore, no further testing will  be performed.  INTERPRETIVE INFORMATION: Anti-Neutrophil Cyto Ab, IgG  Neutrophil Cytoplasmic Antibodies (C-ANCA = granular  cytoplasmic staining, P-ANCA = perinuclear staining) are  found in the serum of over 90 percent of patients with  certain necrotizing systemic vasculitides, and usually in  less than 5 percent of patients with collagen vascular  disease or arthritis.  Performed by [facility],  [address] [facility] [place], [place] [address] [phone]  [email or web address], Manny Everett  MD, Lab. Director       Proteinase 3 Antibody IgG   Date Value Ref Range Status   06/07/2017  0.0 - 0.9 AI Final    <0.2  Negative   Antibody index (AI) values reflect qualitative changes in antibody   concentration that cannot be directly associated with clinical condition or   disease state.       Myeloperoxidase Antibody IgG   Date Value Ref Range Status   06/07/2017  0.0 - 0.9 AI Final    <0.2  Negative   Antibody index (AI) values reflect qualitative changes in antibody   concentration that cannot be directly associated with clinical condition or   disease state.         Albumin Fraction   Date Value Ref Range Status   06/07/2017 3.4 (L) 3.7 - 5.1 g/dL Final     Alpha 2 Fraction   Date Value Ref Range Status   06/07/2017 0.9 0.5 - 0.9 g/dL Final     Beta Fraction   Date Value Ref Range Status   06/07/2017 0.7 0.6 - 1.0 g/dL Final     Gamma Fraction   Date Value Ref Range Status   06/07/2017 0.8 0.7 - 1.6 g/dL Final     Monoclonal Peak   Date Value Ref Range Status   06/07/2017 0.0 0.0 g/dL Final     ELP Interpretation:   Date Value Ref Range Status   06/07/2017   Final    Hypoalbuminemia.  No monoclonal protein seen. Pathologic significance requires   clinical correlation.  KALYN Cline M.D., Ph.D., Pathologist.       IGG   Date Value Ref Range Status   06/22/2017 721 695 - 1,620 mg/dL Final     Kat DAVID Antibody IgG   Date Value Ref Range Status   06/07/2017  0.0 - 0.9 AI Final    <0.2  Negative   Antibody index (AI) values reflect qualitative changes in antibody   concentration that cannot be directly associated with clinical condition or   disease state.       Scleroderma Antibody Scl-70 DAVID IgG   Date Value Ref Range Status   09/14/2017 <0.2 0.0 - 0.9 AI Final     Comment:     Negative  Antibody index (AI) values reflect qualitative changes in antibody   concentration that cannot be directly associated with clinical condition or   disease state.         Scribe Disclosure:  Charlene SMITH, am serving  as a scribe to document services personally performed by Denyn Acosta MD at this visit, based upon the provider's statements to me. All documentation has been reviewed by the aforementioned provider prior to being entered into the official medical record.     MADISON Acosta MD, PhD    Rheumatology

## 2020-01-21 NOTE — NURSING NOTE
"BP (!) 154/86   Pulse 69   Temp 97.4  F (36.3  C) (Oral)   Ht 1.753 m (5' 9\")   Wt 75.3 kg (165 lb 14.4 oz)   BMI 24.50 kg/m    Chief Complaint   Patient presents with     RECHECK     follow up with scleroderma, tzimmer cma       "

## 2020-02-06 ASSESSMENT — ENCOUNTER SYMPTOMS
HEARTBURN: 1
BOWEL INCONTINENCE: 0
ORTHOPNEA: 0
POSTURAL DYSPNEA: 0
COUGH DISTURBING SLEEP: 0
PALPITATIONS: 0
MYALGIAS: 0
SLEEP DISTURBANCES DUE TO BREATHING: 0
RECTAL PAIN: 0
DYSPNEA ON EXERTION: 0
HEMOPTYSIS: 0
DIARRHEA: 0
STIFFNESS: 0
SHORTNESS OF BREATH: 0
JAUNDICE: 0
SPUTUM PRODUCTION: 1
HYPOTENSION: 0
BLOATING: 0
ABDOMINAL PAIN: 0
LEG PAIN: 0
LIGHT-HEADEDNESS: 0
WHEEZING: 0
VOMITING: 0
BACK PAIN: 0
JOINT SWELLING: 0
COUGH: 1
SKIN CHANGES: 0
BLOOD IN STOOL: 0
MUSCLE WEAKNESS: 0
HYPERTENSION: 0
POOR WOUND HEALING: 0
ARTHRALGIAS: 1
SYNCOPE: 0
MUSCLE CRAMPS: 1
NAUSEA: 0
NAIL CHANGES: 0
CONSTIPATION: 0
SNORES LOUDLY: 0
NECK PAIN: 0

## 2020-02-18 DIAGNOSIS — J84.9 ILD (INTERSTITIAL LUNG DISEASE) (H): ICD-10-CM

## 2020-02-18 LAB
ALBUMIN SERPL-MCNC: 3.6 G/DL (ref 3.4–5)
ALP SERPL-CCNC: 127 U/L (ref 40–150)
ALT SERPL W P-5'-P-CCNC: 20 U/L (ref 0–70)
ANION GAP SERPL CALCULATED.3IONS-SCNC: 6 MMOL/L (ref 3–14)
AST SERPL W P-5'-P-CCNC: 10 U/L (ref 0–45)
BASOPHILS # BLD AUTO: 0 10E9/L (ref 0–0.2)
BASOPHILS NFR BLD AUTO: 0.3 %
BILIRUB SERPL-MCNC: 0.5 MG/DL (ref 0.2–1.3)
BUN SERPL-MCNC: 15 MG/DL (ref 7–30)
CALCIUM SERPL-MCNC: 8.9 MG/DL (ref 8.5–10.1)
CHLORIDE SERPL-SCNC: 107 MMOL/L (ref 94–109)
CO2 SERPL-SCNC: 25 MMOL/L (ref 20–32)
CREAT SERPL-MCNC: 1.07 MG/DL (ref 0.66–1.25)
DIFFERENTIAL METHOD BLD: NORMAL
EOSINOPHIL # BLD AUTO: 0.1 10E9/L (ref 0–0.7)
EOSINOPHIL NFR BLD AUTO: 2 %
ERYTHROCYTE [DISTWIDTH] IN BLOOD BY AUTOMATED COUNT: 13.3 % (ref 10–15)
GFR SERPL CREATININE-BSD FRML MDRD: 77 ML/MIN/{1.73_M2}
GLUCOSE SERPL-MCNC: 102 MG/DL (ref 70–99)
HCT VFR BLD AUTO: 41.2 % (ref 40–53)
HGB BLD-MCNC: 13.4 G/DL (ref 13.3–17.7)
LYMPHOCYTES # BLD AUTO: 0.9 10E9/L (ref 0.8–5.3)
LYMPHOCYTES NFR BLD AUTO: 13.5 %
MCH RBC QN AUTO: 29.3 PG (ref 26.5–33)
MCHC RBC AUTO-ENTMCNC: 32.5 G/DL (ref 31.5–36.5)
MCV RBC AUTO: 90 FL (ref 78–100)
MONOCYTES # BLD AUTO: 0.8 10E9/L (ref 0–1.3)
MONOCYTES NFR BLD AUTO: 11.8 %
NEUTROPHILS # BLD AUTO: 5 10E9/L (ref 1.6–8.3)
NEUTROPHILS NFR BLD AUTO: 72.4 %
PLATELET # BLD AUTO: 351 10E9/L (ref 150–450)
POTASSIUM SERPL-SCNC: 4.7 MMOL/L (ref 3.4–5.3)
PROT SERPL-MCNC: 6.1 G/DL (ref 6.8–8.8)
RBC # BLD AUTO: 4.58 10E12/L (ref 4.4–5.9)
SODIUM SERPL-SCNC: 138 MMOL/L (ref 133–144)
WBC # BLD AUTO: 6.9 10E9/L (ref 4–11)

## 2020-02-18 PROCEDURE — 36415 COLL VENOUS BLD VENIPUNCTURE: CPT | Performed by: INTERNAL MEDICINE

## 2020-02-18 PROCEDURE — 80053 COMPREHEN METABOLIC PANEL: CPT | Performed by: INTERNAL MEDICINE

## 2020-02-18 PROCEDURE — 85025 COMPLETE CBC W/AUTO DIFF WBC: CPT | Performed by: INTERNAL MEDICINE

## 2020-02-20 ENCOUNTER — OFFICE VISIT (OUTPATIENT)
Dept: PULMONOLOGY | Facility: CLINIC | Age: 55
End: 2020-02-20
Attending: INTERNAL MEDICINE
Payer: COMMERCIAL

## 2020-02-20 VITALS
HEART RATE: 55 BPM | OXYGEN SATURATION: 98 % | BODY MASS INDEX: 24.29 KG/M2 | SYSTOLIC BLOOD PRESSURE: 129 MMHG | HEIGHT: 69 IN | DIASTOLIC BLOOD PRESSURE: 78 MMHG | WEIGHT: 164 LBS

## 2020-02-20 DIAGNOSIS — J84.9 ILD (INTERSTITIAL LUNG DISEASE) (H): ICD-10-CM

## 2020-02-20 DIAGNOSIS — J84.9 ILD (INTERSTITIAL LUNG DISEASE) (H): Primary | ICD-10-CM

## 2020-02-20 PROCEDURE — G0463 HOSPITAL OUTPT CLINIC VISIT: HCPCS | Mod: ZF

## 2020-02-20 ASSESSMENT — MIFFLIN-ST. JEOR: SCORE: 1564.28

## 2020-02-20 ASSESSMENT — PAIN SCALES - GENERAL: PAINLEVEL: NO PAIN (0)

## 2020-02-20 NOTE — LETTER
2/20/2020       RE: Kwaku Medrano  [address] [id number]     Dear Colleague,    Thank you for referring your patient, Kwaku Medrano, to the [facility] at [facility]. Please see a copy of my visit note below.    [facility] Interstitial Lung Disease Clinic    Reason for Visit  Kwaku Medrano is a 54 year old year old male who is being seen for Follow Up (Scleroderma)    HPI    Mr. Medrano is a 54-year-old with scleroderma interstitial lung disease who is here for follow-up.  He has been on mycophenolate since approximately July 2017 with his first appointment in the interstitial lung disease clinic in August 2017.      Today, he reports that he is doing well.  He continues to work full-time and averages 16-17,000 steps per day at work.  He denies paroxysmal nocturnal dyspnea or fevers.  He is able to walk up stairs without feeling short of breath.  He does endorse occasional cough.  He continues to take mycophenolate 1500 mg twice a day and prednisone 4 mg daily.  He reports that pantoprazole seems to be controlling his heartburn symptoms well.  He reports no new fingertip ulcers.        Current Outpatient Medications   Medication     aspirin 81 MG EC tablet     Blood Pressure Monitoring KIT     diltiazem ER COATED BEADS (CARDIZEM CD/CARTIA XT) 180 MG 24 hr capsule     lisinopril (PRINIVIL/ZESTRIL) 10 MG tablet     mycophenolate (GENERIC EQUIVALENT) 500 MG tablet     pantoprazole (PROTONIX) 40 MG EC tablet     predniSONE (DELTASONE) 1 MG tablet     sulfamethoxazole-trimethoprim (BACTRIM) 400-80 MG tablet     No current facility-administered medications for this visit.      Allergies   Allergen Reactions     Ampicillin Rash     Past Medical History:   Diagnosis Date     Atrial fibrillation and flutter (H) 07/2017     Fracture      GERD (gastroesophageal reflux disease)      Interstitial lung disease (H)      sclerderma ILD; present on CXR 3-2017 and CT 5-2017; dx confirmed by CT 9-2017 fibrotic NSIP pattern with increased fibrosis; started mycophenolate 7/2017     Scleroderma (H)     dx 9- at [facility] by Dr. Acosta       Past Surgical History:   Procedure Laterality Date     COLONOSCOPY N/A 7/19/2017    Procedure: COLONOSCOPY;  COLONOSCOPY;  Surgeon: Mita Jimenez MD;  Location:  GI     NO HISTORY OF SURGERY         Social History     Socioeconomic History     Marital status: Single     Spouse name: Not on file     Number of children: Not on file     Years of education: Not on file     Highest education level: Not on file   Occupational History     Not on file   Social Needs     Financial resource strain: Not on file     Food insecurity:     Worry: Not on file     Inability: Not on file     Transportation needs:     Medical: Not on file     Non-medical: Not on file   Tobacco Use     Smoking status: Never Smoker     Smokeless tobacco: Former User     Types: Chew   Substance and Sexual Activity     Alcohol use: Yes     Comment: very rarely     Drug use: No     Sexual activity: Not Currently   Lifestyle     Physical activity:     Days per week: Not on file     Minutes per session: Not on file     Stress: Not on file   Relationships     Social connections:     Talks on phone: Not on file     Gets together: Not on file     Attends Rastafarian service: Not on file     Active member of club or organization: Not on file     Attends meetings of clubs or organizations: Not on file     Relationship status: Not on file     Intimate partner violence:     Fear of current or ex partner: Not on file     Emotionally abused: Not on file     Physically abused: Not on file     Forced sexual activity: Not on file   Other Topics Concern     Parent/sibling w/ CABG, MI or angioplasty before 65F 55M? Not Asked   Social History Narrative    Work in [employer].  Worked on car brakes during teen years, but otherwise no asbestos  "exposure.  Denies exposure to hot tubs, silica, pet birds, mold.  Single, no children.       Family History   Problem Relation Age of Onset     Prostate Cancer Father      Lung Cancer Other      Family History Negative Mother      Family History Negative Maternal Grandmother      Family History Negative Maternal Grandfather      Family History Negative Paternal Grandmother      Other - See Comments Paternal Grandfather         Atherosclerosis     Family History Negative Brother      Family History Negative Sister      Family History Negative Brother      LUNG DISEASE No family hx of      Rheumatologic Disease No family hx of              ROS Pulmonary  A complete ROS was otherwise negative except as noted in the HPI.    Vitals: /78 (BP Location: Left arm, Patient Position: Chair, Cuff Size: Adult Regular)   Pulse 55   Ht 1.753 m (5' 9\")   Wt 74.4 kg (164 lb)   SpO2 98%   BMI 24.22 kg/m       Exam:   GENERAL APPEARANCE: Well developed, well nourished, alert, and in no apparent distress.  RESP: good air flow throughout.  No crackles. No rhonchi. No wheezes.  CV: Normal S1, S2, regular rhythm, normal rate. No murmur.  No LE edema.   MS: extremities normal. No clubbing. No cyanosis.  SKIN: no rash on limited exam.  + dry skin.  NEURO: Mentation intact, speech normal, normal gait and stance.  PSYCH: mentation appears normal. and affect normal/bright.    Results:  Recent Results (from the past 168 hour(s))   Comprehensive metabolic panel    Collection Time: 02/18/20  7:31 AM   Result Value Ref Range    Sodium 138 133 - 144 mmol/L    Potassium 4.7 3.4 - 5.3 mmol/L    Chloride 107 94 - 109 mmol/L    Carbon Dioxide 25 20 - 32 mmol/L    Anion Gap 6 3 - 14 mmol/L    Glucose 102 (H) 70 - 99 mg/dL    Urea Nitrogen 15 7 - 30 mg/dL    Creatinine 1.07 0.66 - 1.25 mg/dL    GFR Estimate 77 >60 mL/min/[1.73_m2]    GFR Estimate If Black 89 >60 mL/min/[1.73_m2]    Calcium 8.9 8.5 - 10.1 mg/dL    Bilirubin Total 0.5 0.2 - 1.3 " mg/dL    Albumin 3.6 3.4 - 5.0 g/dL    Protein Total 6.1 (L) 6.8 - 8.8 g/dL    Alkaline Phosphatase 127 40 - 150 U/L    ALT 20 0 - 70 U/L    AST 10 0 - 45 U/L   CBC with platelets differential    Collection Time: 02/18/20  7:31 AM   Result Value Ref Range    WBC 6.9 4.0 - 11.0 10e9/L    RBC Count 4.58 4.4 - 5.9 10e12/L    Hemoglobin 13.4 13.3 - 17.7 g/dL    Hematocrit 41.2 40.0 - 53.0 %    MCV 90 78 - 100 fl    MCH 29.3 26.5 - 33.0 pg    MCHC 32.5 31.5 - 36.5 g/dL    RDW 13.3 10.0 - 15.0 %    Platelet Count 351 150 - 450 10e9/L    % Neutrophils 72.4 %    % Lymphocytes 13.5 %    % Monocytes 11.8 %    % Eosinophils 2.0 %    % Basophils 0.3 %    Absolute Neutrophil 5.0 1.6 - 8.3 10e9/L    Absolute Lymphocytes 0.9 0.8 - 5.3 10e9/L    Absolute Monocytes 0.8 0.0 - 1.3 10e9/L    Absolute Eosinophils 0.1 0.0 - 0.7 10e9/L    Absolute Basophils 0.0 0.0 - 0.2 10e9/L    Diff Method Automated Method    General PFT Lab (Please always keep checked)    Collection Time: 02/20/20  7:53 AM   Result Value Ref Range    FVC-Pred 4.63 L    FVC-Pre 4.22 L    FVC-%Pred-Pre 91 %    FEV1-Pre 3.57 L    FEV1-%Pred-Pre 98 %    FEV1FVC-Pred 78 %    FEV1FVC-Pre 85 %    FEFMax-Pred 9.27 L/sec    FEFMax-Pre 10.90 L/sec    FEFMax-%Pred-Pre 117 %    FEF2575-Pred 3.15 L/sec    FEF2575-Pre 4.07 L/sec    EUC7901-%Pred-Pre 129 %    ExpTime-Pre 6.81 sec    FIFMax-Pre 3.49 L/sec    VC-Pred 4.91 L    VC-Pre 4.35 L    VC-%Pred-Pre 88 %    IC-Pred 3.51 L    IC-Pre 2.79 L    IC-%Pred-Pre 79 %    ERV-Pred 1.40 L    ERV-Pre 1.57 L    ERV-%Pred-Pre 112 %    FEV1FEV6-Pred 80 %    FEV1FEV6-Pre 85 %    FRCPleth-Pred 3.52 L    FRCPleth-Pre 3.17 L    FRCPleth-%Pred-Pre 90 %    RVPleth-Pred 2.30 L    RVPleth-Pre 1.60 L    RVPleth-%Pred-Pre 69 %    TLCPleth-Pred 6.92 L    TLCPleth-Pre 5.95 L    TLCPleth-%Pred-Pre 86 %    DLCOunc-Pred 27.47 ml/min/mmHg    DLCOunc-Pre 21.26 ml/min/mmHg    DLCOunc-%Pred-Pre 77 %    DLCOcor-Pre 22.05 ml/min/mmHg    DLCOcor-%Pred-Pre 80 %     VA-Pre 5.41 L    VA-%Pred-Pre 85 %    FEV1SVC-Pred 74 %    FEV1SVC-Pre 82 %         FVC FEV1 TLC DLCO   [facility]  MMF and pred 7/2017 2.79 58% 2.54 68%    12.9 52%   8-3-2017 [facility] 3.34 2.85 4.89 17.63   9- 3.46 73% 3.13 85% 4.73 68% 19.77 67%   12- 3.91 83% 3.47 94% 5.10 73% 20.16 68%   5- 4.21 90% 3.64 99% 5.96 86% 21.63 74%   8- 4.19 89% 3.64 99% 5.46 78% 19.94 68%   12- 4.19 90% 3.68 101% 6.17 89% 21.74 74%   3- 4.21 90% 3.60 98% 5.56 80% 20.27 73%   8-2-2019 4.37 94% 3.74 103% 5.67 81% 21.31 73%   11- 4.30 92% 3.65 101% 5.81 83% 20.51 74%   2- 4.22 91% 3.57 98% 5.95 86% 21.26 77%       I reviewed pulmonary function test that was performed today.  This shows normal spirometry, lung volumes, and diffusion.  Lung function improved significantly from baseline in June 2017 and is now in the normal range and has stabilized.  I also reviewed labs from a few days ago, and they are within normal limits.    I reviewed results with the patient.      Assessment and plan:   Mr. Medrano is a 54-year-old with scleroderma interstitial lung disease who is here for follow-up.  1.  Scleroderma ILD.  PFT has improved since initial measurement in 2017, and is now in the normal range and has stabilized.  He has no pulmonary symptoms except for an occasional cough, and he is tolerating mycophenolate well.  We will continue 1500 mg twice a day.  I would like him to stay on this dose for another couple years, and if he continues to do well then we can consider decreasing the dose eventually down to 1000 mg twice a day.  He is in agreement with this plan.  Encouraged him to continue regular activity as he is doing.  I would not recommend changing to nintedanib or adding nintedanib to his regimen as he is doing well.  He will return in 3 months with full PFT.  His prednisone dose is being managed by Dr. Acosta.  2.  High risk medication monitoring.  He will continue  Bactrim for pneumocystis prophylaxis.  I will recheck labs in 3 months to monitor mycophenolate.    Again, thank you for allowing me to participate in the care of your patient.      Sincerely,    Elma Dillon MD

## 2020-02-20 NOTE — PROGRESS NOTES
[facility] Interstitial Lung Disease Clinic    Reason for Visit  Kwaku Medrano is a 54 year old year old male who is being seen for Follow Up (Scleroderma)    HPI    Mr. Medrano is a 54-year-old with scleroderma interstitial lung disease who is here for follow-up.  He has been on mycophenolate since approximately July 2017 with his first appointment in the interstitial lung disease clinic in August 2017.     Today, he reports that he is doing well.  He continues to work full-time and averages 16-17,000 steps per day at work.  He denies paroxysmal nocturnal dyspnea or fevers.  He is able to walk up stairs without feeling short of breath.  He does endorse occasional cough.  He continues to take mycophenolate 1500 mg twice a day and prednisone 4 mg daily.  He reports that pantoprazole seems to be controlling his heartburn symptoms well.  He reports no new fingertip ulcers.        Current Outpatient Medications   Medication     aspirin 81 MG EC tablet     Blood Pressure Monitoring KIT     diltiazem ER COATED BEADS (CARDIZEM CD/CARTIA XT) 180 MG 24 hr capsule     lisinopril (PRINIVIL/ZESTRIL) 10 MG tablet     mycophenolate (GENERIC EQUIVALENT) 500 MG tablet     pantoprazole (PROTONIX) 40 MG EC tablet     predniSONE (DELTASONE) 1 MG tablet     sulfamethoxazole-trimethoprim (BACTRIM) 400-80 MG tablet     No current facility-administered medications for this visit.      Allergies   Allergen Reactions     Ampicillin Rash     Past Medical History:   Diagnosis Date     Atrial fibrillation and flutter (H) 07/2017     Fracture      GERD (gastroesophageal reflux disease)      Interstitial lung disease (H)     sclerderma ILD; present on CXR 3-2017 and CT 5-2017; dx confirmed by CT 9-2017 fibrotic NSIP pattern with increased fibrosis; started mycophenolate 7/2017     Scleroderma (H)     dx 9- at [facility] by Dr. Acosta       Past Surgical History:   Procedure Laterality Date     COLONOSCOPY N/A 7/19/2017     Procedure: COLONOSCOPY;  COLONOSCOPY;  Surgeon: Mita Jimenez MD;  Location:  GI     NO HISTORY OF SURGERY         Social History     Socioeconomic History     Marital status: Single     Spouse name: Not on file     Number of children: Not on file     Years of education: Not on file     Highest education level: Not on file   Occupational History     Not on file   Social Needs     Financial resource strain: Not on file     Food insecurity:     Worry: Not on file     Inability: Not on file     Transportation needs:     Medical: Not on file     Non-medical: Not on file   Tobacco Use     Smoking status: Never Smoker     Smokeless tobacco: Former User     Types: Chew   Substance and Sexual Activity     Alcohol use: Yes     Comment: very rarely     Drug use: No     Sexual activity: Not Currently   Lifestyle     Physical activity:     Days per week: Not on file     Minutes per session: Not on file     Stress: Not on file   Relationships     Social connections:     Talks on phone: Not on file     Gets together: Not on file     Attends Mosque service: Not on file     Active member of club or organization: Not on file     Attends meetings of clubs or organizations: Not on file     Relationship status: Not on file     Intimate partner violence:     Fear of current or ex partner: Not on file     Emotionally abused: Not on file     Physically abused: Not on file     Forced sexual activity: Not on file   Other Topics Concern     Parent/sibling w/ CABG, MI or angioplasty before 65F 55M? Not Asked   Social History Narrative    Work in [employer].  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  Denies exposure to hot tubs, silica, pet birds, mold.  Single, no children.       Family History   Problem Relation Age of Onset     Prostate Cancer Father      Lung Cancer Other      Family History Negative Mother      Family History Negative Maternal Grandmother      Family History Negative Maternal  "Grandfather      Family History Negative Paternal Grandmother      Other - See Comments Paternal Grandfather         Atherosclerosis     Family History Negative Brother      Family History Negative Sister      Family History Negative Brother      LUNG DISEASE No family hx of      Rheumatologic Disease No family hx of              ROS Pulmonary  A complete ROS was otherwise negative except as noted in the HPI.    Vitals: /78 (BP Location: Left arm, Patient Position: Chair, Cuff Size: Adult Regular)   Pulse 55   Ht 1.753 m (5' 9\")   Wt 74.4 kg (164 lb)   SpO2 98%   BMI 24.22 kg/m      Exam:   GENERAL APPEARANCE: Well developed, well nourished, alert, and in no apparent distress.  RESP: good air flow throughout.  No crackles. No rhonchi. No wheezes.  CV: Normal S1, S2, regular rhythm, normal rate. No murmur.  No LE edema.   MS: extremities normal. No clubbing. No cyanosis.  SKIN: no rash on limited exam.  + dry skin.  NEURO: Mentation intact, speech normal, normal gait and stance.  PSYCH: mentation appears normal. and affect normal/bright.    Results:  Recent Results (from the past 168 hour(s))   Comprehensive metabolic panel    Collection Time: 02/18/20  7:31 AM   Result Value Ref Range    Sodium 138 133 - 144 mmol/L    Potassium 4.7 3.4 - 5.3 mmol/L    Chloride 107 94 - 109 mmol/L    Carbon Dioxide 25 20 - 32 mmol/L    Anion Gap 6 3 - 14 mmol/L    Glucose 102 (H) 70 - 99 mg/dL    Urea Nitrogen 15 7 - 30 mg/dL    Creatinine 1.07 0.66 - 1.25 mg/dL    GFR Estimate 77 >60 mL/min/[1.73_m2]    GFR Estimate If Black 89 >60 mL/min/[1.73_m2]    Calcium 8.9 8.5 - 10.1 mg/dL    Bilirubin Total 0.5 0.2 - 1.3 mg/dL    Albumin 3.6 3.4 - 5.0 g/dL    Protein Total 6.1 (L) 6.8 - 8.8 g/dL    Alkaline Phosphatase 127 40 - 150 U/L    ALT 20 0 - 70 U/L    AST 10 0 - 45 U/L   CBC with platelets differential    Collection Time: 02/18/20  7:31 AM   Result Value Ref Range    WBC 6.9 4.0 - 11.0 10e9/L    RBC Count 4.58 4.4 - 5.9 " 10e12/L    Hemoglobin 13.4 13.3 - 17.7 g/dL    Hematocrit 41.2 40.0 - 53.0 %    MCV 90 78 - 100 fl    MCH 29.3 26.5 - 33.0 pg    MCHC 32.5 31.5 - 36.5 g/dL    RDW 13.3 10.0 - 15.0 %    Platelet Count 351 150 - 450 10e9/L    % Neutrophils 72.4 %    % Lymphocytes 13.5 %    % Monocytes 11.8 %    % Eosinophils 2.0 %    % Basophils 0.3 %    Absolute Neutrophil 5.0 1.6 - 8.3 10e9/L    Absolute Lymphocytes 0.9 0.8 - 5.3 10e9/L    Absolute Monocytes 0.8 0.0 - 1.3 10e9/L    Absolute Eosinophils 0.1 0.0 - 0.7 10e9/L    Absolute Basophils 0.0 0.0 - 0.2 10e9/L    Diff Method Automated Method    General PFT Lab (Please always keep checked)    Collection Time: 02/20/20  7:53 AM   Result Value Ref Range    FVC-Pred 4.63 L    FVC-Pre 4.22 L    FVC-%Pred-Pre 91 %    FEV1-Pre 3.57 L    FEV1-%Pred-Pre 98 %    FEV1FVC-Pred 78 %    FEV1FVC-Pre 85 %    FEFMax-Pred 9.27 L/sec    FEFMax-Pre 10.90 L/sec    FEFMax-%Pred-Pre 117 %    FEF2575-Pred 3.15 L/sec    FEF2575-Pre 4.07 L/sec    [id number]-%Pred-Pre 129 %    ExpTime-Pre 6.81 sec    FIFMax-Pre 3.49 L/sec    VC-Pred 4.91 L    VC-Pre 4.35 L    VC-%Pred-Pre 88 %    IC-Pred 3.51 L    IC-Pre 2.79 L    IC-%Pred-Pre 79 %    ERV-Pred 1.40 L    ERV-Pre 1.57 L    ERV-%Pred-Pre 112 %    FEV1FEV6-Pred 80 %    FEV1FEV6-Pre 85 %    FRCPleth-Pred 3.52 L    FRCPleth-Pre 3.17 L    FRCPleth-%Pred-Pre 90 %    RVPleth-Pred 2.30 L    RVPleth-Pre 1.60 L    RVPleth-%Pred-Pre 69 %    TLCPleth-Pred 6.92 L    TLCPleth-Pre 5.95 L    TLCPleth-%Pred-Pre 86 %    DLCOunc-Pred 27.47 ml/min/mmHg    DLCOunc-Pre 21.26 ml/min/mmHg    DLCOunc-%Pred-Pre 77 %    DLCOcor-Pre 22.05 ml/min/mmHg    DLCOcor-%Pred-Pre 80 %    VA-Pre 5.41 L    VA-%Pred-Pre 85 %    FEV1SVC-Pred 74 %    FEV1SVC-Pre 82 %         FVC FEV1 TLC DLCO   [facility]  MMF and pred 7/2017 2.79 58% 2.54 68%    12.9 52%   8-3-2017 [facility] 3.34 2.85 4.89 17.63   9- 3.46 73% 3.13 85% 4.73 68% 19.77 67%   12- 3.91 83% 3.47 94% 5.10 73% 20.16 68%    5- 4.21 90% 3.64 99% 5.96 86% 21.63 74%   8- 4.19 89% 3.64 99% 5.46 78% 19.94 68%   12- 4.19 90% 3.68 101% 6.17 89% 21.74 74%   3- 4.21 90% 3.60 98% 5.56 80% 20.27 73%   8-2-2019 4.37 94% 3.74 103% 5.67 81% 21.31 73%   11- 4.30 92% 3.65 101% 5.81 83% 20.51 74%   2- 4.22 91% 3.57 98% 5.95 86% 21.26 77%       I reviewed pulmonary function test that was performed today.  This shows normal spirometry, lung volumes, and diffusion.  Lung function improved significantly from baseline in June 2017 and is now in the normal range and has stabilized.  I also reviewed labs from a few days ago, and they are within normal limits.    I reviewed results with the patient.      Assessment and plan:   Mr. Medrano is a 54-year-old with scleroderma interstitial lung disease who is here for follow-up.  1.  Scleroderma ILD.  PFT has improved since initial measurement in 2017, and is now in the normal range and has stabilized.  He has no pulmonary symptoms except for an occasional cough, and he is tolerating mycophenolate well.  We will continue 1500 mg twice a day.  I would like him to stay on this dose for another couple years, and if he continues to do well then we can consider decreasing the dose eventually down to 1000 mg twice a day.  He is in agreement with this plan.  Encouraged him to continue regular activity as he is doing.  I would not recommend changing to nintedanib or adding nintedanib to his regimen as he is doing well.  He will return in 3 months with full PFT.  His prednisone dose is being managed by Dr. Acosta.  2.  High risk medication monitoring.  He will continue Bactrim for pneumocystis prophylaxis.  I will recheck labs in 3 months to monitor mycophenolate.

## 2020-02-20 NOTE — NURSING NOTE
Chief Complaint   Patient presents with     Follow Up     Scleroderma     Vitals were taken and medications were reconciled.     Deanna AGUEROA  8:20 AM

## 2020-02-25 LAB
DLCOCOR-%PRED-PRE: 80 %
DLCOCOR-PRE: 22.05 ML/MIN/MMHG
DLCOUNC-%PRED-PRE: 77 %
DLCOUNC-PRE: 21.26 ML/MIN/MMHG
DLCOUNC-PRED: 27.47 ML/MIN/MMHG
ERV-%PRED-PRE: 112 %
ERV-PRE: 1.57 L
ERV-PRED: 1.4 L
EXPTIME-PRE: 6.81 SEC
FEF2575-%PRED-PRE: 129 %
FEF2575-PRE: 4.07 L/SEC
FEF2575-PRED: 3.15 L/SEC
FEFMAX-%PRED-PRE: 117 %
FEFMAX-PRE: 10.9 L/SEC
FEFMAX-PRED: 9.27 L/SEC
FEV1-%PRED-PRE: 98 %
FEV1-PRE: 3.57 L
FEV1FEV6-PRE: 85 %
FEV1FEV6-PRED: 80 %
FEV1FVC-PRE: 85 %
FEV1FVC-PRED: 78 %
FEV1SVC-PRE: 82 %
FEV1SVC-PRED: 74 %
FIFMAX-PRE: 3.49 L/SEC
FRCPLETH-%PRED-PRE: 90 %
FRCPLETH-PRE: 3.17 L
FRCPLETH-PRED: 3.52 L
FVC-%PRED-PRE: 91 %
FVC-PRE: 4.22 L
FVC-PRED: 4.63 L
IC-%PRED-PRE: 79 %
IC-PRE: 2.79 L
IC-PRED: 3.51 L
RVPLETH-%PRED-PRE: 69 %
RVPLETH-PRE: 1.6 L
RVPLETH-PRED: 2.3 L
TLCPLETH-%PRED-PRE: 86 %
TLCPLETH-PRE: 5.95 L
TLCPLETH-PRED: 6.92 L
VA-%PRED-PRE: 85 %
VA-PRE: 5.41 L
VC-%PRED-PRE: 88 %
VC-PRE: 4.35 L
VC-PRED: 4.91 L

## 2020-05-06 DIAGNOSIS — M34.9 SCLERODERMA (H): ICD-10-CM

## 2020-05-06 DIAGNOSIS — M19.90 INFLAMMATORY ARTHRITIS: ICD-10-CM

## 2020-05-06 DIAGNOSIS — J84.9 ILD (INTERSTITIAL LUNG DISEASE) (H): ICD-10-CM

## 2020-05-07 RX ORDER — PREDNISONE 1 MG/1
4 TABLET ORAL DAILY
Qty: 360 TABLET | Refills: 0 | Status: SHIPPED | OUTPATIENT
Start: 2020-05-07

## 2020-05-07 NOTE — TELEPHONE ENCOUNTER
predniSONE (DELTASONE) 1 MG tablet      Last Written Prescription Date:  9/12/19  Last Fill Quantity: 360,   # refills: 1  Last Office Visit : 1/21/20    Future Office visit:  none    Refill to pharmacy.

## 2020-05-27 DIAGNOSIS — M33.13 DERMATOMYOSITIS (H): ICD-10-CM

## 2020-05-27 DIAGNOSIS — J84.9 ILD (INTERSTITIAL LUNG DISEASE) (H): ICD-10-CM

## 2020-05-27 DIAGNOSIS — M34.9 SCLERODERMA (H): ICD-10-CM

## 2020-05-27 DIAGNOSIS — Z79.899 HIGH RISK MEDICATIONS (NOT ANTICOAGULANTS) LONG-TERM USE: ICD-10-CM

## 2020-05-27 LAB
ALBUMIN SERPL-MCNC: 3.7 G/DL (ref 3.4–5)
ALP SERPL-CCNC: 144 U/L (ref 40–150)
ALT SERPL W P-5'-P-CCNC: 19 U/L (ref 0–70)
ANION GAP SERPL CALCULATED.3IONS-SCNC: 6 MMOL/L (ref 3–14)
AST SERPL W P-5'-P-CCNC: 16 U/L (ref 0–45)
BASOPHILS # BLD AUTO: 0 10E9/L (ref 0–0.2)
BASOPHILS NFR BLD AUTO: 0.6 %
BILIRUB SERPL-MCNC: 0.6 MG/DL (ref 0.2–1.3)
BUN SERPL-MCNC: 11 MG/DL (ref 7–30)
CALCIUM SERPL-MCNC: 8.4 MG/DL (ref 8.5–10.1)
CHLORIDE SERPL-SCNC: 108 MMOL/L (ref 94–109)
CO2 SERPL-SCNC: 25 MMOL/L (ref 20–32)
CREAT SERPL-MCNC: 1.18 MG/DL (ref 0.66–1.25)
CRP SERPL-MCNC: <2.9 MG/L (ref 0–8)
DIFFERENTIAL METHOD BLD: ABNORMAL
EOSINOPHIL # BLD AUTO: 0.1 10E9/L (ref 0–0.7)
EOSINOPHIL NFR BLD AUTO: 1.3 %
ERYTHROCYTE [DISTWIDTH] IN BLOOD BY AUTOMATED COUNT: 13.4 % (ref 10–15)
ERYTHROCYTE [SEDIMENTATION RATE] IN BLOOD BY WESTERGREN METHOD: 3 MM/H (ref 0–20)
GFR SERPL CREATININE-BSD FRML MDRD: 68 ML/MIN/{1.73_M2}
GLUCOSE SERPL-MCNC: 100 MG/DL (ref 70–99)
HCT VFR BLD AUTO: 39.9 % (ref 40–53)
HGB BLD-MCNC: 13.2 G/DL (ref 13.3–17.7)
LYMPHOCYTES # BLD AUTO: 1.1 10E9/L (ref 0.8–5.3)
LYMPHOCYTES NFR BLD AUTO: 20.5 %
MCH RBC QN AUTO: 29.5 PG (ref 26.5–33)
MCHC RBC AUTO-ENTMCNC: 33.1 G/DL (ref 31.5–36.5)
MCV RBC AUTO: 89 FL (ref 78–100)
MONOCYTES # BLD AUTO: 0.7 10E9/L (ref 0–1.3)
MONOCYTES NFR BLD AUTO: 12.7 %
NEUTROPHILS # BLD AUTO: 3.5 10E9/L (ref 1.6–8.3)
NEUTROPHILS NFR BLD AUTO: 64.9 %
PLATELET # BLD AUTO: 301 10E9/L (ref 150–450)
POTASSIUM SERPL-SCNC: 3.8 MMOL/L (ref 3.4–5.3)
PROT SERPL-MCNC: 6.5 G/DL (ref 6.8–8.8)
RBC # BLD AUTO: 4.47 10E12/L (ref 4.4–5.9)
SODIUM SERPL-SCNC: 139 MMOL/L (ref 133–144)
WBC # BLD AUTO: 5.4 10E9/L (ref 4–11)

## 2020-05-27 PROCEDURE — 80053 COMPREHEN METABOLIC PANEL: CPT | Performed by: INTERNAL MEDICINE

## 2020-05-27 PROCEDURE — 36415 COLL VENOUS BLD VENIPUNCTURE: CPT | Performed by: INTERNAL MEDICINE

## 2020-05-27 PROCEDURE — 85025 COMPLETE CBC W/AUTO DIFF WBC: CPT | Performed by: INTERNAL MEDICINE

## 2020-05-27 PROCEDURE — 86140 C-REACTIVE PROTEIN: CPT | Performed by: INTERNAL MEDICINE

## 2020-05-27 PROCEDURE — 85652 RBC SED RATE AUTOMATED: CPT | Performed by: INTERNAL MEDICINE

## 2020-05-29 ENCOUNTER — VIRTUAL VISIT (OUTPATIENT)
Dept: PULMONOLOGY | Facility: CLINIC | Age: 55
End: 2020-05-29
Attending: INTERNAL MEDICINE
Payer: COMMERCIAL

## 2020-05-29 DIAGNOSIS — J84.9 ILD (INTERSTITIAL LUNG DISEASE) (H): Primary | ICD-10-CM

## 2020-05-29 NOTE — LETTER
"    5/29/2020         RE: Kwaku Medrano  [address] [id number]        Dear Colleague,    Thank you for referring your patient, Kwaku Medrano, to the [facility]. Please see a copy of my visit note below.    Kwaku Medrano is a 54 year old male who is being evaluated via a billable telephone visit.      The patient has been notified of following:     \"This telephone visit will be conducted via a call between you and your physician/provider. We have found that certain health care needs can be provided without the need for a physical exam.  This service lets us provide the care you need with a short phone conversation.  If a prescription is necessary we can send it directly to your pharmacy.  If lab work is needed we can place an order for that and you can then stop by our lab to have the test done at a later time.    Telephone visits are billed at different rates depending on your insurance coverage. During this emergency period, for some insurers they may be billed the same as an in-person visit.  Please reach out to your insurance provider with any questions.    If during the course of the call the physician/provider feels a telephone visit is not appropriate, you will not be charged for this service.\"    Patient has given verbal consent for Telephone visit?  Yes    What phone number would you like to be contacted at? [phone]    How would you like to obtain your AVS? [place]    Phone call duration: 10 minutes      [facility]    Reason for Visit  Kwaku Medrano is a 54 year old year old male who is being seen for RECHECK (Return phone visit )    HPI    Mr. Medrano is a 54-year-old with scleroderma interstitial lung disease with whom I had a phone visit today.  He has been on mycophenolate since approximately July 2017 with his first appointment in our ILD clinic in August 2017.  Today, he reports that he " "is doing well.  His breathing is \"good.\"   He has noticed no worsening dyspnea.  He denies fevers.  He continues to work full-time at the [employer] and averages 22-25,000 steps per day at work.  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  He lives with his parents.  He does wear a mask at work except when he is doing physical work.  He continues to take mycophenolate 1500 mg twice a day.  He occasionally forgets to take the evening dose because he works late.        Current Outpatient Medications   Medication     aspirin 81 MG EC tablet     Blood Pressure Monitoring KIT     diltiazem ER COATED BEADS (CARDIZEM CD/CARTIA XT) 180 MG 24 hr capsule     lisinopril (PRINIVIL/ZESTRIL) 10 MG tablet     mycophenolate (GENERIC EQUIVALENT) 500 MG tablet     pantoprazole (PROTONIX) 40 MG EC tablet     predniSONE (DELTASONE) 1 MG tablet     sulfamethoxazole-trimethoprim (BACTRIM) 400-80 MG tablet     No current facility-administered medications for this visit.      Allergies   Allergen Reactions     Ampicillin Rash     Past Medical History:   Diagnosis Date     Atrial fibrillation and flutter (H) 07/2017     Fracture      GERD (gastroesophageal reflux disease)      Interstitial lung disease (H)     sclerderma ILD; present on CXR 3-2017 and CT 5-2017; dx confirmed by CT 9-2017 fibrotic NSIP pattern with increased fibrosis; started mycophenolate 7/2017     Scleroderma (H)     dx 9- at [facility] by Dr. Acosta       Past Surgical History:   Procedure Laterality Date     COLONOSCOPY N/A 7/19/2017    Procedure: COLONOSCOPY;  COLONOSCOPY;  Surgeon: Mita Jimenez MD;  Location:  GI     NO HISTORY OF SURGERY         Social History     Socioeconomic History     Marital status: Single     Spouse name: Not on file     Number of children: Not on file     Years of education: Not on file     Highest education level: Not on file   Occupational History     Not on file   Social Needs     Financial " resource strain: Not on file     Food insecurity     Worry: Not on file     Inability: Not on file     Transportation needs     Medical: Not on file     Non-medical: Not on file   Tobacco Use     Smoking status: Never Smoker     Smokeless tobacco: Former User     Types: Chew   Substance and Sexual Activity     Alcohol use: Yes     Comment: very rarely     Drug use: No     Sexual activity: Not Currently   Lifestyle     Physical activity     Days per week: Not on file     Minutes per session: Not on file     Stress: Not on file   Relationships     Social connections     Talks on phone: Not on file     Gets together: Not on file     Attends Pentecostalism service: Not on file     Active member of club or organization: Not on file     Attends meetings of clubs or organizations: Not on file     Relationship status: Not on file     Intimate partner violence     Fear of current or ex partner: Not on file     Emotionally abused: Not on file     Physically abused: Not on file     Forced sexual activity: Not on file   Other Topics Concern     Parent/sibling w/ CABG, MI or angioplasty before 65F 55M? Not Asked   Social History Narrative    Work in [employer].  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  Denies exposure to hot tubs, silica, pet birds, mold.  Single, no children.       Family History   Problem Relation Age of Onset     Prostate Cancer Father      Lung Cancer Other      Family History Negative Mother      Family History Negative Maternal Grandmother      Family History Negative Maternal Grandfather      Family History Negative Paternal Grandmother      Other - See Comments Paternal Grandfather         Atherosclerosis     Family History Negative Brother      Family History Negative Sister      Family History Negative Brother      LUNG DISEASE No family hx of      Rheumatologic Disease No family hx of              ROS Pulmonary  A complete ROS was otherwise negative except as noted in the  HPI.    Results:  Recent Results (from the past 168 hour(s))   CBC with platelets differential    Collection Time: 05/27/20  2:03 PM   Result Value Ref Range    WBC 5.4 4.0 - 11.0 10e9/L    RBC Count 4.47 4.4 - 5.9 10e12/L    Hemoglobin 13.2 (L) 13.3 - 17.7 g/dL    Hematocrit 39.9 (L) 40.0 - 53.0 %    MCV 89 78 - 100 fl    MCH 29.5 26.5 - 33.0 pg    MCHC 33.1 31.5 - 36.5 g/dL    RDW 13.4 10.0 - 15.0 %    Platelet Count 301 150 - 450 10e9/L    % Neutrophils 64.9 %    % Lymphocytes 20.5 %    % Monocytes 12.7 %    % Eosinophils 1.3 %    % Basophils 0.6 %    Absolute Neutrophil 3.5 1.6 - 8.3 10e9/L    Absolute Lymphocytes 1.1 0.8 - 5.3 10e9/L    Absolute Monocytes 0.7 0.0 - 1.3 10e9/L    Absolute Eosinophils 0.1 0.0 - 0.7 10e9/L    Absolute Basophils 0.0 0.0 - 0.2 10e9/L    Diff Method Automated Method    Comprehensive metabolic panel    Collection Time: 05/27/20  2:03 PM   Result Value Ref Range    Sodium 139 133 - 144 mmol/L    Potassium 3.8 3.4 - 5.3 mmol/L    Chloride 108 94 - 109 mmol/L    Carbon Dioxide 25 20 - 32 mmol/L    Anion Gap 6 3 - 14 mmol/L    Glucose 100 (H) 70 - 99 mg/dL    Urea Nitrogen 11 7 - 30 mg/dL    Creatinine 1.18 0.66 - 1.25 mg/dL    GFR Estimate 68 >60 mL/min/[1.73_m2]    GFR Estimate If Black 79 >60 mL/min/[1.73_m2]    Calcium 8.4 (L) 8.5 - 10.1 mg/dL    Bilirubin Total 0.6 0.2 - 1.3 mg/dL    Albumin 3.7 3.4 - 5.0 g/dL    Protein Total 6.5 (L) 6.8 - 8.8 g/dL    Alkaline Phosphatase 144 40 - 150 U/L    ALT 19 0 - 70 U/L    AST 16 0 - 45 U/L   Erythrocyte sedimentation rate auto    Collection Time: 05/27/20  2:03 PM   Result Value Ref Range    Sed Rate 3 0 - 20 mm/h   CRP inflammation    Collection Time: 05/27/20  2:03 PM   Result Value Ref Range    CRP Inflammation <2.9 0.0 - 8.0 mg/L       I reviewed labs that were performed a couple days ago.  Calcium level is mildly reduced.  He also has stable mild anemia.  CRP is normal.    I reviewed results with the patient.      Assessment and plan:   Mr. Medrano is a 54-year-old with scleroderma interstitial lung disease with whom I had a phone visit today.     1.  Scleroderma ILD.  Symptomatically, he appears to be stable.  There is no PFT for me to review today as today's visit is a phone visit.  I encouraged him to continue regular physical activity as he is doing.  I also encouraged encouraged him to practice social distancing as he is doing.  He should continue mycophenolate 1500 mg twice a day.  We could consider decreasing to 1000 mg twice a day based on his next PFT if he remains stable.  I will see him back in 3 months with full PFT.    2.  High risk medication monitoring.  He will continue Bactrim for pneumocystis prophylaxis.  I will check labs again in 3 months to monitor mycophenolate.            Again, thank you for allowing me to participate in the care of your patient.        Sincerely,        Elma Dillon MD

## 2020-05-29 NOTE — PROGRESS NOTES
"Kwaku Medrano is a 54 year old male who is being evaluated via a billable telephone visit.      The patient has been notified of following:     \"This telephone visit will be conducted via a call between you and your physician/provider. We have found that certain health care needs can be provided without the need for a physical exam.  This service lets us provide the care you need with a short phone conversation.  If a prescription is necessary we can send it directly to your pharmacy.  If lab work is needed we can place an order for that and you can then stop by our lab to have the test done at a later time.    Telephone visits are billed at different rates depending on your insurance coverage. During this emergency period, for some insurers they may be billed the same as an in-person visit.  Please reach out to your insurance provider with any questions.    If during the course of the call the physician/provider feels a telephone visit is not appropriate, you will not be charged for this service.\"    Patient has given verbal consent for Telephone visit?  Yes    What phone number would you like to be contacted at? [phone]    How would you like to obtain your AVS? Darrickt    Phone call duration: 10 minutes      [facility]    Reason for Visit  Kwaku Medrano is a 54 year old year old male who is being seen for RECHECK (Return phone visit )    HPI    Mr. Medrano is a 54-year-old with scleroderma interstitial lung disease with whom I had a phone visit today.  He has been on mycophenolate since approximately July 2017 with his first appointment in our ILD clinic in August 2017.  Today, he reports that he is doing well.  His breathing is \"good.\"   He has noticed no worsening dyspnea.  He denies fevers.  He continues to work full-time at the [employer] and averages 22-25,000 steps per day at work.  He does practice social distancing at work in the [employer] and at home.  " He lives with his parents.  He does wear a mask at work except when he is doing physical work.  He continues to take mycophenolate 1500 mg twice a day.  He occasionally forgets to take the evening dose because he works late.        Current Outpatient Medications   Medication     aspirin 81 MG EC tablet     Blood Pressure Monitoring KIT     diltiazem ER COATED BEADS (CARDIZEM CD/CARTIA XT) 180 MG 24 hr capsule     lisinopril (PRINIVIL/ZESTRIL) 10 MG tablet     mycophenolate (GENERIC EQUIVALENT) 500 MG tablet     pantoprazole (PROTONIX) 40 MG EC tablet     predniSONE (DELTASONE) 1 MG tablet     sulfamethoxazole-trimethoprim (BACTRIM) 400-80 MG tablet     No current facility-administered medications for this visit.      Allergies   Allergen Reactions     Ampicillin Rash     Past Medical History:   Diagnosis Date     Atrial fibrillation and flutter (H) 07/2017     Fracture      GERD (gastroesophageal reflux disease)      Interstitial lung disease (H)     sclerderma ILD; present on CXR 3-2017 and CT 5-2017; dx confirmed by CT 9-2017 fibrotic NSIP pattern with increased fibrosis; started mycophenolate 7/2017     Scleroderma (H)     dx 9- at [facility] by Dr. Acosta       Past Surgical History:   Procedure Laterality Date     COLONOSCOPY N/A 7/19/2017    Procedure: COLONOSCOPY;  COLONOSCOPY;  Surgeon: Mita Jimenez MD;  Location:  GI     NO HISTORY OF SURGERY         Social History     Socioeconomic History     Marital status: Single     Spouse name: Not on file     Number of children: Not on file     Years of education: Not on file     Highest education level: Not on file   Occupational History     Not on file   Social Needs     Financial resource strain: Not on file     Food insecurity     Worry: Not on file     Inability: Not on file     Transportation needs     Medical: Not on file     Non-medical: Not on file   Tobacco Use     Smoking status: Never Smoker     Smokeless tobacco: Former User     Types:  Chew   Substance and Sexual Activity     Alcohol use: Yes     Comment: very rarely     Drug use: No     Sexual activity: Not Currently   Lifestyle     Physical activity     Days per week: Not on file     Minutes per session: Not on file     Stress: Not on file   Relationships     Social connections     Talks on phone: Not on file     Gets together: Not on file     Attends Congregational service: Not on file     Active member of club or organization: Not on file     Attends meetings of clubs or organizations: Not on file     Relationship status: Not on file     Intimate partner violence     Fear of current or ex partner: Not on file     Emotionally abused: Not on file     Physically abused: Not on file     Forced sexual activity: Not on file   Other Topics Concern     Parent/sibling w/ CABG, MI or angioplasty before 65F 55M? Not Asked   Social History Narrative    Work in [employer].  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  Denies exposure to hot tubs, silica, pet birds, mold.  Single, no children.       Family History   Problem Relation Age of Onset     Prostate Cancer Father      Lung Cancer Other      Family History Negative Mother      Family History Negative Maternal Grandmother      Family History Negative Maternal Grandfather      Family History Negative Paternal Grandmother      Other - See Comments Paternal Grandfather         Atherosclerosis     Family History Negative Brother      Family History Negative Sister      Family History Negative Brother      LUNG DISEASE No family hx of      Rheumatologic Disease No family hx of              ROS Pulmonary  A complete ROS was otherwise negative except as noted in the HPI.    Results:  Recent Results (from the past 168 hour(s))   CBC with platelets differential    Collection Time: 05/27/20  2:03 PM   Result Value Ref Range    WBC 5.4 4.0 - 11.0 10e9/L    RBC Count 4.47 4.4 - 5.9 10e12/L    Hemoglobin 13.2 (L) 13.3 - 17.7 g/dL     Hematocrit 39.9 (L) 40.0 - 53.0 %    MCV 89 78 - 100 fl    MCH 29.5 26.5 - 33.0 pg    MCHC 33.1 31.5 - 36.5 g/dL    RDW 13.4 10.0 - 15.0 %    Platelet Count 301 150 - 450 10e9/L    % Neutrophils 64.9 %    % Lymphocytes 20.5 %    % Monocytes 12.7 %    % Eosinophils 1.3 %    % Basophils 0.6 %    Absolute Neutrophil 3.5 1.6 - 8.3 10e9/L    Absolute Lymphocytes 1.1 0.8 - 5.3 10e9/L    Absolute Monocytes 0.7 0.0 - 1.3 10e9/L    Absolute Eosinophils 0.1 0.0 - 0.7 10e9/L    Absolute Basophils 0.0 0.0 - 0.2 10e9/L    Diff Method Automated Method    Comprehensive metabolic panel    Collection Time: 05/27/20  2:03 PM   Result Value Ref Range    Sodium 139 133 - 144 mmol/L    Potassium 3.8 3.4 - 5.3 mmol/L    Chloride 108 94 - 109 mmol/L    Carbon Dioxide 25 20 - 32 mmol/L    Anion Gap 6 3 - 14 mmol/L    Glucose 100 (H) 70 - 99 mg/dL    Urea Nitrogen 11 7 - 30 mg/dL    Creatinine 1.18 0.66 - 1.25 mg/dL    GFR Estimate 68 >60 mL/min/[1.73_m2]    GFR Estimate If Black 79 >60 mL/min/[1.73_m2]    Calcium 8.4 (L) 8.5 - 10.1 mg/dL    Bilirubin Total 0.6 0.2 - 1.3 mg/dL    Albumin 3.7 3.4 - 5.0 g/dL    Protein Total 6.5 (L) 6.8 - 8.8 g/dL    Alkaline Phosphatase 144 40 - 150 U/L    ALT 19 0 - 70 U/L    AST 16 0 - 45 U/L   Erythrocyte sedimentation rate auto    Collection Time: 05/27/20  2:03 PM   Result Value Ref Range    Sed Rate 3 0 - 20 mm/h   CRP inflammation    Collection Time: 05/27/20  2:03 PM   Result Value Ref Range    CRP Inflammation <2.9 0.0 - 8.0 mg/L       I reviewed labs that were performed a couple days ago.  Calcium level is mildly reduced.  He also has stable mild anemia.  CRP is normal.    I reviewed results with the patient.      Assessment and plan:  Mr. Medrano is a 54-year-old with scleroderma interstitial lung disease with whom I had a phone visit today.     1.  Scleroderma ILD.  Symptomatically, he appears to be stable.  There is no PFT for me to review today as today's visit is a phone visit.  I encouraged  him to continue regular physical activity as he is doing.  I also encouraged encouraged him to practice social distancing as he is doing.  He should continue mycophenolate 1500 mg twice a day.  We could consider decreasing to 1000 mg twice a day based on his next PFT if he remains stable.  I will see him back in 3 months with full PFT.    2.  High risk medication monitoring.  He will continue Bactrim for pneumocystis prophylaxis.  I will check labs again in 3 months to monitor mycophenolate.

## 2020-06-02 ENCOUNTER — TELEPHONE (OUTPATIENT)
Dept: RHEUMATOLOGY | Facility: CLINIC | Age: 55
End: 2020-06-02

## 2020-06-02 DIAGNOSIS — J84.9 ILD (INTERSTITIAL LUNG DISEASE) (H): ICD-10-CM

## 2020-06-02 DIAGNOSIS — D89.89 ANTISYNTHETASE SYNDROME (H): ICD-10-CM

## 2020-06-02 DIAGNOSIS — M33.13 DERMATOMYOSITIS (H): ICD-10-CM

## 2020-06-02 DIAGNOSIS — M60.9 MYOSITIS, UNSPECIFIED MYOSITIS TYPE, UNSPECIFIED SITE: ICD-10-CM

## 2020-06-02 DIAGNOSIS — M13.0 POLYARTHRITIS: ICD-10-CM

## 2020-06-04 RX ORDER — MYCOPHENOLATE MOFETIL 500 MG/1
1500 TABLET ORAL 2 TIMES DAILY
Qty: 180 TABLET | Refills: 1 | Status: SHIPPED | OUTPATIENT
Start: 2020-06-04 | End: 2020-07-30

## 2020-06-04 NOTE — TELEPHONE ENCOUNTER
mycophenolate (GENERIC EQUIVALENT) 500 MG tablet       Last Written Prescription Date:  1-4-20  Last Fill Quantity: 180,   # refills: 2  Last Office Visit: 1-21-20 (RTC 6 M)  Future Office visit:  none    CBC RESULTS:   Recent Labs   Lab Test 05/27/20  1403   WBC 5.4   RBC 4.47   HGB 13.2*   HCT 39.9*   MCV 89   MCH 29.5   MCHC 33.1   RDW 13.4          Creatinine   Date Value Ref Range Status   05/27/2020 1.18 0.66 - 1.25 mg/dL Final   ]    Liver Function Studies -   Recent Labs   Lab Test 05/27/20  1403   PROTTOTAL 6.5*   ALBUMIN 3.7   BILITOTAL 0.6   ALKPHOS 144   AST 16   ALT 19       Routing refill request to provider for review/approval because:  Per protocol    Scheduling has been notified to contact the pt for appointment.

## 2020-06-05 ENCOUNTER — TELEPHONE (OUTPATIENT)
Dept: RHEUMATOLOGY | Facility: CLINIC | Age: 55
End: 2020-06-05

## 2020-07-28 DIAGNOSIS — M33.13 DERMATOMYOSITIS (H): ICD-10-CM

## 2020-07-28 DIAGNOSIS — M13.0 POLYARTHRITIS: ICD-10-CM

## 2020-07-28 DIAGNOSIS — J84.9 ILD (INTERSTITIAL LUNG DISEASE) (H): ICD-10-CM

## 2020-07-28 DIAGNOSIS — M60.9 MYOSITIS, UNSPECIFIED MYOSITIS TYPE, UNSPECIFIED SITE: ICD-10-CM

## 2020-07-28 DIAGNOSIS — D89.89 ANTISYNTHETASE SYNDROME (H): ICD-10-CM

## 2020-07-30 RX ORDER — MYCOPHENOLATE MOFETIL 500 MG/1
1500 TABLET ORAL 2 TIMES DAILY
Qty: 180 TABLET | Refills: 1 | Status: SHIPPED | OUTPATIENT
Start: 2020-07-30 | End: 2020-10-15

## 2020-07-30 NOTE — TELEPHONE ENCOUNTER
mycophenolate (GENERIC EQUIVALENT) 500 MG tablet       Last Written Prescription Date:  6-4-20  Last Fill Quantity: 180,   # refills: 1  Last Office Visit: 1-21-20 ( RTC 6 M)  Future Office visit:  none    CBC RESULTS:   Recent Labs   Lab Test 05/27/20  1403   WBC 5.4   RBC 4.47   HGB 13.2*   HCT 39.9*   MCV 89   MCH 29.5   MCHC 33.1   RDW 13.4          Creatinine   Date Value Ref Range Status   05/27/2020 1.18 0.66 - 1.25 mg/dL Final   ]    Liver Function Studies -   Recent Labs   Lab Test 05/27/20  1403   PROTTOTAL 6.5*   ALBUMIN 3.7   BILITOTAL 0.6   ALKPHOS 144   AST 16   ALT 19       Routing refill request to provider for review/approval because:  Pharm: Requesting 90 day supply    Scheduling has been notified to contact the pt for appointment.

## 2020-08-05 DIAGNOSIS — M34.9 SCLERODERMA (H): ICD-10-CM

## 2020-08-05 DIAGNOSIS — Z79.899 HIGH RISK MEDICATIONS (NOT ANTICOAGULANTS) LONG-TERM USE: ICD-10-CM

## 2020-08-05 DIAGNOSIS — J84.9 ILD (INTERSTITIAL LUNG DISEASE) (H): ICD-10-CM

## 2020-08-05 DIAGNOSIS — M19.90 INFLAMMATORY ARTHRITIS: ICD-10-CM

## 2020-08-07 RX ORDER — SULFAMETHOXAZOLE AND TRIMETHOPRIM 400; 80 MG/1; MG/1
TABLET ORAL
Qty: 180 TABLET | Refills: 3 | Status: SHIPPED | OUTPATIENT
Start: 2020-08-07

## 2020-08-07 NOTE — TELEPHONE ENCOUNTER
sulfamethoxazole-trimethoprim (BACTRIM) 400-80 MG tablet    Last Written Prescription Date: 4/22/19  Last Fill Quantity: 180,   # refills: 3  Last Office Visit : 1/21/20  Future Office visit:  none    Routing refill request to provider for review/approval because: [facility].

## 2020-08-10 ENCOUNTER — TELEPHONE (OUTPATIENT)
Dept: RHEUMATOLOGY | Facility: CLINIC | Age: 55
End: 2020-08-10

## 2020-08-10 NOTE — TELEPHONE ENCOUNTER
----- Message from Pilar Keenan sent at 8/6/2020  8:31 AM CDT -----  Regarding: FW: HAROON: RHEUM , pt of Dr. Acosta  [facility] and letter sent.  LG 7/31/20 and 8/4/20   ----- Message -----  From: Louisa Perkins RN  Sent: 7/30/2020   2:40 PM CDT  To: Clinic Coordinators-[facility]-  Subject: HAROON: RHEUM , pt of Dr. Acosta                   Please call to schedule.  pt of Dr. Acosta, RTC due 7-21-20  HX:  # Dermatomyositis  # Paroxysmal atrial fibrillation   # Raynaud's disease without gangrene  Thanks    I do not need any follow up on the scheduling of this appointment unless the patient will no longer be receiving care in our clinic.

## 2020-10-15 DIAGNOSIS — M13.0 POLYARTHRITIS: ICD-10-CM

## 2020-10-15 DIAGNOSIS — D89.89 ANTISYNTHETASE SYNDROME (H): ICD-10-CM

## 2020-10-15 DIAGNOSIS — J84.9 ILD (INTERSTITIAL LUNG DISEASE) (H): ICD-10-CM

## 2020-10-15 DIAGNOSIS — M60.9 MYOSITIS, UNSPECIFIED MYOSITIS TYPE, UNSPECIFIED SITE: ICD-10-CM

## 2020-10-15 DIAGNOSIS — M33.13 DERMATOMYOSITIS (H): ICD-10-CM

## 2020-10-15 DIAGNOSIS — Z79.899 HIGH RISK MEDICATIONS (NOT ANTICOAGULANTS) LONG-TERM USE: Primary | ICD-10-CM

## 2020-10-19 RX ORDER — MYCOPHENOLATE MOFETIL 500 MG/1
1500 TABLET ORAL 2 TIMES DAILY
Qty: 540 TABLET | Refills: 1 | Status: SHIPPED | OUTPATIENT
Start: 2020-10-19

## 2020-10-19 NOTE — TELEPHONE ENCOUNTER
"mycophenolate (GENERIC EQUIVALENT) 500 MG tablet  Last Written Prescription Date: 7/30/20  Last Fill Quantity: 180,   # refills: 1  Last Office Visit: 1/21/20  Future Office visit:  None    \" Follow-up: 6 month\"    Scheduling has been notified to contact the pt for appointment.         CBC RESULTS:   Recent Labs   Lab Test 05/27/20  1403   WBC 5.4   RBC 4.47   HGB 13.2*   HCT 39.9*   MCV 89   MCH 29.5   MCHC 33.1   RDW 13.4          Creatinine   Date Value Ref Range Status   05/27/2020 1.18 0.66 - 1.25 mg/dL Final   ]    Liver Function Studies -   Recent Labs   Lab Test 05/27/20  1403   PROTTOTAL 6.5*   ALBUMIN 3.7   BILITOTAL 0.6   ALKPHOS 144   AST 16   ALT 19       Routing refill request to provider for review/approval because:  Provider review. labs past due. appt past due.        "

## 2020-10-23 NOTE — TELEPHONE ENCOUNTER
Medication monitoring labs ordered as previous orders have  per protocol and provider preference.    VON LawsN RN  Rheumatology Care Coordinator  [facility]

## 2021-01-01 ENCOUNTER — IMMUNIZATION (OUTPATIENT)
Dept: NURSING | Facility: CLINIC | Age: 56
End: 2021-01-01
Attending: INTERNAL MEDICINE
Payer: COMMERCIAL

## 2021-01-01 ENCOUNTER — OFFICE VISIT (OUTPATIENT)
Dept: URGENT CARE | Facility: URGENT CARE | Age: 56
End: 2021-01-01
Payer: COMMERCIAL

## 2021-01-01 ENCOUNTER — APPOINTMENT (OUTPATIENT)
Dept: CARDIOLOGY | Facility: CLINIC | Age: 56
DRG: 003 | End: 2021-01-01
Attending: INTERNAL MEDICINE
Payer: COMMERCIAL

## 2021-01-01 ENCOUNTER — APPOINTMENT (OUTPATIENT)
Dept: GENERAL RADIOLOGY | Facility: CLINIC | Age: 56
DRG: 003 | End: 2021-01-01
Attending: INTERNAL MEDICINE
Payer: COMMERCIAL

## 2021-01-01 ENCOUNTER — APPOINTMENT (OUTPATIENT)
Dept: CT IMAGING | Facility: CLINIC | Age: 56
DRG: 003 | End: 2021-01-01
Payer: COMMERCIAL

## 2021-01-01 ENCOUNTER — APPOINTMENT (OUTPATIENT)
Dept: GENERAL RADIOLOGY | Facility: CLINIC | Age: 56
End: 2021-01-01
Attending: EMERGENCY MEDICINE
Payer: COMMERCIAL

## 2021-01-01 ENCOUNTER — APPOINTMENT (OUTPATIENT)
Dept: CT IMAGING | Facility: CLINIC | Age: 56
DRG: 003 | End: 2021-01-01
Attending: INTERNAL MEDICINE
Payer: COMMERCIAL

## 2021-01-01 ENCOUNTER — APPOINTMENT (OUTPATIENT)
Dept: ULTRASOUND IMAGING | Facility: CLINIC | Age: 56
DRG: 003 | End: 2021-01-01
Payer: COMMERCIAL

## 2021-01-01 ENCOUNTER — APPOINTMENT (OUTPATIENT)
Dept: ULTRASOUND IMAGING | Facility: CLINIC | Age: 56
DRG: 003 | End: 2021-01-01
Attending: INTERNAL MEDICINE
Payer: COMMERCIAL

## 2021-01-01 ENCOUNTER — APPOINTMENT (OUTPATIENT)
Dept: OCCUPATIONAL THERAPY | Facility: CLINIC | Age: 56
DRG: 003 | End: 2021-01-01
Payer: COMMERCIAL

## 2021-01-01 ENCOUNTER — APPOINTMENT (OUTPATIENT)
Dept: CARDIOLOGY | Facility: CLINIC | Age: 56
DRG: 003 | End: 2021-01-01
Attending: STUDENT IN AN ORGANIZED HEALTH CARE EDUCATION/TRAINING PROGRAM
Payer: COMMERCIAL

## 2021-01-01 ENCOUNTER — APPOINTMENT (OUTPATIENT)
Dept: CT IMAGING | Facility: CLINIC | Age: 56
End: 2021-01-01
Attending: EMERGENCY MEDICINE
Payer: COMMERCIAL

## 2021-01-01 ENCOUNTER — HEALTH MAINTENANCE LETTER (OUTPATIENT)
Age: 56
End: 2021-01-01

## 2021-01-01 ENCOUNTER — HOSPITAL ENCOUNTER (EMERGENCY)
Facility: CLINIC | Age: 56
Discharge: ANOTHER HOSPITAL | End: 2021-10-19
Attending: EMERGENCY MEDICINE | Admitting: EMERGENCY MEDICINE
Payer: COMMERCIAL

## 2021-01-01 ENCOUNTER — IMMUNIZATION (OUTPATIENT)
Dept: NURSING | Facility: CLINIC | Age: 56
End: 2021-01-01
Payer: COMMERCIAL

## 2021-01-01 ENCOUNTER — HOSPITAL ENCOUNTER (INPATIENT)
Facility: CLINIC | Age: 56
LOS: 3 days | DRG: 003 | End: 2021-10-23
Attending: INTERNAL MEDICINE | Admitting: INTERNAL MEDICINE
Payer: COMMERCIAL

## 2021-01-01 VITALS
TEMPERATURE: 97.8 F | RESPIRATION RATE: 22 BRPM | HEART RATE: 103 BPM | OXYGEN SATURATION: 62 % | BODY MASS INDEX: 22.66 KG/M2 | WEIGHT: 153 LBS | DIASTOLIC BLOOD PRESSURE: 74 MMHG | HEIGHT: 69 IN | SYSTOLIC BLOOD PRESSURE: 102 MMHG

## 2021-01-01 VITALS
RESPIRATION RATE: 20 BRPM | TEMPERATURE: 97.4 F | WEIGHT: 153 LBS | DIASTOLIC BLOOD PRESSURE: 76 MMHG | HEART RATE: 132 BPM | BODY MASS INDEX: 22.59 KG/M2 | SYSTOLIC BLOOD PRESSURE: 104 MMHG

## 2021-01-01 VITALS
DIASTOLIC BLOOD PRESSURE: 88 MMHG | RESPIRATION RATE: 12 BRPM | WEIGHT: 147.93 LBS | OXYGEN SATURATION: 98 % | BODY MASS INDEX: 21.91 KG/M2 | TEMPERATURE: 98.6 F | SYSTOLIC BLOOD PRESSURE: 108 MMHG

## 2021-01-01 DIAGNOSIS — I73.00 RAYNAUD'S DISEASE WITHOUT GANGRENE: ICD-10-CM

## 2021-01-01 DIAGNOSIS — I21.4 NSTEMI (NON-ST ELEVATED MYOCARDIAL INFARCTION) (H): Primary | ICD-10-CM

## 2021-01-01 DIAGNOSIS — I48.91 ATRIAL FIBRILLATION, UNSPECIFIED TYPE (H): ICD-10-CM

## 2021-01-01 DIAGNOSIS — I48.91 ATRIAL FIBRILLATION WITH RAPID VENTRICULAR RESPONSE (H): ICD-10-CM

## 2021-01-01 DIAGNOSIS — J84.9 ILD (INTERSTITIAL LUNG DISEASE) (H): Primary | ICD-10-CM

## 2021-01-01 DIAGNOSIS — R06.02 SOB (SHORTNESS OF BREATH): ICD-10-CM

## 2021-01-01 DIAGNOSIS — I50.9 CONGESTIVE HEART FAILURE, UNSPECIFIED HF CHRONICITY, UNSPECIFIED HEART FAILURE TYPE (H): ICD-10-CM

## 2021-01-01 DIAGNOSIS — K22.4 ESOPHAGEAL DYSMOTILITY: ICD-10-CM

## 2021-01-01 DIAGNOSIS — M19.90 INFLAMMATORY ARTHRITIS: ICD-10-CM

## 2021-01-01 DIAGNOSIS — I26.93 SINGLE SUBSEGMENTAL PULMONARY EMBOLISM WITHOUT ACUTE COR PULMONALE (H): ICD-10-CM

## 2021-01-01 DIAGNOSIS — I48.0 PAROXYSMAL ATRIAL FIBRILLATION (H): ICD-10-CM

## 2021-01-01 DIAGNOSIS — I21.4 NSTEMI (NON-ST ELEVATED MYOCARDIAL INFARCTION) (H): ICD-10-CM

## 2021-01-01 DIAGNOSIS — M34.9 SCLERODERMA (H): ICD-10-CM

## 2021-01-01 LAB
1OH-MIDAZOLAM UR QL SCN: PRESENT
1OH-MIDAZOLAM UR QL SCN: PRESENT
3D LVEF ECHO: NORMAL
ABO/RH(D): NORMAL
ABO/RH(D): NORMAL
ACT BLD: 183 SECONDS (ref 74–150)
ACT BLD: 183 SECONDS (ref 74–150)
ACT BLD: 187 SECONDS (ref 74–150)
ACT BLD: 191 SECONDS (ref 74–150)
ACT BLD: 195 SECONDS (ref 74–150)
ACT BLD: 199 SECONDS (ref 74–150)
ACT BLD: 203 SECONDS (ref 74–150)
ACT BLD: 207 SECONDS (ref 74–150)
ACT BLD: 211 SECONDS (ref 74–150)
ACT BLD: 215 SECONDS (ref 74–150)
ACT BLD: 219 SECONDS (ref 74–150)
ACT BLD: 219 SECONDS (ref 74–150)
ACT BLD: 264 SECONDS (ref 74–150)
ACT BLD: 376 SECONDS (ref 74–150)
ALBUMIN SERPL-MCNC: 1.8 G/DL (ref 3.4–5)
ALBUMIN SERPL-MCNC: 2 G/DL (ref 3.4–5)
ALBUMIN SERPL-MCNC: 2.3 G/DL (ref 3.4–5)
ALBUMIN SERPL-MCNC: 2.4 G/DL (ref 3.4–5)
ALBUMIN SERPL-MCNC: 2.5 G/DL (ref 3.4–5)
ALBUMIN SERPL-MCNC: 2.6 G/DL (ref 3.4–5)
ALBUMIN SERPL-MCNC: 2.7 G/DL (ref 3.4–5)
ALBUMIN SERPL-MCNC: 2.8 G/DL (ref 3.4–5)
ALBUMIN SERPL-MCNC: 2.8 G/DL (ref 3.4–5)
ALBUMIN SERPL-MCNC: 3 G/DL (ref 3.4–5)
ALBUMIN SERPL-MCNC: 3 G/DL (ref 3.4–5)
ALBUMIN UR-MCNC: NEGATIVE MG/DL
ALP SERPL-CCNC: 61 U/L (ref 40–150)
ALP SERPL-CCNC: 71 U/L (ref 40–150)
ALP SERPL-CCNC: 72 U/L (ref 40–150)
ALP SERPL-CCNC: 72 U/L (ref 40–150)
ALP SERPL-CCNC: 74 U/L (ref 40–150)
ALP SERPL-CCNC: 75 U/L (ref 40–150)
ALP SERPL-CCNC: 76 U/L (ref 40–150)
ALP SERPL-CCNC: 78 U/L (ref 40–150)
ALP SERPL-CCNC: 82 U/L (ref 40–150)
ALP SERPL-CCNC: 83 U/L (ref 40–150)
ALP SERPL-CCNC: 83 U/L (ref 40–150)
ALP SERPL-CCNC: 84 U/L (ref 40–150)
ALP SERPL-CCNC: 85 U/L (ref 40–150)
ALP SERPL-CCNC: 86 U/L (ref 40–150)
ALP SERPL-CCNC: 87 U/L (ref 40–150)
ALP SERPL-CCNC: 88 U/L (ref 40–150)
ALT SERPL W P-5'-P-CCNC: 1396 U/L (ref 0–70)
ALT SERPL W P-5'-P-CCNC: 1448 U/L (ref 0–70)
ALT SERPL W P-5'-P-CCNC: 1548 U/L (ref 0–70)
ALT SERPL W P-5'-P-CCNC: 2185 U/L (ref 0–70)
ALT SERPL W P-5'-P-CCNC: 2274 U/L (ref 0–70)
ALT SERPL W P-5'-P-CCNC: 2376 U/L (ref 0–70)
ALT SERPL W P-5'-P-CCNC: 2649 U/L (ref 0–70)
ALT SERPL W P-5'-P-CCNC: 2766 U/L (ref 0–70)
ALT SERPL W P-5'-P-CCNC: 2916 U/L (ref 0–70)
ALT SERPL W P-5'-P-CCNC: 295 U/L (ref 0–70)
ALT SERPL W P-5'-P-CCNC: 299 U/L (ref 0–70)
ALT SERPL W P-5'-P-CCNC: 3179 U/L (ref 0–70)
ALT SERPL W P-5'-P-CCNC: 3331 U/L (ref 0–70)
ALT SERPL W P-5'-P-CCNC: 3348 U/L (ref 0–70)
ALT SERPL W P-5'-P-CCNC: 3405 U/L (ref 0–70)
ALT SERPL W P-5'-P-CCNC: 3453 U/L (ref 0–70)
ALT SERPL W P-5'-P-CCNC: 3561 U/L (ref 0–70)
ALT SERPL W P-5'-P-CCNC: 606 U/L (ref 0–70)
AMPHETAMINES UR QL SCN: ABNORMAL
AMPHETAMINES UR QL SCN: ABNORMAL
ANCA AB PATTERN SER IF-IMP: NORMAL
ANION GAP SERPL CALCULATED.3IONS-SCNC: 10 MMOL/L (ref 3–14)
ANION GAP SERPL CALCULATED.3IONS-SCNC: 12 MMOL/L (ref 3–14)
ANION GAP SERPL CALCULATED.3IONS-SCNC: 12 MMOL/L (ref 3–14)
ANION GAP SERPL CALCULATED.3IONS-SCNC: 13 MMOL/L (ref 3–14)
ANION GAP SERPL CALCULATED.3IONS-SCNC: 14 MMOL/L (ref 3–14)
ANION GAP SERPL CALCULATED.3IONS-SCNC: 15 MMOL/L (ref 3–14)
ANION GAP SERPL CALCULATED.3IONS-SCNC: 17 MMOL/L (ref 3–14)
ANION GAP SERPL CALCULATED.3IONS-SCNC: 18 MMOL/L (ref 3–14)
ANION GAP SERPL CALCULATED.3IONS-SCNC: 9 MMOL/L (ref 3–14)
ANION GAP SERPL CALCULATED.3IONS-SCNC: 9 MMOL/L (ref 3–14)
ANTIBODY SCREEN: NEGATIVE
ANTIBODY SCREEN: NEGATIVE
APPEARANCE UR: ABNORMAL
APTT PPP: 143 SECONDS (ref 22–38)
APTT PPP: 181 SECONDS (ref 22–38)
APTT PPP: 194 SECONDS (ref 22–38)
APTT PPP: 203 SECONDS (ref 22–38)
APTT PPP: 212 SECONDS (ref 22–38)
APTT PPP: 228 SECONDS (ref 22–38)
APTT PPP: 234 SECONDS (ref 22–38)
APTT PPP: 238 SECONDS (ref 22–38)
APTT PPP: >240 SECONDS (ref 22–38)
AST SERPL W P-5'-P-CCNC: 1347 U/L (ref 0–45)
AST SERPL W P-5'-P-CCNC: 1692 U/L (ref 0–45)
AST SERPL W P-5'-P-CCNC: 2131 U/L (ref 0–45)
AST SERPL W P-5'-P-CCNC: 2504 U/L (ref 0–45)
AST SERPL W P-5'-P-CCNC: 251 U/L (ref 0–45)
AST SERPL W P-5'-P-CCNC: 2678 U/L (ref 0–45)
AST SERPL W P-5'-P-CCNC: 2794 U/L (ref 0–45)
AST SERPL W P-5'-P-CCNC: 2800 U/L (ref 0–45)
AST SERPL W P-5'-P-CCNC: 334 U/L (ref 0–45)
AST SERPL W P-5'-P-CCNC: 3452 U/L (ref 0–45)
AST SERPL W P-5'-P-CCNC: 4431 U/L (ref 0–45)
AST SERPL W P-5'-P-CCNC: 4718 U/L (ref 0–45)
AST SERPL W P-5'-P-CCNC: 5065 U/L (ref 0–45)
AST SERPL W P-5'-P-CCNC: 5804 U/L (ref 0–45)
AST SERPL W P-5'-P-CCNC: 6355 U/L (ref 0–45)
AST SERPL W P-5'-P-CCNC: 6623 U/L (ref 0–45)
AST SERPL W P-5'-P-CCNC: 7595 U/L (ref 0–45)
AST SERPL W P-5'-P-CCNC: 812 U/L (ref 0–45)
AT III ACT/NOR PPP CHRO: 26 % (ref 85–135)
AT III ACT/NOR PPP CHRO: 37 % (ref 85–135)
AT III ACT/NOR PPP CHRO: 51 % (ref 85–135)
ATRIAL RATE - MUSE: 0 BPM
ATRIAL RATE - MUSE: 105 BPM
ATRIAL RATE - MUSE: 117 BPM
ATRIAL RATE - MUSE: 119 BPM
ATRIAL RATE - MUSE: 125 BPM
ATRIAL RATE - MUSE: 129 BPM
BACTERIA #/AREA URNS HPF: ABNORMAL /HPF
BACTERIA SPT CULT: NO GROWTH
BARBITURATES UR QL: ABNORMAL
BARBITURATES UR QL: ABNORMAL
BASE EXCESS BLDA CALC-SCNC: -1.4 MMOL/L (ref -9–1.8)
BASE EXCESS BLDA CALC-SCNC: -1.9 MMOL/L (ref -9–1.8)
BASE EXCESS BLDA CALC-SCNC: -10.5 MMOL/L (ref -9–1.8)
BASE EXCESS BLDA CALC-SCNC: -13.1 MMOL/L (ref -9.6–2)
BASE EXCESS BLDA CALC-SCNC: -2.4 MMOL/L (ref -9–1.8)
BASE EXCESS BLDA CALC-SCNC: -2.8 MMOL/L (ref -9–1.8)
BASE EXCESS BLDA CALC-SCNC: -2.9 MMOL/L (ref -9–1.8)
BASE EXCESS BLDA CALC-SCNC: -3.2 MMOL/L (ref -9–1.8)
BASE EXCESS BLDA CALC-SCNC: -3.2 MMOL/L (ref -9–1.8)
BASE EXCESS BLDA CALC-SCNC: -3.6 MMOL/L (ref -9–1.8)
BASE EXCESS BLDA CALC-SCNC: -3.8 MMOL/L (ref -9–1.8)
BASE EXCESS BLDA CALC-SCNC: -3.9 MMOL/L (ref -9–1.8)
BASE EXCESS BLDA CALC-SCNC: -4.1 MMOL/L (ref -9–1.8)
BASE EXCESS BLDA CALC-SCNC: -4.1 MMOL/L (ref -9–1.8)
BASE EXCESS BLDA CALC-SCNC: -4.2 MMOL/L (ref -9–1.8)
BASE EXCESS BLDA CALC-SCNC: -4.3 MMOL/L (ref -9–1.8)
BASE EXCESS BLDA CALC-SCNC: -4.5 MMOL/L (ref -9–1.8)
BASE EXCESS BLDA CALC-SCNC: -4.5 MMOL/L (ref -9–1.8)
BASE EXCESS BLDA CALC-SCNC: -4.6 MMOL/L (ref -9–1.8)
BASE EXCESS BLDA CALC-SCNC: -4.6 MMOL/L (ref -9–1.8)
BASE EXCESS BLDA CALC-SCNC: -4.7 MMOL/L (ref -9–1.8)
BASE EXCESS BLDA CALC-SCNC: -4.9 MMOL/L (ref -9.6–2)
BASE EXCESS BLDA CALC-SCNC: -4.9 MMOL/L (ref -9–1.8)
BASE EXCESS BLDA CALC-SCNC: -5.1 MMOL/L (ref -9–1.8)
BASE EXCESS BLDA CALC-SCNC: -5.1 MMOL/L (ref -9–1.8)
BASE EXCESS BLDA CALC-SCNC: -5.2 MMOL/L (ref -9–1.8)
BASE EXCESS BLDA CALC-SCNC: -5.2 MMOL/L (ref -9–1.8)
BASE EXCESS BLDA CALC-SCNC: -5.4 MMOL/L (ref -9–1.8)
BASE EXCESS BLDA CALC-SCNC: -5.5 MMOL/L (ref -9–1.8)
BASE EXCESS BLDA CALC-SCNC: -6 MMOL/L (ref -9–1.8)
BASE EXCESS BLDA CALC-SCNC: -6.2 MMOL/L (ref -9–1.8)
BASE EXCESS BLDA CALC-SCNC: -6.3 MMOL/L (ref -9–1.8)
BASE EXCESS BLDA CALC-SCNC: -6.4 MMOL/L (ref -9–1.8)
BASE EXCESS BLDA CALC-SCNC: -6.5 MMOL/L (ref -9–1.8)
BASE EXCESS BLDA CALC-SCNC: -6.6 MMOL/L (ref -9–1.8)
BASE EXCESS BLDA CALC-SCNC: -6.7 MMOL/L (ref -9–1.8)
BASE EXCESS BLDA CALC-SCNC: -6.8 MMOL/L (ref -9–1.8)
BASE EXCESS BLDA CALC-SCNC: -6.8 MMOL/L (ref -9–1.8)
BASE EXCESS BLDA CALC-SCNC: -6.9 MMOL/L (ref -9–1.8)
BASE EXCESS BLDA CALC-SCNC: -7.2 MMOL/L (ref -9–1.8)
BASE EXCESS BLDA CALC-SCNC: -7.3 MMOL/L (ref -9–1.8)
BASE EXCESS BLDA CALC-SCNC: -7.4 MMOL/L (ref -9–1.8)
BASE EXCESS BLDA CALC-SCNC: -7.4 MMOL/L (ref -9–1.8)
BASE EXCESS BLDA CALC-SCNC: -7.9 MMOL/L (ref -9–1.8)
BASE EXCESS BLDA CALC-SCNC: -8.3 MMOL/L (ref -9–1.8)
BASE EXCESS BLDA CALC-SCNC: -8.7 MMOL/L (ref -9–1.8)
BASE EXCESS BLDA CALC-SCNC: -8.8 MMOL/L (ref -9–1.8)
BASE EXCESS BLDA CALC-SCNC: -9.3 MMOL/L (ref -9.6–2)
BASE EXCESS BLDA CALC-SCNC: -9.7 MMOL/L (ref -9–1.8)
BASE EXCESS BLDV CALC-SCNC: -10.1 MMOL/L (ref -7.7–1.9)
BASE EXCESS BLDV CALC-SCNC: -10.3 MMOL/L (ref -7.7–1.9)
BASE EXCESS BLDV CALC-SCNC: -10.5 MMOL/L (ref -7.7–1.9)
BASE EXCESS BLDV CALC-SCNC: -2.1 MMOL/L (ref -7.7–1.9)
BASE EXCESS BLDV CALC-SCNC: -4.4 MMOL/L (ref -7.7–1.9)
BASE EXCESS BLDV CALC-SCNC: -4.4 MMOL/L (ref -7.7–1.9)
BASE EXCESS BLDV CALC-SCNC: -5.8 MMOL/L (ref -7.7–1.9)
BASE EXCESS BLDV CALC-SCNC: -5.8 MMOL/L (ref -7.7–1.9)
BASE EXCESS BLDV CALC-SCNC: -6.3 MMOL/L (ref -7.7–1.9)
BASE EXCESS BLDV CALC-SCNC: -6.4 MMOL/L (ref -7.7–1.9)
BASE EXCESS BLDV CALC-SCNC: -9.6 MMOL/L (ref -7.7–1.9)
BASOPHILS # BLD AUTO: 0 10E3/UL (ref 0–0.2)
BASOPHILS NFR BLD AUTO: 0 %
BENZODIAZ UR QL: ABNORMAL
BENZODIAZ UR QL: ABNORMAL
BI-PLANE LVEF ECHO: NORMAL
BILIRUB DIRECT SERPL-MCNC: 0.9 MG/DL (ref 0–0.2)
BILIRUB DIRECT SERPL-MCNC: 1.7 MG/DL (ref 0–0.2)
BILIRUB DIRECT SERPL-MCNC: 2.5 MG/DL (ref 0–0.2)
BILIRUB DIRECT SERPL-MCNC: 3.1 MG/DL (ref 0–0.2)
BILIRUB SERPL-MCNC: 0.8 MG/DL (ref 0.2–1.3)
BILIRUB SERPL-MCNC: 1.2 MG/DL (ref 0.2–1.3)
BILIRUB SERPL-MCNC: 1.7 MG/DL (ref 0.2–1.3)
BILIRUB SERPL-MCNC: 1.7 MG/DL (ref 0.2–1.3)
BILIRUB SERPL-MCNC: 1.9 MG/DL (ref 0.2–1.3)
BILIRUB SERPL-MCNC: 2.2 MG/DL (ref 0.2–1.3)
BILIRUB SERPL-MCNC: 2.5 MG/DL (ref 0.2–1.3)
BILIRUB SERPL-MCNC: 2.6 MG/DL (ref 0.2–1.3)
BILIRUB SERPL-MCNC: 2.8 MG/DL (ref 0.2–1.3)
BILIRUB SERPL-MCNC: 3 MG/DL (ref 0.2–1.3)
BILIRUB SERPL-MCNC: 3.3 MG/DL (ref 0.2–1.3)
BILIRUB SERPL-MCNC: 3.6 MG/DL (ref 0.2–1.3)
BILIRUB SERPL-MCNC: 3.8 MG/DL (ref 0.2–1.3)
BILIRUB SERPL-MCNC: 4 MG/DL (ref 0.2–1.3)
BILIRUB SERPL-MCNC: 4.4 MG/DL (ref 0.2–1.3)
BILIRUB SERPL-MCNC: 4.8 MG/DL (ref 0.2–1.3)
BILIRUB SERPL-MCNC: 5.1 MG/DL (ref 0.2–1.3)
BILIRUB SERPL-MCNC: 5.8 MG/DL (ref 0.2–1.3)
BILIRUB UR QL STRIP: NEGATIVE
BLD PROD TYP BPU: NORMAL
BLOOD COMPONENT TYPE: NORMAL
BUN SERPL-MCNC: 14 MG/DL (ref 7–30)
BUN SERPL-MCNC: 27 MG/DL (ref 7–30)
BUN SERPL-MCNC: 32 MG/DL (ref 7–30)
BUN SERPL-MCNC: 32 MG/DL (ref 7–30)
BUN SERPL-MCNC: 38 MG/DL (ref 7–30)
BUN SERPL-MCNC: 38 MG/DL (ref 7–30)
BUN SERPL-MCNC: 41 MG/DL (ref 7–30)
BUN SERPL-MCNC: 49 MG/DL (ref 7–30)
BUN SERPL-MCNC: 52 MG/DL (ref 7–30)
BUN SERPL-MCNC: 58 MG/DL (ref 7–30)
BUN SERPL-MCNC: 58 MG/DL (ref 7–30)
BUN SERPL-MCNC: 59 MG/DL (ref 7–30)
BUN SERPL-MCNC: 64 MG/DL (ref 7–30)
BUN SERPL-MCNC: 64 MG/DL (ref 7–30)
BUN SERPL-MCNC: 66 MG/DL (ref 7–30)
BUN SERPL-MCNC: 72 MG/DL (ref 7–30)
C-ANCA TITR SER IF: NORMAL {TITER}
CA-I BLD-MCNC: 3.7 MG/DL (ref 4.4–5.2)
CA-I BLD-MCNC: 3.8 MG/DL (ref 4.4–5.2)
CA-I BLD-MCNC: 3.9 MG/DL (ref 4.4–5.2)
CA-I BLD-MCNC: 4.1 MG/DL (ref 4.4–5.2)
CA-I BLD-MCNC: 4.2 MG/DL (ref 4.4–5.2)
CA-I BLD-MCNC: 4.3 MG/DL (ref 4.4–5.2)
CALCIUM SERPL-MCNC: 6.8 MG/DL (ref 8.5–10.1)
CALCIUM SERPL-MCNC: 6.8 MG/DL (ref 8.5–10.1)
CALCIUM SERPL-MCNC: 7.1 MG/DL (ref 8.5–10.1)
CALCIUM SERPL-MCNC: 7.2 MG/DL (ref 8.5–10.1)
CALCIUM SERPL-MCNC: 7.3 MG/DL (ref 8.5–10.1)
CALCIUM SERPL-MCNC: 7.4 MG/DL (ref 8.5–10.1)
CALCIUM SERPL-MCNC: 7.5 MG/DL (ref 8.5–10.1)
CALCIUM SERPL-MCNC: 7.5 MG/DL (ref 8.5–10.1)
CALCIUM SERPL-MCNC: 7.7 MG/DL (ref 8.5–10.1)
CALCIUM SERPL-MCNC: 7.8 MG/DL (ref 8.5–10.1)
CALCIUM SERPL-MCNC: 8 MG/DL (ref 8.5–10.1)
CALCIUM SERPL-MCNC: 8 MG/DL (ref 8.5–10.1)
CALCIUM SERPL-MCNC: 8.3 MG/DL (ref 8.5–10.1)
CALCIUM SERPL-MCNC: 8.3 MG/DL (ref 8.5–10.1)
CALCIUM SERPL-MCNC: 8.8 MG/DL (ref 8.5–10.1)
CALCIUM SERPL-MCNC: 9.1 MG/DL (ref 8.5–10.1)
CANNABINOIDS UR QL SCN: ABNORMAL
CANNABINOIDS UR QL SCN: ABNORMAL
CANNABINOIDS UR QL SCN: NORMAL
CF REDUC 30M P MA P HEP LENFR BLD TEG: 0 % (ref 0–8)
CF REDUC 60M P MA P HEPASE LENFR BLD TEG: 0 % (ref 0–15)
CF REDUC 60M P MA P HEPASE LENFR BLD TEG: 0.3 % (ref 0–15)
CF REDUC 60M P MA P HEPASE LENFR BLD TEG: 0.5 % (ref 0–15)
CFT P HPASE BLD TEG: 1.9 MINUTE (ref 1–3)
CFT P HPASE BLD TEG: 2.2 MINUTE (ref 1–3)
CFT P HPASE BLD TEG: 2.6 MINUTE (ref 1–3)
CHLORIDE BLD-SCNC: 103 MMOL/L (ref 94–109)
CHLORIDE BLD-SCNC: 105 MMOL/L (ref 94–109)
CHLORIDE BLD-SCNC: 105 MMOL/L (ref 94–109)
CHLORIDE BLD-SCNC: 107 MMOL/L (ref 94–109)
CHLORIDE BLD-SCNC: 108 MMOL/L (ref 94–109)
CHLORIDE BLD-SCNC: 109 MMOL/L (ref 94–109)
CHLORIDE BLD-SCNC: 110 MMOL/L (ref 94–109)
CHLORIDE BLD-SCNC: 111 MMOL/L (ref 94–109)
CHOLEST SERPL-MCNC: 130 MG/DL
CI (COAGULATION INDEX)(Z): -3.2 (ref -3–3)
CI (COAGULATION INDEX)(Z): -3.2 (ref -3–3)
CI (COAGULATION INDEX)(Z): -4.6 (ref -3–3)
CK SERPL-CCNC: 2796 U/L (ref 30–300)
CK SERPL-CCNC: 2814 U/L (ref 30–300)
CK SERPL-CCNC: 2929 U/L (ref 30–300)
CK SERPL-CCNC: 2993 U/L (ref 30–300)
CLOT ANGLE P HPASE BLD TEG: 54.6 DEGREES (ref 53–72)
CLOT ANGLE P HPASE BLD TEG: 60.8 DEGREES (ref 53–72)
CLOT ANGLE P HPASE BLD TEG: 62 DEGREES (ref 53–72)
CLOT INIT P HPASE BLD TEG: 11.1 MINUTE (ref 5–10)
CLOT INIT P HPASE BLD TEG: 8.4 MINUTE (ref 5–10)
CLOT INIT P HPASE BLD TEG: 9.4 MINUTE (ref 5–10)
CLOT STRENGTH P HPASE BLD TEG: 5.9 KD/SC (ref 4.5–11)
CLOT STRENGTH P HPASE BLD TEG: 6.2 KD/SC (ref 4.5–11)
CLOT STRENGTH P HPASE BLD TEG: 6.5 KD/SC (ref 4.5–11)
CO2 SERPL-SCNC: 14 MMOL/L (ref 20–32)
CO2 SERPL-SCNC: 17 MMOL/L (ref 20–32)
CO2 SERPL-SCNC: 18 MMOL/L (ref 20–32)
CO2 SERPL-SCNC: 19 MMOL/L (ref 20–32)
CO2 SERPL-SCNC: 20 MMOL/L (ref 20–32)
CO2 SERPL-SCNC: 21 MMOL/L (ref 20–32)
CO2 SERPL-SCNC: 21 MMOL/L (ref 20–32)
CO2 SERPL-SCNC: 22 MMOL/L (ref 20–32)
COCAINE UR QL: ABNORMAL
COCAINE UR QL: ABNORMAL
CODING SYSTEM: NORMAL
COLOR UR AUTO: ABNORMAL
CORTIS SERPL-MCNC: 154.5 UG/DL (ref 4–22)
CREAT SERPL-MCNC: 0.89 MG/DL (ref 0.66–1.25)
CREAT SERPL-MCNC: 1.11 MG/DL (ref 0.66–1.25)
CREAT SERPL-MCNC: 1.41 MG/DL (ref 0.66–1.25)
CREAT SERPL-MCNC: 1.55 MG/DL (ref 0.66–1.25)
CREAT SERPL-MCNC: 1.86 MG/DL (ref 0.66–1.25)
CREAT SERPL-MCNC: 1.99 MG/DL (ref 0.66–1.25)
CREAT SERPL-MCNC: 2.06 MG/DL (ref 0.66–1.25)
CREAT SERPL-MCNC: 2.38 MG/DL (ref 0.66–1.25)
CREAT SERPL-MCNC: 2.61 MG/DL (ref 0.66–1.25)
CREAT SERPL-MCNC: 2.77 MG/DL (ref 0.66–1.25)
CREAT SERPL-MCNC: 2.77 MG/DL (ref 0.66–1.25)
CREAT SERPL-MCNC: 2.89 MG/DL (ref 0.66–1.25)
CREAT SERPL-MCNC: 2.94 MG/DL (ref 0.66–1.25)
CREAT SERPL-MCNC: 3.18 MG/DL (ref 0.66–1.25)
CREAT SERPL-MCNC: 3.26 MG/DL (ref 0.66–1.25)
CREAT SERPL-MCNC: 3.42 MG/DL (ref 0.66–1.25)
CREAT SERPL-MCNC: 3.49 MG/DL (ref 0.66–1.25)
CREAT SERPL-MCNC: 3.86 MG/DL (ref 0.66–1.25)
CREAT SERPL-MCNC: 4 MG/DL (ref 0.66–1.25)
CREAT SERPL-MCNC: 4 MG/DL (ref 0.66–1.25)
CREAT UR-MCNC: 24 MG/DL
CREAT UR-MCNC: 58 MG/DL
CRP SERPL-MCNC: 21 MG/L (ref 0–8)
CRP SERPL-MCNC: 24 MG/L (ref 0–8)
CRP SERPL-MCNC: 58 MG/L (ref 0–8)
CRP SERPL-MCNC: 84 MG/L (ref 0–8)
CRP SERPL-MCNC: 97 MG/L (ref 0–8)
D DIMER PPP FEU-MCNC: 3.26 UG/ML FEU (ref 0–0.5)
D DIMER PPP FEU-MCNC: 3.51 UG/ML FEU (ref 0–0.5)
D DIMER PPP FEU-MCNC: 4.1 UG/ML FEU (ref 0–0.5)
D DIMER PPP FEU-MCNC: 5.26 UG/ML FEU (ref 0–0.5)
D DIMER PPP FEU-MCNC: 5.31 UG/ML FEU (ref 0–0.5)
D DIMER PPP FEU-MCNC: 6.31 UG/ML FEU (ref 0–0.5)
D DIMER PPP FEU-MCNC: 7.19 UG/ML FEU (ref 0–0.5)
DIASTOLIC BLOOD PRESSURE - MUSE: NORMAL MMHG
DRVVT CONFIRM NORMALIZED RATIO: 0.89
DRVVT SCREEN MIX RATIO: 1.18
DRVVT SCREEN RATIO: 2.17
EOSINOPHIL # BLD AUTO: 0 10E3/UL (ref 0–0.7)
EOSINOPHIL # BLD AUTO: 0.1 10E3/UL (ref 0–0.7)
EOSINOPHIL NFR BLD AUTO: 0 %
EOSINOPHIL NFR BLD AUTO: 1 %
ERYTHROCYTE [DISTWIDTH] IN BLOOD BY AUTOMATED COUNT: 14.5 % (ref 10–15)
ERYTHROCYTE [DISTWIDTH] IN BLOOD BY AUTOMATED COUNT: 15.6 % (ref 10–15)
ERYTHROCYTE [DISTWIDTH] IN BLOOD BY AUTOMATED COUNT: 15.7 % (ref 10–15)
ERYTHROCYTE [DISTWIDTH] IN BLOOD BY AUTOMATED COUNT: 15.9 % (ref 10–15)
ERYTHROCYTE [DISTWIDTH] IN BLOOD BY AUTOMATED COUNT: 16.1 % (ref 10–15)
ERYTHROCYTE [DISTWIDTH] IN BLOOD BY AUTOMATED COUNT: 16.2 % (ref 10–15)
ERYTHROCYTE [DISTWIDTH] IN BLOOD BY AUTOMATED COUNT: 16.3 % (ref 10–15)
ERYTHROCYTE [DISTWIDTH] IN BLOOD BY AUTOMATED COUNT: 16.5 % (ref 10–15)
ERYTHROCYTE [DISTWIDTH] IN BLOOD BY AUTOMATED COUNT: 16.5 % (ref 10–15)
ERYTHROCYTE [DISTWIDTH] IN BLOOD BY AUTOMATED COUNT: 16.6 % (ref 10–15)
ERYTHROCYTE [DISTWIDTH] IN BLOOD BY AUTOMATED COUNT: 16.6 % (ref 10–15)
ERYTHROCYTE [DISTWIDTH] IN BLOOD BY AUTOMATED COUNT: 16.7 % (ref 10–15)
ERYTHROCYTE [DISTWIDTH] IN BLOOD BY AUTOMATED COUNT: 16.7 % (ref 10–15)
ERYTHROCYTE [DISTWIDTH] IN BLOOD BY AUTOMATED COUNT: 16.8 % (ref 10–15)
ERYTHROCYTE [DISTWIDTH] IN BLOOD BY AUTOMATED COUNT: 16.9 % (ref 10–15)
ERYTHROCYTE [SEDIMENTATION RATE] IN BLOOD BY WESTERGREN METHOD: 10 MM/HR (ref 0–20)
ERYTHROCYTE [SEDIMENTATION RATE] IN BLOOD BY WESTERGREN METHOD: 17 MM/HR (ref 0–20)
ERYTHROCYTE [SEDIMENTATION RATE] IN BLOOD BY WESTERGREN METHOD: 6 MM/HR (ref 0–20)
ERYTHROCYTE [SEDIMENTATION RATE] IN BLOOD BY WESTERGREN METHOD: 7 MM/HR (ref 0–20)
ETHANOL UR QL SCN: ABNORMAL
ETHANOL UR QL SCN: ABNORMAL
FENTANYL UR CFM-MCNC: 19 NG/ML
FENTANYL UR CFM-MCNC: 300 NG/ML
FENTANYL/CREAT UR: 517 NG/MG {CREAT}
FENTANYL/CREAT UR: 79 NG/MG {CREAT}
FIBRINOGEN PPP-MCNC: 208 MG/DL (ref 170–490)
FIBRINOGEN PPP-MCNC: 220 MG/DL (ref 170–490)
FIBRINOGEN PPP-MCNC: 221 MG/DL (ref 170–490)
FIBRINOGEN PPP-MCNC: 256 MG/DL (ref 170–490)
FIBRINOGEN PPP-MCNC: 257 MG/DL (ref 170–490)
FIBRINOGEN PPP-MCNC: 273 MG/DL (ref 170–490)
FLUAV RNA SPEC QL NAA+PROBE: NEGATIVE
FLUBV RNA RESP QL NAA+PROBE: NEGATIVE
GFR SERPL CREATININE-BSD FRML MDRD: 15 ML/MIN/1.73M2
GFR SERPL CREATININE-BSD FRML MDRD: 15 ML/MIN/1.73M2
GFR SERPL CREATININE-BSD FRML MDRD: 16 ML/MIN/1.73M2
GFR SERPL CREATININE-BSD FRML MDRD: 18 ML/MIN/1.73M2
GFR SERPL CREATININE-BSD FRML MDRD: 19 ML/MIN/1.73M2
GFR SERPL CREATININE-BSD FRML MDRD: 20 ML/MIN/1.73M2
GFR SERPL CREATININE-BSD FRML MDRD: 20 ML/MIN/1.73M2
GFR SERPL CREATININE-BSD FRML MDRD: 22 ML/MIN/1.73M2
GFR SERPL CREATININE-BSD FRML MDRD: 23 ML/MIN/1.73M2
GFR SERPL CREATININE-BSD FRML MDRD: 24 ML/MIN/1.73M2
GFR SERPL CREATININE-BSD FRML MDRD: 24 ML/MIN/1.73M2
GFR SERPL CREATININE-BSD FRML MDRD: 26 ML/MIN/1.73M2
GFR SERPL CREATININE-BSD FRML MDRD: 29 ML/MIN/1.73M2
GFR SERPL CREATININE-BSD FRML MDRD: 34 ML/MIN/1.73M2
GFR SERPL CREATININE-BSD FRML MDRD: 36 ML/MIN/1.73M2
GFR SERPL CREATININE-BSD FRML MDRD: 39 ML/MIN/1.73M2
GFR SERPL CREATININE-BSD FRML MDRD: 49 ML/MIN/1.73M2
GFR SERPL CREATININE-BSD FRML MDRD: 55 ML/MIN/1.73M2
GFR SERPL CREATININE-BSD FRML MDRD: 73 ML/MIN/1.73M2
GFR SERPL CREATININE-BSD FRML MDRD: >90 ML/MIN/1.73M2
GLUCOSE BLD-MCNC: 100 MG/DL (ref 70–99)
GLUCOSE BLD-MCNC: 103 MG/DL (ref 70–99)
GLUCOSE BLD-MCNC: 104 MG/DL (ref 70–99)
GLUCOSE BLD-MCNC: 104 MG/DL (ref 70–99)
GLUCOSE BLD-MCNC: 105 MG/DL (ref 70–99)
GLUCOSE BLD-MCNC: 110 MG/DL (ref 70–99)
GLUCOSE BLD-MCNC: 111 MG/DL (ref 70–99)
GLUCOSE BLD-MCNC: 113 MG/DL (ref 70–99)
GLUCOSE BLD-MCNC: 146 MG/DL (ref 70–99)
GLUCOSE BLD-MCNC: 161 MG/DL (ref 70–99)
GLUCOSE BLD-MCNC: 171 MG/DL (ref 70–99)
GLUCOSE BLD-MCNC: 70 MG/DL (ref 70–99)
GLUCOSE BLD-MCNC: 70 MG/DL (ref 70–99)
GLUCOSE BLD-MCNC: 76 MG/DL (ref 70–99)
GLUCOSE BLD-MCNC: 76 MG/DL (ref 70–99)
GLUCOSE BLD-MCNC: 81 MG/DL (ref 70–99)
GLUCOSE BLD-MCNC: 82 MG/DL (ref 70–99)
GLUCOSE BLD-MCNC: 84 MG/DL (ref 70–99)
GLUCOSE BLD-MCNC: 86 MG/DL (ref 70–99)
GLUCOSE BLD-MCNC: 87 MG/DL (ref 70–99)
GLUCOSE BLD-MCNC: 88 MG/DL (ref 70–99)
GLUCOSE BLD-MCNC: 91 MG/DL (ref 70–99)
GLUCOSE BLD-MCNC: 92 MG/DL (ref 70–99)
GLUCOSE BLD-MCNC: 93 MG/DL (ref 70–99)
GLUCOSE BLD-MCNC: 93 MG/DL (ref 70–99)
GLUCOSE BLD-MCNC: 96 MG/DL (ref 70–99)
GLUCOSE BLD-MCNC: 97 MG/DL (ref 70–99)
GLUCOSE BLD-MCNC: 99 MG/DL (ref 70–99)
GLUCOSE BLDC GLUCOMTR-MCNC: 104 MG/DL (ref 70–99)
GLUCOSE BLDC GLUCOMTR-MCNC: 109 MG/DL (ref 70–99)
GLUCOSE BLDC GLUCOMTR-MCNC: 109 MG/DL (ref 70–99)
GLUCOSE BLDC GLUCOMTR-MCNC: 110 MG/DL (ref 70–99)
GLUCOSE BLDC GLUCOMTR-MCNC: 145 MG/DL (ref 70–99)
GLUCOSE BLDC GLUCOMTR-MCNC: 169 MG/DL (ref 70–99)
GLUCOSE BLDC GLUCOMTR-MCNC: 70 MG/DL (ref 70–99)
GLUCOSE BLDC GLUCOMTR-MCNC: 72 MG/DL (ref 70–99)
GLUCOSE BLDC GLUCOMTR-MCNC: 73 MG/DL (ref 70–99)
GLUCOSE BLDC GLUCOMTR-MCNC: 74 MG/DL (ref 70–99)
GLUCOSE BLDC GLUCOMTR-MCNC: 79 MG/DL (ref 70–99)
GLUCOSE BLDC GLUCOMTR-MCNC: 81 MG/DL (ref 70–99)
GLUCOSE BLDC GLUCOMTR-MCNC: 85 MG/DL (ref 70–99)
GLUCOSE BLDC GLUCOMTR-MCNC: 92 MG/DL (ref 70–99)
GLUCOSE BLDC GLUCOMTR-MCNC: 92 MG/DL (ref 70–99)
GLUCOSE BLDC GLUCOMTR-MCNC: 94 MG/DL (ref 70–99)
GLUCOSE BLDC GLUCOMTR-MCNC: 97 MG/DL (ref 70–99)
GLUCOSE BLDC GLUCOMTR-MCNC: 98 MG/DL (ref 70–99)
GLUCOSE BLDC GLUCOMTR-MCNC: 99 MG/DL (ref 70–99)
GLUCOSE UR STRIP-MCNC: NEGATIVE MG/DL
GRAM STAIN RESULT: NORMAL
GRAM STAIN RESULT: NORMAL
HBV CORE AB SERPL QL IA: NONREACTIVE
HBV SURFACE AB SERPL IA-ACNC: 0.39 M[IU]/ML
HBV SURFACE AG SERPL QL IA: NONREACTIVE
HCO3 BLD-SCNC: 16 MMOL/L (ref 21–28)
HCO3 BLD-SCNC: 17 MMOL/L (ref 21–28)
HCO3 BLD-SCNC: 18 MMOL/L (ref 21–28)
HCO3 BLD-SCNC: 19 MMOL/L (ref 21–28)
HCO3 BLD-SCNC: 20 MMOL/L (ref 21–28)
HCO3 BLD-SCNC: 21 MMOL/L (ref 21–28)
HCO3 BLD-SCNC: 22 MMOL/L (ref 21–28)
HCO3 BLD-SCNC: 23 MMOL/L (ref 21–28)
HCO3 BLDA-SCNC: 16 MMOL/L (ref 21–28)
HCO3 BLDA-SCNC: 17 MMOL/L (ref 21–28)
HCO3 BLDA-SCNC: 19 MMOL/L (ref 21–28)
HCO3 BLDA-SCNC: 20 MMOL/L (ref 21–28)
HCO3 BLDA-SCNC: 22 MMOL/L (ref 21–28)
HCO3 BLDA-SCNC: 22 MMOL/L (ref 21–28)
HCO3 BLDA-SCNC: 23 MMOL/L (ref 21–28)
HCO3 BLDA-SCNC: 23 MMOL/L (ref 21–28)
HCO3 BLDV-SCNC: 17 MMOL/L (ref 21–28)
HCO3 BLDV-SCNC: 18 MMOL/L (ref 21–28)
HCO3 BLDV-SCNC: 19 MMOL/L (ref 21–28)
HCO3 BLDV-SCNC: 19 MMOL/L (ref 21–28)
HCO3 BLDV-SCNC: 20 MMOL/L (ref 21–28)
HCO3 BLDV-SCNC: 21 MMOL/L (ref 21–28)
HCO3 BLDV-SCNC: 22 MMOL/L (ref 21–28)
HCO3 BLDV-SCNC: 23 MMOL/L (ref 21–28)
HCT VFR BLD AUTO: 26.4 % (ref 40–53)
HCT VFR BLD AUTO: 27.2 % (ref 40–53)
HCT VFR BLD AUTO: 27.3 % (ref 40–53)
HCT VFR BLD AUTO: 28.1 % (ref 40–53)
HCT VFR BLD AUTO: 28.7 % (ref 40–53)
HCT VFR BLD AUTO: 29.7 % (ref 40–53)
HCT VFR BLD AUTO: 30 % (ref 40–53)
HCT VFR BLD AUTO: 31 % (ref 40–53)
HCT VFR BLD AUTO: 31.4 % (ref 40–53)
HCT VFR BLD AUTO: 32.9 % (ref 40–53)
HCT VFR BLD AUTO: 33.4 % (ref 40–53)
HCT VFR BLD AUTO: 34.7 % (ref 40–53)
HCT VFR BLD AUTO: 36.5 % (ref 40–53)
HCT VFR BLD AUTO: 39.4 % (ref 40–53)
HCT VFR BLD AUTO: 43.8 % (ref 40–53)
HCT VFR BLD AUTO: 44.1 % (ref 40–53)
HCT VFR BLD AUTO: 44.2 % (ref 40–53)
HCT VFR BLD AUTO: 44.5 % (ref 40–53)
HCV AB SERPL QL IA: NONREACTIVE
HDLC SERPL-MCNC: 37 MG/DL
HEPZYMED PTT RATIO: 1.59
HEPZYMED PTT-LA: 62 SECONDS (ref 31–45)
HEPZYMED THROMBIN TIME: 21.8 SECONDS (ref 15.7–21.7)
HGB BLD-MCNC: 10.6 G/DL (ref 13.3–17.7)
HGB BLD-MCNC: 10.6 G/DL (ref 13.3–17.7)
HGB BLD-MCNC: 10.7 G/DL (ref 13.3–17.7)
HGB BLD-MCNC: 11.2 G/DL (ref 13.3–17.7)
HGB BLD-MCNC: 11.3 G/DL (ref 13.3–17.7)
HGB BLD-MCNC: 11.5 G/DL (ref 13.3–17.7)
HGB BLD-MCNC: 11.7 G/DL (ref 13.3–17.7)
HGB BLD-MCNC: 12.5 G/DL (ref 13.3–17.7)
HGB BLD-MCNC: 13.3 G/DL (ref 13.3–17.7)
HGB BLD-MCNC: 13.6 G/DL (ref 13.3–17.7)
HGB BLD-MCNC: 13.6 G/DL (ref 13.3–17.7)
HGB BLD-MCNC: 13.8 G/DL (ref 13.3–17.7)
HGB BLD-MCNC: 8.6 G/DL (ref 13.3–17.7)
HGB BLD-MCNC: 8.8 G/DL (ref 13.3–17.7)
HGB BLD-MCNC: 9 G/DL (ref 13.3–17.7)
HGB BLD-MCNC: 9.4 G/DL (ref 13.3–17.7)
HGB BLD-MCNC: 9.4 G/DL (ref 13.3–17.7)
HGB BLD-MCNC: 9.9 G/DL (ref 13.3–17.7)
HGB BLD-MCNC: 9.9 G/DL (ref 13.3–17.7)
HGB FREE PLAS-MCNC: <30 MG/DL
HGB UR QL STRIP: ABNORMAL
HIV 1+2 AB+HIV1 P24 AG SERPL QL IA: NONREACTIVE
HOLD SPECIMEN: NORMAL
HYALINE CASTS: 3 /LPF
IMM GRANULOCYTES # BLD: 0.3 10E3/UL
IMM GRANULOCYTES # BLD: 0.3 10E3/UL
IMM GRANULOCYTES # BLD: 0.4 10E3/UL
IMM GRANULOCYTES NFR BLD: 2 %
INR PPP: 2.32 (ref 0.85–1.15)
INR PPP: 2.37 (ref 0.85–1.15)
INR PPP: 3.42 (ref 0.85–1.15)
INR PPP: 3.85 (ref 0.85–1.15)
INR PPP: 3.87 (ref 0.85–1.15)
INR PPP: 3.92 (ref 0.85–1.15)
INR PPP: 4.04 (ref 0.85–1.15)
INR PPP: 4.1 (ref 0.85–1.15)
INR PPP: 4.11 (ref 0.85–1.15)
INR PPP: 4.51 (ref 0.85–1.15)
INR PPP: 4.67 (ref 0.85–1.15)
INR PPP: 4.74 (ref 0.85–1.15)
INR PPP: 4.8 (ref 0.85–1.15)
INR PPP: 5.3 (ref 0.85–1.15)
INR PPP: 5.61 (ref 0.85–1.15)
INTERPRETATION ECG - MUSE: NORMAL
INTERPRETATION TEGPIA: NORMAL
ISSUE DATE AND TIME: NORMAL
KETONES UR STRIP-MCNC: NEGATIVE MG/DL
LA PPP-IMP: NEGATIVE
LACTATE BLD-SCNC: 8.3 MMOL/L
LACTATE BLD-SCNC: 8.9 MMOL/L
LACTATE BLD-SCNC: 9.1 MMOL/L
LACTATE BLD-SCNC: 9.5 MMOL/L
LACTATE SERPL-SCNC: 2.1 MMOL/L (ref 0.7–2)
LACTATE SERPL-SCNC: 2.2 MMOL/L (ref 0.7–2)
LACTATE SERPL-SCNC: 2.3 MMOL/L (ref 0.7–2)
LACTATE SERPL-SCNC: 2.5 MMOL/L (ref 0.7–2)
LACTATE SERPL-SCNC: 2.8 MMOL/L (ref 0.7–2)
LACTATE SERPL-SCNC: 3 MMOL/L (ref 0.7–2)
LACTATE SERPL-SCNC: 3.1 MMOL/L (ref 0.7–2)
LACTATE SERPL-SCNC: 3.2 MMOL/L (ref 0.7–2)
LACTATE SERPL-SCNC: 3.3 MMOL/L (ref 0.7–2)
LACTATE SERPL-SCNC: 3.3 MMOL/L (ref 0.7–2)
LACTATE SERPL-SCNC: 3.4 MMOL/L (ref 0.7–2)
LACTATE SERPL-SCNC: 3.4 MMOL/L (ref 0.7–2)
LACTATE SERPL-SCNC: 3.5 MMOL/L (ref 0.7–2)
LACTATE SERPL-SCNC: 3.7 MMOL/L (ref 0.7–2)
LACTATE SERPL-SCNC: 3.8 MMOL/L (ref 0.7–2)
LACTATE SERPL-SCNC: 3.9 MMOL/L (ref 0.7–2)
LACTATE SERPL-SCNC: 4 MMOL/L (ref 0.7–2)
LACTATE SERPL-SCNC: 4.1 MMOL/L (ref 0.7–2)
LACTATE SERPL-SCNC: 4.1 MMOL/L (ref 0.7–2)
LACTATE SERPL-SCNC: 4.2 MMOL/L (ref 0.7–2)
LACTATE SERPL-SCNC: 4.2 MMOL/L (ref 0.7–2)
LACTATE SERPL-SCNC: 4.4 MMOL/L (ref 0.7–2)
LACTATE SERPL-SCNC: 4.6 MMOL/L (ref 0.7–2)
LACTATE SERPL-SCNC: 4.7 MMOL/L (ref 0.7–2)
LACTATE SERPL-SCNC: 4.9 MMOL/L (ref 0.7–2)
LACTATE SERPL-SCNC: 4.9 MMOL/L (ref 0.7–2)
LACTATE SERPL-SCNC: 5.5 MMOL/L (ref 0.7–2)
LACTATE SERPL-SCNC: 5.6 MMOL/L (ref 0.7–2)
LACTATE SERPL-SCNC: 5.7 MMOL/L (ref 0.7–2)
LACTATE SERPL-SCNC: 5.7 MMOL/L (ref 0.7–2)
LACTATE SERPL-SCNC: 5.8 MMOL/L (ref 0.7–2)
LACTATE SERPL-SCNC: 5.9 MMOL/L (ref 0.7–2)
LACTATE SERPL-SCNC: 5.9 MMOL/L (ref 0.7–2)
LACTATE SERPL-SCNC: 6.2 MMOL/L (ref 0.7–2)
LACTATE SERPL-SCNC: 6.2 MMOL/L (ref 0.7–2)
LACTATE SERPL-SCNC: 6.3 MMOL/L (ref 0.7–2)
LACTATE SERPL-SCNC: 6.4 MMOL/L (ref 0.7–2)
LACTATE SERPL-SCNC: 7.1 MMOL/L (ref 0.7–2)
LACTATE SERPL-SCNC: 7.1 MMOL/L (ref 0.7–2)
LACTATE SERPL-SCNC: 8.4 MMOL/L (ref 0.7–2)
LACTATE SERPL-SCNC: 8.8 MMOL/L (ref 0.7–2)
LACTATE SERPL-SCNC: 8.9 MMOL/L (ref 0.7–2)
LACTATE SERPL-SCNC: 9.4 MMOL/L (ref 0.7–2)
LDH SERPL L TO P-CCNC: 1595 U/L (ref 85–227)
LDH SERPL L TO P-CCNC: 2490 U/L (ref 85–227)
LDH SERPL L TO P-CCNC: 3099 U/L (ref 85–227)
LDH SERPL L TO P-CCNC: 6155 U/L (ref 85–227)
LDLC SERPL CALC-MCNC: 72 MG/DL
LEUKOCYTE ESTERASE UR QL STRIP: ABNORMAL
LUPUS INTERPRETATION: ABNORMAL
LVEF ECHO: NORMAL
LVEF ECHO: NORMAL
LYMPHOCYTES # BLD AUTO: 0.4 10E3/UL (ref 0.8–5.3)
LYMPHOCYTES # BLD AUTO: 0.6 10E3/UL (ref 0.8–5.3)
LYMPHOCYTES # BLD AUTO: 0.9 10E3/UL (ref 0.8–5.3)
LYMPHOCYTES # BLD AUTO: 1.4 10E3/UL (ref 0.8–5.3)
LYMPHOCYTES NFR BLD AUTO: 12 %
LYMPHOCYTES NFR BLD AUTO: 14 %
LYMPHOCYTES NFR BLD AUTO: 3 %
LYMPHOCYTES NFR BLD AUTO: 3 %
MAGNESIUM SERPL-MCNC: 2 MG/DL (ref 1.6–2.3)
MAGNESIUM SERPL-MCNC: 2.1 MG/DL (ref 1.6–2.3)
MAGNESIUM SERPL-MCNC: 2.2 MG/DL (ref 1.6–2.3)
MAGNESIUM SERPL-MCNC: 2.3 MG/DL (ref 1.6–2.3)
MAGNESIUM SERPL-MCNC: 2.4 MG/DL (ref 1.6–2.3)
MAGNESIUM SERPL-MCNC: 2.5 MG/DL (ref 1.6–2.3)
MAGNESIUM SERPL-MCNC: 2.5 MG/DL (ref 1.6–2.3)
MCF P HPASE BLD TEG: 54.1 MM (ref 50–70)
MCF P HPASE BLD TEG: 55.3 MM (ref 50–70)
MCF P HPASE BLD TEG: 56.6 MM (ref 50–70)
MCH RBC QN AUTO: 27.5 PG (ref 26.5–33)
MCH RBC QN AUTO: 27.6 PG (ref 26.5–33)
MCH RBC QN AUTO: 27.8 PG (ref 26.5–33)
MCH RBC QN AUTO: 27.8 PG (ref 26.5–33)
MCH RBC QN AUTO: 28 PG (ref 26.5–33)
MCH RBC QN AUTO: 28 PG (ref 26.5–33)
MCH RBC QN AUTO: 28.1 PG (ref 26.5–33)
MCH RBC QN AUTO: 28.2 PG (ref 26.5–33)
MCH RBC QN AUTO: 28.2 PG (ref 26.5–33)
MCH RBC QN AUTO: 28.3 PG (ref 26.5–33)
MCH RBC QN AUTO: 28.4 PG (ref 26.5–33)
MCH RBC QN AUTO: 28.4 PG (ref 26.5–33)
MCH RBC QN AUTO: 28.5 PG (ref 26.5–33)
MCH RBC QN AUTO: 28.5 PG (ref 26.5–33)
MCH RBC QN AUTO: 28.6 PG (ref 26.5–33)
MCHC RBC AUTO-ENTMCNC: 30.1 G/DL (ref 31.5–36.5)
MCHC RBC AUTO-ENTMCNC: 30.5 G/DL (ref 31.5–36.5)
MCHC RBC AUTO-ENTMCNC: 30.6 G/DL (ref 31.5–36.5)
MCHC RBC AUTO-ENTMCNC: 31 G/DL (ref 31.5–36.5)
MCHC RBC AUTO-ENTMCNC: 31.1 G/DL (ref 31.5–36.5)
MCHC RBC AUTO-ENTMCNC: 31.3 G/DL (ref 31.5–36.5)
MCHC RBC AUTO-ENTMCNC: 31.4 G/DL (ref 31.5–36.5)
MCHC RBC AUTO-ENTMCNC: 31.5 G/DL (ref 31.5–36.5)
MCHC RBC AUTO-ENTMCNC: 31.6 G/DL (ref 31.5–36.5)
MCHC RBC AUTO-ENTMCNC: 31.7 G/DL (ref 31.5–36.5)
MCHC RBC AUTO-ENTMCNC: 31.9 G/DL (ref 31.5–36.5)
MCHC RBC AUTO-ENTMCNC: 32 G/DL (ref 31.5–36.5)
MCHC RBC AUTO-ENTMCNC: 32.2 G/DL (ref 31.5–36.5)
MCHC RBC AUTO-ENTMCNC: 32.2 G/DL (ref 31.5–36.5)
MCHC RBC AUTO-ENTMCNC: 32.4 G/DL (ref 31.5–36.5)
MCHC RBC AUTO-ENTMCNC: 32.6 G/DL (ref 31.5–36.5)
MCV RBC AUTO: 87 FL (ref 78–100)
MCV RBC AUTO: 88 FL (ref 78–100)
MCV RBC AUTO: 89 FL (ref 78–100)
MCV RBC AUTO: 90 FL (ref 78–100)
MCV RBC AUTO: 91 FL (ref 78–100)
MCV RBC AUTO: 91 FL (ref 78–100)
MCV RBC AUTO: 92 FL (ref 78–100)
MCV RBC AUTO: 93 FL (ref 78–100)
MONOCYTES # BLD AUTO: 0.6 10E3/UL (ref 0–1.3)
MONOCYTES # BLD AUTO: 0.9 10E3/UL (ref 0–1.3)
MONOCYTES # BLD AUTO: 0.9 10E3/UL (ref 0–1.3)
MONOCYTES # BLD AUTO: 1.2 10E3/UL (ref 0–1.3)
MONOCYTES NFR BLD AUTO: 11 %
MONOCYTES NFR BLD AUTO: 14 %
MONOCYTES NFR BLD AUTO: 4 %
MONOCYTES NFR BLD AUTO: 5 %
MRSA DNA SPEC QL NAA+PROBE: NEGATIVE
MUCOUS THREADS #/AREA URNS LPF: PRESENT /LPF
NEUTROPHILS # BLD AUTO: 14.2 10E3/UL (ref 1.6–8.3)
NEUTROPHILS # BLD AUTO: 17.1 10E3/UL (ref 1.6–8.3)
NEUTROPHILS # BLD AUTO: 4.6 10E3/UL (ref 1.6–8.3)
NEUTROPHILS # BLD AUTO: 8.2 10E3/UL (ref 1.6–8.3)
NEUTROPHILS NFR BLD AUTO: 72 %
NEUTROPHILS NFR BLD AUTO: 75 %
NEUTROPHILS NFR BLD AUTO: 90 %
NEUTROPHILS NFR BLD AUTO: 91 %
NITRATE UR QL: NEGATIVE
NONHDLC SERPL-MCNC: 93 MG/DL
NORFENTANYL UR CFM-MCNC: 61 NG/ML
NORFENTANYL/CREAT UR: 105 NG/MG {CREAT}
NRBC # BLD AUTO: 0 10E3/UL
NRBC BLD AUTO-RTO: 0 /100
NSE SERPL IA-MCNC: 13.6 UG/L
NSE SERPL IA-MCNC: 28 UG/L
NT-PROBNP SERPL-MCNC: ABNORMAL PG/ML (ref 0–900)
NT-PROBNP SERPL-MCNC: ABNORMAL PG/ML (ref 0–900)
O2/TOTAL GAS SETTING VFR VENT: 100 %
O2/TOTAL GAS SETTING VFR VENT: 40 %
O2/TOTAL GAS SETTING VFR VENT: 50 %
O2/TOTAL GAS SETTING VFR VENT: 60 %
O2/TOTAL GAS SETTING VFR VENT: 70 %
O2/TOTAL GAS SETTING VFR VENT: 80 %
O2/TOTAL GAS SETTING VFR VENT: 80 %
OPIATES UR QL SCN: ABNORMAL
OPIATES UR QL SCN: ABNORMAL
OXYHGB MFR BLD: 91 % (ref 92–100)
OXYHGB MFR BLD: 93 % (ref 92–100)
OXYHGB MFR BLD: 94 % (ref 92–100)
OXYHGB MFR BLD: 95 % (ref 92–100)
OXYHGB MFR BLD: 96 % (ref 92–100)
OXYHGB MFR BLD: 97 % (ref 92–100)
OXYHGB MFR BLD: 98 % (ref 92–100)
OXYHGB MFR BLD: 99 % (ref 92–100)
OXYHGB MFR BLDA: 66 % (ref 92–100)
OXYHGB MFR BLDA: 83 % (ref 92–100)
OXYHGB MFR BLDA: 95 % (ref 75–100)
OXYHGB MFR BLDA: 95 % (ref 75–100)
OXYHGB MFR BLDA: 96 % (ref 75–100)
OXYHGB MFR BLDA: 97 % (ref 75–100)
OXYHGB MFR BLDA: 98 % (ref 75–100)
OXYHGB MFR BLDA: 98 % (ref 75–100)
OXYHGB MFR BLDA: 99 % (ref 75–100)
OXYHGB MFR BLDA: 99 % (ref 92–100)
OXYHGB MFR BLDV: 39 % (ref 70–75)
OXYHGB MFR BLDV: 51 % (ref 70–75)
OXYHGB MFR BLDV: 52 % (ref 70–75)
OXYHGB MFR BLDV: 54 % (ref 70–75)
OXYHGB MFR BLDV: 58 % (ref 70–75)
OXYHGB MFR BLDV: 62 %
OXYHGB MFR BLDV: 66 %
OXYHGB MFR BLDV: 72 %
OXYHGB MFR BLDV: 72 %
OXYHGB MFR BLDV: 73 %
OXYHGB MFR BLDV: 73 %
P AXIS - MUSE: NORMAL DEGREES
PA AA BLD-ACNC: 0 %
PA ADP BLD-ACNC: 46 %
PATIENT PTT-LA: 158 SECONDS (ref 31–45)
PCO2 BLD: 24 MM HG (ref 35–45)
PCO2 BLD: 26 MM HG (ref 35–45)
PCO2 BLD: 26 MM HG (ref 35–45)
PCO2 BLD: 29 MM HG (ref 35–45)
PCO2 BLD: 30 MM HG (ref 35–45)
PCO2 BLD: 30 MM HG (ref 35–45)
PCO2 BLD: 31 MM HG (ref 35–45)
PCO2 BLD: 32 MM HG (ref 35–45)
PCO2 BLD: 33 MM HG (ref 35–45)
PCO2 BLD: 34 MM HG (ref 35–45)
PCO2 BLD: 35 MM HG (ref 35–45)
PCO2 BLD: 35 MM HG (ref 35–45)
PCO2 BLD: 36 MM HG (ref 35–45)
PCO2 BLD: 37 MM HG (ref 35–45)
PCO2 BLD: 37 MM HG (ref 35–45)
PCO2 BLD: 38 MM HG (ref 35–45)
PCO2 BLD: 39 MM HG (ref 35–45)
PCO2 BLD: 39 MM HG (ref 35–45)
PCO2 BLD: 40 MM HG (ref 35–45)
PCO2 BLD: 42 MM HG (ref 35–45)
PCO2 BLD: 43 MM HG (ref 35–45)
PCO2 BLD: 44 MM HG (ref 35–45)
PCO2 BLD: 46 MM HG (ref 35–45)
PCO2 BLD: 46 MM HG (ref 35–45)
PCO2 BLDA: 31 MM HG (ref 35–45)
PCO2 BLDA: 36 MM HG (ref 35–45)
PCO2 BLDA: 41 MM HG (ref 35–45)
PCO2 BLDA: 47 MM HG (ref 35–45)
PCO2 BLDA: 52 MM HG (ref 35–45)
PCO2 BLDA: 53 MM HG (ref 35–45)
PCO2 BLDA: 56 MM HG (ref 35–45)
PCO2 BLDA: 57 MM HG (ref 35–45)
PCO2 BLDV: 35 MM HG (ref 40–50)
PCO2 BLDV: 39 MM HG (ref 40–50)
PCO2 BLDV: 39 MM HG (ref 40–50)
PCO2 BLDV: 42 MM HG (ref 40–50)
PCO2 BLDV: 42 MM HG (ref 40–50)
PCO2 BLDV: 44 MM HG (ref 40–50)
PCO2 BLDV: 46 MM HG (ref 40–50)
PCO2 BLDV: 47 MM HG (ref 40–50)
PCO2 BLDV: 53 MM HG (ref 40–50)
PCO2 BLDV: 54 MM HG (ref 40–50)
PCO2 BLDV: 54 MM HG (ref 40–50)
PCO2 BLDV: 56 MM HG (ref 40–50)
PCP UR QL SCN: ABNORMAL
PH BLD: 7.22 [PH] (ref 7.35–7.45)
PH BLD: 7.24 [PH] (ref 7.35–7.45)
PH BLD: 7.25 [PH] (ref 7.35–7.45)
PH BLD: 7.25 [PH] (ref 7.35–7.45)
PH BLD: 7.26 [PH] (ref 7.35–7.45)
PH BLD: 7.26 [PH] (ref 7.35–7.45)
PH BLD: 7.27 [PH] (ref 7.35–7.45)
PH BLD: 7.28 [PH] (ref 7.35–7.45)
PH BLD: 7.28 [PH] (ref 7.35–7.45)
PH BLD: 7.29 [PH] (ref 7.35–7.45)
PH BLD: 7.29 [PH] (ref 7.35–7.45)
PH BLD: 7.3 [PH] (ref 7.35–7.45)
PH BLD: 7.31 [PH] (ref 7.35–7.45)
PH BLD: 7.32 [PH] (ref 7.35–7.45)
PH BLD: 7.33 [PH] (ref 7.35–7.45)
PH BLD: 7.34 [PH] (ref 7.35–7.45)
PH BLD: 7.35 [PH] (ref 7.35–7.45)
PH BLD: 7.35 [PH] (ref 7.35–7.45)
PH BLD: 7.36 [PH] (ref 7.35–7.45)
PH BLD: 7.37 [PH] (ref 7.35–7.45)
PH BLD: 7.38 [PH] (ref 7.35–7.45)
PH BLD: 7.38 [PH] (ref 7.35–7.45)
PH BLD: 7.39 [PH] (ref 7.35–7.45)
PH BLD: 7.4 [PH] (ref 7.35–7.45)
PH BLD: 7.41 [PH] (ref 7.35–7.45)
PH BLD: 7.42 [PH] (ref 7.35–7.45)
PH BLD: 7.43 [PH] (ref 7.35–7.45)
PH BLD: 7.44 [PH] (ref 7.35–7.45)
PH BLD: 7.45 [PH] (ref 7.35–7.45)
PH BLD: 7.46 [PH] (ref 7.35–7.45)
PH BLD: 7.47 [PH] (ref 7.35–7.45)
PH BLD: 7.5 [PH] (ref 7.35–7.45)
PH BLDA: 7.13 [PH] (ref 7.35–7.45)
PH BLDA: 7.15 [PH] (ref 7.35–7.45)
PH BLDA: 7.23 [PH] (ref 7.35–7.45)
PH BLDA: 7.24 [PH] (ref 7.35–7.45)
PH BLDA: 7.25 [PH] (ref 7.35–7.45)
PH BLDA: 7.28 [PH] (ref 7.35–7.45)
PH BLDA: 7.32 [PH] (ref 7.35–7.45)
PH BLDA: 7.33 [PH] (ref 7.35–7.45)
PH BLDA: 7.35 [PH] (ref 7.35–7.45)
PH BLDA: 7.39 [PH] (ref 7.35–7.45)
PH BLDV: 7.15 [PH] (ref 7.32–7.43)
PH BLDV: 7.19 [PH] (ref 7.32–7.43)
PH BLDV: 7.21 [PH] (ref 7.32–7.43)
PH BLDV: 7.21 [PH] (ref 7.32–7.43)
PH BLDV: 7.22 [PH] (ref 7.32–7.43)
PH BLDV: 7.24 [PH] (ref 7.32–7.43)
PH BLDV: 7.25 [PH] (ref 7.32–7.43)
PH BLDV: 7.25 [PH] (ref 7.32–7.43)
PH BLDV: 7.27 [PH] (ref 7.32–7.43)
PH BLDV: 7.31 [PH] (ref 7.32–7.43)
PH BLDV: 7.34 [PH] (ref 7.32–7.43)
PH BLDV: 7.38 [PH] (ref 7.32–7.43)
PH UR STRIP: 5 [PH] (ref 5–7)
PHOSPHATE SERPL-MCNC: 3.9 MG/DL (ref 2.5–4.5)
PHOSPHATE SERPL-MCNC: 3.9 MG/DL (ref 2.5–4.5)
PHOSPHATE SERPL-MCNC: 5.3 MG/DL (ref 2.5–4.5)
PHOSPHATE SERPL-MCNC: 6.2 MG/DL (ref 2.5–4.5)
PHOSPHATE SERPL-MCNC: 6.6 MG/DL (ref 2.5–4.5)
PHOSPHATE SERPL-MCNC: 7.3 MG/DL (ref 2.5–4.5)
PHOSPHATE SERPL-MCNC: 7.7 MG/DL (ref 2.5–4.5)
PLATELET # BLD AUTO: 102 10E3/UL (ref 150–450)
PLATELET # BLD AUTO: 124 10E3/UL (ref 150–450)
PLATELET # BLD AUTO: 125 10E3/UL (ref 150–450)
PLATELET # BLD AUTO: 157 10E3/UL (ref 150–450)
PLATELET # BLD AUTO: 175 10E3/UL (ref 150–450)
PLATELET # BLD AUTO: 185 10E3/UL (ref 150–450)
PLATELET # BLD AUTO: 189 10E3/UL (ref 150–450)
PLATELET # BLD AUTO: 266 10E3/UL (ref 150–450)
PLATELET # BLD AUTO: 278 10E3/UL (ref 150–450)
PLATELET # BLD AUTO: 297 10E3/UL (ref 150–450)
PLATELET # BLD AUTO: 300 10E3/UL (ref 150–450)
PLATELET # BLD AUTO: 342 10E3/UL (ref 150–450)
PLATELET # BLD AUTO: 65 10E3/UL (ref 150–450)
PLATELET # BLD AUTO: 74 10E3/UL (ref 150–450)
PLATELET # BLD AUTO: 76 10E3/UL (ref 150–450)
PLATELET # BLD AUTO: 88 10E3/UL (ref 150–450)
PLATELET # BLD AUTO: 90 10E3/UL (ref 150–450)
PLATELET # BLD AUTO: 99 10E3/UL (ref 150–450)
PLATELET NEUTRALIZATION: -11 SECONDS
PO2 BLD: 103 MM HG (ref 80–105)
PO2 BLD: 106 MM HG (ref 80–105)
PO2 BLD: 108 MM HG (ref 80–105)
PO2 BLD: 113 MM HG (ref 80–105)
PO2 BLD: 113 MM HG (ref 80–105)
PO2 BLD: 121 MM HG (ref 80–105)
PO2 BLD: 124 MM HG (ref 80–105)
PO2 BLD: 124 MM HG (ref 80–105)
PO2 BLD: 125 MM HG (ref 80–105)
PO2 BLD: 127 MM HG (ref 80–105)
PO2 BLD: 128 MM HG (ref 80–105)
PO2 BLD: 129 MM HG (ref 80–105)
PO2 BLD: 130 MM HG (ref 80–105)
PO2 BLD: 132 MM HG (ref 80–105)
PO2 BLD: 133 MM HG (ref 80–105)
PO2 BLD: 135 MM HG (ref 80–105)
PO2 BLD: 139 MM HG (ref 80–105)
PO2 BLD: 140 MM HG (ref 80–105)
PO2 BLD: 141 MM HG (ref 80–105)
PO2 BLD: 141 MM HG (ref 80–105)
PO2 BLD: 146 MM HG (ref 80–105)
PO2 BLD: 146 MM HG (ref 80–105)
PO2 BLD: 148 MM HG (ref 80–105)
PO2 BLD: 150 MM HG (ref 80–105)
PO2 BLD: 151 MM HG (ref 80–105)
PO2 BLD: 152 MM HG (ref 80–105)
PO2 BLD: 223 MM HG (ref 80–105)
PO2 BLD: 72 MM HG (ref 80–105)
PO2 BLD: 79 MM HG (ref 80–105)
PO2 BLD: 80 MM HG (ref 80–105)
PO2 BLD: 84 MM HG (ref 80–105)
PO2 BLD: 84 MM HG (ref 80–105)
PO2 BLD: 87 MM HG (ref 80–105)
PO2 BLD: 88 MM HG (ref 80–105)
PO2 BLD: 90 MM HG (ref 80–105)
PO2 BLD: 92 MM HG (ref 80–105)
PO2 BLD: 92 MM HG (ref 80–105)
PO2 BLD: 95 MM HG (ref 80–105)
PO2 BLDA: 108 MM HG (ref 80–105)
PO2 BLDA: 110 MM HG (ref 80–105)
PO2 BLDA: 136 MM HG (ref 80–105)
PO2 BLDA: 138 MM HG (ref 80–105)
PO2 BLDA: 206 MM HG (ref 80–105)
PO2 BLDA: 44 MM HG (ref 80–105)
PO2 BLDA: 453 MM HG (ref 80–105)
PO2 BLDA: 58 MM HG (ref 80–105)
PO2 BLDA: 83 MM HG (ref 80–105)
PO2 BLDA: 90 MM HG (ref 80–105)
PO2 BLDV: 25 MM HG (ref 25–47)
PO2 BLDV: 30 MM HG (ref 25–47)
PO2 BLDV: 35 MM HG (ref 25–47)
PO2 BLDV: 36 MM HG (ref 25–47)
PO2 BLDV: 36 MM HG (ref 25–47)
PO2 BLDV: 38 MM HG (ref 25–47)
PO2 BLDV: 41 MM HG (ref 25–47)
PO2 BLDV: 42 MM HG (ref 25–47)
PO2 BLDV: 43 MM HG (ref 25–47)
PO2 BLDV: 43 MM HG (ref 25–47)
PO2 BLDV: 48 MM HG (ref 25–47)
PO2 BLDV: 48 MM HG (ref 25–47)
POTASSIUM BLD-SCNC: 3.8 MMOL/L (ref 3.4–5.3)
POTASSIUM BLD-SCNC: 4.1 MMOL/L (ref 3.4–5.3)
POTASSIUM BLD-SCNC: 4.2 MMOL/L (ref 3.4–5.3)
POTASSIUM BLD-SCNC: 4.3 MMOL/L (ref 3.4–5.3)
POTASSIUM BLD-SCNC: 4.4 MMOL/L (ref 3.4–5.3)
POTASSIUM BLD-SCNC: 4.5 MMOL/L (ref 3.4–5.3)
POTASSIUM BLD-SCNC: 4.6 MMOL/L (ref 3.4–5.3)
POTASSIUM BLD-SCNC: 4.6 MMOL/L (ref 3.4–5.3)
POTASSIUM BLD-SCNC: 4.7 MMOL/L (ref 3.4–5.3)
POTASSIUM BLD-SCNC: 4.8 MMOL/L (ref 3.4–5.3)
POTASSIUM BLD-SCNC: 4.9 MMOL/L (ref 3.4–5.3)
POTASSIUM BLD-SCNC: 4.9 MMOL/L (ref 3.4–5.3)
POTASSIUM BLD-SCNC: 5 MMOL/L (ref 3.4–5.3)
POTASSIUM BLD-SCNC: 5.1 MMOL/L (ref 3.5–5)
POTASSIUM BLD-SCNC: 5.2 MMOL/L (ref 3.5–5)
POTASSIUM BLD-SCNC: 5.3 MMOL/L (ref 3.4–5.3)
POTASSIUM BLD-SCNC: 5.3 MMOL/L (ref 3.5–5)
POTASSIUM BLD-SCNC: 5.5 MMOL/L (ref 3.4–5.3)
POTASSIUM BLD-SCNC: 5.5 MMOL/L (ref 3.4–5.3)
PR INTERVAL - MUSE: NORMAL MS
PROCALCITONIN SERPL-MCNC: 0.05 NG/ML
PROCALCITONIN SERPL-MCNC: 12.8 NG/ML
PROCALCITONIN SERPL-MCNC: 32.93 NG/ML
PROT SERPL-MCNC: 4 G/DL (ref 6.8–8.8)
PROT SERPL-MCNC: 4.7 G/DL (ref 6.8–8.8)
PROT SERPL-MCNC: 4.7 G/DL (ref 6.8–8.8)
PROT SERPL-MCNC: 4.8 G/DL (ref 6.8–8.8)
PROT SERPL-MCNC: 4.9 G/DL (ref 6.8–8.8)
PROT SERPL-MCNC: 5 G/DL (ref 6.8–8.8)
PROT SERPL-MCNC: 5.1 G/DL (ref 6.8–8.8)
PROT SERPL-MCNC: 5.1 G/DL (ref 6.8–8.8)
PROT SERPL-MCNC: 5.2 G/DL (ref 6.8–8.8)
PROT SERPL-MCNC: 5.2 G/DL (ref 6.8–8.8)
PROT SERPL-MCNC: 5.3 G/DL (ref 6.8–8.8)
PROT SERPL-MCNC: 5.4 G/DL (ref 6.8–8.8)
PROT SERPL-MCNC: 6 G/DL (ref 6.8–8.8)
PROT SERPL-MCNC: 6 G/DL (ref 6.8–8.8)
PTT 1:2 MIX: 43 SECONDS (ref 31–45)
QRS DURATION - MUSE: 108 MS
QRS DURATION - MUSE: 108 MS
QRS DURATION - MUSE: 110 MS
QRS DURATION - MUSE: 112 MS
QRS DURATION - MUSE: 72 MS
QRS DURATION - MUSE: 92 MS
QT - MUSE: 318 MS
QT - MUSE: 318 MS
QT - MUSE: 338 MS
QT - MUSE: 344 MS
QT - MUSE: 370 MS
QT - MUSE: 396 MS
QTC - MUSE: 408 MS
QTC - MUSE: 436 MS
QTC - MUSE: 453 MS
QTC - MUSE: 454 MS
QTC - MUSE: 481 MS
QTC - MUSE: 493 MS
R AXIS - MUSE: -44 DEGREES
R AXIS - MUSE: -44 DEGREES
R AXIS - MUSE: -47 DEGREES
R AXIS - MUSE: -53 DEGREES
R AXIS - MUSE: -54 DEGREES
R AXIS - MUSE: -56 DEGREES
RADIOLOGIST FLAGS: ABNORMAL
RBC # BLD AUTO: 3.02 10E6/UL (ref 4.4–5.9)
RBC # BLD AUTO: 3.09 10E6/UL (ref 4.4–5.9)
RBC # BLD AUTO: 3.1 10E6/UL (ref 4.4–5.9)
RBC # BLD AUTO: 3.16 10E6/UL (ref 4.4–5.9)
RBC # BLD AUTO: 3.18 10E6/UL (ref 4.4–5.9)
RBC # BLD AUTO: 3.31 10E6/UL (ref 4.4–5.9)
RBC # BLD AUTO: 3.34 10E6/UL (ref 4.4–5.9)
RBC # BLD AUTO: 3.46 10E6/UL (ref 4.4–5.9)
RBC # BLD AUTO: 3.53 10E6/UL (ref 4.4–5.9)
RBC # BLD AUTO: 3.75 10E6/UL (ref 4.4–5.9)
RBC # BLD AUTO: 3.75 10E6/UL (ref 4.4–5.9)
RBC # BLD AUTO: 3.79 10E6/UL (ref 4.4–5.9)
RBC # BLD AUTO: 4.09 10E6/UL (ref 4.4–5.9)
RBC # BLD AUTO: 4.5 10E6/UL (ref 4.4–5.9)
RBC # BLD AUTO: 4.8 10E6/UL (ref 4.4–5.9)
RBC # BLD AUTO: 4.83 10E6/UL (ref 4.4–5.9)
RBC # BLD AUTO: 4.84 10E6/UL (ref 4.4–5.9)
RBC # BLD AUTO: 4.93 10E6/UL (ref 4.4–5.9)
RBC URINE: 6 /HPF
SA TARGET DNA: POSITIVE
SAO2 % BLDV: 35 % (ref 94–100)
SARS-COV-2 RNA RESP QL NAA+PROBE: NEGATIVE
SODIUM BLD-SCNC: 140 MMOL/L (ref 133–144)
SODIUM BLD-SCNC: 143 MMOL/L (ref 133–144)
SODIUM BLD-SCNC: 144 MMOL/L (ref 133–144)
SODIUM SERPL-SCNC: 135 MMOL/L (ref 133–144)
SODIUM SERPL-SCNC: 136 MMOL/L (ref 133–144)
SODIUM SERPL-SCNC: 139 MMOL/L (ref 133–144)
SODIUM SERPL-SCNC: 139 MMOL/L (ref 133–144)
SODIUM SERPL-SCNC: 140 MMOL/L (ref 133–144)
SODIUM SERPL-SCNC: 142 MMOL/L (ref 133–144)
SODIUM SERPL-SCNC: 142 MMOL/L (ref 133–144)
SODIUM SERPL-SCNC: 143 MMOL/L (ref 133–144)
SODIUM SERPL-SCNC: 144 MMOL/L (ref 133–144)
SP GR UR STRIP: 1.01 (ref 1–1.03)
SPECIMEN EXPIRATION DATE: NORMAL
SPECIMEN EXPIRATION DATE: NORMAL
SYSTOLIC BLOOD PRESSURE - MUSE: NORMAL MMHG
T AXIS - MUSE: 101 DEGREES
T AXIS - MUSE: 101 DEGREES
T AXIS - MUSE: 106 DEGREES
T AXIS - MUSE: 107 DEGREES
T AXIS - MUSE: 110 DEGREES
T AXIS - MUSE: 90 DEGREES
THROMBIN TIME: >60 SECONDS (ref 13–19)
TRANSITIONAL EPI: 12 /HPF
TRIGL SERPL-MCNC: 104 MG/DL
TROPONIN I SERPL-MCNC: 10.24 UG/L (ref 0–0.04)
TROPONIN I SERPL-MCNC: 10.54 UG/L (ref 0–0.04)
TROPONIN I SERPL-MCNC: 10.94 UG/L (ref 0–0.04)
TROPONIN I SERPL-MCNC: 11.58 UG/L (ref 0–0.04)
TROPONIN I SERPL-MCNC: 12.42 UG/L (ref 0–0.04)
TROPONIN I SERPL-MCNC: 12.74 UG/L (ref 0–0.04)
TROPONIN I SERPL-MCNC: 14.8 UG/L (ref 0–0.04)
TROPONIN I SERPL-MCNC: 18.35 UG/L (ref 0–0.04)
TROPONIN I SERPL-MCNC: 20.27 UG/L (ref 0–0.04)
TROPONIN I SERPL-MCNC: 21.01 UG/L (ref 0–0.04)
TROPONIN I SERPL-MCNC: 22.29 UG/L (ref 0–0.04)
TROPONIN I SERPL-MCNC: 5.43 UG/L (ref 0–0.04)
TROPONIN I SERPL-MCNC: 7.42 UG/L (ref 0–0.04)
TROPONIN I SERPL-MCNC: 9.15 UG/L (ref 0–0.04)
TROPONIN I SERPL-MCNC: 9.6 UG/L (ref 0–0.04)
TROPONIN I SERPL-MCNC: 9.81 UG/L (ref 0–0.04)
TSH SERPL DL<=0.005 MIU/L-ACNC: 1.58 MU/L (ref 0.4–4)
UFH PPP CHRO-ACNC: 0.18 IU/ML
UFH PPP CHRO-ACNC: 0.21 IU/ML
UFH PPP CHRO-ACNC: 0.22 IU/ML
UFH PPP CHRO-ACNC: 0.23 IU/ML
UFH PPP CHRO-ACNC: 0.27 IU/ML
UFH PPP CHRO-ACNC: 0.28 IU/ML
UFH PPP CHRO-ACNC: 0.29 IU/ML
UFH PPP CHRO-ACNC: 0.3 IU/ML
UFH PPP CHRO-ACNC: 0.3 IU/ML
UFH PPP CHRO-ACNC: 0.32 IU/ML
UFH PPP CHRO-ACNC: 0.34 IU/ML
UFH PPP CHRO-ACNC: 0.41 IU/ML
UFH PPP CHRO-ACNC: 0.43 IU/ML
UFH PPP CHRO-ACNC: 0.85 IU/ML
UNIT ABO/RH: NORMAL
UNIT NUMBER: NORMAL
UNIT STATUS: NORMAL
UNIT TYPE ISBT: 6200
UNIT TYPE ISBT: 6200
UNIT TYPE ISBT: 8400
UROBILINOGEN UR STRIP-MCNC: NORMAL MG/DL
VANCOMYCIN SERPL-MCNC: 23 MG/L
VANCOMYCIN SERPL-MCNC: 26.6 MG/L
VENTRICULAR RATE- MUSE: 105 BPM
VENTRICULAR RATE- MUSE: 107 BPM
VENTRICULAR RATE- MUSE: 113 BPM
VENTRICULAR RATE- MUSE: 122 BPM
VENTRICULAR RATE- MUSE: 122 BPM
VENTRICULAR RATE- MUSE: 64 BPM
WBC # BLD AUTO: 11.1 10E3/UL (ref 4–11)
WBC # BLD AUTO: 15.5 10E3/UL (ref 4–11)
WBC # BLD AUTO: 15.9 10E3/UL (ref 4–11)
WBC # BLD AUTO: 16 10E3/UL (ref 4–11)
WBC # BLD AUTO: 16.7 10E3/UL (ref 4–11)
WBC # BLD AUTO: 17.3 10E3/UL (ref 4–11)
WBC # BLD AUTO: 17.5 10E3/UL (ref 4–11)
WBC # BLD AUTO: 17.7 10E3/UL (ref 4–11)
WBC # BLD AUTO: 18.7 10E3/UL (ref 4–11)
WBC # BLD AUTO: 19 10E3/UL (ref 4–11)
WBC # BLD AUTO: 19 10E3/UL (ref 4–11)
WBC # BLD AUTO: 19.2 10E3/UL (ref 4–11)
WBC # BLD AUTO: 19.7 10E3/UL (ref 4–11)
WBC # BLD AUTO: 19.7 10E3/UL (ref 4–11)
WBC # BLD AUTO: 20.1 10E3/UL (ref 4–11)
WBC # BLD AUTO: 20.1 10E3/UL (ref 4–11)
WBC # BLD AUTO: 21.9 10E3/UL (ref 4–11)
WBC # BLD AUTO: 6.4 10E3/UL (ref 4–11)
WBC URINE: 6 /HPF

## 2021-01-01 PROCEDURE — 410N000003 HC PER-PERFUSION 1ST 30 MIN: Performed by: INTERNAL MEDICINE

## 2021-01-01 PROCEDURE — 999N000065 XR CHEST PORT 1 VIEW

## 2021-01-01 PROCEDURE — 85384 FIBRINOGEN ACTIVITY: CPT | Performed by: INTERNAL MEDICINE

## 2021-01-01 PROCEDURE — 33949 ECMO/ECLS DAILY MGMT ARTERY: CPT | Performed by: INTERNAL MEDICINE

## 2021-01-01 PROCEDURE — 82330 ASSAY OF CALCIUM: CPT | Performed by: INTERNAL MEDICINE

## 2021-01-01 PROCEDURE — 84155 ASSAY OF PROTEIN SERUM: CPT

## 2021-01-01 PROCEDURE — 93458 L HRT ARTERY/VENTRICLE ANGIO: CPT | Performed by: INTERNAL MEDICINE

## 2021-01-01 PROCEDURE — 82803 BLOOD GASES ANY COMBINATION: CPT | Performed by: INTERNAL MEDICINE

## 2021-01-01 PROCEDURE — 96375 TX/PRO/DX INJ NEW DRUG ADDON: CPT | Mod: 59

## 2021-01-01 PROCEDURE — 99291 CRITICAL CARE FIRST HOUR: CPT | Mod: 25

## 2021-01-01 PROCEDURE — 258N000003 HC RX IP 258 OP 636: Performed by: STUDENT IN AN ORGANIZED HEALTH CARE EDUCATION/TRAINING PROGRAM

## 2021-01-01 PROCEDURE — 85379 FIBRIN DEGRADATION QUANT: CPT | Performed by: INTERNAL MEDICINE

## 2021-01-01 PROCEDURE — 93005 ELECTROCARDIOGRAM TRACING: CPT

## 2021-01-01 PROCEDURE — P9041 ALBUMIN (HUMAN),5%, 50ML: HCPCS

## 2021-01-01 PROCEDURE — 99291 CRITICAL CARE FIRST HOUR: CPT | Mod: GC | Performed by: INTERNAL MEDICINE

## 2021-01-01 PROCEDURE — 71045 X-RAY EXAM CHEST 1 VIEW: CPT | Mod: 26 | Performed by: RADIOLOGY

## 2021-01-01 PROCEDURE — 83051 HEMOGLOBIN PLASMA: CPT | Performed by: INTERNAL MEDICINE

## 2021-01-01 PROCEDURE — 84075 ASSAY ALKALINE PHOSPHATASE: CPT | Performed by: INTERNAL MEDICINE

## 2021-01-01 PROCEDURE — 83605 ASSAY OF LACTIC ACID: CPT | Performed by: INTERNAL MEDICINE

## 2021-01-01 PROCEDURE — 999N000045 HC STATISTIC DAILY SWAN MONITORING

## 2021-01-01 PROCEDURE — 85730 THROMBOPLASTIN TIME PARTIAL: CPT | Performed by: INTERNAL MEDICINE

## 2021-01-01 PROCEDURE — 82248 BILIRUBIN DIRECT: CPT | Performed by: INTERNAL MEDICINE

## 2021-01-01 PROCEDURE — 250N000009 HC RX 250: Performed by: STUDENT IN AN ORGANIZED HEALTH CARE EDUCATION/TRAINING PROGRAM

## 2021-01-01 PROCEDURE — 96376 TX/PRO/DX INJ SAME DRUG ADON: CPT

## 2021-01-01 PROCEDURE — 85027 COMPLETE CBC AUTOMATED: CPT | Performed by: INTERNAL MEDICINE

## 2021-01-01 PROCEDURE — 200N000002 HC R&B ICU UMMC

## 2021-01-01 PROCEDURE — 85652 RBC SED RATE AUTOMATED: CPT | Performed by: INTERNAL MEDICINE

## 2021-01-01 PROCEDURE — C9113 INJ PANTOPRAZOLE SODIUM, VIA: HCPCS | Performed by: INTERNAL MEDICINE

## 2021-01-01 PROCEDURE — 83735 ASSAY OF MAGNESIUM: CPT | Performed by: INTERNAL MEDICINE

## 2021-01-01 PROCEDURE — 93010 ELECTROCARDIOGRAM REPORT: CPT | Mod: 59 | Performed by: INTERNAL MEDICINE

## 2021-01-01 PROCEDURE — 93321 DOPPLER ECHO F-UP/LMTD STD: CPT | Mod: 26 | Performed by: INTERNAL MEDICINE

## 2021-01-01 PROCEDURE — 87040 BLOOD CULTURE FOR BACTERIA: CPT | Performed by: INTERNAL MEDICINE

## 2021-01-01 PROCEDURE — 99207 PR NO CHARGE LOS: CPT | Performed by: STUDENT IN AN ORGANIZED HEALTH CARE EDUCATION/TRAINING PROGRAM

## 2021-01-01 PROCEDURE — 36415 COLL VENOUS BLD VENIPUNCTURE: CPT | Performed by: INTERNAL MEDICINE

## 2021-01-01 PROCEDURE — 250N000011 HC RX IP 250 OP 636: Performed by: INTERNAL MEDICINE

## 2021-01-01 PROCEDURE — 85610 PROTHROMBIN TIME: CPT | Performed by: INTERNAL MEDICINE

## 2021-01-01 PROCEDURE — 70450 CT HEAD/BRAIN W/O DYE: CPT

## 2021-01-01 PROCEDURE — 80347 BENZODIAZEPINES 13 OR MORE: CPT | Performed by: INTERNAL MEDICINE

## 2021-01-01 PROCEDURE — 87641 MR-STAPH DNA AMP PROBE: CPT | Performed by: INTERNAL MEDICINE

## 2021-01-01 PROCEDURE — 86140 C-REACTIVE PROTEIN: CPT | Performed by: INTERNAL MEDICINE

## 2021-01-01 PROCEDURE — 85347 COAGULATION TIME ACTIVATED: CPT

## 2021-01-01 PROCEDURE — 93306 TTE W/DOPPLER COMPLETE: CPT

## 2021-01-01 PROCEDURE — 82947 ASSAY GLUCOSE BLOOD QUANT: CPT | Performed by: INTERNAL MEDICINE

## 2021-01-01 PROCEDURE — 96366 THER/PROPH/DIAG IV INF ADDON: CPT | Mod: 59

## 2021-01-01 PROCEDURE — 93925 LOWER EXTREMITY STUDY: CPT | Mod: 26 | Performed by: RADIOLOGY

## 2021-01-01 PROCEDURE — 99153 MOD SED SAME PHYS/QHP EA: CPT | Performed by: INTERNAL MEDICINE

## 2021-01-01 PROCEDURE — 94003 VENT MGMT INPAT SUBQ DAY: CPT

## 2021-01-01 PROCEDURE — P9059 PLASMA, FRZ BETWEEN 8-24HOUR: HCPCS

## 2021-01-01 PROCEDURE — 33949 ECMO/ECLS DAILY MGMT ARTERY: CPT

## 2021-01-01 PROCEDURE — 87389 HIV-1 AG W/HIV-1&-2 AB AG IA: CPT | Performed by: INTERNAL MEDICINE

## 2021-01-01 PROCEDURE — 31500 INSERT EMERGENCY AIRWAY: CPT | Mod: 59

## 2021-01-01 PROCEDURE — 99292 CRITICAL CARE ADDL 30 MIN: CPT | Performed by: INTERNAL MEDICINE

## 2021-01-01 PROCEDURE — 82805 BLOOD GASES W/O2 SATURATION: CPT | Performed by: INTERNAL MEDICINE

## 2021-01-01 PROCEDURE — 93308 TTE F-UP OR LMTD: CPT | Mod: 26 | Performed by: INTERNAL MEDICINE

## 2021-01-01 PROCEDURE — 92950 HEART/LUNG RESUSCITATION CPR: CPT | Mod: 59

## 2021-01-01 PROCEDURE — 84145 PROCALCITONIN (PCT): CPT | Performed by: INTERNAL MEDICINE

## 2021-01-01 PROCEDURE — 81001 URINALYSIS AUTO W/SCOPE: CPT | Performed by: INTERNAL MEDICINE

## 2021-01-01 PROCEDURE — 84484 ASSAY OF TROPONIN QUANT: CPT | Performed by: INTERNAL MEDICINE

## 2021-01-01 PROCEDURE — 250N000009 HC RX 250: Performed by: INTERNAL MEDICINE

## 2021-01-01 PROCEDURE — C1894 INTRO/SHEATH, NON-LASER: HCPCS | Performed by: INTERNAL MEDICINE

## 2021-01-01 PROCEDURE — 94002 VENT MGMT INPAT INIT DAY: CPT

## 2021-01-01 PROCEDURE — 51702 INSERT TEMP BLADDER CATH: CPT | Mod: 59

## 2021-01-01 PROCEDURE — 93925 LOWER EXTREMITY STUDY: CPT

## 2021-01-01 PROCEDURE — 86316 IMMUNOASSAY TUMOR OTHER: CPT | Performed by: INTERNAL MEDICINE

## 2021-01-01 PROCEDURE — 86146 BETA-2 GLYCOPROTEIN ANTIBODY: CPT | Performed by: STUDENT IN AN ORGANIZED HEALTH CARE EDUCATION/TRAINING PROGRAM

## 2021-01-01 PROCEDURE — 84100 ASSAY OF PHOSPHORUS: CPT | Performed by: INTERNAL MEDICINE

## 2021-01-01 PROCEDURE — 33952 ECMO/ECLS INSJ PRPH CANNULA: CPT | Performed by: INTERNAL MEDICINE

## 2021-01-01 PROCEDURE — 999N000155 HC STATISTIC RAPCV CVP MONITORING

## 2021-01-01 PROCEDURE — 80307 DRUG TEST PRSMV CHEM ANLYZR: CPT | Performed by: INTERNAL MEDICINE

## 2021-01-01 PROCEDURE — 82962 GLUCOSE BLOOD TEST: CPT

## 2021-01-01 PROCEDURE — 85520 HEPARIN ASSAY: CPT | Performed by: INTERNAL MEDICINE

## 2021-01-01 PROCEDURE — 272N000555 HC SENSOR NIRS OXIMETER, ADULT

## 2021-01-01 PROCEDURE — 80354 DRUG SCREENING FENTANYL: CPT | Performed by: INTERNAL MEDICINE

## 2021-01-01 PROCEDURE — 83880 ASSAY OF NATRIURETIC PEPTIDE: CPT | Performed by: INTERNAL MEDICINE

## 2021-01-01 PROCEDURE — 36592 COLLECT BLOOD FROM PICC: CPT | Performed by: INTERNAL MEDICINE

## 2021-01-01 PROCEDURE — 5A1522G EXTRACORPOREAL OXYGENATION, MEMBRANE, PERIPHERAL VENO-ARTERIAL: ICD-10-PCS | Performed by: INTERNAL MEDICINE

## 2021-01-01 PROCEDURE — 71045 X-RAY EXAM CHEST 1 VIEW: CPT

## 2021-01-01 PROCEDURE — 999N000015 HC STATISTIC ARTERIAL MONITORING DAILY

## 2021-01-01 PROCEDURE — 02HQ32Z INSERTION OF MONITORING DEVICE INTO RIGHT PULMONARY ARTERY, PERCUTANEOUS APPROACH: ICD-10-PCS | Performed by: INTERNAL MEDICINE

## 2021-01-01 PROCEDURE — 96368 THER/DIAG CONCURRENT INF: CPT | Mod: 59

## 2021-01-01 PROCEDURE — 82805 BLOOD GASES W/O2 SATURATION: CPT | Performed by: EMERGENCY MEDICINE

## 2021-01-01 PROCEDURE — 74176 CT ABD & PELVIS W/O CONTRAST: CPT | Mod: 26 | Performed by: RADIOLOGY

## 2021-01-01 PROCEDURE — 97110 THERAPEUTIC EXERCISES: CPT | Mod: GO | Performed by: OCCUPATIONAL THERAPIST

## 2021-01-01 PROCEDURE — 86900 BLOOD TYPING SEROLOGIC ABO: CPT | Performed by: INTERNAL MEDICINE

## 2021-01-01 PROCEDURE — 70450 CT HEAD/BRAIN W/O DYE: CPT | Mod: 26 | Performed by: RADIOLOGY

## 2021-01-01 PROCEDURE — 258N000003 HC RX IP 258 OP 636: Performed by: INTERNAL MEDICINE

## 2021-01-01 PROCEDURE — 84155 ASSAY OF PROTEIN SERUM: CPT | Performed by: INTERNAL MEDICINE

## 2021-01-01 PROCEDURE — 258N000003 HC RX IP 258 OP 636

## 2021-01-01 PROCEDURE — 83615 LACTATE (LD) (LDH) ENZYME: CPT | Performed by: INTERNAL MEDICINE

## 2021-01-01 PROCEDURE — 250N000013 HC RX MED GY IP 250 OP 250 PS 637: Performed by: INTERNAL MEDICINE

## 2021-01-01 PROCEDURE — 96367 TX/PROPH/DG ADDL SEQ IV INF: CPT | Mod: 59

## 2021-01-01 PROCEDURE — 272N000306 HC DEVICE OXYGENATOR QUADROX ECMO, ADULT

## 2021-01-01 PROCEDURE — 36415 COLL VENOUS BLD VENIPUNCTURE: CPT | Performed by: PHYSICIAN ASSISTANT

## 2021-01-01 PROCEDURE — 36556 INSERT NON-TUNNEL CV CATH: CPT

## 2021-01-01 PROCEDURE — 97167 OT EVAL HIGH COMPLEX 60 MIN: CPT | Mod: GO | Performed by: OCCUPATIONAL THERAPIST

## 2021-01-01 PROCEDURE — 250N000011 HC RX IP 250 OP 636

## 2021-01-01 PROCEDURE — 99291 CRITICAL CARE FIRST HOUR: CPT | Mod: 25 | Performed by: INTERNAL MEDICINE

## 2021-01-01 PROCEDURE — 82550 ASSAY OF CK (CPK): CPT | Performed by: INTERNAL MEDICINE

## 2021-01-01 PROCEDURE — 99233 SBSQ HOSP IP/OBS HIGH 50: CPT | Performed by: INTERNAL MEDICINE

## 2021-01-01 PROCEDURE — C1769 GUIDE WIRE: HCPCS | Performed by: INTERNAL MEDICINE

## 2021-01-01 PROCEDURE — 999N000185 HC STATISTIC TRANSPORT TIME EA 15 MIN

## 2021-01-01 PROCEDURE — 250N000013 HC RX MED GY IP 250 OP 250 PS 637: Performed by: STUDENT IN AN ORGANIZED HEALTH CARE EDUCATION/TRAINING PROGRAM

## 2021-01-01 PROCEDURE — 80202 ASSAY OF VANCOMYCIN: CPT | Performed by: INTERNAL MEDICINE

## 2021-01-01 PROCEDURE — 999N000157 HC STATISTIC RCP TIME EA 10 MIN

## 2021-01-01 PROCEDURE — 0001A PR COVID VAC PFIZER DIL RECON 30 MCG/0.3 ML IM: CPT

## 2021-01-01 PROCEDURE — 99152 MOD SED SAME PHYS/QHP 5/>YRS: CPT | Performed by: INTERNAL MEDICINE

## 2021-01-01 PROCEDURE — 86704 HEP B CORE ANTIBODY TOTAL: CPT | Performed by: INTERNAL MEDICINE

## 2021-01-01 PROCEDURE — 86706 HEP B SURFACE ANTIBODY: CPT | Performed by: INTERNAL MEDICINE

## 2021-01-01 PROCEDURE — 99222 1ST HOSP IP/OBS MODERATE 55: CPT | Mod: GC | Performed by: INTERNAL MEDICINE

## 2021-01-01 PROCEDURE — 99232 SBSQ HOSP IP/OBS MODERATE 35: CPT | Mod: GC | Performed by: INTERNAL MEDICINE

## 2021-01-01 PROCEDURE — 93308 TTE F-UP OR LMTD: CPT

## 2021-01-01 PROCEDURE — 86255 FLUORESCENT ANTIBODY SCREEN: CPT | Performed by: INTERNAL MEDICINE

## 2021-01-01 PROCEDURE — 85300 ANTITHROMBIN III ACTIVITY: CPT | Performed by: INTERNAL MEDICINE

## 2021-01-01 PROCEDURE — 80053 COMPREHEN METABOLIC PANEL: CPT | Performed by: INTERNAL MEDICINE

## 2021-01-01 PROCEDURE — 255N000002 HC RX 255 OP 636: Performed by: INTERNAL MEDICINE

## 2021-01-01 PROCEDURE — 96374 THER/PROPH/DIAG INJ IV PUSH: CPT | Mod: 59

## 2021-01-01 PROCEDURE — 85027 COMPLETE CBC AUTOMATED: CPT | Performed by: STUDENT IN AN ORGANIZED HEALTH CARE EDUCATION/TRAINING PROGRAM

## 2021-01-01 PROCEDURE — 93925 LOWER EXTREMITY STUDY: CPT | Mod: 76

## 2021-01-01 PROCEDURE — 82533 TOTAL CORTISOL: CPT | Performed by: INTERNAL MEDICINE

## 2021-01-01 PROCEDURE — 84100 ASSAY OF PHOSPHORUS: CPT | Performed by: STUDENT IN AN ORGANIZED HEALTH CARE EDUCATION/TRAINING PROGRAM

## 2021-01-01 PROCEDURE — 76705 ECHO EXAM OF ABDOMEN: CPT | Mod: 26 | Performed by: RADIOLOGY

## 2021-01-01 PROCEDURE — 93321 DOPPLER ECHO F-UP/LMTD STD: CPT

## 2021-01-01 PROCEDURE — 87205 SMEAR GRAM STAIN: CPT | Performed by: INTERNAL MEDICINE

## 2021-01-01 PROCEDURE — 0002A PR COVID VAC PFIZER DIL RECON 30 MCG/0.3 ML IM: CPT

## 2021-01-01 PROCEDURE — 5A1945Z RESPIRATORY VENTILATION, 24-96 CONSECUTIVE HOURS: ICD-10-PCS | Performed by: INTERNAL MEDICINE

## 2021-01-01 PROCEDURE — 84450 TRANSFERASE (AST) (SGOT): CPT | Performed by: INTERNAL MEDICINE

## 2021-01-01 PROCEDURE — 36415 COLL VENOUS BLD VENIPUNCTURE: CPT | Performed by: EMERGENCY MEDICINE

## 2021-01-01 PROCEDURE — 272N000237 HC CARDIOHELP CIRCUIT

## 2021-01-01 PROCEDURE — B2111ZZ FLUOROSCOPY OF MULTIPLE CORONARY ARTERIES USING LOW OSMOLAR CONTRAST: ICD-10-PCS | Performed by: INTERNAL MEDICINE

## 2021-01-01 PROCEDURE — 85396 CLOTTING ASSAY WHOLE BLOOD: CPT | Performed by: INTERNAL MEDICINE

## 2021-01-01 PROCEDURE — 82803 BLOOD GASES ANY COMBINATION: CPT

## 2021-01-01 PROCEDURE — 83735 ASSAY OF MAGNESIUM: CPT

## 2021-01-01 PROCEDURE — 82810 BLOOD GASES O2 SAT ONLY: CPT

## 2021-01-01 PROCEDURE — 93306 TTE W/DOPPLER COMPLETE: CPT | Mod: 26 | Performed by: STUDENT IN AN ORGANIZED HEALTH CARE EDUCATION/TRAINING PROGRAM

## 2021-01-01 PROCEDURE — 71250 CT THORAX DX C-: CPT | Mod: 26 | Performed by: RADIOLOGY

## 2021-01-01 PROCEDURE — 250N000011 HC RX IP 250 OP 636: Performed by: EMERGENCY MEDICINE

## 2021-01-01 PROCEDURE — 91300 PR COVID VAC PFIZER DIL RECON 30 MCG/0.3 ML IM: CPT

## 2021-01-01 PROCEDURE — 93325 DOPPLER ECHO COLOR FLOW MAPG: CPT | Mod: 26 | Performed by: INTERNAL MEDICINE

## 2021-01-01 PROCEDURE — 99292 CRITICAL CARE ADDL 30 MIN: CPT | Mod: 25

## 2021-01-01 PROCEDURE — 93010 ELECTROCARDIOGRAM REPORT: CPT | Mod: 76 | Performed by: INTERNAL MEDICINE

## 2021-01-01 PROCEDURE — 84145 PROCALCITONIN (PCT): CPT | Performed by: EMERGENCY MEDICINE

## 2021-01-01 PROCEDURE — 250N000009 HC RX 250

## 2021-01-01 PROCEDURE — 80076 HEPATIC FUNCTION PANEL: CPT | Performed by: INTERNAL MEDICINE

## 2021-01-01 PROCEDURE — 82374 ASSAY BLOOD CARBON DIOXIDE: CPT | Performed by: INTERNAL MEDICINE

## 2021-01-01 PROCEDURE — 80061 LIPID PANEL: CPT | Performed by: INTERNAL MEDICINE

## 2021-01-01 PROCEDURE — 85025 COMPLETE CBC W/AUTO DIFF WBC: CPT | Performed by: PHYSICIAN ASSISTANT

## 2021-01-01 PROCEDURE — 4A023N7 MEASUREMENT OF CARDIAC SAMPLING AND PRESSURE, LEFT HEART, PERCUTANEOUS APPROACH: ICD-10-PCS | Performed by: INTERNAL MEDICINE

## 2021-01-01 PROCEDURE — 84295 ASSAY OF SERUM SODIUM: CPT

## 2021-01-01 PROCEDURE — 82040 ASSAY OF SERUM ALBUMIN: CPT | Performed by: INTERNAL MEDICINE

## 2021-01-01 PROCEDURE — 76376 3D RENDER W/INTRP POSTPROCES: CPT | Mod: 26 | Performed by: INTERNAL MEDICINE

## 2021-01-01 PROCEDURE — 33947 ECMO/ECLS INITIATION ARTERY: CPT

## 2021-01-01 PROCEDURE — 87340 HEPATITIS B SURFACE AG IA: CPT | Performed by: INTERNAL MEDICINE

## 2021-01-01 PROCEDURE — 99233 SBSQ HOSP IP/OBS HIGH 50: CPT | Performed by: PHYSICIAN ASSISTANT

## 2021-01-01 PROCEDURE — 84484 ASSAY OF TROPONIN QUANT: CPT | Performed by: EMERGENCY MEDICINE

## 2021-01-01 PROCEDURE — 93306 TTE W/DOPPLER COMPLETE: CPT | Mod: 26 | Performed by: INTERNAL MEDICINE

## 2021-01-01 PROCEDURE — 272N000002 HC OR SUPPLY OTHER OPNP: Performed by: INTERNAL MEDICINE

## 2021-01-01 PROCEDURE — 82330 ASSAY OF CALCIUM: CPT

## 2021-01-01 PROCEDURE — 84295 ASSAY OF SERUM SODIUM: CPT | Performed by: INTERNAL MEDICINE

## 2021-01-01 PROCEDURE — 93010 ELECTROCARDIOGRAM REPORT: CPT | Performed by: INTERNAL MEDICINE

## 2021-01-01 PROCEDURE — 82570 ASSAY OF URINE CREATININE: CPT | Performed by: INTERNAL MEDICINE

## 2021-01-01 PROCEDURE — 96365 THER/PROPH/DIAG IV INF INIT: CPT | Mod: 59

## 2021-01-01 PROCEDURE — 99239 HOSP IP/OBS DSCHRG MGMT >30: CPT | Mod: GC | Performed by: INTERNAL MEDICINE

## 2021-01-01 PROCEDURE — 250N000012 HC RX MED GY IP 250 OP 636 PS 637: Performed by: INTERNAL MEDICINE

## 2021-01-01 PROCEDURE — 84460 ALANINE AMINO (ALT) (SGPT): CPT

## 2021-01-01 PROCEDURE — 99223 1ST HOSP IP/OBS HIGH 75: CPT | Mod: GC | Performed by: INTERNAL MEDICINE

## 2021-01-01 PROCEDURE — 71250 CT THORAX DX C-: CPT

## 2021-01-01 PROCEDURE — 80053 COMPREHEN METABOLIC PANEL: CPT | Performed by: PHYSICIAN ASSISTANT

## 2021-01-01 PROCEDURE — 71275 CT ANGIOGRAPHY CHEST: CPT

## 2021-01-01 PROCEDURE — 272N000001 HC OR GENERAL SUPPLY STERILE: Performed by: INTERNAL MEDICINE

## 2021-01-01 PROCEDURE — 250N000009 HC RX 250: Performed by: EMERGENCY MEDICINE

## 2021-01-01 PROCEDURE — 84450 TRANSFERASE (AST) (SGOT): CPT

## 2021-01-01 PROCEDURE — 82247 BILIRUBIN TOTAL: CPT | Performed by: INTERNAL MEDICINE

## 2021-01-01 PROCEDURE — 3E043XZ INTRODUCTION OF VASOPRESSOR INTO CENTRAL VEIN, PERCUTANEOUS APPROACH: ICD-10-PCS | Performed by: INTERNAL MEDICINE

## 2021-01-01 PROCEDURE — 999N000076 HC STATISTIC ICP MONITORING

## 2021-01-01 PROCEDURE — 80307 DRUG TEST PRSMV CHEM ANLYZR: CPT

## 2021-01-01 PROCEDURE — 999N000208 ECHOCARDIOGRAM COMPLETE

## 2021-01-01 PROCEDURE — 250N000011 HC RX IP 250 OP 636: Performed by: STUDENT IN AN ORGANIZED HEALTH CARE EDUCATION/TRAINING PROGRAM

## 2021-01-01 PROCEDURE — 84100 ASSAY OF PHOSPHORUS: CPT

## 2021-01-01 PROCEDURE — 258N000003 HC RX IP 258 OP 636: Performed by: EMERGENCY MEDICINE

## 2021-01-01 PROCEDURE — 83880 ASSAY OF NATRIURETIC PEPTIDE: CPT | Performed by: EMERGENCY MEDICINE

## 2021-01-01 PROCEDURE — 99232 SBSQ HOSP IP/OBS MODERATE 35: CPT | Performed by: INTERNAL MEDICINE

## 2021-01-01 PROCEDURE — 86803 HEPATITIS C AB TEST: CPT | Performed by: INTERNAL MEDICINE

## 2021-01-01 PROCEDURE — 99215 OFFICE O/P EST HI 40 MIN: CPT | Performed by: PHYSICIAN ASSISTANT

## 2021-01-01 PROCEDURE — 86147 CARDIOLIPIN ANTIBODY EA IG: CPT | Performed by: STUDENT IN AN ORGANIZED HEALTH CARE EDUCATION/TRAINING PROGRAM

## 2021-01-01 PROCEDURE — 85613 RUSSELL VIPER VENOM DILUTED: CPT | Performed by: INTERNAL MEDICINE

## 2021-01-01 PROCEDURE — 84443 ASSAY THYROID STIM HORMONE: CPT

## 2021-01-01 PROCEDURE — 87636 SARSCOV2 & INF A&B AMP PRB: CPT | Performed by: EMERGENCY MEDICINE

## 2021-01-01 PROCEDURE — 86140 C-REACTIVE PROTEIN: CPT | Performed by: PHYSICIAN ASSISTANT

## 2021-01-01 PROCEDURE — 94660 CPAP INITIATION&MGMT: CPT

## 2021-01-01 PROCEDURE — 71045 X-RAY EXAM CHEST 1 VIEW: CPT | Mod: 26 | Performed by: STUDENT IN AN ORGANIZED HEALTH CARE EDUCATION/TRAINING PROGRAM

## 2021-01-01 PROCEDURE — 85025 COMPLETE CBC W/AUTO DIFF WBC: CPT

## 2021-01-01 PROCEDURE — 80053 COMPREHEN METABOLIC PANEL: CPT | Performed by: EMERGENCY MEDICINE

## 2021-01-01 PROCEDURE — 85025 COMPLETE CBC W/AUTO DIFF WBC: CPT | Performed by: INTERNAL MEDICINE

## 2021-01-01 PROCEDURE — 85025 COMPLETE CBC W/AUTO DIFF WBC: CPT | Performed by: EMERGENCY MEDICINE

## 2021-01-01 PROCEDURE — G0463 HOSPITAL OUTPT CLINIC VISIT: HCPCS

## 2021-01-01 PROCEDURE — 87040 BLOOD CULTURE FOR BACTERIA: CPT | Performed by: EMERGENCY MEDICINE

## 2021-01-01 PROCEDURE — 99223 1ST HOSP IP/OBS HIGH 75: CPT | Performed by: INTERNAL MEDICINE

## 2021-01-01 PROCEDURE — 85041 AUTOMATED RBC COUNT: CPT | Performed by: INTERNAL MEDICINE

## 2021-01-01 PROCEDURE — 85390 FIBRINOLYSINS SCREEN I&R: CPT | Mod: 26 | Performed by: PATHOLOGY

## 2021-01-01 PROCEDURE — 76705 ECHO EXAM OF ABDOMEN: CPT

## 2021-01-01 PROCEDURE — 83605 ASSAY OF LACTIC ACID: CPT | Performed by: EMERGENCY MEDICINE

## 2021-01-01 PROCEDURE — P9059 PLASMA, FRZ BETWEEN 8-24HOUR: HCPCS | Performed by: STUDENT IN AN ORGANIZED HEALTH CARE EDUCATION/TRAINING PROGRAM

## 2021-01-01 PROCEDURE — 03HY32Z INSERTION OF MONITORING DEVICE INTO UPPER ARTERY, PERCUTANEOUS APPROACH: ICD-10-PCS | Performed by: INTERNAL MEDICINE

## 2021-01-01 PROCEDURE — 87070 CULTURE OTHR SPECIMN AEROBIC: CPT | Performed by: INTERNAL MEDICINE

## 2021-01-01 RX ORDER — FENTANYL CITRATE 50 UG/ML
INJECTION, SOLUTION INTRAMUSCULAR; INTRAVENOUS
Status: COMPLETED
Start: 2021-01-01 | End: 2021-01-01

## 2021-01-01 RX ORDER — HEPARIN SODIUM 10000 [USP'U]/100ML
10-50 INJECTION, SOLUTION INTRAVENOUS CONTINUOUS
Status: DISCONTINUED | OUTPATIENT
Start: 2021-01-01 | End: 2021-01-01

## 2021-01-01 RX ORDER — ALBUMIN, HUMAN INJ 5% 5 %
250 SOLUTION INTRAVENOUS ONCE
Status: COMPLETED | OUTPATIENT
Start: 2021-01-01 | End: 2021-01-01

## 2021-01-01 RX ORDER — ACETAMINOPHEN 650 MG/1
650 SUPPOSITORY RECTAL EVERY 6 HOURS PRN
Status: CANCELLED | OUTPATIENT
Start: 2021-01-01

## 2021-01-01 RX ORDER — ACETAMINOPHEN 650 MG/1
650 SUPPOSITORY RECTAL EVERY 4 HOURS PRN
Status: DISCONTINUED | OUTPATIENT
Start: 2021-01-01 | End: 2021-01-01

## 2021-01-01 RX ORDER — FUROSEMIDE 10 MG/ML
40 INJECTION INTRAMUSCULAR; INTRAVENOUS 2 TIMES DAILY
Status: CANCELLED | OUTPATIENT
Start: 2021-01-01

## 2021-01-01 RX ORDER — NOREPINEPHRINE BITARTRATE 0.06 MG/ML
.01-.6 INJECTION, SOLUTION INTRAVENOUS CONTINUOUS
Status: DISCONTINUED | OUTPATIENT
Start: 2021-01-01 | End: 2021-01-01 | Stop reason: HOSPADM

## 2021-01-01 RX ORDER — AMOXICILLIN 250 MG
1 CAPSULE ORAL 2 TIMES DAILY PRN
Status: CANCELLED | OUTPATIENT
Start: 2021-01-01

## 2021-01-01 RX ORDER — PROCHLORPERAZINE 25 MG
25 SUPPOSITORY, RECTAL RECTAL EVERY 12 HOURS PRN
Status: CANCELLED | OUTPATIENT
Start: 2021-01-01

## 2021-01-01 RX ORDER — ETOMIDATE 2 MG/ML
20 INJECTION INTRAVENOUS ONCE
Status: COMPLETED | OUTPATIENT
Start: 2021-01-01 | End: 2021-01-01

## 2021-01-01 RX ORDER — ASPIRIN 81 MG/1
81 TABLET, CHEWABLE ORAL DAILY
Status: DISCONTINUED | OUTPATIENT
Start: 2021-01-01 | End: 2021-01-01 | Stop reason: HOSPADM

## 2021-01-01 RX ORDER — LORAZEPAM 2 MG/ML
0.5 INJECTION INTRAMUSCULAR ONCE
Status: COMPLETED | OUTPATIENT
Start: 2021-01-01 | End: 2021-01-01

## 2021-01-01 RX ORDER — ONDANSETRON 2 MG/ML
4 INJECTION INTRAMUSCULAR; INTRAVENOUS ONCE
Status: DISCONTINUED | OUTPATIENT
Start: 2021-01-01 | End: 2021-01-01 | Stop reason: HOSPADM

## 2021-01-01 RX ORDER — ASPIRIN 81 MG/1
81 TABLET, CHEWABLE ORAL DAILY
Status: DISCONTINUED | OUTPATIENT
Start: 2021-01-01 | End: 2021-01-01

## 2021-01-01 RX ORDER — POLYETHYLENE GLYCOL 3350 17 G/17G
17 POWDER, FOR SOLUTION ORAL DAILY
Status: CANCELLED | OUTPATIENT
Start: 2021-01-01

## 2021-01-01 RX ORDER — ACETAMINOPHEN 325 MG/1
650 TABLET ORAL EVERY 6 HOURS PRN
Status: CANCELLED | OUTPATIENT
Start: 2021-01-01

## 2021-01-01 RX ORDER — CHLORHEXIDINE GLUCONATE ORAL RINSE 1.2 MG/ML
15 SOLUTION DENTAL EVERY 12 HOURS
Status: CANCELLED | OUTPATIENT
Start: 2021-01-01

## 2021-01-01 RX ORDER — ACETAMINOPHEN 325 MG/1
650 TABLET ORAL EVERY 4 HOURS PRN
Status: DISCONTINUED | OUTPATIENT
Start: 2021-01-01 | End: 2021-01-01

## 2021-01-01 RX ORDER — ONDANSETRON 4 MG/1
4 TABLET, ORALLY DISINTEGRATING ORAL EVERY 6 HOURS PRN
Status: CANCELLED | OUTPATIENT
Start: 2021-01-01

## 2021-01-01 RX ORDER — POTASSIUM CHLORIDE 29.8 MG/ML
20 INJECTION INTRAVENOUS
Status: DISCONTINUED | OUTPATIENT
Start: 2021-01-01 | End: 2021-01-01

## 2021-01-01 RX ORDER — AMOXICILLIN 250 MG
2 CAPSULE ORAL 2 TIMES DAILY
Status: CANCELLED | OUTPATIENT
Start: 2021-01-01

## 2021-01-01 RX ORDER — FENTANYL CITRATE 50 UG/ML
50 INJECTION, SOLUTION INTRAMUSCULAR; INTRAVENOUS EVERY 10 MIN PRN
Status: DISCONTINUED | OUTPATIENT
Start: 2021-01-01 | End: 2021-01-01 | Stop reason: HOSPADM

## 2021-01-01 RX ORDER — AMOXICILLIN 250 MG
1 CAPSULE ORAL 2 TIMES DAILY
Status: DISCONTINUED | OUTPATIENT
Start: 2021-01-01 | End: 2021-01-01

## 2021-01-01 RX ORDER — NALOXONE HYDROCHLORIDE 0.4 MG/ML
0.2 INJECTION, SOLUTION INTRAMUSCULAR; INTRAVENOUS; SUBCUTANEOUS
Status: DISCONTINUED | OUTPATIENT
Start: 2021-01-01 | End: 2021-01-01 | Stop reason: HOSPADM

## 2021-01-01 RX ORDER — IOPAMIDOL 755 MG/ML
INJECTION, SOLUTION INTRAVASCULAR
Status: DISCONTINUED | OUTPATIENT
Start: 2021-01-01 | End: 2021-01-01 | Stop reason: HOSPADM

## 2021-01-01 RX ORDER — CALCIUM CHLORIDE 100 MG/ML
2 INJECTION INTRAVENOUS; INTRAVENTRICULAR ONCE
Status: COMPLETED | OUTPATIENT
Start: 2021-01-01 | End: 2021-01-01

## 2021-01-01 RX ORDER — CALCIUM GLUCONATE 20 MG/ML
1 INJECTION, SOLUTION INTRAVENOUS ONCE
Status: COMPLETED | OUTPATIENT
Start: 2021-01-01 | End: 2021-01-01

## 2021-01-01 RX ORDER — PANTOPRAZOLE SODIUM 40 MG/1
40 TABLET, DELAYED RELEASE ORAL DAILY
Status: DISCONTINUED | OUTPATIENT
Start: 2021-01-01 | End: 2021-01-01

## 2021-01-01 RX ORDER — DOPAMINE HYDROCHLORIDE 160 MG/100ML
2-20 INJECTION, SOLUTION INTRAVENOUS CONTINUOUS
Status: DISCONTINUED | OUTPATIENT
Start: 2021-01-01 | End: 2021-01-01

## 2021-01-01 RX ORDER — ASPIRIN 81 MG/1
81 TABLET ORAL DAILY
Status: CANCELLED | OUTPATIENT
Start: 2021-01-01

## 2021-01-01 RX ORDER — FENTANYL CITRATE 50 UG/ML
100 INJECTION, SOLUTION INTRAMUSCULAR; INTRAVENOUS ONCE
Status: COMPLETED | OUTPATIENT
Start: 2021-01-01 | End: 2021-01-01

## 2021-01-01 RX ORDER — B COMPLEX C NO.10/FOLIC ACID 900MCG/5ML
5 LIQUID (ML) ORAL DAILY
Status: DISCONTINUED | OUTPATIENT
Start: 2021-01-01 | End: 2021-01-01 | Stop reason: HOSPADM

## 2021-01-01 RX ORDER — PANTOPRAZOLE SODIUM 40 MG/1
40 TABLET, DELAYED RELEASE ORAL DAILY
Qty: 60 TABLET | Refills: 0 | Status: SHIPPED | OUTPATIENT
Start: 2021-01-01

## 2021-01-01 RX ORDER — MIDAZOLAM HCL IN 0.9 % NACL/PF 1 MG/ML
1-8 PLASTIC BAG, INJECTION (ML) INTRAVENOUS CONTINUOUS
Status: DISCONTINUED | OUTPATIENT
Start: 2021-01-01 | End: 2021-01-01 | Stop reason: HOSPADM

## 2021-01-01 RX ORDER — FENTANYL CITRATE-0.9 % NACL/PF 10 MCG/ML
100 PLASTIC BAG, INJECTION (ML) INTRAVENOUS ONCE
Status: COMPLETED | OUTPATIENT
Start: 2021-01-01 | End: 2021-01-01

## 2021-01-01 RX ORDER — ACETAMINOPHEN 325 MG/1
650 TABLET ORAL EVERY 4 HOURS PRN
Status: CANCELLED | OUTPATIENT
Start: 2021-01-01

## 2021-01-01 RX ORDER — NOREPINEPHRINE BITARTRATE 0.02 MG/ML
.01-.6 INJECTION, SOLUTION INTRAVENOUS CONTINUOUS
Status: DISCONTINUED | OUTPATIENT
Start: 2021-01-01 | End: 2021-01-01 | Stop reason: HOSPADM

## 2021-01-01 RX ORDER — LORAZEPAM 2 MG/ML
INJECTION INTRAMUSCULAR
Status: COMPLETED
Start: 2021-01-01 | End: 2021-01-01

## 2021-01-01 RX ORDER — POTASSIUM CHLORIDE 29.8 MG/ML
20 INJECTION INTRAVENOUS EVERY 8 HOURS PRN
Status: DISCONTINUED | OUTPATIENT
Start: 2021-01-01 | End: 2021-01-01 | Stop reason: HOSPADM

## 2021-01-01 RX ORDER — BISACODYL 10 MG
10 SUPPOSITORY, RECTAL RECTAL DAILY PRN
Status: CANCELLED | OUTPATIENT
Start: 2021-01-01

## 2021-01-01 RX ORDER — MIDAZOLAM HCL IN 0.9 % NACL/PF 1 MG/ML
1-8 PLASTIC BAG, INJECTION (ML) INTRAVENOUS CONTINUOUS
Status: CANCELLED | OUTPATIENT
Start: 2021-01-01

## 2021-01-01 RX ORDER — METHYLPREDNISOLONE SODIUM SUCCINATE 125 MG/2ML
60 INJECTION, POWDER, LYOPHILIZED, FOR SOLUTION INTRAMUSCULAR; INTRAVENOUS ONCE
Status: COMPLETED | OUTPATIENT
Start: 2021-01-01 | End: 2021-01-01

## 2021-01-01 RX ORDER — ALBUMIN, HUMAN INJ 5% 5 %
SOLUTION INTRAVENOUS
Status: COMPLETED
Start: 2021-01-01 | End: 2021-01-01

## 2021-01-01 RX ORDER — AMOXICILLIN 250 MG
2 CAPSULE ORAL 2 TIMES DAILY PRN
Status: CANCELLED | OUTPATIENT
Start: 2021-01-01

## 2021-01-01 RX ORDER — CALCIUM GLUCONATE 20 MG/ML
2 INJECTION, SOLUTION INTRAVENOUS EVERY 8 HOURS PRN
Status: DISCONTINUED | OUTPATIENT
Start: 2021-01-01 | End: 2021-01-01 | Stop reason: HOSPADM

## 2021-01-01 RX ORDER — MAGNESIUM SULFATE HEPTAHYDRATE 40 MG/ML
2 INJECTION, SOLUTION INTRAVENOUS DAILY PRN
Status: DISCONTINUED | OUTPATIENT
Start: 2021-01-01 | End: 2021-01-01

## 2021-01-01 RX ORDER — POLYETHYLENE GLYCOL 3350 17 G/17G
17 POWDER, FOR SOLUTION ORAL DAILY
Status: DISCONTINUED | OUTPATIENT
Start: 2021-01-01 | End: 2021-01-01 | Stop reason: HOSPADM

## 2021-01-01 RX ORDER — CALCIUM CHLORIDE, MAGNESIUM CHLORIDE, DEXTROSE MONOHYDRATE, LACTIC ACID, SODIUM CHLORIDE, SODIUM BICARBONATE AND POTASSIUM CHLORIDE 5.15; 2.03; 22; 5.4; 6.46; 3.09; .157 G/L; G/L; G/L; G/L; G/L; G/L; G/L
12.5 INJECTION INTRAVENOUS CONTINUOUS
Status: DISCONTINUED | OUTPATIENT
Start: 2021-01-01 | End: 2021-01-01 | Stop reason: HOSPADM

## 2021-01-01 RX ORDER — NALOXONE HYDROCHLORIDE 0.4 MG/ML
0.4 INJECTION, SOLUTION INTRAMUSCULAR; INTRAVENOUS; SUBCUTANEOUS
Status: DISCONTINUED | OUTPATIENT
Start: 2021-01-01 | End: 2021-01-01 | Stop reason: HOSPADM

## 2021-01-01 RX ORDER — ONDANSETRON 2 MG/ML
4 INJECTION INTRAMUSCULAR; INTRAVENOUS EVERY 6 HOURS PRN
Status: CANCELLED | OUTPATIENT
Start: 2021-01-01

## 2021-01-01 RX ORDER — LIDOCAINE 40 MG/G
CREAM TOPICAL
Status: CANCELLED | OUTPATIENT
Start: 2021-01-01

## 2021-01-01 RX ORDER — LIDOCAINE 40 MG/G
CREAM TOPICAL
Status: DISCONTINUED | OUTPATIENT
Start: 2021-01-01 | End: 2021-01-01

## 2021-01-01 RX ORDER — CALCIUM CHLORIDE, MAGNESIUM CHLORIDE, DEXTROSE MONOHYDRATE, LACTIC ACID, SODIUM CHLORIDE, SODIUM BICARBONATE AND POTASSIUM CHLORIDE 5.15; 2.03; 22; 5.4; 6.46; 3.09; .157 G/L; G/L; G/L; G/L; G/L; G/L; G/L
INJECTION INTRAVENOUS CONTINUOUS
Status: DISCONTINUED | OUTPATIENT
Start: 2021-01-01 | End: 2021-01-01 | Stop reason: HOSPADM

## 2021-01-01 RX ORDER — PHENYLEPHRINE HCL IN 0.9% NACL 50MG/250ML
.5-6 PLASTIC BAG, INJECTION (ML) INTRAVENOUS CONTINUOUS
Status: DISCONTINUED | OUTPATIENT
Start: 2021-01-01 | End: 2021-01-01

## 2021-01-01 RX ORDER — MAGNESIUM SULFATE HEPTAHYDRATE 40 MG/ML
4 INJECTION, SOLUTION INTRAVENOUS EVERY 4 HOURS PRN
Status: DISCONTINUED | OUTPATIENT
Start: 2021-01-01 | End: 2021-01-01

## 2021-01-01 RX ORDER — DILTIAZEM HYDROCHLORIDE 180 MG/1
180 CAPSULE, COATED, EXTENDED RELEASE ORAL DAILY
Qty: 60 CAPSULE | Refills: 0 | Status: SHIPPED | OUTPATIENT
Start: 2021-01-01

## 2021-01-01 RX ORDER — IOPAMIDOL 755 MG/ML
76 INJECTION, SOLUTION INTRAVASCULAR ONCE
Status: COMPLETED | OUTPATIENT
Start: 2021-01-01 | End: 2021-01-01

## 2021-01-01 RX ORDER — ONDANSETRON 2 MG/ML
4 INJECTION INTRAMUSCULAR; INTRAVENOUS EVERY 6 HOURS PRN
Status: DISCONTINUED | OUTPATIENT
Start: 2021-01-01 | End: 2021-01-01 | Stop reason: HOSPADM

## 2021-01-01 RX ORDER — AMOXICILLIN 250 MG
2 CAPSULE ORAL 2 TIMES DAILY
Status: DISCONTINUED | OUTPATIENT
Start: 2021-01-01 | End: 2021-01-01

## 2021-01-01 RX ORDER — NITROGLYCERIN 0.4 MG/1
0.4 TABLET SUBLINGUAL EVERY 5 MIN PRN
Status: CANCELLED | OUTPATIENT
Start: 2021-01-01

## 2021-01-01 RX ORDER — PROCHLORPERAZINE MALEATE 10 MG
10 TABLET ORAL EVERY 6 HOURS PRN
Status: CANCELLED | OUTPATIENT
Start: 2021-01-01

## 2021-01-01 RX ORDER — HEPARIN SODIUM 10000 [USP'U]/100ML
0-5000 INJECTION, SOLUTION INTRAVENOUS CONTINUOUS
Status: DISCONTINUED | OUTPATIENT
Start: 2021-01-01 | End: 2021-01-01

## 2021-01-01 RX ORDER — HEPARIN SODIUM 10000 [USP'U]/100ML
0-5000 INJECTION, SOLUTION INTRAVENOUS CONTINUOUS
Status: DISCONTINUED | OUTPATIENT
Start: 2021-01-01 | End: 2021-01-01 | Stop reason: HOSPADM

## 2021-01-01 RX ORDER — HEPARIN SODIUM (PORCINE) LOCK FLUSH IV SOLN 100 UNIT/ML 100 UNIT/ML
5-10 SOLUTION INTRAVENOUS EVERY 30 MIN PRN
Status: DISCONTINUED | OUTPATIENT
Start: 2021-01-01 | End: 2021-01-01 | Stop reason: HOSPADM

## 2021-01-01 RX ORDER — LIDOCAINE 40 MG/G
CREAM TOPICAL
Status: DISCONTINUED | OUTPATIENT
Start: 2021-01-01 | End: 2021-01-01 | Stop reason: HOSPADM

## 2021-01-01 RX ORDER — FUROSEMIDE 10 MG/ML
80 INJECTION INTRAMUSCULAR; INTRAVENOUS ONCE
Status: COMPLETED | OUTPATIENT
Start: 2021-01-01 | End: 2021-01-01

## 2021-01-01 RX ORDER — FENTANYL CITRATE 50 UG/ML
50 INJECTION, SOLUTION INTRAMUSCULAR; INTRAVENOUS EVERY 30 MIN PRN
Status: DISCONTINUED | OUTPATIENT
Start: 2021-01-01 | End: 2021-01-01 | Stop reason: HOSPADM

## 2021-01-01 RX ORDER — AMOXICILLIN 250 MG
1 CAPSULE ORAL 2 TIMES DAILY
Status: CANCELLED | OUTPATIENT
Start: 2021-01-01

## 2021-01-01 RX ORDER — ACETAMINOPHEN 650 MG/1
650 SUPPOSITORY RECTAL EVERY 4 HOURS PRN
Status: CANCELLED | OUTPATIENT
Start: 2021-01-01

## 2021-01-01 RX ORDER — EPINEPHRINE IN 0.9 % SOD CHLOR 5 MG/250ML
.01-.3 PLASTIC BAG, INJECTION (ML) INTRAVENOUS CONTINUOUS
Status: DISCONTINUED | OUTPATIENT
Start: 2021-01-01 | End: 2021-01-01

## 2021-01-01 RX ORDER — MAGNESIUM SULFATE HEPTAHYDRATE 40 MG/ML
2 INJECTION, SOLUTION INTRAVENOUS EVERY 8 HOURS PRN
Status: DISCONTINUED | OUTPATIENT
Start: 2021-01-01 | End: 2021-01-01 | Stop reason: HOSPADM

## 2021-01-01 RX ORDER — DEXTROSE MONOHYDRATE 100 MG/ML
INJECTION, SOLUTION INTRAVENOUS CONTINUOUS PRN
Status: DISCONTINUED | OUTPATIENT
Start: 2021-01-01 | End: 2021-01-01 | Stop reason: HOSPADM

## 2021-01-01 RX ORDER — LISINOPRIL 10 MG/1
10 TABLET ORAL DAILY
Qty: 60 TABLET | Refills: 0 | Status: SHIPPED | OUTPATIENT
Start: 2021-01-01

## 2021-01-01 RX ORDER — CARBOXYMETHYLCELLULOSE SODIUM 5 MG/ML
1 SOLUTION/ DROPS OPHTHALMIC
Status: CANCELLED | OUTPATIENT
Start: 2021-01-01

## 2021-01-01 RX ORDER — HEPARIN SODIUM 1000 [USP'U]/ML
INJECTION, SOLUTION INTRAVENOUS; SUBCUTANEOUS
Status: DISCONTINUED | OUTPATIENT
Start: 2021-01-01 | End: 2021-01-01 | Stop reason: HOSPADM

## 2021-01-01 RX ORDER — MAGNESIUM HYDROXIDE/ALUMINUM HYDROXICE/SIMETHICONE 120; 1200; 1200 MG/30ML; MG/30ML; MG/30ML
30 SUSPENSION ORAL EVERY 4 HOURS PRN
Status: DISCONTINUED | OUTPATIENT
Start: 2021-01-01 | End: 2021-01-01 | Stop reason: HOSPADM

## 2021-01-01 RX ORDER — CALCIUM CHLORIDE 100 MG/ML
INJECTION INTRAVENOUS; INTRAVENTRICULAR
Status: COMPLETED
Start: 2021-01-01 | End: 2021-01-01

## 2021-01-01 RX ORDER — POLYETHYLENE GLYCOL 3350 17 G/17G
17 POWDER, FOR SOLUTION ORAL DAILY
Status: DISCONTINUED | OUTPATIENT
Start: 2021-01-01 | End: 2021-01-01

## 2021-01-01 RX ORDER — CALCIUM GLUCONATE 20 MG/ML
2 INJECTION, SOLUTION INTRAVENOUS
Status: DISCONTINUED | OUTPATIENT
Start: 2021-01-01 | End: 2021-01-01

## 2021-01-01 RX ORDER — HEPARIN SODIUM 10000 [USP'U]/100ML
10-50 INJECTION, SOLUTION INTRAVENOUS CONTINUOUS
Status: DISCONTINUED | OUTPATIENT
Start: 2021-01-01 | End: 2021-01-01 | Stop reason: HOSPADM

## 2021-01-01 RX ORDER — PANTOPRAZOLE SODIUM 40 MG/1
40 TABLET, DELAYED RELEASE ORAL DAILY
Status: CANCELLED | OUTPATIENT
Start: 2021-01-01

## 2021-01-01 RX ORDER — MAGNESIUM HYDROXIDE/ALUMINUM HYDROXICE/SIMETHICONE 120; 1200; 1200 MG/30ML; MG/30ML; MG/30ML
30 SUSPENSION ORAL EVERY 4 HOURS PRN
Status: CANCELLED | OUTPATIENT
Start: 2021-01-01

## 2021-01-01 RX ORDER — AMOXICILLIN 250 MG
2 CAPSULE ORAL 2 TIMES DAILY
Status: DISCONTINUED | OUTPATIENT
Start: 2021-01-01 | End: 2021-01-01 | Stop reason: HOSPADM

## 2021-01-01 RX ORDER — PREDNISONE 1 MG/1
4 TABLET ORAL DAILY
Status: DISCONTINUED | OUTPATIENT
Start: 2021-01-01 | End: 2021-01-01 | Stop reason: HOSPADM

## 2021-01-01 RX ORDER — AMOXICILLIN 250 MG
1 CAPSULE ORAL 2 TIMES DAILY
Status: DISCONTINUED | OUTPATIENT
Start: 2021-01-01 | End: 2021-01-01 | Stop reason: HOSPADM

## 2021-01-01 RX ORDER — POLYETHYLENE GLYCOL 3350 17 G/17G
17 POWDER, FOR SOLUTION ORAL DAILY PRN
Status: CANCELLED | OUTPATIENT
Start: 2021-01-01

## 2021-01-01 RX ORDER — ONDANSETRON 4 MG/1
4 TABLET, ORALLY DISINTEGRATING ORAL EVERY 6 HOURS PRN
Status: DISCONTINUED | OUTPATIENT
Start: 2021-01-01 | End: 2021-01-01 | Stop reason: HOSPADM

## 2021-01-01 RX ORDER — FUROSEMIDE 10 MG/ML
60 INJECTION INTRAMUSCULAR; INTRAVENOUS ONCE
Status: COMPLETED | OUTPATIENT
Start: 2021-01-01 | End: 2021-01-01

## 2021-01-01 RX ORDER — HEPARIN SODIUM 200 [USP'U]/100ML
3 INJECTION, SOLUTION INTRAVENOUS CONTINUOUS
Status: DISCONTINUED | OUTPATIENT
Start: 2021-01-01 | End: 2021-01-01 | Stop reason: HOSPADM

## 2021-01-01 RX ORDER — MAGNESIUM HYDROXIDE/ALUMINUM HYDROXICE/SIMETHICONE 120; 1200; 1200 MG/30ML; MG/30ML; MG/30ML
30 SUSPENSION ORAL EVERY 4 HOURS PRN
Status: DISCONTINUED | OUTPATIENT
Start: 2021-01-01 | End: 2021-01-01

## 2021-01-01 RX ORDER — PREDNISONE 10 MG/1
TABLET ORAL
Qty: 57 TABLET | Refills: 0 | Status: SHIPPED | OUTPATIENT
Start: 2021-01-01

## 2021-01-01 RX ORDER — NITROGLYCERIN 5 MG/ML
VIAL (ML) INTRAVENOUS
Status: DISCONTINUED | OUTPATIENT
Start: 2021-01-01 | End: 2021-01-01 | Stop reason: HOSPADM

## 2021-01-01 RX ORDER — EPINEPHRINE IN 0.9 % SOD CHLOR 5 MG/250ML
.01-.3 PLASTIC BAG, INJECTION (ML) INTRAVENOUS CONTINUOUS
Status: DISCONTINUED | OUTPATIENT
Start: 2021-01-01 | End: 2021-01-01 | Stop reason: HOSPADM

## 2021-01-01 RX ORDER — SULFAMETHOXAZOLE AND TRIMETHOPRIM 400; 80 MG/1; MG/1
1 TABLET ORAL DAILY
Status: CANCELLED | OUTPATIENT
Start: 2021-01-01

## 2021-01-01 RX ADMIN — CALCIUM CHLORIDE 1 G: 100 INJECTION, SOLUTION INTRAVENOUS at 17:04

## 2021-01-01 RX ADMIN — CALCIUM CHLORIDE, MAGNESIUM CHLORIDE, DEXTROSE MONOHYDRATE, LACTIC ACID, SODIUM CHLORIDE, SODIUM BICARBONATE AND POTASSIUM CHLORIDE 12.5 ML/KG/HR: 5.15; 2.03; 22; 5.4; 6.46; 3.09; .157 INJECTION INTRAVENOUS at 05:03

## 2021-01-01 RX ADMIN — CALCIUM CHLORIDE, MAGNESIUM CHLORIDE, DEXTROSE MONOHYDRATE, LACTIC ACID, SODIUM CHLORIDE, SODIUM BICARBONATE AND POTASSIUM CHLORIDE: 5.15; 2.03; 22; 5.4; 6.46; 3.09; .157 INJECTION INTRAVENOUS at 09:01

## 2021-01-01 RX ADMIN — EPINEPHRINE 0.1 MCG/KG/MIN: 1 INJECTION PARENTERAL at 02:13

## 2021-01-01 RX ADMIN — VANCOMYCIN HYDROCHLORIDE 1250 MG: 10 INJECTION, POWDER, LYOPHILIZED, FOR SOLUTION INTRAVENOUS at 15:45

## 2021-01-01 RX ADMIN — Medication: at 04:02

## 2021-01-01 RX ADMIN — CALCIUM GLUCONATE 2 G: 20 INJECTION, SOLUTION INTRAVENOUS at 01:41

## 2021-01-01 RX ADMIN — ALBUMIN HUMAN 250 ML: 0.05 INJECTION, SOLUTION INTRAVENOUS at 12:13

## 2021-01-01 RX ADMIN — SODIUM BICARBONATE 100 MEQ: 84 INJECTION INTRAVENOUS at 01:24

## 2021-01-01 RX ADMIN — CEFEPIME HYDROCHLORIDE 2 G: 2 INJECTION, POWDER, FOR SOLUTION INTRAVENOUS at 16:28

## 2021-01-01 RX ADMIN — FUROSEMIDE 80 MG: 10 INJECTION, SOLUTION INTRAVENOUS at 08:05

## 2021-01-01 RX ADMIN — DOCUSATE SODIUM 50 MG AND SENNOSIDES 8.6 MG 1 TABLET: 8.6; 5 TABLET, FILM COATED ORAL at 20:17

## 2021-01-01 RX ADMIN — EPINEPHRINE 0.08 MCG/KG/MIN: 1 INJECTION PARENTERAL at 19:05

## 2021-01-01 RX ADMIN — CALCIUM GLUCONATE 2 G: 20 INJECTION, SOLUTION INTRAVENOUS at 06:10

## 2021-01-01 RX ADMIN — Medication 100 MEQ: at 01:24

## 2021-01-01 RX ADMIN — Medication: at 19:38

## 2021-01-01 RX ADMIN — HEPARIN SODIUM 1400 UNITS/HR: 10000 INJECTION, SOLUTION INTRAVENOUS at 06:09

## 2021-01-01 RX ADMIN — Medication 200 MCG: at 16:13

## 2021-01-01 RX ADMIN — CALCIUM CHLORIDE, MAGNESIUM CHLORIDE, DEXTROSE MONOHYDRATE, LACTIC ACID, SODIUM CHLORIDE, SODIUM BICARBONATE AND POTASSIUM CHLORIDE 12.5 ML/KG/HR: 5.15; 2.03; 22; 5.4; 6.46; 3.09; .157 INJECTION INTRAVENOUS at 10:21

## 2021-01-01 RX ADMIN — CALCIUM CHLORIDE 1 G: 100 INJECTION, SOLUTION INTRAVENOUS at 05:26

## 2021-01-01 RX ADMIN — SODIUM BICARBONATE 15 ML/HR: 84 INJECTION, SOLUTION INTRAVENOUS at 20:14

## 2021-01-01 RX ADMIN — ALBUMIN HUMAN 250 ML: 50 SOLUTION INTRAVENOUS at 06:30

## 2021-01-01 RX ADMIN — Medication 100 MCG: at 19:01

## 2021-01-01 RX ADMIN — VANCOMYCIN HYDROCHLORIDE 1500 MG: 10 INJECTION, POWDER, LYOPHILIZED, FOR SOLUTION INTRAVENOUS at 08:37

## 2021-01-01 RX ADMIN — Medication 0.08 MCG/KG/MIN: at 02:30

## 2021-01-01 RX ADMIN — HYDROCORTISONE SODIUM SUCCINATE 20 MG: 100 INJECTION, POWDER, FOR SOLUTION INTRAMUSCULAR; INTRAVENOUS at 23:53

## 2021-01-01 RX ADMIN — Medication 0.1 MCG/KG/MIN: at 19:11

## 2021-01-01 RX ADMIN — VASOPRESSIN 2.4 UNITS/HR: 20 INJECTION INTRAVENOUS at 20:16

## 2021-01-01 RX ADMIN — ALBUMIN HUMAN 250 ML: 50 SOLUTION INTRAVENOUS at 04:44

## 2021-01-01 RX ADMIN — CALCIUM CHLORIDE 2 G: 100 INJECTION INTRAVENOUS; INTRAVENTRICULAR at 00:00

## 2021-01-01 RX ADMIN — DOCUSATE SODIUM 50 MG AND SENNOSIDES 8.6 MG 2 TABLET: 8.6; 5 TABLET, FILM COATED ORAL at 07:36

## 2021-01-01 RX ADMIN — CALCIUM GLUCONATE 4 G: 98 INJECTION, SOLUTION INTRAVENOUS at 11:10

## 2021-01-01 RX ADMIN — POLYETHYLENE GLYCOL 3350 17 G: 17 POWDER, FOR SOLUTION ORAL at 07:37

## 2021-01-01 RX ADMIN — ETOMIDATE 20 MG: 2 INJECTION INTRAVENOUS at 19:02

## 2021-01-01 RX ADMIN — EPINEPHRINE 0.12 MCG/KG/MIN: 1 INJECTION PARENTERAL at 14:01

## 2021-01-01 RX ADMIN — DOCUSATE SODIUM 50 MG AND SENNOSIDES 8.6 MG 2 TABLET: 8.6; 5 TABLET, FILM COATED ORAL at 19:51

## 2021-01-01 RX ADMIN — CALCIUM CHLORIDE 1 G: 100 INJECTION, SOLUTION INTRAVENOUS at 10:16

## 2021-01-01 RX ADMIN — PREDNISONE 4 MG: 1 TABLET ORAL at 08:50

## 2021-01-01 RX ADMIN — HYDROCORTISONE SODIUM SUCCINATE 20 MG: 100 INJECTION, POWDER, FOR SOLUTION INTRAMUSCULAR; INTRAVENOUS at 11:54

## 2021-01-01 RX ADMIN — Medication 4 MG/HR: at 03:27

## 2021-01-01 RX ADMIN — CALCIUM CHLORIDE, MAGNESIUM CHLORIDE, DEXTROSE MONOHYDRATE, LACTIC ACID, SODIUM CHLORIDE, SODIUM BICARBONATE AND POTASSIUM CHLORIDE 12.5 ML/KG/HR: 5.15; 2.03; 22; 5.4; 6.46; 3.09; .157 INJECTION INTRAVENOUS at 09:01

## 2021-01-01 RX ADMIN — DOCUSATE SODIUM 50 MG AND SENNOSIDES 8.6 MG 1 TABLET: 8.6; 5 TABLET, FILM COATED ORAL at 19:38

## 2021-01-01 RX ADMIN — CALCIUM CHLORIDE, MAGNESIUM CHLORIDE, DEXTROSE MONOHYDRATE, LACTIC ACID, SODIUM CHLORIDE, SODIUM BICARBONATE AND POTASSIUM CHLORIDE: 5.15; 2.03; 22; 5.4; 6.46; 3.09; .157 INJECTION INTRAVENOUS at 10:21

## 2021-01-01 RX ADMIN — CALCIUM GLUCONATE 1 G: 20 INJECTION, SOLUTION INTRAVENOUS at 14:44

## 2021-01-01 RX ADMIN — Medication: at 14:44

## 2021-01-01 RX ADMIN — CALCIUM CHLORIDE, MAGNESIUM CHLORIDE, DEXTROSE MONOHYDRATE, LACTIC ACID, SODIUM CHLORIDE, SODIUM BICARBONATE AND POTASSIUM CHLORIDE 12.5 ML/KG/HR: 5.15; 2.03; 22; 5.4; 6.46; 3.09; .157 INJECTION INTRAVENOUS at 22:17

## 2021-01-01 RX ADMIN — Medication 5 ML: at 16:42

## 2021-01-01 RX ADMIN — ALBUMIN HUMAN 250 ML: 0.05 INJECTION, SOLUTION INTRAVENOUS at 18:52

## 2021-01-01 RX ADMIN — IOPAMIDOL 76 ML: 755 INJECTION, SOLUTION INTRAVENOUS at 16:12

## 2021-01-01 RX ADMIN — DOCUSATE SODIUM 50 MG AND SENNOSIDES 8.6 MG 2 TABLET: 8.6; 5 TABLET, FILM COATED ORAL at 07:13

## 2021-01-01 RX ADMIN — HEPARIN SODIUM 1250 UNITS/HR: 10000 INJECTION, SOLUTION INTRAVENOUS at 03:27

## 2021-01-01 RX ADMIN — Medication 0.6 MCG/KG/MIN: at 20:55

## 2021-01-01 RX ADMIN — FENTANYL CITRATE 100 MCG: 50 INJECTION, SOLUTION INTRAMUSCULAR; INTRAVENOUS at 00:00

## 2021-01-01 RX ADMIN — SODIUM CHLORIDE 1000 MG: 9 INJECTION, SOLUTION INTRAVENOUS at 07:37

## 2021-01-01 RX ADMIN — PANTOPRAZOLE SODIUM 40 MG: 40 INJECTION, POWDER, FOR SOLUTION INTRAVENOUS at 07:13

## 2021-01-01 RX ADMIN — FUROSEMIDE 60 MG: 10 INJECTION, SOLUTION INTRAVENOUS at 17:02

## 2021-01-01 RX ADMIN — DOCUSATE SODIUM 50 MG AND SENNOSIDES 8.6 MG 2 TABLET: 8.6; 5 TABLET, FILM COATED ORAL at 07:29

## 2021-01-01 RX ADMIN — CEFEPIME HYDROCHLORIDE 2 G: 2 INJECTION, POWDER, FOR SOLUTION INTRAVENOUS at 08:00

## 2021-01-01 RX ADMIN — MIDAZOLAM HYDROCHLORIDE 4 MG/HR: 1 INJECTION, SOLUTION INTRAVENOUS at 20:10

## 2021-01-01 RX ADMIN — CEFEPIME 2 G: 2 INJECTION, POWDER, FOR SOLUTION INTRAVENOUS at 18:36

## 2021-01-01 RX ADMIN — CALCIUM GLUCONATE 2 G: 20 INJECTION, SOLUTION INTRAVENOUS at 17:33

## 2021-01-01 RX ADMIN — PANTOPRAZOLE SODIUM 40 MG: 40 INJECTION, POWDER, FOR SOLUTION INTRAVENOUS at 08:14

## 2021-01-01 RX ADMIN — Medication 50 MCG/HR: at 00:03

## 2021-01-01 RX ADMIN — VANCOMYCIN HYDROCHLORIDE 1500 MG: 5 INJECTION, POWDER, LYOPHILIZED, FOR SOLUTION INTRAVENOUS at 17:05

## 2021-01-01 RX ADMIN — ASPIRIN 81 MG CHEWABLE TABLET 81 MG: 81 TABLET CHEWABLE at 07:13

## 2021-01-01 RX ADMIN — CALCIUM CHLORIDE, MAGNESIUM CHLORIDE, DEXTROSE MONOHYDRATE, LACTIC ACID, SODIUM CHLORIDE, SODIUM BICARBONATE AND POTASSIUM CHLORIDE 12.5 ML/KG/HR: 5.15; 2.03; 22; 5.4; 6.46; 3.09; .157 INJECTION INTRAVENOUS at 15:47

## 2021-01-01 RX ADMIN — METHYLPREDNISOLONE SODIUM SUCCINATE 62.5 MG: 125 INJECTION, POWDER, FOR SOLUTION INTRAMUSCULAR; INTRAVENOUS at 16:27

## 2021-01-01 RX ADMIN — CEFEPIME 2 G: 2 INJECTION, POWDER, FOR SOLUTION INTRAVENOUS at 07:59

## 2021-01-01 RX ADMIN — DOCUSATE SODIUM 50 MG AND SENNOSIDES 8.6 MG 1 TABLET: 8.6; 5 TABLET, FILM COATED ORAL at 08:50

## 2021-01-01 RX ADMIN — CALCIUM CHLORIDE 1 G: 100 INJECTION, SOLUTION INTRAVENOUS at 00:28

## 2021-01-01 RX ADMIN — MIDAZOLAM 2 MG: 1 INJECTION INTRAMUSCULAR; INTRAVENOUS at 15:40

## 2021-01-01 RX ADMIN — EPINEPHRINE 0.1 MCG/KG/MIN: 1 INJECTION PARENTERAL at 02:57

## 2021-01-01 RX ADMIN — ALBUMIN HUMAN 250 ML: 0.05 INJECTION, SOLUTION INTRAVENOUS at 06:30

## 2021-01-01 RX ADMIN — AMIODARONE HYDROCHLORIDE 150 MG: 1.5 INJECTION, SOLUTION INTRAVENOUS at 18:13

## 2021-01-01 RX ADMIN — CALCIUM GLUCONATE 2 G: 20 INJECTION, SOLUTION INTRAVENOUS at 11:24

## 2021-01-01 RX ADMIN — Medication 3 MG/HR: at 18:36

## 2021-01-01 RX ADMIN — ALBUMIN HUMAN 250 ML: 50 SOLUTION INTRAVENOUS at 18:52

## 2021-01-01 RX ADMIN — SODIUM CHLORIDE 1000 MG: 9 INJECTION, SOLUTION INTRAVENOUS at 09:32

## 2021-01-01 RX ADMIN — Medication 50 MCG/HR: at 09:53

## 2021-01-01 RX ADMIN — PANTOPRAZOLE SODIUM 40 MG: 40 INJECTION, POWDER, FOR SOLUTION INTRAVENOUS at 07:29

## 2021-01-01 RX ADMIN — CALCIUM GLUCONATE 2 G: 20 INJECTION, SOLUTION INTRAVENOUS at 22:53

## 2021-01-01 RX ADMIN — HEPARIN SODIUM 1400 UNITS/HR: 10000 INJECTION, SOLUTION INTRAVENOUS at 11:44

## 2021-01-01 RX ADMIN — PANTOPRAZOLE SODIUM 40 MG: 40 INJECTION, POWDER, FOR SOLUTION INTRAVENOUS at 07:37

## 2021-01-01 RX ADMIN — CALCIUM CHLORIDE 1 G: 100 INJECTION, SOLUTION INTRAVENOUS at 16:06

## 2021-01-01 RX ADMIN — LORAZEPAM 0.5 MG: 2 INJECTION INTRAMUSCULAR at 18:48

## 2021-01-01 RX ADMIN — Medication 0.06 MCG/KG/MIN: at 21:05

## 2021-01-01 RX ADMIN — ALBUMIN HUMAN 250 ML: 50 SOLUTION INTRAVENOUS at 19:37

## 2021-01-01 RX ADMIN — HEPARIN SODIUM 1600 UNITS/HR: 10000 INJECTION, SOLUTION INTRAVENOUS at 23:53

## 2021-01-01 RX ADMIN — HUMAN ALBUMIN MICROSPHERES AND PERFLUTREN 9 ML: 10; .22 INJECTION, SOLUTION INTRAVENOUS at 12:10

## 2021-01-01 RX ADMIN — SODIUM CHLORIDE 63 ML: 9 INJECTION, SOLUTION INTRAVENOUS at 16:12

## 2021-01-01 RX ADMIN — CALCIUM GLUCONATE 2 G: 20 INJECTION, SOLUTION INTRAVENOUS at 05:40

## 2021-01-01 RX ADMIN — CEFEPIME HYDROCHLORIDE 2 G: 2 INJECTION, POWDER, FOR SOLUTION INTRAVENOUS at 07:18

## 2021-01-01 RX ADMIN — ALBUMIN HUMAN 250 ML: 0.05 INJECTION, SOLUTION INTRAVENOUS at 04:44

## 2021-01-01 RX ADMIN — POLYETHYLENE GLYCOL 3350 17 G: 17 POWDER, FOR SOLUTION ORAL at 08:51

## 2021-01-01 RX ADMIN — POLYETHYLENE GLYCOL 3350 17 G: 17 POWDER, FOR SOLUTION ORAL at 07:29

## 2021-01-01 RX ADMIN — ASPIRIN 81 MG CHEWABLE TABLET 81 MG: 81 TABLET CHEWABLE at 07:36

## 2021-01-01 RX ADMIN — HEPARIN SODIUM 1250 UNITS/HR: 10000 INJECTION, SOLUTION INTRAVENOUS at 17:28

## 2021-01-01 RX ADMIN — HYDROCORTISONE SODIUM SUCCINATE 20 MG: 100 INJECTION, POWDER, FOR SOLUTION INTRAMUSCULAR; INTRAVENOUS at 11:13

## 2021-01-01 RX ADMIN — HEPARIN SODIUM 500 UNITS/HR: 10000 INJECTION, SOLUTION INTRAVENOUS at 14:30

## 2021-01-01 RX ADMIN — Medication 5 ML: at 07:14

## 2021-01-01 RX ADMIN — CEFEPIME HYDROCHLORIDE 2 G: 2 INJECTION, POWDER, FOR SOLUTION INTRAVENOUS at 19:51

## 2021-01-01 RX ADMIN — SUCCINYLCHOLINE CHLORIDE 150 MG: 20 INJECTION, SOLUTION INTRAMUSCULAR; INTRAVENOUS; PARENTERAL at 19:02

## 2021-01-01 RX ADMIN — PHYTONADIONE 5 MG: 10 INJECTION, EMULSION INTRAMUSCULAR; INTRAVENOUS; SUBCUTANEOUS at 12:29

## 2021-01-01 RX ADMIN — Medication 100 MCG: at 19:07

## 2021-01-01 RX ADMIN — CEFEPIME 2 G: 2 INJECTION, POWDER, FOR SOLUTION INTRAVENOUS at 07:01

## 2021-01-01 RX ADMIN — HEPARIN SODIUM 3 ML/HR: 200 INJECTION, SOLUTION INTRAVENOUS at 14:32

## 2021-01-01 RX ADMIN — ALBUMIN HUMAN 250 ML: 0.05 INJECTION, SOLUTION INTRAVENOUS at 19:37

## 2021-01-01 RX ADMIN — LORAZEPAM 0.5 MG: 2 INJECTION INTRAMUSCULAR; INTRAVENOUS at 18:48

## 2021-01-01 RX ADMIN — ASPIRIN 81 MG CHEWABLE TABLET 81 MG: 81 TABLET CHEWABLE at 07:29

## 2021-01-01 RX ADMIN — Medication 3 MG/HR: at 04:30

## 2021-01-01 RX ADMIN — HYDROCORTISONE SODIUM SUCCINATE 20 MG: 100 INJECTION, POWDER, FOR SOLUTION INTRAMUSCULAR; INTRAVENOUS at 00:57

## 2021-01-01 RX ADMIN — AMIODARONE HYDROCHLORIDE 0.5 MG/MIN: 50 INJECTION, SOLUTION INTRAVENOUS at 02:25

## 2021-01-01 ASSESSMENT — ENCOUNTER SYMPTOMS
HEADACHES: 0
WEAKNESS: 0
NUMBNESS: 1
CHILLS: 0
SHORTNESS OF BREATH: 1
NAUSEA: 1
FEVER: 0

## 2021-01-01 ASSESSMENT — ACTIVITIES OF DAILY LIVING (ADL)
ADLS_ACUITY_SCORE: 20
ADLS_ACUITY_SCORE: 20
ADLS_ACUITY_SCORE: 18
ADLS_ACUITY_SCORE: 20
ADLS_ACUITY_SCORE: 18
ADLS_ACUITY_SCORE: 20
ADLS_ACUITY_SCORE: 18
ADLS_ACUITY_SCORE: 20
ADLS_ACUITY_SCORE: 18
ADLS_ACUITY_SCORE: 20
ADLS_ACUITY_SCORE: 18
ADLS_ACUITY_SCORE: 20
ADLS_ACUITY_SCORE: 18
ADLS_ACUITY_SCORE: 20
ADLS_ACUITY_SCORE: 18
ADLS_ACUITY_SCORE: 20
ADLS_ACUITY_SCORE: 18
ADLS_ACUITY_SCORE: 20
ADLS_ACUITY_SCORE: 18
ADLS_ACUITY_SCORE: 20
ADLS_ACUITY_SCORE: 18

## 2021-10-12 NOTE — PROGRESS NOTES
Assessment & Plan     ILD (interstitial lung disease) (H)  CBC negative for signs of infection  CMP does show elevated liver enzymes, at this time I will have to have patient follow up with specialist to continue cellcept  CRP pending  Started on longer tapering dose of prednisone for SOB secondary to ILD  - CBC with platelets and differential; Future  - Comprehensive metabolic panel (BMP + Alb, Alk Phos, ALT, AST, Total. Bili, TP); Future  - CRP, inflammation; Future  - CBC with platelets and differential  - Comprehensive metabolic panel (BMP + Alb, Alk Phos, ALT, AST, Total. Bili, TP)  - CRP, inflammation  - predniSONE (DELTASONE) 10 MG tablet; 5 tabs po every day for 5 days, then 4 tabs po every day for 3 days, 3 tabs po every day for 3 days, 2 tab po every day for 3 days, 1 tabs po every day for 3 days, 1/2 tab po every day for 4 days    Scleroderma (H)  Labs ordered, copies sent with patient  - CBC with platelets and differential; Future  - Comprehensive metabolic panel (BMP + Alb, Alk Phos, ALT, AST, Total. Bili, TP); Future  - CRP, inflammation; Future  - CBC with platelets and differential  - Comprehensive metabolic panel (BMP + Alb, Alk Phos, ALT, AST, Total. Bili, TP)  - CRP, inflammation    Paroxysmal atrial fibrillation (H)  Restart medication  - diltiazem ER COATED BEADS (CARDIZEM CD/CARTIA XT) 180 MG 24 hr capsule; Take 1 capsule (180 mg) by mouth daily    Raynaud's disease without gangrene  Restart medication  - lisinopril (ZESTRIL) 10 MG tablet; Take 1 tablet (10 mg) by mouth daily    Atrial fibrillation, unspecified type (H)  Restart medication  - lisinopril (ZESTRIL) 10 MG tablet; Take 1 tablet (10 mg) by mouth daily    Esophageal dysmotility  Restart medication  - pantoprazole (PROTONIX) 40 MG EC tablet; Take 1 tablet (40 mg) by mouth daily    Inflammatory arthritis  Prednisone will help with this    SOB (shortness of breath)  Prednisone taper  Follow up with Pulmonology  - predniSONE  (DELTASONE) 10 MG tablet; 5 tabs po every day for 5 days, then 4 tabs po every day for 3 days, 3 tabs po every day for 3 days, 2 tab po every day for 3 days, 1 tabs po every day for 3 days, 1/2 tab po every day for 4 days    Review of external notes as documented elsewhere in note  > 40 minutes spent on the date of the encounter doing chart review, review of outside records, review of test results, interpretation of tests, patient visit and documentation        CONSULTATION/REFERRAL to pulmonology    No follow-ups on file.    Adan Batista PA-C  [facility] is a 56 year old who presents for the following health issues     HPI     SOB  Has ILD and has not taken medications for months    Review of Systems   Constitutional, HEENT, cardiovascular, pulmonary, GI, , musculoskeletal, neuro, skin, endocrine and psych systems are negative, except as otherwise noted.      Objective    /76   Pulse (!) 132   Temp 97.4  F (36.3  C) (Tympanic)   Resp 20   Wt 69.4 kg (153 lb)   BMI 22.59 kg/m    Body mass index is 22.59 kg/m .  Physical Exam   GENERAL: healthy, alert and no distress  EYES: Eyes grossly normal to inspection, PERRL and conjunctivae and sclerae normal  HENT: ear canals and TM's normal, nose and mouth without ulcers or lesions  NECK: no adenopathy, no asymmetry, masses, or scars and thyroid normal to palpation  RESP: Decreased lung movements  CV: regular rate and rhythm, normal S1 S2, no S3 or S4, no murmur, click or rub, no peripheral edema and peripheral pulses strong  ABDOMEN: soft, nontender, no hepatosplenomegaly, no masses and bowel sounds normal  MS: no gross musculoskeletal defects noted, no edema  SKIN: Positive for sceloderma  NEURO: Normal strength and tone, mentation intact and speech normal      Results for orders placed or performed in visit on 10/11/21   Comprehensive metabolic panel (BMP + Alb, Alk Phos, ALT, AST, Total. Bili, TP)      Status: Abnormal   Result Value Ref Range    Sodium 135 133 - 144 mmol/L    Potassium 3.8 3.4 - 5.3 mmol/L    Chloride 103 94 - 109 mmol/L    Carbon Dioxide (CO2) 22 20 - 32 mmol/L    Anion Gap 10 3 - 14 mmol/L    Urea Nitrogen 14 7 - 30 mg/dL    Creatinine 0.89 0.66 - 1.25 mg/dL    Calcium 8.8 8.5 - 10.1 mg/dL    Glucose 97 70 - 99 mg/dL    Alkaline Phosphatase 74 40 - 150 U/L     (H) 0 - 45 U/L     (H) 0 - 70 U/L    Protein Total 6.0 (L) 6.8 - 8.8 g/dL    Albumin 2.6 (L) 3.4 - 5.0 g/dL    Bilirubin Total 0.8 0.2 - 1.3 mg/dL    GFR Estimate >90 >60 mL/min/1.73m2   CBC with platelets and differential     Status: Abnormal   Result Value Ref Range    WBC Count 6.4 4.0 - 11.0 10e3/uL    RBC Count 4.50 4.40 - 5.90 10e6/uL    Hemoglobin 12.5 (L) 13.3 - 17.7 g/dL    Hematocrit 39.4 (L) 40.0 - 53.0 %    MCV 88 78 - 100 fL    MCH 27.8 26.5 - 33.0 pg    MCHC 31.7 31.5 - 36.5 g/dL    RDW 14.5 10.0 - 15.0 %    Platelet Count 342 150 - 450 10e3/uL    % Neutrophils 72 %    % Lymphocytes 14 %    % Monocytes 14 %    % Eosinophils 1 %    % Basophils 0 %    Absolute Neutrophils 4.6 1.6 - 8.3 10e3/uL    Absolute Lymphocytes 0.9 0.8 - 5.3 10e3/uL    Absolute Monocytes 0.9 0.0 - 1.3 10e3/uL    Absolute Eosinophils 0.1 0.0 - 0.7 10e3/uL    Absolute Basophils 0.0 0.0 - 0.2 10e3/uL   CBC with platelets and differential     Status: Abnormal    Narrative    The following orders were created for panel order CBC with platelets and differential.  Procedure                               Abnormality         Status                     ---------                               -----------         ------                     CBC with platelets and d...[[id number]]  Abnormal            Final result                 Please view results for these tests on the individual orders.

## 2021-10-19 PROBLEM — I21.4 NSTEMI (NON-ST ELEVATED MYOCARDIAL INFARCTION) (H): Status: ACTIVE | Noted: 2021-01-01

## 2021-10-19 PROBLEM — I26.93 SINGLE SUBSEGMENTAL PULMONARY EMBOLISM WITHOUT ACUTE COR PULMONALE (H): Status: ACTIVE | Noted: 2021-01-01

## 2021-10-19 PROBLEM — I50.9 CONGESTIVE HEART FAILURE, UNSPECIFIED HF CHRONICITY, UNSPECIFIED HEART FAILURE TYPE (H): Status: ACTIVE | Noted: 2021-01-01

## 2021-10-19 PROBLEM — I48.91 ATRIAL FIBRILLATION WITH RAPID VENTRICULAR RESPONSE (H): Status: ACTIVE | Noted: 2021-01-01

## 2021-10-19 NOTE — Clinical Note
17 Fr cannula inserted into the right femoral artery  Patient returning call to clinic. She thinks a nurse tried to call her. Ok to leave a detailed message.

## 2021-10-19 NOTE — Clinical Note
Baseline: Peak systolic 80, CARLOS 68, AVC 75 , MVO 15, MVC 22  2.5 L/min: Peak systolic 88, CARLOS 90, AVC 95, MVO 12, MVC 19  4.0 L/min early: Peak systolic 92, CARLOS 75, AVC 78, MVO 10, MVC 18   4.0 L/min + 15 mins: Peak systolic 95, CARLOS 88, AVC 88, MVO 10, MVC 17

## 2021-10-19 NOTE — ED NOTES
Bed: ED10  Expected date:   Expected time:   Means of arrival:   Comments:  [facility] - 54 M SOB eta 8313

## 2021-10-19 NOTE — ED PROVIDER NOTES
History     Chief Complaint:  Shortness of Breath      HPI   Kwaku Medrano is a 56 year old male who presents with not feeling well and shortness of breath.  He has a history of interstitial lung disease, scleroderma, and Raynaud's phenomenon and ran out of his medications approximately 10 months ago.  He recently within the past week or so get started on some of his medications.  He notes he has not felt well for the last couple of weeks but has gotten worse this past week.  Today felt like he could not get his breath and felt more fatigued and weak.  He is Covid vaccinated and stays at home with very little social interactions to be at risk for Covid.  He denies fevers chills.  He has had nausea and difficulty swallowing and feels it secondary to the scleroderma.  He feels like he cannot take a deep breath.  Denies history of pulmonary embolism.  Patient called EMS and was given a DuoNeb and oxygen in route.      Review of Systems   Constitutional: Negative for chills and fever.   Respiratory: Positive for shortness of breath.    Gastrointestinal: Positive for nausea.   Neurological: Positive for numbness. Negative for weakness and headaches.   All other systems reviewed and are negative.    Allergies:  Ampicillin      Medications:      diltiazem ER COATED BEADS (CARDIZEM CD/CARTIA XT) 180 MG 24 hr capsule  lisinopril (ZESTRIL) 10 MG tablet  pantoprazole (PROTONIX) 40 MG EC tablet  aspirin 81 MG EC tablet  Blood Pressure Monitoring KIT  mycophenolate (GENERIC EQUIVALENT) 500 MG tablet  predniSONE (DELTASONE) 1 MG tablet  predniSONE (DELTASONE) 10 MG tablet  sulfamethoxazole-trimethoprim (BACTRIM) 400-80 MG tablet    not currently taking mycophenolate      Past Medical History:    Past Medical History:   Diagnosis Date     Atrial fibrillation and flutter (H) 07/2017     Fracture      GERD (gastroesophageal reflux disease)      Interstitial lung disease (H)      Scleroderma (H)      Patient Active Problem List     Diagnosis Date Noted     Atrial fibrillation with rapid ventricular response (H) 10/19/2021     Priority: Medium     Congestive heart failure, unspecified HF chronicity, unspecified heart failure type (H) 10/19/2021     Priority: Medium     Single subsegmental pulmonary embolism without acute cor pulmonale (H) 10/19/2021     Priority: Medium     NSTEMI (non-ST elevated myocardial infarction) (H) 10/19/2021     Priority: Medium     Interstitial lung disease (H)      Priority: Medium     sclerderma ILD; present on CXR 3-2017 and CT 5-2017; dx confirmed by CT 9-2017 fibrotic NSIP pattern with increased fibrosis; started mycophenolate 7/2017       Scleroderma (H)      Priority: Medium     Diastolic hypertension 09/14/2017     Priority: Medium     High risk medications (not anticoagulants) long-term use 09/14/2017     Priority: Medium     Atrial fibrillation, unspecified type (H) 09/14/2017     Priority: Medium     Dermatomyositis (H) 07/25/2017     Priority: Medium     ILD (interstitial lung disease) (H) 07/25/2017     Priority: Medium     Atrial fibrillation and flutter (H) 07/01/2017     Priority: Medium        Past Surgical History:    Past Surgical History:   Procedure Laterality Date     COLONOSCOPY N/A 7/19/2017    Procedure: COLONOSCOPY;  COLONOSCOPY;  Surgeon: Mita Jimenez MD;  Location:  GI     NO HISTORY OF SURGERY         Family History:    Family History   Problem Relation Age of Onset     Prostate Cancer Father      Lung Cancer Other      Family History Negative Mother      Family History Negative Maternal Grandmother      Family History Negative Maternal Grandfather      Family History Negative Paternal Grandmother      Other - See Comments Paternal Grandfather         Atherosclerosis     Family History Negative Brother      Family History Negative Sister      Family History Negative Brother      LUNG DISEASE No family hx of      Rheumatologic Disease No family hx of        Social  "History:  Lives at home, no smoking    Physical Exam     Patient Vitals for the past 24 hrs:   BP Temp Temp src Pulse Resp SpO2 Height Weight   10/19/21 2030 (!) 80/34 -- -- 120 (!) 38 -- 1.75 m (5' 8.9\") --   10/19/21 2020 (!) 91/23 -- -- (!) 122 (!) 38 -- -- --   10/19/21 2010 95/55 -- -- 120 (!) 39 -- -- --   10/19/21 1950 99/58 -- -- 109 (!) 39 -- -- --   10/19/21 1940 (!) 83/41 -- -- 103 (!) 38 -- -- --   10/19/21 1930 (!) 56/24 -- -- 98 28 -- -- --   10/19/21 1920 (!) 77/65 -- -- 93 26 (!) 62 % -- --   10/19/21 1900 (!) 74/43 -- -- 118 (!) 40 (!) 80 % -- --   10/19/21 1850 (!) 84/54 -- -- 116 (!) 50 90 % -- --   10/19/21 1840 103/57 -- -- 115 21 -- -- --   10/19/21 1815 (!) 104/95 -- -- (!) 126 25 -- -- --   10/19/21 1730 (!) 86/71 -- -- (!) 131 11 97 % -- --   10/19/21 1700 98/69 -- -- (!) 134 28 95 % -- --   10/19/21 1654 -- -- -- -- -- -- -- 69.4 kg (153 lb)   10/19/21 1645 107/86 -- -- (!) 122 13 -- -- --   10/19/21 1630 106/84 -- -- (!) 123 22 96 % -- --   10/19/21 1530 127/77 97.8  F (36.6  C) Temporal (!) 122 (!) 46 (!) 85 % -- --       Physical Exam  GENERAL: well developed, pleasant, thin, looks chronically ill  HEAD: atraumatic  EYES: pupils reactive, extraocular muscles intact, conjunctivae normal  ENT:  mucus membranes moist  NECK:  trachea midline, normal range of motion  RESPIRATORY: mild to moderate tachypnea, no shahbaz wheezing  CVS: normal S1/S2, no murmurs, intact distal pulses, cyanosis to both hands and feet, cool to the touch, peripheral edema bilateral  ABDOMEN: soft, nontender, nondistention  MUSCULOSKELETAL: no deformities  SKIN: extremities are cool, blue  NEURO: GCS 15, cranial nerves intact, alert and oriented x3  PSYCH:  Mood/affect normal      Emergency Department Course   ECG:15:28  ECG taken at 15:28, ECG read at 1535  Afib with RVR, left axis deviation, no ischemic changes     Rate 122 bpm. OK interval  ms. QRS duration 108 ms. QT/QTc 318/453 ms.     Imaging:  CT Chest (PE) " Abdomen Pelvis w Contrast   Preliminary Result   Abnormal   IMPRESSION:   1.  Tiny pulmonary embolism visualized at the right hilar region.   2.  Hazy groundglass opacities at the bilateral mid to low lungs with   interstitial thickening consistent with pulmonary edema versus   atypical infectious etiology. Correlate with CHF. Underlying   interstitial lung disease again noted and may be progressed as   compared to 2017, but this is difficult to separate from the acute   presentation.   3.  Trace bilateral pleural fluid.   4.  Diffuse anasarca. Small ascites.   5.  Diffuse wall thickening of the gallbladder. Correlate with any   gallbladder disease. This wall thickening may just relate to the fluid   overload.   6.  Previously noted nodule of the right liver is difficult to   visualize but is likely similar in size. Heterogeneous appearance of   the liver on the right may just relate to a perfusion phenomenon.   7.  Cortical thinning of the kidneys with small cortical regions of   hypoenhancement. This could also be a perfusion phenomenon. Correlate   with any evidence for pyelonephritis.      [Critical Result: Pulmonary embolism]      Finding was identified on 10/19/2021 4:30 PM.       Dr. Colbert was contacted by me on 10/19/2021 4:43 PM and verbalized   understanding of the critical result.       XR Chest Port 1 View    (Results Pending)   XR Chest Port 1 View    (Results Pending)       Laboratory:  Labs Ordered and Resulted from Time of ED Arrival Up to the Time of Departure from the ED   COMPREHENSIVE METABOLIC PANEL - Abnormal; Notable for the following components:       Result Value    Carbon Dioxide (CO2) 14 (*)     Anion Gap 17 (*)     Calcium 8.3 (*)     Glucose 161 (*)      (*)      (*)     Protein Total 6.0 (*)     Albumin 2.7 (*)     All other components within normal limits   TROPONIN I - Abnormal; Notable for the following components:    Troponin I 5.429 (*)     All other components within  normal limits   CBC WITH PLATELETS AND DIFFERENTIAL - Abnormal; Notable for the following components:    WBC Count 11.1 (*)     MCHC 30.1 (*)     RDW 15.6 (*)     Absolute Immature Granulocytes 0.3 (*)     All other components within normal limits   ISTAT GASES LACTATE VENOUS POCT - Abnormal; Notable for the following components:    Lactic Acid POCT 9.5 (*)     Bicarbonate Venous POCT 18 (*)     O2 Sat, Venous POCT 35 (*)     pH Venous POCT 7.24 (*)     All other components within normal limits   NT PROBNP INPATIENT - Abnormal; Notable for the following components:    N terminal Pro BNP Inpatient 36,382 (*)     All other components within normal limits   PROCALCITONIN - Abnormal; Notable for the following components:    Procalcitonin 0.05 (*)     All other components within normal limits   BLOOD GAS VENOUS WITH OXYHEMOGLOBIN - Abnormal; Notable for the following components:    pH Venous 7.22 (*)     Bicarbonate Venous 17 (*)     Oxyhemoglobin Venous 39 (*)     Base Excess/Deficit (+/-) -10.1 (*)     All other components within normal limits   LACTIC ACID WHOLE BLOOD - Abnormal; Notable for the following components:    Lactic Acid 8.4 (*)     All other components within normal limits   EXTRA BLOOD CULTURE BOTTLE   EXTRA BLUE TOP TUBE   INFLUENZA A/B & SARS-COV2 PCR MULTIPLEX   PERIPHERAL IV CATHETER   MEASURE WEIGHT   NOTIFY PHYSICIAN   NOTIFY PHYSICIAN   BLOOD CULTURE   BLOOD CULTURE   MRSA MSSA PCR, NASAL SWAB   CBC WITH PLATELETS & DIFFERENTIAL    Narrative:     The following orders were created for panel order CBC with platelets + differential.  Procedure                               Abnormality         Status                     ---------                               -----------         ------                     CBC with platelets and d...[[id number]]  Abnormal            Final result                 Please view results for these tests on the individual orders.   EXTRA TUBE    Narrative:     The following  orders were created for panel order [place] Draw.  Procedure                               Abnormality         Status                     ---------                               -----------         ------                     Extra Blood Culture Bottle[[id number]]                       Final result               Extra Blue Top Tube[[id number]]                              Final result                 Please view results for these tests on the individual orders.     Procedures:  Central Line Placement    PROCEDURE: Central Line Placement.   INDICATIONS: Vascular access   CONSENT: Risks (including but not limited to: bleeding, infection or artery puncture), benefits and alternatives were discussed with spouse and consent for procedure was obtained.   PROCEDURAL NOTE: Right Femoral area was prepped, cleansed and draped in a sterile fashion. Mask and gloves were used per sterile protocol. Introducer needle was then used to gain access to the central venous circulation. Using Seldinger technique the Thermoguard catheter was placed. Catheter port(s) were aspirated and flushed. Central line was sutured in place and sterile dressing applied.  Procedure:  Intubation  Indication:  Respiratory failure  Consent:  Patient  Procedure:  Etomidate and succinylcholine, 3 MAC blade used patient did not have very much opening to his teeth was a tight fit, 7.5 tube placed secured.      Emergency Department Course:  Reviewed:  I reviewed nursing notes, vitals, past history and care everywhere    Assessments:  1552 I obtained history and examined the patient as noted above.    I rechecked the patient and explained findings.     Consults:   ICU    Interventions:  Medications   heparin 25,000 units in 0.45% NaCl 250 mL ANTICOAGULANT infusion (1,250 Units/hr Intravenous New Bag 10/19/21 9380)   amiodarone in NS adult drip std conc 1.8 mg/mL infusion (has no administration in time range)   amiodarone in NS adult drip std conc 1.8 mg/mL infusion  (has no administration in time range)   ondansetron (ZOFRAN) injection 4 mg (has no administration in time range)   midazolam (VERSED) drip - ADULT 100 mg/100 mL in NS (pre-mix) (4 mg/hr Intravenous New Bag 10/19/21 2010)   vasopressin 0.2 units/mL in NS (PITRESSIN) standard conc infusion (0 Units/hr Intravenous Stopped 10/19/21 2109)   sodium bicarbonate (BICARB) 1 mEq/mL drip (15 mL/hr Intravenous New Bag 10/19/21 2014)   EPINEPHrine (ADRENALIN) 5 mg in  mL infusion (0.06 mcg/kg/min × 69.4 kg Intravenous New Bag 10/19/21 2105)   ceFEPIme (MAXIPIME) 2 g vial to attach to  ml bag for ADULTS or 50 ml bag for PEDS (0 g Intravenous Stopped 10/19/21 1715)   Saline (63 mLs Other Given 10/19/21 1612)   iopamidol (ISOVUE-370) solution 76 mL (76 mLs Intravenous Given 10/19/21 1612)   methylPREDNISolone sodium succinate (solu-MEDROL) injection 62.5 mg (62.5 mg Intravenous Given 10/19/21 1627)   vancomycin 1500 mg in 0.9% NaCl 250 ml intermittent infusion 1,500 mg (0 mg Intravenous Stopped 10/19/21 1902)   furosemide (LASIX) injection 60 mg (60 mg Intravenous Given 10/19/21 1702)   heparin ANTICOAGULANT Loading dose for HIGH INTENSITY TREATMENT * Give BEFORE starting heparin infusion (5,550 Units Intravenous Given 10/19/21 1730)   amiodarone (NEXTERONE) bolus 150 mg (0 mg Intravenous Stopped 10/19/21 1825)   LORazepam (ATIVAN) injection 0.5 mg (0.5 mg Intravenous Given 10/19/21 1848)   phenylephrine (STACEY-SYNEPHRINE) injection 100 mcg (100 mcg Intravenous Given 10/19/21 1907)   etomidate (AMIDATE) injection 20 mg (20 mg Intravenous Given 10/19/21 1902)   succinylcholine (ANECTINE) injection 150 mg (150 mg Intravenous Given 10/19/21 1902)   phenylephrine (STACEY-SYNEPHRINE) injection 100 mcg (100 mcg Intravenous Given 10/19/21 1901)   midazolam (VERSED) bolus from infusion pump 4 mg (4 mg Intravenous Given 10/19/21 2010)       Disposition:  The patient was admitted to the hospital under the care of   Ender.    Impression & Plan      CMS Diagnoses       Medical Decision Making:  Patient presents with shortness of breath with complex past medical history and has been off of meds for the last 10 months.  He has underlying interstitial lung disease, scleroderma, Raynaud's and atrial fibrillation.  Patient has moderate edema throughout.  Patient looks cyanotic in his fingertips but notes that that could be related to his Raynaud's phenomenon.  Patient looks cachectic and chronically ill.  Patient was initially placed on oxygen and having a hard time getting a reading due to the Raynaud's.  He looked quite clamped down.  He was placed on oxygen and then rapidly put on a BiPAP.  Initial lactic acid came back quite high.  Blood cultures were obtained and given broad-spectrum antibiotics for possible infectious etiology.  He is also given Solu-Medrol.  Patient looks to be doing well and CT showed anasarca and CHF with a mild or small PE.  He was given heparin and Lasix and we had taken him off BiPAP as he was looking well.  He then dropped his blood pressures and became more dyspneic.  He was placed back on BiPAP and became more nauseated.  Patient did not look to be doing well look to be decompensating quickly.  Patient has been already admitted to the hospitalist service and had been discussed full CODE STATUS.  Patient was taken to our critical care area and prepared for intubation.  In an attempt to prevent a periarrest he was given phenylephrine twice.  He was given etomidate succinylcholine and intubated with 7.5 endotracheal tube.  Patient was hypotensive and then arrested.  He had been given epi prior to this.  Patient had CPR for under 5 minutes and had return of pulse.  He was placed on an epi drip.  Initially spoke to our ICU doctor and suggested vasopressin drip which we transferred him to.  I then spoke to the ICU doctor over at the [facility] as we did not unfortunately of a bed here and suggested back on  the norepinephrine.  Patient has central line placed by myself in the right IJ.  This was placed under ultrasound guidance.  Initial x-ray showed that it was slightly deep and was pulled back 4 to 5 cm.  Patient had a Younger catheter placed.    Sister arrived as we were getting ready for transport.  She notes he lives with his parents and helps take care of them as one has dementia and the other just had a stroke.  The family has been after him trying to get him to go in for help.  Eliana can be reached at [phone].    Total critical care time excluding procedures is 100 minutes    Diagnosis:    ICD-10-CM    1. Congestive heart failure, unspecified HF chronicity, unspecified heart failure type (H)  I50.9    2. Atrial fibrillation with rapid ventricular response (H)  I48.91    3. Single subsegmental pulmonary embolism without acute cor pulmonale (H)  I26.93    4. NSTEMI (non-ST elevated myocardial infarction) (H)  I21.4          Scribe Disclosure:  I, Sonja Thompson, am serving as a scribe at 3:52 PM on 10/19/2021 to document services personally performed by Osmar Colbert MD based on my observations and the provider's statements to me.      Osmar Colbert MD  10/19/21 2111       Osmar Colbert MD  10/19/21 2201

## 2021-10-19 NOTE — ED NOTES
MD and Hospitalist at bedside. Decision to place patient back on bipap due to VBG results and WOB. MD to discontinue diltiazem due to SBP 86. Plan for amiodarone.

## 2021-10-20 PROBLEM — R57.0 CARDIOGENIC SHOCK (H): Status: ACTIVE | Noted: 2021-01-01

## 2021-10-20 NOTE — PLAN OF CARE
Admitted/transferred from: [facility]   Reason for admission/transfer: Continued mechanical and medical heart support. [place] and a-line placed, plan for IABP today    2 RN skin assessment: completed by Zehra PEDRAZA and Maria Elena BERTRAND  Result of skin assessment and interventions/actions: Patient has very pale, dusky, carmen, cold and taut extremities. Some areas of open ski present on the tips of his fingers. Generalized ruddiness. No sacral pressure wounds, other skin relatively intact.  Height, weight, drug calc weight: done  Patient belongings : clothing in patient closet, home medications packaged and sent to security  MDRO education added to care plan: n/a

## 2021-10-20 NOTE — CONSULTS
[facility] Rheumatology Consultation    Kwaku Medrano MRN# [id number]   Age: 56 year old YOB: 1965     Date of Admission: 10/19/2021    Reason for consult:        Requesting physician: Jonah Huston MD       Level of consult: Consult, follow and place orders           Assessment and Plan:   Assessment:  Kwaku Medrano is a 56 year old male with PMH diffuse cutaneous systemic sclerosis, ILD, Raynaud's, dermatomyositis (potential overlap with scleroderma), A-flutter s/p ablation 2017, SVT, GERD, paroxysmal atrial fibrillation.    Overall picture is concerning for cardiogenic shock in the setting of recent asystole/PEA while attempting intubation.  After discussed with cardiology, it is likely that right heart failure in the setting of an amiodarone bolus may have led to this event.    Echocardiogram was notable for LVEF 14%, and right ventricular dilation and decreased global right ventricular function.  Overall, we are unsure of the percent pertaining cause of this patient's right heart failure.  Differential includes uncontrolled A. fib/RVR, pulmonary hypertension, pulmonary embolism or antiphospholipid syndrome, CAPS, vasculitis.  However our concern for systemic sclerosis or other autoimmune involvement is low at this point.  Changes on skin seen on physical exam, including tightening of skin in the extremities, abdomen, and face is indicative more of a chronic uncontrolled process, related acute decompensation.  No livedo seen on exam. There was reduction of cyanosis after starting ECMO, making this likely to be a cardiogenic process.    We feel, in light of patient's current critical condition that steroid therapy is appropriate (Solu-Medrol 1 g/day for 3 days).  Patient does not need to be started on other immunosuppressants at this time.  ANCA was last negative on 6/07/2017, so we recommend repeating this (ordered for you).  Also recommend obtaining lupus anticoagulant labs in  light of new pulmonary embolism.    Thank you for this consult.  Rheumatology will continue to follow.  Please contact us if there are any questions or concerns.       Plan:  1. Discontinue Solu-Medrol. High-dose corticosteroids are associated with elevated risk of scleroderma renal crisis, especially in the context of extensive bound down skin, as is the case with Mr. Medrano. Accordingly, withdraw steroids or return to PTA dose of 5 mg prednisone daily as soon as concern for new-onset vasculitis is alleviated.  2.  ANCA vasculitis panel (ordered for you)  3.  Lupus anticoagulant panel (anticardiolipin, LAC, anti Beta-2 lipoprotein IgG/IgM)  4.  Hepatitis B/C panel (negative)  5.  Obtain urinalysis (ordered)  6.  Continue to follow urine output and renal function while on steroids; have low threshold to perform peripheral smear if blood counts drop to investigate TTP versus steroid-associated scleroderma renal crisis    I saw this patient with the medical resident. I agree with the stated findings and recommendations which reflect our joint impressions and plan.    I discussed with Dr. Acosta, primary rheumatologist treating patient for scleroderma, whose thoughts are incorporated into this note.    1) Acute cardiovascular collapse with cardiogenic shock is most likely causally related to underlying structural heart disease with atrial fibrillation and rapid ventricular response, and/or idiosyncratic response to amiodorone, and is not likely due to flaring or active autoimmune or systemic inflammatory disease. However, the presence of pulmonary embolism and marked peripheral vascular constriction in a patient with SHERRELL-associated Raynaud's and autoimmune disease raises concern for (catastrophic) anti-phospholipid antibody syndrome. Serologies have been ordered.    2) Marked and rapid improvement in digital and extremity perfusion between morning and mid-afternoon on 10-20 is likely due to improved circulatory  "support (VA ECMO). Undiagnosed or new-onset vasculitis is less likely, and the ongoing therapeutic role for steroids is therefore questionable. Steroids pose a risk of scleroderma renal crisis, which may be more difficult to identify in the context of acute kidney injury. Further high-dose corticosteroids may be withheld pending work-up for sepsis and vasculitis. Since prior chronic steroid use places patient at risk for steroid-induced adrenal insufficiency, I recommend replacing solumedrol with \"stress-dose\" hydrocortisone 20 mg IV q 12 hours for 72 hours.    3) Angiogram findings of non-obstructive coronary disease argues against a causal role for large-vessal ischemic disease in the current picture. Microvascular disease leading to myocardial ischemia can complicate scleroderma, however. When cardiovascular and renal recovery permit, consider cardiac MRI with gadolinium to look for delayed washout.    Rheumatology will follow.    Kenny Flowers M.D.  Staff Rheumatologist, [facility]  Pager [phone]                Chief Complaint:     Cardiogenic shock, concern for scleroderma involvement     History is obtained from the electronic health record         History of Present Illness:   This patient is a 56 year old male with past medical history diffuse cutaneous systemic sclerosis, ILD, Raynaud's, dermatomyositis (potential overlap with scleroderma), A-flutter s/p ablation 2017, SVT, GERD, paroxysmal atrial fibrillation.  Patient was brought to [facility] ICU following asystole/PEA and subsequent cardiogenic shock.  Rheumatology was consulted for evaluation of potential involvement of scleroderma.    Patient follows Dr. Acosta in clinic, and Dr. Dillon in pulmonology.  His last visit with rheumatology was on 1/21/2020.  At the time, he had well-controlled disease and was on CellCept 1500 mg daily and prednisone 4 mg daily.  His last PFTs on 2/20/2020 were showing normal lung function.  However, he has not been compliant " with medications for the past 10 months, as he has had difficulty time his medications and did not refill them after they ran out 2 months ago.    Her past several weeks, patient has had worsening shortness of breath with exertion.  He presented to urgent care on 10/11/2021 due to this increasing shortness of breath.  Work-up at the time showed no leukocytosis, normal renal function, but an elevated CRP (21), and transaminitis (, ).  He was subsequently discharged with prednisone taper (starting at 50 mg daily).  Unfortunately, patient's shortness of breath steadily worsened over time, so he presented to the emergency department at [facility] on 10/19/2021, with worsening fatigue and weakness.    While in the emergency department, his oxygen needs increased from the nasal cannula to BiPAP.  Lactic acid was elevated at 8.4.  Blood cultures were obtained, and thoughts of antibiotics were started.  He was subsequently started on Solu-Medrol 1 mg/kg.  CT PE was performed, showing a tiny PE in the right hilar region, groundglass opacities that showed interval progression, trace bilateral pleural fluid (consistent with pulmonary edema, infection, or CHF.),  Diffuse anasarca, cortical thinning of the kidneys, and a heterogenous appearing liver.  Given these findings, patient was started on heparin and furosemide.  He was found to have atrial fibrillation with RVR, so he was given an amiodarone bolus for conversion.  Unfortunately, patient's respiratory status is slowly declined.  He was trialed off BiPAP but his blood pressure dropped and his dyspnea worsened.  After discussion with the patient, he was subsequently intubated.  However soon after sedation with succinylcholine and etomidate and initial placement of the tube, patient arrested and went into asystole/PEA. Patient subsequently returned to [facility]C within 5 minutes.  Central line was placed in by the ED, and he was subsequently transferred to  [facility] ICU for further evaluation.  With his worsening circulatory status, patient has been placed on ECMO.  Rheumatology was consulted for his scleroderma may be involved in this disease process.     While in the ICU, patient leukocytosis has been increasing from 11.1 to 21.9.  His renal function has worsened from a baseline of 0.8-1.1, up to 1.86.  BNP elevated to 58,828.  Patient has had a negative rheumatoid work-up so far except for a positive SHERRELL (9/1/2017), 1: 160, speckled.  Other prior immune work-up, including RF, CCP, SCL 70, SSA, RNA Hermelindo III, ANCA, troponin, and myositis panel have been negative.         Past Medical History:   Diffuse Cutaneous Systemic Sclerosis  Dermatomyositis  Interstitial Lung Disease  Raynauds  Aflutter s/p ablation (2017)  Paroxymal Atrial Fibrillation  SVT  GERD          Past Surgical History:     Past Surgical History:   Procedure Laterality Date     COLONOSCOPY N/A 7/19/2017    Procedure: COLONOSCOPY;  COLONOSCOPY;  Surgeon: Mita Jimenez MD;  Location:  GI     NO HISTORY OF SURGERY               Social History:     Social History     Tobacco Use     Smoking status: Never Smoker     Smokeless tobacco: Former User     Types: Chew   Substance Use Topics     Alcohol use: Yes     Comment: very rarely             Family History:     Family History   Problem Relation Age of Onset     Prostate Cancer Father      Lung Cancer Other      Family History Negative Mother      Family History Negative Maternal Grandmother      Family History Negative Maternal Grandfather      Family History Negative Paternal Grandmother      Other - See Comments Paternal Grandfather         Atherosclerosis     Family History Negative Brother      Family History Negative Sister      Family History Negative Brother      LUNG DISEASE No family hx of      Rheumatologic Disease No family hx of              Immunizations:   COVID-19 vaccinated          Allergies:     Allergies   Allergen Reactions      Ampicillin Rash             Medications:     Medications Prior to Admission   Medication Sig Dispense Refill Last Dose     aspirin 81 MG EC tablet Take 1 tablet (81 mg) by mouth daily 30 tablet 1      Blood Pressure Monitoring KIT 1 each three times a week Dr Acosta has asked you to check your blood pressure 2-3 times per week using your home monitor.  Please keep track of your findings in a journal and bring it to your appointments.   Contact this office if your blood pressure: the top number (systolic) is consistently 30 points higher than your normal BP or if the bottom number (diastolic) is consistently 20 points higher than your normal BP. 1 kit 1      diltiazem ER COATED BEADS (CARDIZEM CD/CARTIA XT) 180 MG 24 hr capsule Take 1 capsule (180 mg) by mouth daily 60 capsule 0      lisinopril (ZESTRIL) 10 MG tablet Take 1 tablet (10 mg) by mouth daily 60 tablet 0      mycophenolate (GENERIC EQUIVALENT) 500 MG tablet Take 3 tablets (1,500 mg) by mouth 2 times daily Labs due every 8-12 weeks. Past due.  Call clinic to schedule follow up appointment.  Past due. 540 tablet 1      pantoprazole (PROTONIX) 40 MG EC tablet Take 1 tablet (40 mg) by mouth daily 60 tablet 0      predniSONE (DELTASONE) 1 MG tablet Take 4 tablets (4 mg) by mouth daily 360 tablet 0      predniSONE (DELTASONE) 10 MG tablet 5 tabs po every day for 5 days, then 4 tabs po every day for 3 days, 3 tabs po every day for 3 days, 2 tab po every day for 3 days, 1 tabs po every day for 3 days, 1/2 tab po every day for 4 days 57 tablet 0      sulfamethoxazole-trimethoprim (BACTRIM) 400-80 MG tablet Once daily for PJP prophylaxis. Start after bronchoscopy 180 tablet 3              Review of Systems:   Unable to obtain review of systems due to sedation          Physical Exam:     HEENT: Intubated. Atraumatic. Reduced wrinkles on nasolabial fold.  Cardiovascular: Regular rate, regular rhythm. No murmurs appreciated.  Pulmonary: Overlying ventilator  sounds  Abdomen: Tightening of skin over abdomen. Soft, nontender to palpation.  Extremities: Tightening of skin over bilateral lower and upper extremities. Cool to touch. Cyanosis over distal fingertips and toes, improved with ECMO. Now with residual cyanosis of left third middle finger and right digits. Unable to assess muscular function.   Skin: Tightening of skin over bilateral lower extremities, no hair on lower extremities up ~6cm. Tightening of skin and loss of hair on upper extremities, up ~15cm to arm. Bilateral digits with peripheral flattening. Left third digit and right second digit with ulceration. Periungual telangiectasias present. Additional tightening seen over chest and abdomen, with reduced wrinkles on face.           Data:     Autoimmune Labs:  SHERRELL + 1:160, speckled (9/14/2017)    Negative Labs:  Myositis panel negative (LENNOX 1, NXP2, MDA5, TIF1-g, MI-2, P155/114, PL-7, OJ, EJ, SRP, Marlene-1, SSA 52/60)  RF < 20 (6/22/17)  CCP negative (6/22/17)  SCL-70 negative (9/14/2017)  SSA/SSB Negative (7/7/2017)  RNA Ehrmelindo III (9/14/2017)  ANCA (PR3, MPO) negative (9/14/2017)  Jo1 negative (6/22/2017)      Echocardiogram:  Biplane LVEF is 14%.  Left ventricular size is normal.  Severe diffuse hypokinesis is present.  Mild to moderate right ventricular dilation is present.  Global right ventricular function is moderately reduced.  No pericardial effusion is present.  LV/RV dysfunction is new when compared to prior study      CT Chest Abdomen Pelvis (10/20/21)  1. Increase in basilar predominant pulmonary airspace opacities which likely represent pulmonary edema in the setting of recent cardiogenic shock. Infection is not excluded.  2. Interval ECMO cannulation, right common femoral vein approach ECMO cannula tip terminates at the superior cavoatrial junction and right  common femoral artery ECMO catheter tip terminates at the aortic bifurcation.  3. Small right and trace left pleural effusions, small volume  ascites, and soft tissue anasarca.  4. Retained iodinated contrast in the renal cortices on this noncontrast CT, related to prior contrast administration and compatible with acute kidney injury.        King Cloud MD   Medicine-Dermatology PGY-1

## 2021-10-20 NOTE — ED NOTES
Assumed care of patient at 1900 as patient was being prepared to admit on Bipap.  Patient found to be tachynpiec on bipap, rate in 40s with decreasing LOC.  Ativan was given for anxiety with no improvement in respiratory distress. Patient exhibits 2+ lower extremity edema, hands bluish owing to Reynauds, mottling on trunk.  Patient moved to STABE for intubation. BPs low, bumps of EPI given.  Post intubation, patient had wide complex without a pulse, CPR in progress for 3-4 minutes, EPI given and drip initiated, converted to AFib rates 90s.  BP remains very labile, bumps of Epi given.  .

## 2021-10-20 NOTE — PLAN OF CARE
Major Shift Events: At beginning of shift, noticed the levo pump was incorrectly programmed and pt was requiring a significant amount more of pressor than what it looked like on the pump. Cards2 fellow was contacted along with pharmacist. Compass was written. Pt went down to cath lab around 0930 to be cannulated for VA ECMO. Following cath lab pt went to CT for head, chest, abd, and pelvis scans. Pt returned to the floor around 1300. Pt sedated and intubate. Pupils equal and sluggish. T max 101.6. UA and cultures sent. HR junctional 60-70s. MAPs 70-80s, titrating down on pressors as able. Amio turned off per Cards2 team once back from cath lab. VA ECMO at 4.8L of flow and sweep of 3 with fio2 of 40%. Pt's vent settings CMV, 50%, 18, 460, 5. Minimal secretions from ETT. UOP picked up once on ECMO, has been about 125cc/hr. No BM. Pulses weak with doppler. Calcium replaced x2. Pt's father and brother here this afternoon and was updated by the team.      Plan: Continue to monitor and update as needed.     For vital signs and complete assessments, please see documentation flowsheets.

## 2021-10-20 NOTE — PROCEDURES
Procedure: Arterial line placement  Indication: hemodynamic monitoring  Diagnosis: cardiogenic shock, hypotension    Proceduralist: Chaudhry, Mohsan, MD  Consent: Phone consent obtained from sister and in chart    Anesthesia: 1 cc of?% Lidocaine    After properly positioning the patient's wrist in the standard fashion, the site was prepped and draped in a sterile fashion.  Lidocaine was administered subcutaneously as a local infiltration and given time to take effect.  Next, the radial artery was entered under US guidance, noting bright red, pulsatile flow.  A guidewire was easily inserted, the needle removed, and the catheter was then placed using the Seldinger technique.  The guidewire was removed, with good flow present.  The catheter was then connected to the transducer with a good waveform noted.  The catheter was secured with suture and an occlusive dressing was placed after properly cleaning and prepping the site.    Complications:  The patient tolerated the procedure well and no complications were noted.    Estimated Blood Loss: minimal     Plan: Arterial line to remain in place for hemodynamic monitoring.      ATTESTATION:  I was present at bedside for the entire procedure. NO complications. Patients does have Raynaud's disease yet obtaining BP non-invasively, including using doppler, seemed challenging. As such, after discussion with his sister, the decision was made to place arterial line that indeed showed higher BP values.   Dennis Flood MD

## 2021-10-20 NOTE — PROCEDURES
Procedure: Central line placement, PA catheter placement  Indication: hemodynamic monitoring, central access  Diagnosis: cardiogenic shock, hypotension    Proceduralist: Chaudhry, Mohsan, MD  Consent: Phone consent obtained from sister and in chart    Anesthesia: 1 cc of?% Lidocaine in addition to general    Consent was obtained and a time-out was completed verifying correct patient, procedure, site, and positioning. The patient was placed in appropriate dependent position for central line placement. The patient s right internal jugular area was prepped and draped in sterile fashion. He already had a lightly secured tripple lumen catheter in place. The area was cleaned, and covered. 1% Lidocaine was used to anesthetize the surrounding skin area. Ultrasound was used to identify the RIJ and observe the needle entering the vein. A 9Fr MAC sheath was inserted to the vessel using Seldinger Seldinger technique without difficulty. Appropriate blood return was obtained and each lumen of the catheter was evacuated of air and flushed with sterile saline. The catheter was then sutured in place to the skin and a sterile dressing applied. The patient tolerated the procedure well and there were no complications. Blood loss was minimal.    A 7.5Fr [place] catheter was then floated into the RPA via the MAC sheath. Patient did have some non-sustained episodes of VT during the insertion that was mitigated by immediate catheter removal. Given TR some difficulty was encountered while traversing into the RV and PA. Ultimately the catheter was in the right position by waveform analysis and was secured in place at 59cm from the hub. No complications noted.  CXR confirmed good positioning. Ports were flushed and hemodynamics obtained    ATTESTATION:  I was present at bedside for the entire procedure. No complications.  Dennis Flood MD

## 2021-10-20 NOTE — PROGRESS NOTES
Cardiology Progress Note    Assessment & Plan   Kwaku Medrano is a 56 year old male with PMH of scleroderma, ILD, Raynauds,  A Flutter s/p CTI ablation in 2017, pAF, SVT, GERD who had not been taking his medications for the past 10 months. He was recently seen in an urgent care clinic on 10/11/2021 for SOB. He was started on a prednisone taper because his breathing trouble was thought to be secondary to an ILD flare. He was started back on his medications (Dilt /day, Lisinopril 10, Protonix 40, Pred taper) except for cellcept. He presented to the OSH with SOB. He was found in A fib w RVR with HR in the 130s. He was given a bolus of amio 150mg and then developed severe respiratory distress. Post RSI (w etomidate and succinylcholine) patient had cardiac arrest (<5min) with asystole/PEA. Post arrest, patient was transferred to [facility] for additional management of ongoing shock.  Patient had increased pressor needs on 10/20, so VA-ECMO was placed.    Changes today:  - VA-ECMO cannulation today  - Lasix 80 IV given prior to cannulation. Urinating 100-150 ml per hour after ECMO cannulation.  - Follow lactate, kidney function and liver function while on ECMO. Might need additional doses of lasix.  - Continue antibiotics for possible pneumonia.  - Stop amiodarone for junctional rhythm post ECMO cannulation.  - Rheumatology and pulmonology consult to rule out scleroderma and ILD flares respectively. Received 1 g of solumedrol.      Neurology: Metabolic encephalopathy -- in the setting of cardiogenic shock.  Intubated, sedated.  CT head showed no acute pathology  Plan:  --Continue versed, fentanyl  as needed  --  Delirium protocol  -- Treat shock as below.     Cardiovascular / Hemodynamics: - Mixed circulatory shock: cardiogenic (post amiodarone administration for atrial fibrillation with RVR in OSH) and possibly septic (in the setting of fever and pulmonary infiltrates)  - NICM : normal coronary angiogram. Possibly  tachycardia induced versus septic induced.  - PEA arrest post intubation on 10/19 -- ROSC after 5 minutes of CPR  - Paroxysmal atrial fibrillation with RVR -- now in junctional rhythm post amiodarone and ECMO cannulation  - History of atrial flutter s/p ablation in 2017  - History of Raynaud/scleroderma -- peripheral cyanosis not related to Raynaud. No scleroderma flare. Recommended 3 days of solumedrol. US duplex arterial showed distal flow.    DATE MAP CVP PAP PCWP Anjelica CO Anjelica CI SVR MVo2 Therapies   October 20, prior to ECMO 61  16 32/24 14 2.9 1.7 1241 54 Levo 0.6, epi 0.14       TTE: EF 10-15% prior to ECMO. Severe RV dysfunction.  EKG: junctional rhythm post amiodarone.    ECMO settings: flow 4.9, speed 3950, FiO2 60, sweep 3, ACT goal 200-220    ECMO cannulas: 25 F venous, 17 F arterial, 8 F reperfusion cannula    Plan:  --Wean epi as able.  -- Diuresis as needed. Received lasix 80 IV as able.  --ACT goal 200-220 given history of Raynaud.  --hold ACE/ARB for now given likely reduced renal fxn after arrest  --holding beta blocker given shock   -- 3 days of solumedrol 1 g as by rheumatology     Pulmonary: Acute hypoxic respiratory failure -- community acquired pneumonia, cardiogenic shock (pulmonary edema), and small PE  History of ILD  -- Pulmonology ruled out ILD flare  Small PE --  Tiny pulmonary embolism visualized at the right hilar region  Vent Settings:  Ventilation Mode: CMV/AC  (Continuous Mandatory Ventilation/ Assist Control)  FiO2 (%): 50 %  Rate Set (breaths/minute): 18 breaths/min  Tidal Volume Set (mL): 460 mL  PEEP (cm H2O): 5 cmH2O  Oxygen Concentration (%): 50 %  Resp: 18  .   CXR: Lines in stable position.  CT chest 10/20:  Increase in basilar predominant pulmonary airspace opacities which likely represent pulmonary edema in the setting of recent cardiogenic shock.    Plan:  -- Continue cefepime and vancomycin.  -- Diuresis as above  --wean vent as able  --daily CXR  --Q2h ABGs for now      GI and Nutrition: Shock liver  History of GERD and esophageal dysmotility.  Plan:  --monitor BID LFTs  --NPO for now, likely start tube feeding tomorrow   --bowel regimen - on board  --GI Prophylaxis: PPI      Renal, Fluid and Electrolytes: DIMPLE -- Cardiorenal . Creatinine 2 (baseline 0.9-1). Responding well to lasix 80 IV  Lactic acidosis -- lactate up to 9, improving to 7    Plan:  --monitor urine output  --maintain K>4 and Mg>2   -- Diuresis as needed     Infectious Disease: Community acquired pneumonia  --Continue cefepime and vancomycin  --daily blood cultures  --monitor for signs of infection given lines, and leukocytosis     Hematology and Oncology: Receiving heparin for ECMO   --cryo PRN fibrinogen < 100; FFP for INR >2  --Transfuse for Hgb<7, platelets <50k  --heparin gtt for ECMO with ACT goal 200-220 (given Raynaud)  --US LE w/ arterial duplex per ECMO protocol   --DVT PPX: Heparin as above     Endocrinology: No known medical history  --insulin gtt as needed  --Solumedrol 1 g as above  --f/u HgbA1c      Lines: PA catheter October 20, 2021  R femoral arterial and venous ECMO cannulae October 20, 2021  L femoral arterial line October 20, 2021  R radial arterial line October 20, 2021  ETT October 20, 2021  Younger catheter October 20, 2021  OG tube October 20, 2021  Restraint: needed    Current lines are required for patient management       Family update by me today: Yes     Code Status:    The pt was discussed and evaluated with Dennis Fofana MD, attending physician, who agrees with the assessment and plan above.     Jonah Huston MD  Cardiovascular disease fellow  [facility]  [phone]         Jonah Huston MD  Cardiovascular disease fellow  [facility]  [phone]  10/20/2021 5:46 PM         Interval History   Had increasing pressor needs this morning needing ECMO cannulation.  Lactate going up.  No pulsatility post ECMO, will monitor for  now.  Lactate still high, will monitor.      Intake/Output Summary (Last 24 hours) at 10/20/2021 1747  Last data filed at 10/20/2021 1700  Gross per 24 hour   Intake 1483.3 ml   Output 1740 ml   Net -256.7 ml         Physical Exam   Temp: 98.4  F (36.9  C) Temp src: Bladder BP: 108/88 Pulse: 69   Resp: 18 SpO2: 95 % O2 Device: Mechanical Ventilator    Vitals:    10/20/21 0400   Weight: 62.4 kg (137 lb 8 oz)     Vital Signs with Ranges  Temp:  [95.9  F (35.5  C)-101.3  F (38.5  C)] 98.4  F (36.9  C)  Pulse:  [] 69  Resp:  [18-50] 18  BP: ()/(23-95) 108/88  MAP:  [61 mmHg-88 mmHg] 74 mmHg  Arterial Line BP: ()/(40-84) 74/72  FiO2 (%):  [50 %-100 %] 50 %  SpO2:  [62 %-100 %] 95 %  I/O last 3 completed shifts:  In: 1389.5 [I.V.:1389.5]  Out: 1135 [Urine:835; Emesis/NG output:300]     , Blood pressure 108/88, pulse 69, temperature 98.4  F (36.9  C), resp. rate 18, weight 62.4 kg (137 lb 8 oz), SpO2 95 %.  137 lbs 8 oz  GENERAL APPEARANCE: Intubated, sedated. NAD.  HEENT: No icterus, PERRL 2 mm, ETT in place, OG tube in place  CARDIOVASCULAR: regular rate and rhythm, normal S1 and S2, no S3 or S4, click or rub. +grade 2/6 systolic murmur.   RESP: Coarse bilaterally. Mechanical ventilation.    GASTRO: Soft, bowel sounds hypoactive but present  GENITOURINARY: Younger in place.  EXTREMITIES: Cool, 1+ edema, pulses dopplered, as above. VA ECMO cannulas in right groin, arterial sheath in left groin  NEURO: Sedated and intubated, Pupils equal and reactive, 4 mm. Fent and Versed for sedation, as below.  INTEGUMENTARY: No rashes. Cannula/Line sites CDI  LINES/TUBES/DRAINS: (noted below) V-A ECMO Cannulas R groin, arterial sheath  L groin. R radial Arterial line. ETT. OG. Younger Catheter.    Medications     EPINEPHrine 0.06 mcg/kg/min (10/20/21 1700)     fentaNYL 50 mcg/hr (10/20/21 1700)     HEParin 800 Units/hr (10/20/21 1700)     heparin (PRESSURE BAG) 2 unit/mL in 0.9% NaCl 3 mL/hr (10/20/21 1700)     -  MEDICATION INSTRUCTIONS -       midazolam 3 mg/hr (10/20/21 1700)     norepinephrine 0.04 mcg/kg/min (10/20/21 1700)     - MEDICATION INSTRUCTIONS -         artificial tears   Both Eyes Q8H     [START ON 10/21/2021] aspirin  81 mg Oral Daily     ceFEPIme (MAXIPIME) IV  2 g Intravenous Q12H     methylPREDNISolone  1,000 mg Intravenous Q24H     pantoprazole  40 mg Per Feeding Tube QAM AC    Or     pantoprazole (PROTONIX) IV  40 mg Intravenous QAM AC     polyethylene glycol  17 g Oral Daily     [Held by provider] predniSONE  4 mg Oral Daily     senna-docusate  1 tablet Oral BID    Or     senna-docusate  2 tablet Oral BID     sodium chloride (PF)  10 mL Intravenous Once     sodium chloride (PF)  3 mL Intracatheter Q8H     vancomycin place colunga - receiving intermittent dosing  1 each Intravenous See Admin Instructions       Data   Recent Labs   Lab 10/20/21  1548 10/20/21  1546 10/20/21  1545 10/20/21  1251 10/20/21  1133 10/20/21  0829 10/20/21  0828 10/20/21  0828 10/20/21  0350 10/20/21  0350   WBC  --  16.0*  --  15.5*  --   --   --  21.9*   < > 19.0*   HGB  --  11.3*  --  10.6* 11.7*   < >  --  13.6   < > 13.8   MCV  --  89  --  93  --   --   --  91   < > 90   PLT  --  189  --  185  --   --   --  266   < > 278   INR  --   --  4.51* 5.30*  --   --   --   --   --  2.32*   NA  --   --  143 143 143   < >  --  140   < > 139   POTASSIUM  --   --  4.6 4.8 5.3*   < >  --  5.5*   < > 4.9   CHLORIDE  --   --  108 110*  --   --   --  108   < > 107   CO2  --   --  17* 18*  --   --   --  19*   < > 19*   BUN  --   --  41* 38*  --   --   --  38*   < > 32*   CR  --   --  2.06* 1.99*  --   --   --  1.86*   < > 1.55*   ANIONGAP  --   --  18* 15*  --   --   --  13   < > 13   BETZY  --   --  7.8* 7.2*  --   --   --  8.3*   < > 9.1   *  --  103* 88  92 81   < >   < > 111*   < > 146*   ALBUMIN  --   --  2.0* 1.8*  --   --    < > 2.3*   < > 2.4*   PROTTOTAL  --   --  4.7* 4.0*  --   --    < > 5.3*   < > 5.4*   BILITOTAL  --   --   2.5* 1.9*  --   --    < > 2.2*   < > 1.7*   ALKPHOS  --   --  74 61  --   --    < > 82   < > 84   ALT  --   --  2,274* 1,448*  --   --    < > 1,548*   < > 1,396*   AST  --   --  4,718* 2,794*  --   --    < > 2,678*   < > 2,504*   TROPONIN  --   --  11.576* 10.545*  --   --   --  12.742*   < > 9.601*    < > = values in this interval not displayed.       Recent Results (from the past 24 hour(s))   XR Chest Port 1 View    Narrative    EXAM: XR CHEST PORT 1 VIEW  LOCATION: [facility]  DATE/TIME: 10/19/2021 7:00 PM    INDICATION: Intubation  COMPARISON: 10/19/2021.    FINDINGS: ET tube 3.5 cm above the brooke. NG tube is looped in the stomach. There is no pneumothorax. The heart size is normal. There are bilateral pulmonary infiltrates consistent with pneumonia.      Impression    IMPRESSION: ET tube 3.5 cm above the brooke.   XR Chest Port 1 View    Narrative    EXAM: XR CHEST PORT 1 VIEW  LOCATION: [facility]  DATE/TIME: 10/19/2021 7:37 PM    INDICATION: ett placement, central line  COMPARISON: Earlier today at 1915 hours      Impression    IMPRESSION: Interval placement of right IJ central catheter with its tip junction of SVC and right atrium. No pneumothorax. ET tube and NG tube remain in good position, as does the left pleural drain.    No change in the patchy mixed interstitial and airspace infiltrates bilaterally consistent with pneumonia. Heart size upper limits of normal.   XR Chest Port 1 View    Narrative    EXAMINATION: XR CHEST PORT 1 VIEW, 10/20/2021 12:45 AM    INDICATION: PA Cath placement    COMPARISON: 10/19/2021    FINDINGS: Single portable supine AP radiograph of the chest. ET tube  projects over the mid thoracic trachea. Right IJ central venous  catheter with tip terminating over the mid SVC. Interval placement of  right IJ [place]-Demetrio catheter with tip terminating over the distal right  main pulmonary artery. Enteric tube side-port and tip projects  over  the stomach.    Trachea is midline. The cardiomediastinal silhouette is stable. Small  right pleural effusion. No left pleural effusion. No pneumothorax.  Biapical pleural thickening. Increased bilateral interstitial and  airspace opacities.    Partially visualized upper abdomen demonstrates multiple air and stool  distended loops of bowel. Soft tissue and bones are within normal  limits.      Impression    IMPRESSION:  1. Interval placement of [place]-Demetrio catheter with tip terminating in  the distal right main pulmonary artery.  2. Interval retraction of right IJ central venous catheter with tip  terminating over the mid SVC.  3. Increased bilateral interstitial and airspace opacities, right  greater than left. Small right pleural effusion.    I have personally reviewed the examination and initial interpretation  and I agree with the findings.    TAHIRA FUENTES MD         SYSTEM ID:  [id number]   Echo Complete   Result Value    Biplane LVEF 14%    Narrative    [id number]  [id number]  [id number]  [id number]^LEO^RAS     [facility],[place]  Echocardiography Laboratory  [address] [place] [id number]     Name: FLOR CALZADA  MRN: [id number]  : 1965  Study Date: 10/20/2021 08:11 AM  Age: 56 yrs  Gender: Male  Patient Location: [facility]  Reason For Study: Heart Failure  Ordering Physician: RAS BAILEY  Referring Physician: MAURI FARAH  Performed By: Judith Burton     BSA: 1.8 m2  Height: 69 in  Weight: 137 lb  ______________________________________________________________________________  Procedure  Complete Portable Echo Adult.  ______________________________________________________________________________  Interpretation Summary  Biplane LVEF is 14%.  Left ventricular size is normal.  Severe diffuse hypokinesis is present.  Mild to moderate right ventricular dilation is present.  Global right ventricular function is moderately reduced.  No pericardial effusion is  present.  LV/RV dysfunction is new when compared to prior study.  ______________________________________________________________________________  Left Ventricle  Left ventricular wall thickness is normal. Left ventricular size is normal.  Biplane LVEF is 14%. Diastolic function not assessed due to arrhythmia. Severe  diffuse hypokinesis is present.     Right Ventricle  Mild to moderate right ventricular dilation is present. Global right  ventricular function is moderately reduced.     Atria  Both atria appear normal.     Mitral Valve  Moderate mitral insufficiency is present.     Aortic Valve  Mild aortic valve calcification is present. The valve leaflets are not well  visualized. On Doppler interrogation, there is no significant stenosis or  regurgitation.     Tricuspid Valve  The tricuspid valve is normal. Mild tricuspid insufficiency is present. The  right ventricular systolic pressure is approximated at 17.4 mmHg plus the  right atrial pressure.     Pulmonic Valve  The pulmonic valve is normal.     Vessels  The pulmonary artery and bifurcation cannot be assessed. The inferior vena  cava cannot be assessed. Mild dilatation of the aorta is present. Ascending  aorta 3.6 cm. IVC diameter >2.1 cm collapsing <50% with sniff suggests a high  RA pressure estimated at 15 mmHg or greater.     Pericardium  No pericardial effusion is present.     Miscellaneous  The PASP is 14mmHg over the RA pressure.     Compared to Previous Study  LV/RV dysfunction is new when compared to prior study.     Attestation  I have personally viewed the imaging and agree with the interpretation and  report as documented by the fellow, Garry Wray, and/or edited by me.  ______________________________________________________________________________  MMode/2D Measurements & Calculations  IVSd: 1.0 cm  LVIDd: 5.0 cm  LVIDs: 4.6 cm  LVPWd: 0.95 cm  FS: 8.5 %  LV mass(C)d: 178.7 grams  LV mass(C)dI: 101.6 grams/m2  Ao root diam: 3.6 cm  LVOT diam: 2.3  cm  LVOT area: 4.2 cm2     EF(MOD-bp): 14.0 %  RWT: 0.38     Doppler Measurements & Calculations  TR max aleks: 208.3 cm/sec  TR max P.4 mmHg     ______________________________________________________________________________  Report approved by: Sam Sharma 10/20/2021 10:06 AM         US Lower Extremity Arterial Duplex Bilateral    Narrative    ULTRASOUND LOWER EXTREMITY ARTERIAL DUPLEX BILATERAL 10/20/2021 9:57  AM    CLINICAL HISTORY: Severe mottling.     COMPARISONS: None available.    REFERRING PROVIDER: RAS BAILEY    TECHNIQUE: Bilateral leg arteries evaluated with grayscale, color  Doppler, and Doppler waveform ultrasound.    FINDINGS: Occasional ectopy with variable waveform morphology and  amplitude.    RIGHT:       Common femoral artery: 59/18 cm/s, triphasic, 7.5 mm       Profundus femoral artery: 89/24 cm/s, triphasic, 5.3 mm         Superficial femoral artery, origin: 77/19 cm/s, triphasic, 4.6 mm       Superficial femoral artery, mid thigh: 65/14 cm/s, triphasic       Superficial femoral artery, distal thigh: 87/0 cm/s, triphasic,  3.4 mm         Popliteal artery: 39/6 cm/s, triphasic         Posterior tibial artery, ankle: 30/0 cm/s, triphasic       Anterior tibial artery, ankle: 17/0 cm/s, biphasic    LEFT:       Common femoral artery: 130/16 cm/s, triphasic, 8.0 mm       Profundus femoral artery: 93/12 cm/s, triphasic, 4.8 mm         Superficial femoral artery, origin: 111/20 cm/s, triphasic, 3.0  mm       Superficial femoral artery, mid thigh: 95/15 cm/s, triphasic, 2.9  mm       Superficial femoral artery, distal thigh: 88/14 cm/s, triphasic,  3.3 mm         Popliteal artery: 36/6 cm/s, triphasic         Posterior tibial artery, ankle: 84/14 cm/s, triphasic       Anterior tibial artery, ankle: 22/0 cm/s, triphasic      Impression    IMPRESSION:  1. Arrythmia.    2. No significant arterial stenosis demonstrated.    CIRO TALBOT MD         SYSTEM ID:  [id number]   Echo Complete    Narrative     [id number]  [id number]  [id number]  [id number]^JANET^MAGGI     [facility],[place]  Echocardiography Laboratory  [address] [place] [id number]     Name: FLOR CALZADA  MRN: [id number]  : 1965  Study Date: 10/20/2021 10:17 AM  Age: 56 yrs  Gender: Male  Patient [facility]: [facility]  Reason For Study: Cardiac Device, Unspecified. VA ECMO cannulation in Cath Lab  Ordering Physician: MAGGI GONZALES  Referring Physician: MAURI FARAH  Performed By: CJ Isabel     BSA: 1.7 m2  Height: 68 in  Weight: 137 lb  BP: 119/94 mmHg  ______________________________________________________________________________  Procedure  Complete Portable Echo Adult. Contrast Optison. Optison (NDC #0259-2484-47)  given intravenously. Patient was given 10 ml mixture of 3 ml Optison and 6 ml  saline. 0 ml wasted.  ______________________________________________________________________________  Interpretation Summary  Limited TTE for VA-ECMO Cannulation     At baseline (prior to cannulation): The LVEF is 10-15% with severe RV  dysfunction. The LVEDV is 145 ml and the LVESV is 127 mL.  At 4.0 L/min: The LVEF is 5-10% with severe RV dysfunction. There is moderate  mitral regurgitation. The LVEDV is 123 mL and the LVESV is 115 mL.  ______________________________________________________________________________  ______________________________________________________________________________  QLAB 3DQ Advanced  Stroke Vol: 13.0 ml  10_EDV(3DQA): 151.1 ml  10_ESV(3DQA): 138.1 ml  10_EF(3DQA): 8.6 %  10_Tmsv 3-6: 0.00 msec  10_Tmsv 3-5: 0.00 msec  10_Tmsv 3-6 (%): 0.00 %  10_Tmsv 3-5 (%): 0.00 %     ______________________________________________________________________________  Report approved by: Sam Garcia 10/20/2021 04:01 PM         Cardiac Catheterization    Narrative    1. No angiographic evidence of coronary artery disease  2. Elevated LV ventricular pressures prior to VA-ECMO  cannulation  3. Successful VA-ECMO cannulation with 17 Fr arterial 25 Fr venous   cannulas in right femoral artery/vein  4. LV end-diastolic pressure improved after placement on VA-ECMO   CT Head w/o Contrast    Narrative    CT HEAD W/O CONTRAST 10/20/2021 12:38 PM    History: Mental status change, unknown cause   ICD-10:    Comparison: None available    Technique: Using multidetector thin collimation helical acquisition  technique, axial, coronal and sagittal CT images from the skull base  to the vertex were obtained without intravenous contrast.   (topogram) image(s) also obtained and reviewed.    Findings: There is no intracranial hemorrhage, mass effect, or midline  shift. Gray/white matter differentiation in both cerebral hemispheres  is preserved. Beam hardening artifact over the occipital lobes.  Ventricles are proportionate to the cerebral sulci. The basal cisterns  are clear.    The bony calvaria and the bones of the skull base are normal. The  visualized portions of the paranasal sinuses are clear. Scattered  fluid within the mastoid air cells.      Impression    Impression:    1. No acute intracranial pathology.  2. Scattered fluid within the mastoid air cells which is nonspecific  in the setting of intubation.    LUCIANO DA SILVA MD         SYSTEM ID:  [id number]   CT Chest Abdomen Pelvis w/o Contrast    Narrative    CT CHEST ABDOMEN PELVIS W/O CONTRAST 10/20/2021 12:44 PM    History: ECMO    Comparison: CT chest and pelvis 10/19/2021, MRI abdomen 7/5/2017    Technique: Helical CT acquisition from the lung apices to the pubic  symphysis was obtained without intravenous contrast. Axial, coronal,  and sagittal reconstructions were obtained and reviewed.    Contrast: None    Findings:     CHEST:   Right internal jugular [place]-Demetrio catheter tip terminates in the right  pulmonary artery.  Lungs: Increase in bibasilar lower lung predominant airspace opacities  superimposed on pre-existing fibrosis. Small right  and trace left  pleural effusions. No pneumothorax.  Airways: The endotracheal tube tip is in the thoracic trachea. The  central tracheal bronchial tree is clear.  Vessels: Main pulmonary artery and aorta are normal in caliber. Normal  three-vessel arch. Solid air tracking from the left upper extremity to  the left subclavian vein which is likely intravenously related to  medication administration  Heart: Heart size is enlarged without pericardial effusion.  Lymph nodes: No suspicious mediastinal or hilar lymphadenopathy.  Calcified left hilar lymph nodes.  Thyroid: Within normal limits.  Esophagus: Nasogastric tube courses through the esophagus, terminating  in the gastric fundus.    ABDOMEN PELVIS:    Liver: Redemonstration of hepatic segment 6 hypodensity which is  better characterized on prior MRI as a hemangioma. Scattered hepatic  calcifications likely sequelae of prior granulomatous infection.  Biliary system: Gallbladder is within normal limits. Trace vicariously  excreted contrast within the gallbladder lumen. No intrahepatic or  extrahepatic biliary ductal dilatation.  Pancreas: No focal mass or dilation of the main pancreatic duct.  Stomach: Within normal limits.  Spleen: Splenic calcifications likely sequelae of prior granulomatous  infection..  Adrenal glands: Within normal limits.  Kidneys: Bilateral delayed nephrograms related to prior contrast  examination..  Bladder: Younger catheter in place..  Reproductive organs: Coarse prostate calcifications.  Colon: Within normal limits.  Appendix: Within normal limits.  Small Bowel: Within normal limits.  Lymph nodes: No intra-abdominal or pelvic lymphadenopathy.  Vasculature: Right common femoral artery ECMO tip terminates at the  aortic bifurcation. Left-sided common femoral arterial line tip  terminates within the infrarenal aorta. A right common femoral vein  ECMO catheter tip terminates near the superior cavoatrial junction  Peritoneum: Small volume free  fluid in the pelvis.     Soft tissues: Anasarca.   Bones: No suspicious osseous lesion.      Impression    IMPRESSION:   1. Increase in basilar predominant pulmonary airspace opacities which  likely represent pulmonary edema in the setting of recent cardiogenic  shock. Infection is not excluded.  2. Interval ECMO cannulation, right common femoral vein approach ECMO  cannula tip terminates at the superior cavoatrial junction and right  common femoral artery ECMO catheter tip terminates at the aortic  bifurcation.  3. Small right and trace left pleural effusions, small volume ascites,  and soft tissue anasarca.  4. Retained iodinated contrast in the renal cortices on this  noncontrast CT, related to prior contrast administration and  compatible with acute kidney injury.    I have personally reviewed the examination and initial interpretation  and I agree with the findings.    FELISHA ARMANDO DO         SYSTEM ID:  [id number]   XR Chest Port 1 View    Narrative    Portable chest 10/20/2021 1303 hours    INDICATION: Check endotracheal tube placement and ECLS cannula  placement    COMPARISON: 10/20/2021 0043 hours    Findings: Heart size appears upper normal uterine for portable  technique. NG/OG tube tip and sidehole projects within the stomach.  Endotracheal tube tip approximately 3.5 cm above the boroke. A right  IJ [place]-Demetrio catheter again noted this tip in the distal right main  branch pulmonary artery. Continued patchy opacities in the lungs which  may indicate RDS (infection/edema/pulmonary hemorrhage). Inferior  approach ECLS cannula tip projects near the SVC/right atrial junction.      Impression    IMPRESSION: Continued probable ARDS, similar to earlier this morning.  ECLS cannula tip projects near the SVC/right atrial junction    NEELIMA CALABRESE MD         SYSTEM ID:  [id number]   US Lower Extremity Arterial Duplex Bilateral    Narrative    BILATERAL LOWER EXTREMITY DUPLEX ARTERIAL ULTRASOUND 10/20/2021  3:44  PM    CLINICAL HISTORY: Patient on [facility] ECMO. Evaluate patency of lower  extremity arteries.    COMPARISONS: None available..    REFERRING PROVIDER: MAURI FARAH    TECHNIQUE: Grayscale, color Doppler, Doppler waveform ultrasound  evaluation of bilateral external iliac arteries through anterior and  posterior tibial arteries.    FINDINGS:  RIGHT:         COMMON FEMORAL ARTERY: obscured by ECMO       PROFUNDUS FEMORAL ARTERY: obscured by ECMO         SUPERFICIAL FEMORAL ARTERY, proximal: obscured by ECMO       SUPERFICIAL FEMORAL ARTERY, mid: 37 cm/s, monophasic, turbulent       SUPERFICIAL FEMORAL ARTERY, distal: 40 cm/s, monophasic,  turbulent         POPLITEAL ARTERY: 28 cm/s, monophasic, turbulent         POSTERIOR TIBIAL ARTERY, ankle: 18 cm/s, monophasic, turbulent       ANTERIOR TIBIAL ARTERY, ankle: 8 cm/s, monophasic, turbulent    LEFT:         COMMON FEMORAL ARTERY: obscured by ECMO       PROFUNDUS FEMORAL ARTERY: obscured by ECMO         SUPERFICIAL FEMORAL ARTERY, proximal: 44 cm/s, monophasic,  turbulent       SUPERFICIAL FEMORAL ARTERY, mid: 34 cm/s, monophasic, turbulent       SUPERFICIAL FEMORAL ARTERY, distal: 33 cm/s, monophasic,  turbulent         POPLITEAL ARTERY: 22 cm/s, monophasic, turbulent         POSTERIOR TIBIAL ARTERY, ankle: 15 cm/s, monophasic, turbulent       ANTERIOR TIBIAL ARTERY, ankle: 4 cm/s, monophasic, turbulent      Impression    IMPRESSION:  1. RIGHT: The interrogated right lower extremity arteries are patent  with turbulent waveforms secondary to ECMO.    2. LEFT: The interrogated left lower extremity arteries are patent  with turbulent waveforms secondary to ECMO.        Guidelines:  [facility] duplex criteria for lower limb arterial  occlusive disease  -Percent stenosis- Normal (1-19%): Peak systolic velocity (cm/s):  <150, End-diastolic velocity (cm/s): <40, Velocity ratio (Vr): <1.5,  Distal arterial waveform: Triphasic  -Percent stenosis- 20-49%:  Peak systolic velocity (cm/s): 150-200,  End-diastolic velocity (cm/s): <40, Velocity ratio (Vr): 1.5-2.0,  Distal arterial waveform: Triphasic  -Percent stenosis- 50-75%: Peak systolic velocity (cm/s): 200-300,  End-diastolic velocity (cm/s): <90, Velocity ratio (Vr): 2.0-3.9,  Distal arterial waveform: Poststenotic turbulence distal to stenosis,  monophasic distal waveform  -Percent stenosis- >75%: Peak systolic velocity (cm/s): >300,  End-diastolic velocity (cm/s): <90, Velocity ratio (Vr): >4.0, Distal  arterial waveform: Dampened distal waveform and low PSV/EDV* in the  stenosis  -Percent stenosis- Occlusion: Absent flow by color Doppler/pulsed  Doppler spectral analysis; length of occlusion estimated from distance  between exit and reentry collateral arteries  *PSV = peak systolic velocity, EDV = end-diastolic velocity  http://link.carrillo.com/chapter/10.1007/[email or web address]/fulltext  html    I have personally reviewed the examination and initial interpretation  and I agree with the findings.    KIRSTIN SOARES MD         SYSTEM ID:  [id number]

## 2021-10-20 NOTE — PHARMACY-VANCOMYCIN DOSING SERVICE
Pharmacy Vancomycin Initial Note  Date of Service 2021  Patient's  1965  56 year old, male    Indication: Sepsis    Current estimated CrCl = Estimated Creatinine Clearance: 38.2 mL/min (A) (based on SCr of 1.86 mg/dL (H)).    Creatinine for last 3 days  10/19/2021:  3:30 PM Creatinine 1.11 mg/dL; 11:30 PM Creatinine 1.41 mg/dL  10/20/2021:  3:50 AM Creatinine 1.55 mg/dL;  8:28 AM Creatinine 1.86 mg/dL    Recent Vancomycin Level(s) for last 3 days  No results found for requested labs within last 72 hours.      Vancomycin IV Administrations (past 72 hours)                   vancomycin 1500 mg in 0.9% NaCl 250 ml intermittent infusion 1,500 mg (mg) 1,500 mg New Bag 10/20/21 0837    vancomycin 1500 mg in 0.9% NaCl 250 ml intermittent infusion 1,500 mg (mg) 1,500 mg New Bag 10/19/21 1705                Nephrotoxins and other renal medications (From now, onward)    Start     Dose/Rate Route Frequency Ordered Stop    10/20/21 0130  norepinephrine (LEVOPHED) 16 mg in  mL infusion MAX CONC CENTRAL LINE      0.01-0.6 mcg/kg/min × 69.4 kg  0.7-39 mL/hr  Intravenous CONTINUOUS 10/20/21 0120            Contrast Orders - past 72 hours (72h ago, onward)    Start     Dose/Rate Route Frequency Ordered Stop    10/20/21 1230  perflutren diluted 1mL to 2mL with saline (OPTISON) diluted injection 6 mL      6 mL Intravenous ONCE 10/20/21 1210 10/20/21 1210    10/20/21 1119  iopamidol (ISOVUE-370) solution  Status:  Discontinued        ONCE PRN 10/20/21 1120 10/20/21 1248                Plan:  1. Start vancomycin  1500 mg IV x 1 loading dose, then intermittent dosing by level.   2. Vancomycin monitoring method: Trough (Method 2 = manual dose calculation)  3. Vancomycin therapeutic monitoring goal: 15-20 mg/L  4. Pharmacy will check vancomycin levels as appropriate in 1-3 Days.    5. Serum creatinine levels will be ordered daily for the first week of therapy and at least twice weekly for subsequent weeks.       Marita Lamb, PharmD

## 2021-10-20 NOTE — H&P
Cardiology -- History and Physical  Date of Admission:  10/19/2021    ASSESSMENT:   Kwaku Medrano is a 56 year old male with PMH of scleroderma, ILD, Raynauds,  A Flutter s/p CTI ablation in 2017, pAF, SVT, GERD who had not been taking his medications for the past 10 months. He was recently seen in an urgent care clinic on 10/11/2021 for SOB. He was started on a prednisone taper because his breathing trouble was thought to be secondary to an ILD flare. He was started back on his medications (Dilt /day, Lisinopril 10, Protonix 40, Pred taper) except for cellcept. He presented to the OSH with SOB. He was found in A fib w RVR with HR in the 130s. He was given a bolus of amio 150mg and then developed severe respiratory distress. Post RSI (w etomidate and succinylcholine) patient had cardiac arrest (<5min) with asystole/PEA. Post arrest, patient was transferred to [facility] for additional management of ongoing shock.     #Cardiogenic shock  #s/p cardiac arrest @ OSH - Patient had persistent hypotension post intubation with a 5 min period of cardiac arrest (PEA vs Asystole) prior to ROSC.  #Severe biventricular failure  #Shock liver - ALI w/o liver failure  #Mild Pre-capillary pulmonary hypertension  LVEF 25-30% with a 4.9cm LVDd. RV moderately dilated with severely reduced function. No prior ischemic workup noted.   - Drug of abuse screen pending  - HIV, HBV and HCV testing pending  Date RA PA PCWP CO/CI SVR PVR MAP Therapies   10/20/21 11 38/24(25) 14 2.9/1.7 1460 3.79     Inotropes: Epi 0.1 mcg/kg/min, maintain small dose of epi for inotropic response  Pressors: NE 0.1 mcg/kg/min  MCS: None currently, considering IABP placement for hemodynamic support  Rhythm: Amio 0.5mg/min w/o bolus for frequent runs of NSVT and paroxysms of AF    #Lactic acidosis - resolving  Lactate 8.4 -> 5.6. CTM.    #Leukocytosis - unclear if demargination from steroids vs true infection.  - empirically started on vanc and cefepime. Continue  empiric coverage for now until Bcx can be returned. Continue to monitor for fevers.    #AHRF - presented with FiO2 100%  Interestingly patient pulmonary compliance is far better than would be expected for a patient with ILD. Will discuss with pulmonary regarding management, unclear if patient has an ILD flare.    #Scleroderma  #ILD  #Raynaud's with digital ulcerations   Continue PTA prednisone 4mg daily. He was given 1mg/kg Solumedrol at OSH prior to transfer. Considering discussing with pulm regarding management of this patient with ILD. Unclear if patient has an ILD flare at this time. Also discuss regarding MMF management.  #Esophageal dysmotility  #Paroxysmal AF (DAVID-VaSc: 1 [CHF])    #Small PE  Continue high intensity heparin    #Nonoliguric DIMPLE - likely ATN in the setting of shock  Baseline Cr 0.9-1.1, currently 1.55.  Monitor UOP, CRRT or HD as needed    Discussed with Dr. Alexy Mohsan Chaudhry, MD  Division of Cardiology, PGY-5    Chief Complaint: SOB    History of Present Illness   Kwaku Medrano is a 56 year old male with PMH of scleroderma, ILD, Raynauds,  A Flutter s/p CTI ablation in 2017, pAF, SVT, GERD who had not been taking his medications for the past 10 months. He was recently seen in an urgent care clinic on 10/11/2021 for SOB. He was started on a prednisone taper because his breathing trouble was thought to be secondary to an ILD flare. He was started back on his medications (Dilt /day, Lisinopril 10, Protonix 40, Pred taper) except for cellcept.    He presented to the ED today with reports of not feeling well along with shortness of breath.  Reports shortness of breath worsening over the past week.  During his urgent care visit he was noted to have elevated transaminitis.  No fevers or chills.  Patient reported nausea and difficulty swallowing, but thought it was secondary to his scleroderma.  He also reported that he had a hard time taking deep breaths.  At the outside hospital he  was found to be in A. fib with RVR with heart rates in the 130s.  He was given amiodarone bolus.  He subsequently decompensated with acute hypoxic respiratory failure requiring intubation.  Outside hospital documented a goals of care discussion with patient regarding intubation sedation/tracheostomy, patient wanted to proceed with intensive management including all of the above. Patient arrived on multiple pressors, epi 0.25, NE 0.03. no prior coronary events.  Patient had a CT PE which showed a small right hilar pulmonary embolism along with possible progression of ILD.    Social:  Sister arrived as we were getting ready for transport.  She notes he lives with his parents and helps take care of them as one has dementia and the other just had a stroke.  The family has been after him trying to get him to go in for help.    HPI and ROS limited by intubation and sedation.    PAST MEDICAL HISTORY:  Past Medical History:   Diagnosis Date     Atrial fibrillation and flutter (H) 07/2017     Fracture      GERD (gastroesophageal reflux disease)      Interstitial lung disease (H)     sclerderma ILD; present on CXR 3-2017 and CT 5-2017; dx confirmed by CT 9-2017 fibrotic NSIP pattern with increased fibrosis; started mycophenolate 7/2017     Scleroderma (H)     dx 9- at [facility] by Dr. Acosta       CURRENT MEDICATIONS:  No current outpatient medications on file.       PAST SURGICAL HISTORY:  Past Surgical History:   Procedure Laterality Date     COLONOSCOPY N/A 7/19/2017    Procedure: COLONOSCOPY;  COLONOSCOPY;  Surgeon: Mita Jimenez MD;  Location:  GI     NO HISTORY OF SURGERY         ALLERGIES  Allergies   Allergen Reactions     Ampicillin Rash       FAMILY HISTORY:  Family History   Problem Relation Age of Onset     Prostate Cancer Father      Lung Cancer Other      Family History Negative Mother      Family History Negative Maternal Grandmother      Family History Negative Maternal Grandfather      Family  History Negative Paternal Grandmother      Other - See Comments Paternal Grandfather         Atherosclerosis     Family History Negative Brother      Family History Negative Sister      Family History Negative Brother      LUNG DISEASE No family hx of      Rheumatologic Disease No family hx of        SOCIAL HISTORY:  Social History     Socioeconomic History     Marital status: Single     Spouse name: Not on file     Number of children: Not on file     Years of education: Not on file     Highest education level: Not on file   Occupational History     Not on file   Tobacco Use     Smoking status: Never Smoker     Smokeless tobacco: Former User     Types: Chew   Substance and Sexual Activity     Alcohol use: Yes     Comment: very rarely     Drug use: No     Sexual activity: Not Currently   Other Topics Concern     Parent/sibling w/ CABG, MI or angioplasty before 65F 55M? Not Asked   Social History Narrative    Work in [employer].  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  Denies exposure to hot tubs, silica, pet birds, mold.  Single, no children.     Social Determinants of Health     Financial Resource Strain:      Difficulty of Paying Living Expenses:    Food Insecurity:      Worried About Running Out of Food in the Last Year:      Ran Out of Food in the Last Year:    Transportation Needs:      Lack of Transportation (Medical):      Lack of Transportation (Non-Medical):    Physical Activity:      Days of Exercise per Week:      Minutes of Exercise per Session:    Stress:      Feeling of Stress :    Social Connections:      Frequency of Communication with Friends and Family:      Frequency of Social Gatherings with Friends and Family:      Attends Shinto Services:      Active Member of Clubs or Organizations:      Attends Club or Organization Meetings:      Marital Status:    Intimate Partner Violence:      Fear of Current or Ex-Partner:      Emotionally Abused:      Physically  Abused:      Sexually Abused:        Review of Systems   The 10 point Review of Systems is negative other than noted in the HPI or here.     Physical Exam   Temp: 100.4  F (38  C) Temp src: Bladder BP: (!) 119/94 Pulse: 117   Resp: 25 SpO2: 100 % O2 Device: Mechanical Ventilator    Vital Signs with Ranges  Temp:  [97.8  F (36.6  C)-100.4  F (38  C)] 100.4  F (38  C)  Pulse:  [] 117  Resp:  [11-50] 25  BP: ()/(23-95) 119/94  MAP:  [64 mmHg-88 mmHg] 64 mmHg  Arterial Line BP: ()/(49-73) 100/52  FiO2 (%):  [60 %-100 %] 60 %  SpO2:  [62 %-100 %] 100 %  137 lbs 8 oz    GEN: Intubated and sedated.  HEENT: no icterus. PERRL.  CV: RRR, normal s1/s2, no murmurs/rubs/s3/s4, no heave. JVP 10   CHEST: crackles bilaterally at the bases.  ABD: soft, NT/ND, NABS  : no flank/suprapubic tenderness  Ext: 2+ pitting edema. Extremities cool the touch.  NEURO: moving all extremities. Cough reflex intact.  PSYCH: unable to assess    Data   Recent Labs   Lab 10/20/21  0350 10/20/21  0055 10/19/21  2334 10/19/21  2330 10/19/21  1530 10/19/21  1530   WBC 19.0*  --   --  17.7*  --  11.1*   HGB 13.8  --   --  13.6  --  13.3   MCV 90  --   --  92  --  91     --   --  297  --  300   INR 2.32*  --   --  2.37*  --   --      --   --  139  --  136   POTASSIUM 4.9  --   --  4.2  --  4.1   CHLORIDE 107  --   --  105  --  105   CO2 19*  --   --  19*  --  14*   BUN 32*  --   --  32*  --  27   CR 1.55*  --   --  1.41*  --  1.11   ANIONGAP 13  --   --  15*  --  17*   BETZY 9.1  --   --  8.0*  --  8.3*   * 145* 169* 171*   < > 161*   ALBUMIN 2.4*  --   --  2.4*   < > 2.7*   PROTTOTAL 5.4*  --   --  5.4*   < > 6.0*   BILITOTAL 1.7*  --   --  1.7*   < > 1.2   ALKPHOS 84  --   --  88   < > 87   ALT 1,396*  --   --  606*   < > 295*   AST 2,504*  --   --  812*   < > 251*   TROPONIN 9.601*  --   --  7.423*  --  5.429*    < > = values in this interval not displayed.       Recent Results (from the past 24 hour(s))   CT Chest  (PE) Abdomen Pelvis w Contrast   Result Value    Radiologist flags Pulmonary embolism (AA)    Narrative    CT CHEST PULMONARY EMBOLISM, ABDOMEN PELVIS WITH CONTRAST 10/19/2021  4:26 PM    CLINICAL HISTORY: Shortness of breath; nausea.   TECHNIQUE: CT angiogram chest and routine CT abdomen pelvis with IV  contrast. Arterial phase through the chest and venous phase through  the abdomen and pelvis. 2D and 3D MIP reconstructions were performed  by the CT technologist. Dose reduction techniques were used.   CONTRAST: 76mL Isovue-370    COMPARISON: CT chest 9/5/2017, CT abdomen and pelvis 6/29/2017.    FINDINGS:  ANGIOGRAM CHEST: Very small pulmonary embolism at the right posterior  hilar region series 3 image 78. Limited opacification of the thoracic  aorta limits assessment.    LUNGS AND PLEURA: Small bilateral pleural fluid. Interstitial  prominence mostly at the mid to inferior lungs again identified but  this appears increased in the interval. There is new ill-defined hazy  groundglass opacities also noted primarily at the mid to low lungs.    MEDIASTINUM/AXILLAE: Suggestion of anasarca. No visible suspicious  lymph node. Gas distends the esophagus.    CORONARY ARTERY CALCIFICATION: Mild.    HEPATOBILIARY: Ill-defined hypodense nodule continues to be 1.7 cm  posterior right liver series 7 image 59. Liver is limited in view  related to contrast timing and enhancement. There is heterogeneous  appearance along the medial right hepatic dome that could be related  to enhancement image 34. Diffuse wall thickening of the gallbladder.    PANCREAS: Normal.    SPLEEN: Scattered calcifications.    ADRENAL GLANDS: Normal.    KIDNEYS/BLADDER: Renal cortical thinning. There is a cortical  hypoenhancement bilaterally. No hydronephrosis. Unremarkable bladder  for any acute abnormality. Mild distention of the bladder.    BOWEL: Moderate stool throughout the colon. A few small bowel loops  proximally are mildly dilated. Distal small  bowel is decompressed with  some wall prominence. There is small ascites and diffuse anasarca.    LYMPH NODES: Normal.    PELVIC ORGANS: Small pelvic ascites.    OTHER: None.    MUSCULOSKELETAL: No aggressive bone lesion.      Impression    IMPRESSION:  1.  Tiny pulmonary embolism visualized at the right hilar region.  2.  Hazy groundglass opacities at the bilateral mid to low lungs with  interstitial thickening consistent with pulmonary edema versus  atypical infectious etiology. Correlate with CHF. Underlying  interstitial lung disease again noted and may be progressed as  compared to 2017, but this is difficult to separate from the acute  presentation.  3.  Trace bilateral pleural fluid.  4.  Diffuse anasarca. Small ascites.  5.  Diffuse wall thickening of the gallbladder. Correlate with any  gallbladder disease. This wall thickening may just relate to the fluid  overload.  6.  Previously noted nodule of the right liver is difficult to  visualize but is likely similar in size. Heterogeneous appearance of  the liver on the right may just relate to a perfusion phenomenon.  7.  Cortical thinning of the kidneys with small cortical regions of  hypoenhancement. This could also be a perfusion phenomenon. Correlate  with any evidence for pyelonephritis.    [Critical Result: Pulmonary embolism]    Finding was identified on 10/19/2021 4:30 PM.     Dr. Colbert was contacted by me on 10/19/2021 4:43 PM and verbalized  understanding of the critical result.     SERENA HAGEN MD         SYSTEM ID:  [id number]   XR Chest Port 1 View    Narrative    EXAM: XR CHEST PORT 1 VIEW  LOCATION: [facility]  DATE/TIME: 10/19/2021 7:00 PM    INDICATION: Intubation  COMPARISON: 10/19/2021.    FINDINGS: ET tube 3.5 cm above the brooke. NG tube is looped in the stomach. There is no pneumothorax. The heart size is normal. There are bilateral pulmonary infiltrates consistent with pneumonia.      Impression    IMPRESSION: ET tube  3.5 cm above the brooke.   XR Chest Port 1 View    Narrative    EXAM: XR CHEST PORT 1 VIEW  LOCATION: [facility]  DATE/TIME: 10/19/2021 7:37 PM    INDICATION: ett placement, central line  COMPARISON: Earlier today at 1915 hours      Impression    IMPRESSION: Interval placement of right IJ central catheter with its tip junction of SVC and right atrium. No pneumothorax. ET tube and NG tube remain in good position, as does the left pleural drain.    No change in the patchy mixed interstitial and airspace infiltrates bilaterally consistent with pneumonia. Heart size upper limits of normal.   XR Chest Port 1 View    Impression    RESIDENT PRELIMINARY INTERPRETATION  IMPRESSION:  1. Interval placement of [place]-Demetrio catheter with tip terminating of  the distal right main pulmonary artery.  2. Interval retraction of right IJ central venous catheter with tip  terminating over the mid SVC.  3. Unchanged bilateral interstitial and airspace opacities, right  greater than left. Small right pleural effusion.

## 2021-10-20 NOTE — PROGRESS NOTES
ECLS Cannulation Note:    Date on: 10/20/2021  Time on: 10:58  Cannulating Surgeon: Dr. Canales    Arterial Cannula: 17 Fr. In the RFA ( 8 Fr RPC in the RSA)      Venous Cannula: 25 Fr. In the RFV      ECMO components include:  Console Serial Number: [id number]  Circuit Lot Number: [id number]  Oxygenator Lot number: [id number]    Cannulation was performed in the CCL, placement was verified by fluoroscopy.    Patient's blood type is pending .    RT Adelso  ECMO Specialist  10/20/2021 1:34 PM

## 2021-10-20 NOTE — PROGRESS NOTES
ECMO Attending Progress Note  10/20/2021    Kwaku Medrano is a 56 year old male who was cannulated for ECMO 10/20/2021 due to profound cardiogenic and vasodilatory shock.    Cannulation Site:  17F Fr in the R femoral artery  25F Fr in the R femoral vein  8F reperfusion cannula    Interval events: cannulated this am pulsatility 0    Physical Exam:  Temp:  [95.9  F (35.5  C)-101.3  F (38.5  C)] 98.4  F (36.9  C)  Pulse:  [] 70  Resp:  [18-50] 18  BP: ()/(23-94) 108/88  MAP:  [61 mmHg-88 mmHg] 72 mmHg  Arterial Line BP: ()/(40-84) 69/69  FiO2 (%):  [50 %-100 %] 50 %  SpO2:  [62 %-100 %] 95 %    Intake/Output Summary (Last 24 hours) at 10/20/2021 1834  Last data filed at 10/20/2021 1800  Gross per 24 hour   Intake 1513.8 ml   Output 1840 ml   Net -326.2 ml    Ventilation Mode: CMV/AC  (Continuous Mandatory Ventilation/ Assist Control)  FiO2 (%): 50 %  Rate Set (breaths/minute): 18 breaths/min  Tidal Volume Set (mL): 460 mL  PEEP (cm H2O): 5 cmH2O  Oxygen Concentration (%): 50 %  Resp: 18       Labs:  Recent Labs   Lab 10/20/21  1820 10/20/21  1548 10/20/21  1546 10/20/21  1253 10/20/21  1133 10/20/21  0916   PH 7.32* 7.36  --  7.22* 7.15*  --    PCO2 39 30*  --  43 57*  --    PO2 72* 84  --  124* 44*  --    HCO3 20* 17*  --  18* 20*  --    O2PER 50 50  40 50 60  --    < >    < > = values in this interval not displayed.      Recent Labs   Lab 10/20/21  1546 10/20/21  1251 10/20/21  1133 10/20/21  1125 10/20/21  1112 10/20/21  0828 10/20/21  0350 10/20/21  0350   WBC 16.0* 15.5*  --   --   --  21.9*  --  19.0*   HGB 11.3* 10.6* 11.7* 11.5*   < > 13.6   < > 13.8    < > = values in this interval not displayed.     Creatinine   Date Value Ref Range Status   10/20/2021 2.06 (H) 0.66 - 1.25 mg/dL Final   10/20/2021 1.99 (H) 0.66 - 1.25 mg/dL Final   10/20/2021 1.86 (H) 0.66 - 1.25 mg/dL Final   10/20/2021 1.55 (H) 0.66 - 1.25 mg/dL Final   05/27/2020 1.18 0.66 - 1.25 mg/dL Final   02/18/2020 1.07 0.66 -  1.25 mg/dL Final   01/17/2020 0.91 0.66 - 1.25 mg/dL Final   11/21/2019 1.02 0.66 - 1.25 mg/dL Final       Blood Flow (Circuit) LPM: 4.76 LPM  Gas Flow  LPM: (S) 3 LPM  Gas FiO2   %: 40 %  ACT  (seconds): 183 seconds  Blood Temp  (degrees C): 36.7 C  Pulse Oximetry  (SpO2%):  (Unable to obtain)  Arterial Pressure  mmHg: 320 mmHg      ECMO Issues including assessments and plan on DOS 10/20/2021:  Neuro: Sedated for mechanical ventilation and ECMO.  No acute distress.  NIRS stable 60/70 b/l  RASS goal: -1 to -2  CV: Cardiogenic shock.  Hemodynamically stable on ne 0.04 and epi 0.06  Pulm: Keep vent settings at rest settings as above. Minimal ECMO at 40% fio2 80s  FEN/Renal: Electrolytes stable w/ replacement protocols in place, Cr stable, UOP improving after reducing pressors   Heme: ACT goal: 200-220 av not opening Hemoglobin 11.3.  Minimal oozing around the ECMO cannulas.  ID: Receiving empiric antibiotics  Cannulae: Position is acceptable on exam and the available imaging.  Distal perfusion cannula is in place and patent.  Extremities are well-perfused.    Consider increasing epi for inotrope, and decreasing flow as able to maintain map 60-65 as long as lactate continues to fall and uop continues at goal. Goal to improve PEA to opening of aortic valve     I have personally reviewed the ECMO flows, oxygenation and CO2 clearance, anticoagulation, and cannula position.  I have also personally assessed the patient's systemic response with hemodynamics, oxygenation, ventilation, and bleeding.       The patient requires continued ECMO support and management in the ICU.  I have discussed patient care and treatment plan with the primary team.      Sabino Trinidad MD  Critical Care Cardiology  [phone]    October 20, 2021

## 2021-10-20 NOTE — PROGRESS NOTES
Brief hospitalist signoff note    Patient is a very pleasant 56-year-old gentleman with an unfortunate history of ILD, scleroderma, and Raynaud's.  He unfortunately has not been taking any of his meds for the last 10 months due to concern over Covid and needing to isolate, as well as difficulty making follow-ups.  He recently was seen in the clinic in resumed on most of his home medications with the exception of CellCept.  Presented to the hospital today due to ongoing, and progressive shortness of breath, lower extremity edema, and a persistently racing heart.  He states that these symptoms have been present for at least 2 to 3 weeks.    I did get the opportunity to discuss with Mr. Medrano this evening.  At that time he was of sound mind.  I specifically discussed with him his CODE STATUS.  I discussed with him intubation, which he stated that he would want if it was necessary at this time.  I specifically discussed with him the possibility that he may may need a tracheostomy in light of his ILD, likely heart failure, and possibly prolonged intubation.  He was in favor of all resuscitative efforts as needed including chest compressions, electric shocks, IV medications to raise his blood pressure.    - hospitalist service will sign off. Please re-consult our team as needed.    Jens Almanzar MD

## 2021-10-20 NOTE — CONSULTS
[facility]  Pulmonary Consult     Patient:  Kwaku Medrano, Date of birth 1965, Medical record number [id number]  Date of Visit:  10/20/2021    Reason for Consult: Scleroderma, not on meds, hypoxic resp failure         Assessment and Recommendations:     56 year old M with scleroderma ILD, previously normal lung function, who has been off his MMF and pred (4mg) for about 10 months now presenting with biventricular heart failure and shock, cannulated for [facility] ecmo on 10/20.     Discussion: Imaging and oxygenation not concerning for an ILD flair. He does have extensive pulmonary disease on imaging but surprisingly normal PFTs. Noted small PE.     - No need to resumed MMF (1500mg BID) at this time  - No need to treat with high dose steroids for ILD flair, stress dose steroids could be considered  - Lung protective ventilation strategy, as you are  - Small PE - currently on hep gtt. Would complete full course of treatment even after hep gtt no longer needed for cardiac/ECMO. Duration can be determined outpatient as far as provoke/unprovoked  - ILD flair can be triggered by acute illness/intubation. Will sign off for now but if oxygenation is worsening and you have concerns for flair, please feel free to call pulm to discuss    Thank you very much for this consultation. Pulmonary will sign off.     Patient staffed with Dr. Antoni King  Pulmonary and Critical Care Fellow  [id number]        History of Present Illness   56 year old with diffuse cutaneous systemic sclerosis, scleroderma ILD, raynauds presents with fever, afib with RVR, shock, newly reduced EF, and hypoxic respiratory failure.     We are consulted to assist with ILD evalution/treatment in the setting of mechanical ventilation.     On MMF since July 2017. Last seen in May 2020. Dr. Dillon. Works in a [employer]. On MMF 1500 mg BID. Last visit with rheum was Dr. Acosta in Jan 2020 and he was on pred 4mg qday in addition  to MMF    Nine days ago presented to urgent care with SOB and reported not taking meds for months. In addition to recommending specialist followup, they started pred taper at 50 mg qday x 5 d then 40 x 3 d. Noted pAfib and re-prescribed dilt, lisinopril, pantoprazole.     Yesterday presented to an outside ED via EMS with dyspnea. Trialed bipap, solumedrol, heparin for new PE and lasix. Then dropped BP. Became nauseaus, intubated. Phenylephrine x 2, Etomidate, succ. for intubation. BP decreased. Received bumps of epi then had wide complex without pulse. Arrested and had rosc within 5 min. Started epi drip. Transferred to [facility] and cannulated for [facility] ECMO this morning.     Review of Systems:  10 point ROS completed and negative except for above.    Past Medical History:   Diagnosis Date     Atrial fibrillation and flutter (H) 07/2017     Fracture      GERD (gastroesophageal reflux disease)      Interstitial lung disease (H)     sclerderma ILD; present on CXR 3-2017 and CT 5-2017; dx confirmed by CT 9-2017 fibrotic NSIP pattern with increased fibrosis; started mycophenolate 7/2017     Scleroderma (H)     dx 9- at [facility] by Dr. Acosta       Past Surgical History:   Procedure Laterality Date     COLONOSCOPY N/A 7/19/2017    Procedure: COLONOSCOPY;  COLONOSCOPY;  Surgeon: Mita Jimenez MD;  Location:  GI     NO HISTORY OF SURGERY         Family History   Problem Relation Age of Onset     Prostate Cancer Father      Lung Cancer Other      Family History Negative Mother      Family History Negative Maternal Grandmother      Family History Negative Maternal Grandfather      Family History Negative Paternal Grandmother      Other - See Comments Paternal Grandfather         Atherosclerosis     Family History Negative Brother      Family History Negative Sister      Family History Negative Brother      LUNG DISEASE No family hx of      Rheumatologic Disease No family hx of        Social History     Social  History Narrative    Work in [employer].  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  Denies exposure to hot tubs, silica, pet birds, mold.  Single, no children.     Social History     Tobacco Use     Smoking status: Never Smoker     Smokeless tobacco: Former User     Types: Chew   Substance Use Topics     Alcohol use: Yes     Comment: very rarely     Drug use: No       Patient Active Problem List   Diagnosis     Dermatomyositis (H)     ILD (interstitial lung disease) (H)     Diastolic hypertension     High risk medications (not anticoagulants) long-term use     Atrial fibrillation, unspecified type (H)     Atrial fibrillation and flutter (H)     Scleroderma (H)     Interstitial lung disease (H)     Atrial fibrillation with rapid ventricular response (H)     Congestive heart failure, unspecified HF chronicity, unspecified heart failure type (H)     Single subsegmental pulmonary embolism without acute cor pulmonale (H)     NSTEMI (non-ST elevated myocardial infarction) (H)     Cardiogenic shock (H)            Current Medications & Allergies:       [START ON 10/21/2021] aspirin  81 mg Oral Daily     ceFEPIme (MAXIPIME) IV  2 g Intravenous Q12H     methylPREDNISolone  1,000 mg Intravenous Q24H     pantoprazole  40 mg Per Feeding Tube QAM AC    Or     pantoprazole (PROTONIX) IV  40 mg Intravenous QAM AC     polyethylene glycol  17 g Oral Daily     predniSONE  4 mg Oral Daily     senna-docusate  1 tablet Oral BID    Or     senna-docusate  2 tablet Oral BID     sodium chloride (PF)  3 mL Intracatheter Q8H       Infusions/Drips:    amiodarone 0.5 mg/min (10/20/21 1015)     EPINEPHrine 0.15 mcg/kg/min (10/20/21 1027)     fentaNYL 50 mcg/hr (10/20/21 1027)     heparin 1,250 Units/hr (10/20/21 1000)     - MEDICATION INSTRUCTIONS -       midazolam 2 mg/hr (10/20/21 1027)     norepinephrine 0.5 mcg/kg/min (10/20/21 1028)     - MEDICATION INSTRUCTIONS -         Allergies   Allergen Reactions      Ampicillin Rash            Physical Exam:   Ranges for vital signs:  Temp:  [97.8  F (36.6  C)-101.3  F (38.5  C)] 101.3  F (38.5  C)  Pulse:  [] 141  Resp:  [11-50] 22  BP: ()/(23-95) 119/94  MAP:  [61 mmHg-88 mmHg] 68 mmHg  Arterial Line BP: ()/(49-73) 85/60  FiO2 (%):  [60 %-100 %] 60 %  SpO2:  [62 %-100 %] 95 %  Vitals:    10/20/21 0400   Weight: 62.4 kg (137 lb 8 oz)     CMV 18/460/5/60%    Physical Examination:  GENERAL:  Lying in bed, intubated, sedated  HEAD:  Head is normocephalic, atraumatic, small mouth with tight surrounding skin  EYES:  Eyes have anicteric sclerae   LUNGS:  Scattered crackles anteriorly   CARDIOVASCULAR:  Regular rate and rhythm (examined after he cardioverted)  ABDOMEN:  Soft, nontender, nondistended  EXT:  No edema, + raynauds with ulceration over some digits. Mottling. Contractures of both hands.   NEUROLOGIC:  sedated    Labs/Data    Bicarb (BMP)  7.26 / 141 / 40 / 18 most recent ABG  K rising, 5.5. Cr rising 1.11 yesterday afternoon -->1.86  11-->22 WBCs  INR 2.32  ntBNP 36k --> 59k  procal 0.05  Trop rising 12.7    PFTs normal and stable in Feb 2020 at last check    ECHO 10/20  Biplane LVEF is 14%.  Left ventricular size is normal.  Severe diffuse hypokinesis is present.  Mild to moderate right ventricular dilation is present.  Global right ventricular function is moderately reduced.  No pericardial effusion is present.  LV/RV dysfunction is new when compared to prior study.    Microbiology:  Covid, HIV, Infl A and B neg 10/20  Blood cultures pending    Imaging:  CTPE 10/20  1.  Tiny pulmonary embolism visualized at the right hilar region.  2.  Hazy groundglass opacities at the bilateral mid to low lungs with  interstitial thickening consistent with pulmonary edema versus  atypical infectious etiology. Correlate with CHF. Underlying  interstitial lung disease again noted and may be progressed as  compared to 2017, but this is difficult to separate from the  acute  presentation.  3.  Trace bilateral pleural fluid.  4.  Diffuse anasarca. Small ascites.  5.  Diffuse wall thickening of the gallbladder. Correlate with any  gallbladder disease. This wall thickening may just relate to the fluid  overload.  6.  Previously noted nodule of the right liver is difficult to  visualize but is likely similar in size. Heterogeneous appearance of  the liver on the right may just relate to a perfusion phenomenon.  7.  Cortical thinning of the kidneys with small cortical regions of  hypoenhancement. This could also be a perfusion phenomenon. Correlate  with any evidence for pyelonephritis.    Reviewed by pulmonary. Agree difficult to compare but no dramatic worsening of ILD.

## 2021-10-20 NOTE — ED TRIAGE NOTES
"Coming from home for a \"few weeks\" of SOB. This morning patient unable to catch breath. 1 Neb given by EMS and spot checked an SpO2 of 89%. Pt hard to get O2 reading on d/t raynaud's. Hx of interstitial lung disease, scleroderma, raynaud's, afib and has been off home medications for >6 months. .  "

## 2021-10-21 NOTE — PROGRESS NOTES
Cardiology Progress Note  Kwaku Medrano MRN: [id number]  Age: 56 year old, : 1965  Date: 10/21/2021            Assessment and Plan:     Kwaku Medrano is a 56 year old male with PMH of scleroderma, ILD, Raynauds,  A Flutter s/p CTI ablation in 2017, pAF, SVT, GERD who had not been taking his medications for the past 10 months. He was recently seen in an urgent care clinic on 10/11/2021 for SOB. He was started on a prednisone taper because his breathing trouble was thought to be secondary to an ILD flare. He was started back on his medications (Dilt /day, Lisinopril 10, Protonix 40, Pred taper) except for cellcept. He presented to the OSH with SOB. He was found in A fib w RVR with HR in the 130s. He was given a bolus of amio 150mg and then developed severe respiratory distress. Post RSI (w etomidate and succinylcholine) patient had cardiac arrest (<5min) with asystole/PEA. Post arrest, patient was transferred to [facility] for additional management of ongoing shock.  Patient had increased pressor needs on 10/20, so VA-ECMO was placed.     Changes today:  - Appreciate CSI ECMO recommendations; will reduce flow to 4L  - Consult nephrology for oliguric DIMPLE  - RUQ ultrasound  - Continue volume resuscitation with albumin  - Continue to trend troponin  - TTE  - Vitamin K 5mg IV   -Stop versed  -Transition to hydrocortisone per rheumatology recommendations       Neurology: Metabolic encephalopathy -- in the setting of cardiogenic shock.  Intubated, sedated.  CT head showed no acute pathology  Plan:  --Stop versed, continue fentanyl  as needed  --  Delirium protocol  -- Treat shock as below.      Cardiovascular / Hemodynamics: - Mixed circulatory shock: cardiogenic (post amiodarone administration for atrial fibrillation with RVR in OSH); possibly septic (in the setting of fever and pulmonary infiltrates)  - NICM : normal coronary angiogram. Possibly tachycardia induced versus  septic induced. Troponin elevated without ischemic EKG changes. Unlikely to be myocarditis.  - PEA arrest post intubation on 10/19 -- ROSC after 5 minutes of CPR  - Paroxysmal atrial fibrillation with RVR --  junctional rhythm post amiodarone and ECMO cannulation  - History of atrial flutter s/p ablation in 2017  - History of Raynaud/scleroderma -- peripheral cyanosis not related to Raynaud. No scleroderma flare. Stress dose steroids. US duplex arterial showed distal flow.     DATE MAP CVP PAP PCWP Anjelica CO Anjelica CI SVR MVo2 Therapies   October 20, prior to ECMO 61  16 32/24 14 2.9 1.7 1241 54 Levo 0.6, epi 0.14         TTE: EF 10-15% prior to ECMO. Severe RV dysfunction.  Post-ECMO TTE: Severely reduced BiV function; Mod MR  EKG: AFib     ECMO settings: flow 4.07, FiO2 60, sweep 1, ACT goal 200-220     ECMO cannulas: 25 F venous, 17 F arterial, 8 F reperfusion cannula     Plan:  --Wean epi as able.  --250mL Albumin  --ACT goal 200-220 given history of Raynaud.  --hold ACE/ARB for now given likely reduced renal fxn after arrest  --holding beta blocker given shock   --hydrocortisone per rheumatology  --Nephrology consult for dialysis for volume      Pulmonary: Acute hypoxic respiratory failure -- community acquired pneumonia, cardiogenic shock (pulmonary edema), and small PE  History of ILD  -- Pulmonology ruled out ILD flare  Small PE --  Tiny pulmonary embolism visualized at the right hilar region  Vent Settings:  Ventilation Mode: CMV/AC  (Continuous Mandatory Ventilation/ Assist Control)  FiO2 (%): 50 %  Rate Set (breaths/minute): 12 breaths/min  Tidal Volume Set (mL): 460 mL  PEEP (cm H2O): 5 cmH2O  Oxygen Concentration (%): 50 %  Resp: 12     CXR: Lines in stable position.  CT chest 10/20:  Increase in basilar predominant pulmonary airspace opacities which likely represent pulmonary edema in the setting of recent cardiogenic shock.     Plan:  -- Continue cefepime and vancomycin.  -- Volume as above  --wean vent  as able  --daily CXR  --Q2h ABGs for now      GI and Nutrition: Shock liver  History of GERD and esophageal dysmotility.  Plan:  --monitor BID LFTs  --NPO for now, likely start tube feeding tomorrow   --bowel regimen - on board  --GI Prophylaxis: PPI       Renal, Fluid and Electrolytes: Acute Oliguric DIMPLE -- Cardiorenal . Creatinine uptrending 2.6 (baseline 0.9-1). UOP minimal  Lactic acidosis -- lactate up to 9, improving slowly; now ~3     Plan:  --consult nephrology for further management; consideration of dialysis  --monitor urine output  --maintain K>4 and Mg>2   -- Diuresis as needed      Infectious Disease: Community acquired pneumonia  --Continue cefepime and vancomycin  --daily blood cultures  --monitor for signs of infection given lines, and leukocytosis      Hematology and Oncology: Coagulopathy  Receiving heparin for ECMO   - VitK 5mg (10/21)  - FFP x 2 (10/21)  --cryo PRN fibrinogen < 100; FFP for INR >2  --Transfuse for Hgb<7, platelets <50k  --heparin gtt for ECMO with ACT goal 200-220 (given Raynaud)  --US LE w/ arterial duplex per ECMO protocol   --DVT PPX: Heparin as above      Endocrinology: No known medical history  --insulin gtt as needed  --Steroids as above      Lines: PA catheter October 20, 2021  R femoral arterial and venous ECMO cannulae October 20, 2021  L femoral arterial line October 20, 2021  R radial arterial line October 20, 2021  ETT October 20, 2021  Younger catheter October 20, 2021  OG tube October 20, 2021  Restraint: needed    Current lines are required for patient management         Family update by me today: No, updated by Dr. Whitten, Advanced Heart Failure Fellow    Code Status: Full Code    The pt was discussed and evaluated with Dennis Fofana MD, attending physician, who agrees with the assessment and plan above.     Milton Patel MD  [facility] Heart  Cardiology  [phone]                Subjective/Interval Events     ECMO flows turned down to 4.5L.  Fluid resuscitated. Pulsatility returned. Lactic acid trending down. UOP slowing dramatically. INR elevated, likely from liver dysfunction. Remains sedated and ventilated. Nephrology and pulmonology on-board.           Objective     /88 (BP Location: Left arm)   Pulse 112   Temp 98.6  F (37  C)   Resp 12   Wt 64 kg (141 lb 3.2 oz)   SpO2 93%   BMI 20.91 kg/m    Temp:  [95.9  F (35.5  C)-101.3  F (38.5  C)] 98.6  F (37  C)  Pulse:  [] 112  Resp:  [12-22] 12  BP: (108)/(88) 108/88  MAP:  [60 mmHg-82 mmHg] 66 mmHg  Arterial Line BP: ()/(40-84) 80/62  FiO2 (%):  [50 %-60 %] 50 %  SpO2:  [85 %-100 %] 93 %  Wt Readings from Last 2 Encounters:   10/21/21 64 kg (141 lb 3.2 oz)   10/19/21 69.4 kg (153 lb)     I/O last 3 completed shifts:  In: 2908.03 [I.V.:2083.03; NG/GT:75]  Out: 1575 [Urine:1325; Emesis/NG output:250]      GENERAL APPEARANCE: Intubated, sedated. NAD.  HEENT: No icterus, pupils equal, ETT in place, OG tube in place  CARDIOVASCULAR: irregularly irregular, normal S1 and S2, no S3 or S4, click or rub. +grade 2/6 systolic murmur.   RESP: Coarse bilaterally, good air movement. Mechanical ventilation.    GASTRO: Soft, bowel sounds hypoactive but present  GENITOURINARY: Younger in place.  EXTREMITIES: Cool, 1-2+ edema, pulses dopplered. VA ECMO cannulas in groin, arterial sheath in left groin  NEURO: Sedated and intubated, Pupils equal and reactive, 4 mm. Fent and Versed for sedation, as below.   INTEGUMENTARY: No rashes. Cannula/Line sites CDI   LINES/TUBES/DRAINS: (noted below) V-A ECMO Cannulas R groin, arterial sheath  L groin. R radial Arterial line. ETT. OG. Younger Catheter.             Data:     Vent Settings:  Resp: 12 SpO2: 93 % O2 Device: Mechanical Ventilator      Arterial Blood Gas:   Recent Labs   Lab 10/21/21  0558 10/21/21  0350 10/21/21  0348 10/21/21  0301 10/21/21  0159 10/21/21  0159   PH 7.37 7.35  --  7.35  --  7.46*   PCO2 37 36  --  37  --  26*   PO2 132* 92  --  90  --   113*   HCO3 21 20*  --  21  --  19*   O2PER 50 50  60 50 50   < > 50    < > = values in this interval not displayed.       Vitals:    10/20/21 0400 10/21/21 0000   Weight: 62.4 kg (137 lb 8 oz) 64 kg (141 lb 3.2 oz)   I/O last 3 completed shifts:  In: 2908.03 [I.V.:2083.03; NG/GT:75]  Out: 1575 [Urine:1325; Emesis/NG output:250]  Recent Labs   Lab 10/21/21  0350 10/21/21  0348 10/20/21  2200 10/20/21  2158   NA  --  144  --  143   POTASSIUM  --  4.4  --  4.5   CHLORIDE  --  111*  --  110*   CO2  --  21  --  19*   ANIONGAP  --  12  --  14   * 100*   < > 110*   BUN  --  52*  --  49*   CR  --  2.61*  --  2.38*   BETZY  --  8.0*  --  7.7*    < > = values in this interval not displayed.     No components found for: URINE   Recent Labs   Lab 10/21/21  0348 10/20/21  2158 10/20/21  1545   AST 7,595* 6,355* 4,718*   ALT 3,561* 3,179* 2,274*   BILITOTAL 2.8* 2.6* 2.5*   ALBUMIN 2.5* 2.4* 2.0*   PROTTOTAL 4.8* 4.8* 4.7*   ALKPHOS 74 72 74     Temp: 98.6  F (37  C) Temp src: BladderTemp  Min: 95.9  F (35.5  C)  Max: 101.3  F (38.5  C)   Recent Labs   Lab 10/21/21  0348 10/20/21  2158 10/20/21  1546 10/20/21  1251 10/20/21  1133 10/20/21  1112 10/20/21  0828   WBC 17.5* 15.9* 16.0* 15.5*  --   --  21.9*   HGB 10.7* 10.6* 11.3* 10.6* 11.7*   < > 13.6   HCT 33.4* 32.9* 36.5* 34.7*  --   --  43.8   MCV 88 88 89 93  --   --  91   RDW 16.2* 15.9* 16.1* 15.9*  --   --  15.9*    175 189 185  --   --  266    < > = values in this interval not displayed.     Recent Labs   Lab 10/21/21  0348 10/20/21  2158 10/20/21  1545 10/20/21  1251 10/20/21  0350   INR 4.67* 4.80* 4.51* 5.30* 2.32*   * 143* >240* >240* 194*     Recent Labs   Lab 10/21/21  0350 10/21/21  0348 10/20/21  2200 10/20/21  2158 10/20/21  1820   * 100* 113* 110* 110*       All imaging personally reviewed:  Recent Results (from the past 24 hour(s))   Echo Complete   Result Value    Biplane LVEF 14%    Narrative     [id number]  [id number]  [id number]  [id number]^LEO^RAS     [facility],[place]  Echocardiography Laboratory  [address] [place] [id number]     Name: FLOR CALZADA  MRN: [id number]  : 1965  Study Date: 10/20/2021 08:11 AM  Age: 56 yrs  Gender: Male  Patient Location: [facility]  Reason For Study: Heart Failure  Ordering Physician: RAS BAILEY  Referring Physician: MAURI FARAH  Performed By: Judith Burton     BSA: 1.8 m2  Height: 69 in  Weight: 137 lb  ______________________________________________________________________________  Procedure  Complete Portable Echo Adult.  ______________________________________________________________________________  Interpretation Summary  Biplane LVEF is 14%.  Left ventricular size is normal.  Severe diffuse hypokinesis is present.  Mild to moderate right ventricular dilation is present.  Global right ventricular function is moderately reduced.  No pericardial effusion is present.  LV/RV dysfunction is new when compared to prior study.  ______________________________________________________________________________  Left Ventricle  Left ventricular wall thickness is normal. Left ventricular size is normal.  Biplane LVEF is 14%. Diastolic function not assessed due to arrhythmia. Severe  diffuse hypokinesis is present.     Right Ventricle  Mild to moderate right ventricular dilation is present. Global right  ventricular function is moderately reduced.     Atria  Both atria appear normal.     Mitral Valve  Moderate mitral insufficiency is present.     Aortic Valve  Mild aortic valve calcification is present. The valve leaflets are not well  visualized. On Doppler interrogation, there is no significant stenosis or  regurgitation.     Tricuspid Valve  The tricuspid valve is normal. Mild tricuspid insufficiency is present. The  right ventricular systolic pressure is approximated at 17.4 mmHg plus the  right atrial pressure.     Pulmonic  Valve  The pulmonic valve is normal.     Vessels  The pulmonary artery and bifurcation cannot be assessed. The inferior vena  cava cannot be assessed. Mild dilatation of the aorta is present. Ascending  aorta 3.6 cm. IVC diameter >2.1 cm collapsing <50% with sniff suggests a high  RA pressure estimated at 15 mmHg or greater.     Pericardium  No pericardial effusion is present.     Miscellaneous  The PASP is 14mmHg over the RA pressure.     Compared to Previous Study  LV/RV dysfunction is new when compared to prior study.     Attestation  I have personally viewed the imaging and agree with the interpretation and  report as documented by the fellow, Garry Wray, and/or edited by me.  ______________________________________________________________________________  MMode/2D Measurements & Calculations  IVSd: 1.0 cm  LVIDd: 5.0 cm  LVIDs: 4.6 cm  LVPWd: 0.95 cm  FS: 8.5 %  LV mass(C)d: 178.7 grams  LV mass(C)dI: 101.6 grams/m2  Ao root diam: 3.6 cm  LVOT diam: 2.3 cm  LVOT area: 4.2 cm2     EF(MOD-bp): 14.0 %  RWT: 0.38     Doppler Measurements & Calculations  TR max aleks: 208.3 cm/sec  TR max P.4 mmHg     ______________________________________________________________________________  Report approved by: Sam Sharma 10/20/2021 10:06 AM         US Lower Extremity Arterial Duplex Bilateral    Narrative    ULTRASOUND LOWER EXTREMITY ARTERIAL DUPLEX BILATERAL 10/20/2021 9:57  AM    CLINICAL HISTORY: Severe mottling.     COMPARISONS: None available.    REFERRING PROVIDER: RAS BAILEY    TECHNIQUE: Bilateral leg arteries evaluated with grayscale, color  Doppler, and Doppler waveform ultrasound.    FINDINGS: Occasional ectopy with variable waveform morphology and  amplitude.    RIGHT:       Common femoral artery: 59/18 cm/s, triphasic, 7.5 mm       Profundus femoral artery: 89/24 cm/s, triphasic, 5.3 mm         Superficial femoral artery, origin: 77/19 cm/s, triphasic, 4.6 mm       Superficial femoral artery, mid  thigh: 65/14 cm/s, triphasic       Superficial femoral artery, distal thigh: 87/0 cm/s, triphasic,  3.4 mm         Popliteal artery: 39/6 cm/s, triphasic         Posterior tibial artery, ankle: 30/0 cm/s, triphasic       Anterior tibial artery, ankle: 17/0 cm/s, biphasic    LEFT:       Common femoral artery: 130/16 cm/s, triphasic, 8.0 mm       Profundus femoral artery: 93/12 cm/s, triphasic, 4.8 mm         Superficial femoral artery, origin: 111/20 cm/s, triphasic, 3.0  mm       Superficial femoral artery, mid thigh: 95/15 cm/s, triphasic, 2.9  mm       Superficial femoral artery, distal thigh: 88/14 cm/s, triphasic,  3.3 mm         Popliteal artery: 36/6 cm/s, triphasic         Posterior tibial artery, ankle: 84/14 cm/s, triphasic       Anterior tibial artery, ankle: 22/0 cm/s, triphasic      Impression    IMPRESSION:  1. Arrythmia.    2. No significant arterial stenosis demonstrated.    CIRO TALBOT MD         SYSTEM ID:  [id number]   Echo Complete    Narrative    [id number]  [id number]  [id number]  [id number]^JANET^MAGGI     [facility],[place]  Echocardiography Laboratory  [address] [place] [id number]     Name: FLOR CALZADA  MRN: [id number]  : 1965  Study Date: 10/20/2021 10:17 AM  Age: 56 yrs  Gender: Male  Patient Location: [facility]  Reason For Study: Cardiac Device, Unspecified. VA ECMO cannulation in Cath Lab  Ordering Physician: MAGGI GONZALES  Referring Physician: MAURI FARAH  Performed By: CJ Isabel     BSA: 1.7 m2  Height: 68 in  Weight: 137 lb  BP: 119/94 mmHg  ______________________________________________________________________________  Procedure  Complete Portable Echo Adult. Contrast Optison. Optison (NDC #8304-9861-68)  given intravenously. Patient was given 10 ml mixture of 3 ml Optison and 6 ml  saline. 0 ml wasted.  ______________________________________________________________________________  Interpretation Summary  Limited TTE  for VA-ECMO Cannulation     At baseline (prior to cannulation): The LVEF is 10-15% with severe RV  dysfunction. The LVEDV is 145 ml and the LVESV is 127 mL.  At 4.0 L/min: The LVEF is 5-10% with severe RV dysfunction. There is moderate  mitral regurgitation. The LVEDV is 123 mL and the LVESV is 115 mL.  ______________________________________________________________________________  ______________________________________________________________________________  QLAB 3DQ Advanced  Stroke Vol: 13.0 ml  10_EDV(3DQA): 151.1 ml  10_ESV(3DQA): 138.1 ml  10_EF(3DQA): 8.6 %  10_Tmsv 3-6: 0.00 msec  10_Tmsv 3-5: 0.00 msec  10_Tmsv 3-6 (%): 0.00 %  10_Tmsv 3-5 (%): 0.00 %     ______________________________________________________________________________  Report approved by: Sam Garcia 10/20/2021 04:01 PM         Cardiac Catheterization    Narrative    1. No angiographic evidence of coronary artery disease  2. Elevated LV ventricular pressures prior to VA-ECMO cannulation  3. Successful VA-ECMO cannulation with 17 Fr arterial 25 Fr venous   cannulas in right femoral artery/vein  4. LV end-diastolic pressure improved after placement on VA-ECMO   CT Head w/o Contrast    Narrative    CT HEAD W/O CONTRAST 10/20/2021 12:38 PM    History: Mental status change, unknown cause   ICD-10:    Comparison: None available    Technique: Using multidetector thin collimation helical acquisition  technique, axial, coronal and sagittal CT images from the skull base  to the vertex were obtained without intravenous contrast.   (topogram) image(s) also obtained and reviewed.    Findings: There is no intracranial hemorrhage, mass effect, or midline  shift. Gray/white matter differentiation in both cerebral hemispheres  is preserved. Beam hardening artifact over the occipital lobes.  Ventricles are proportionate to the cerebral sulci. The basal cisterns  are clear.    The bony calvaria and the bones of the skull base are normal.  The  visualized portions of the paranasal sinuses are clear. Scattered  fluid within the mastoid air cells.      Impression    Impression:    1. No acute intracranial pathology.  2. Scattered fluid within the mastoid air cells which is nonspecific  in the setting of intubation.    LUCIANO DA SILVA MD         SYSTEM ID:  [id number]   CT Chest Abdomen Pelvis w/o Contrast    Narrative    CT CHEST ABDOMEN PELVIS W/O CONTRAST 10/20/2021 12:44 PM    History: ECMO    Comparison: CT chest and pelvis 10/19/2021, MRI abdomen 7/5/2017    Technique: Helical CT acquisition from the lung apices to the pubic  symphysis was obtained without intravenous contrast. Axial, coronal,  and sagittal reconstructions were obtained and reviewed.    Contrast: None    Findings:     CHEST:   Right internal jugular Huntley-Demetrio catheter tip terminates in the right  pulmonary artery.  Lungs: Increase in bibasilar lower lung predominant airspace opacities  superimposed on pre-existing fibrosis. Small right and trace left  pleural effusions. No pneumothorax.  Airways: The endotracheal tube tip is in the thoracic trachea. The  central tracheal bronchial tree is clear.  Vessels: Main pulmonary artery and aorta are normal in caliber. Normal  three-vessel arch. Solid air tracking from the left upper extremity to  the left subclavian vein which is likely intravenously related to  medication administration  Heart: Heart size is enlarged without pericardial effusion.  Lymph nodes: No suspicious mediastinal or hilar lymphadenopathy.  Calcified left hilar lymph nodes.  Thyroid: Within normal limits.  Esophagus: Nasogastric tube courses through the esophagus, terminating  in the gastric fundus.    ABDOMEN PELVIS:    Liver: Redemonstration of hepatic segment 6 hypodensity which is  better characterized on prior MRI as a hemangioma. Scattered hepatic  calcifications likely sequelae of prior granulomatous infection.  Biliary system: Gallbladder is within normal limits.  Trace vicariously  excreted contrast within the gallbladder lumen. No intrahepatic or  extrahepatic biliary ductal dilatation.  Pancreas: No focal mass or dilation of the main pancreatic duct.  Stomach: Within normal limits.  Spleen: Splenic calcifications likely sequelae of prior granulomatous  infection..  Adrenal glands: Within normal limits.  Kidneys: Bilateral delayed nephrograms related to prior contrast  examination..  Bladder: Younger catheter in place..  Reproductive organs: Coarse prostate calcifications.  Colon: Within normal limits.  Appendix: Within normal limits.  Small Bowel: Within normal limits.  Lymph nodes: No intra-abdominal or pelvic lymphadenopathy.  Vasculature: Right common femoral artery ECMO tip terminates at the  aortic bifurcation. Left-sided common femoral arterial line tip  terminates within the infrarenal aorta. A right common femoral vein  ECMO catheter tip terminates near the superior cavoatrial junction  Peritoneum: Small volume free fluid in the pelvis.     Soft tissues: Anasarca.   Bones: No suspicious osseous lesion.      Impression    IMPRESSION:   1. Increase in basilar predominant pulmonary airspace opacities which  likely represent pulmonary edema in the setting of recent cardiogenic  shock. Infection is not excluded.  2. Interval ECMO cannulation, right common femoral vein approach ECMO  cannula tip terminates at the superior cavoatrial junction and right  common femoral artery ECMO catheter tip terminates at the aortic  bifurcation.  3. Small right and trace left pleural effusions, small volume ascites,  and soft tissue anasarca.  4. Retained iodinated contrast in the renal cortices on this  noncontrast CT, related to prior contrast administration and  compatible with acute kidney injury.    I have personally reviewed the examination and initial interpretation  and I agree with the findings.    FELISHA ARMANDO,          SYSTEM ID:  [id number]   XR Chest Port 1 View     Narrative    Portable chest 10/20/2021 1303 hours    INDICATION: Check endotracheal tube placement and ECLS cannula  placement    COMPARISON: 10/20/2021 0043 hours    Findings: Heart size appears upper normal uterine for portable  technique. NG/OG tube tip and sidehole projects within the stomach.  Endotracheal tube tip approximately 3.5 cm above the brooke. A right  IJ Austin-Demetrio catheter again noted this tip in the distal right main  branch pulmonary artery. Continued patchy opacities in the lungs which  may indicate RDS (infection/edema/pulmonary hemorrhage). Inferior  approach ECLS cannula tip projects near the SVC/right atrial junction.      Impression    IMPRESSION: Continued probable ARDS, similar to earlier this morning.  ECLS cannula tip projects near the SVC/right atrial junction    NEELIMA CALABRESE MD         SYSTEM ID:  [id number]   US Lower Extremity Arterial Duplex Bilateral    Narrative    BILATERAL LOWER EXTREMITY DUPLEX ARTERIAL ULTRASOUND 10/20/2021 3:44  PM    CLINICAL HISTORY: Patient on VA ECMO. Evaluate patency of lower  extremity arteries.    COMPARISONS: None available..    REFERRING PROVIDER: MAURI FARAH    TECHNIQUE: Grayscale, color Doppler, Doppler waveform ultrasound  evaluation of bilateral external iliac arteries through anterior and  posterior tibial arteries.    FINDINGS:  RIGHT:         COMMON FEMORAL ARTERY: obscured by ECMO       PROFUNDUS FEMORAL ARTERY: obscured by ECMO         SUPERFICIAL FEMORAL ARTERY, proximal: obscured by ECMO       SUPERFICIAL FEMORAL ARTERY, mid: 37 cm/s, monophasic, turbulent       SUPERFICIAL FEMORAL ARTERY, distal: 40 cm/s, monophasic,  turbulent         POPLITEAL ARTERY: 28 cm/s, monophasic, turbulent         POSTERIOR TIBIAL ARTERY, ankle: 18 cm/s, monophasic, turbulent       ANTERIOR TIBIAL ARTERY, ankle: 8 cm/s, monophasic, turbulent    LEFT:         COMMON FEMORAL ARTERY: obscured by ECMO       PROFUNDUS FEMORAL ARTERY: obscured by ECMO          SUPERFICIAL FEMORAL ARTERY, proximal: 44 cm/s, monophasic,  turbulent       SUPERFICIAL FEMORAL ARTERY, mid: 34 cm/s, monophasic, turbulent       SUPERFICIAL FEMORAL ARTERY, distal: 33 cm/s, monophasic,  turbulent         POPLITEAL ARTERY: 22 cm/s, monophasic, turbulent         POSTERIOR TIBIAL ARTERY, ankle: 15 cm/s, monophasic, turbulent       ANTERIOR TIBIAL ARTERY, ankle: 4 cm/s, monophasic, turbulent      Impression    IMPRESSION:  1. RIGHT: The interrogated right lower extremity arteries are patent  with turbulent waveforms secondary to ECMO.    2. LEFT: The interrogated left lower extremity arteries are patent  with turbulent waveforms secondary to ECMO.        Guidelines:  [facility] duplex criteria for lower limb arterial  occlusive disease  -Percent stenosis- Normal (1-19%): Peak systolic velocity (cm/s):  <150, End-diastolic velocity (cm/s): <40, Velocity ratio (Vr): <1.5,  Distal arterial waveform: Triphasic  -Percent stenosis- 20-49%: Peak systolic velocity (cm/s): 150-200,  End-diastolic velocity (cm/s): <40, Velocity ratio (Vr): 1.5-2.0,  Distal arterial waveform: Triphasic  -Percent stenosis- 50-75%: Peak systolic velocity (cm/s): 200-300,  End-diastolic velocity (cm/s): <90, Velocity ratio (Vr): 2.0-3.9,  Distal arterial waveform: Poststenotic turbulence distal to stenosis,  monophasic distal waveform  -Percent stenosis- >75%: Peak systolic velocity (cm/s): >300,  End-diastolic velocity (cm/s): <90, Velocity ratio (Vr): >4.0, Distal  arterial waveform: Dampened distal waveform and low PSV/EDV* in the  stenosis  -Percent stenosis- Occlusion: Absent flow by color Doppler/pulsed  Doppler spectral analysis; length of occlusion estimated from distance  between exit and reentry collateral arteries  *PSV = peak systolic velocity, EDV = end-diastolic velocity  http://link.carrillo.com/chapter/10.1007/[email or web address]_23/fulltext  html    I have personally reviewed the examination and  initial interpretation  and I agree with the findings.    KIRSTIN SOARES MD         SYSTEM ID:  [id number]   XR Chest Port 1 View    Narrative    EXAMINATION: XR CHEST PORT 1 VIEW, 10/21/2021 1:57 AM    INDICATION: Check endotracheal tube placement and ECLS cannula  placement. DO NOT log-roll patient.  Place film under patient using  patient safety handling process.    COMPARISON: 10/20/2021    FINDINGS: Single portable supine AP radiograph of the chest. ET tube  projects over the mid thoracic trachea. Lower approach ECMO cannula  projects over the high right atrium. Right IJ [place] catheter has  been retracted and projects over the right main pulmonary artery.  NG/OG tube side-port and tip projects over the stomach.    Trachea is midline. The cardiomediastinal silhouette is stably  enlarged. Small bilateral pleural effusions. Right greater than left  basilar atelectasis. Unchanged bilateral interstitial and airspace  opacities. No pneumothorax.    Partially visualized upper abdomen is unremarkable. No acute osseous  abnormality. Soft tissue is within normal limits.      Impression    IMPRESSION:  1. ET tube projects over the mid thoracic trachea. Interval retraction  of the [place] catheter with tip terminating over the right main  pulmonary artery. ECMO cannula tip projects over the high right  atrium.  2. Stable right greater than left interstitial and airspace opacities  and small bilateral pleural effusions.    I have personally reviewed the examination and initial interpretation  and I agree with the findings.    STELLA MCQUEEN MD         SYSTEM ID:  [id number]             Medications     Current Facility-Administered Medications   Medication     alum & mag hydroxide-simethicone (MAALOX) suspension 30 mL     artificial tears ophthalmic ointment     aspirin (ASA) chewable tablet 81 mg     calcium chloride in  mL intermittent infusion 1 g     ceFEPIme (MAXIPIME) 2 g vial to attach to  ml bag for  ADULTS or 50 ml bag for PEDS     EPINEPHrine (ADRENALIN) 16 mg in sodium chloride 0.9 % 250 mL infusion     fentaNYL (SUBLIMAZE) 50 mcg/mL bolus from infusion pump 50 mcg     fentaNYL (SUBLIMAZE) infusion     heparin (porcine) 100 unit/mL in 0.45% Sodium Chloride ANTICOAGULANT infusion     heparin 100 UNIT/ML injection 350-710 Units     heparin 2 unit/mL in 0.9% NaCl (for REPERFUSION CATHETER)     lidocaine (LMX4) cream     lidocaine (LMX4) cream     lidocaine 1 % 0.1-1 mL     lidocaine 1 % 0.1-1 mL     magnesium sulfate 2 g in water intermittent infusion     magnesium sulfate 4 g in 100 mL sterile water (premade)     medication instruction     methylPREDNISolone sodium succinate (solu-MEDROL) 1,000 mg in sodium chloride 0.9 % 266 mL intermittent infusion     midazolam (VERSED) drip - ADULT 100 mg/100 mL in NS (pre-mix)     naloxone (NARCAN) injection 0.2 mg    Or     naloxone (NARCAN) injection 0.4 mg    Or     naloxone (NARCAN) injection 0.2 mg    Or     naloxone (NARCAN) injection 0.4 mg     norepinephrine (LEVOPHED) 16 mg in  mL infusion MAX CONC CENTRAL LINE     ondansetron (ZOFRAN-ODT) ODT tab 4 mg    Or     ondansetron (ZOFRAN) injection 4 mg     pantoprazole (PROTONIX) 2 mg/mL suspension 40 mg    Or     pantoprazole (PROTONIX) IV push injection 40 mg     Patient is already receiving anticoagulation with heparin, enoxaparin (LOVENOX), warfarin (COUMADIN)  or other anticoagulant medication     polyethylene glycol (MIRALAX) Packet 17 g     potassium chloride 20 mEq in 50 mL intermittent infusion     [Held by provider] predniSONE (DELTASONE) tablet 4 mg     senna-docusate (SENOKOT-S/PERICOLACE) 8.6-50 MG per tablet 1 tablet    Or     senna-docusate (SENOKOT-S/PERICOLACE) 8.6-50 MG per tablet 2 tablet     sodium chloride (PF) 0.9% PF flush 10 mL     sodium chloride (PF) 0.9% PF flush 3 mL     sodium chloride (PF) 0.9% PF flush 3 mL     sodium chloride (PF) 0.9% PF flush 3 mL     sodium phosphate 10 mmol in  D5W intermittent infusion     sodium phosphate 15 mmol in D5W intermittent infusion     sodium phosphate 20 mmol in D5W intermittent infusion     sodium phosphate 25 mmol in D5W intermittent infusion     vancomycin place colunga - receiving intermittent dosing

## 2021-10-21 NOTE — PROGRESS NOTES
[facility] Rheumatology follow up     Kwaku Medrano MRN# [id number]   Age: 56 year old YOB: 1965     Date of Admission: 10/19/2021    Reason for follow up: Hx of diffuse cutaneous sliding scale, ILD, Raynaud's and DM       Requesting physician: Jonah Huston MD                   Assessment and Plan:     56 years old . Kwaku Medrano with hx of  diffuse cutaneous systemic sclerosis, ILD, Raynaud's, dermatomyositis (potential overlap with scleroderma), A-flutter s/p ablation 2017, SVT, GERD and paroxysmal atrial fibrillation previously stable PFTs who has remained non compliant with his medications for last 10 months now presented with cardiogenic shock requiring [facility] ECMO support.     Decompensated heart failure/cardiogenic shock is likely related to underlying heart disease in setting of atrial fibrillation with rapid ventricular response and possible idiosyncratic response to antiarrhythmic mediations. It is less likely due to flaring of autoimmune or systemic inflammatory disease given his stable condition on last follow up. However considering is limited compliance with medication over the last 10 months it could still be the possibility.  He has hx of severe raynaud's with digital ulceration and presence of new pulmonary embolism in a patient with autoimmune could raise the concern of catastrophic antiphospholipids syndrome. However suspicion is very low and serologies for APS has been ordered to rule it out.     Marked improvement in digital and extremity perfusion since yesterday is likely due to improved circulatory support. Given low suspicion of undiagnosed or new-onset vasculitis, We do not think if there will be any therapeutic benefit with high dose steroids. Instead steroids pose a risk of scleroderma renal crisis, which may be more difficult to identify in the context of existing acute kidney injury 2/2 to cardiorenal compromise. Since prior chronic steroid use places  "patient at risk for steroid-induced adrenal insufficiency, We recommend replacing solumedrol with \"stress-dose\" hydrocortisone 20 mg IV q 12 hours for 72  Hours.     Although angiogram findings revealed non-obstructive coronary disease, However microvascular disease can lead myocardial ischemia in setting of scleroderma. When cardiovascular and renal stabilize cardiac MRI with gadolinium can be considered to look for delayed washout.    Plan:  Discontinue Solu-Medrol. (High-dose corticosteroids are associated with elevated risk of scleroderma renal crisis)  Consider stress dose Hydrocortisone 20 mg IV q 12 hours for 72 hrs (Give the hx of chronic steroid use)  ANCA vasculitis panel pending  Lupus anticoagulant panel (anticardiolipin, LAC, anti Beta-2 lipoprotein IgG/IgM) pending  Continue to follow urine output and renal function- Keep low threshold to perform peripheral smear if blood counts drop to investigate TTP versus steroid-associated scleroderma renal crisis    I saw this patient with the Rheumatology Fellow. I agree with the findings and recommendations.    Kenny Flowers M.D.  Staff Rheumatologist, [facility]  Pager [phone]          Summary      56 year old Mr Medrano has a past medical history diffuse cutaneous systemic sclerosis, ILD, Raynaud's, dermatomyositis (potential overlap with scleroderma), A-flutter s/p ablation 2017, SVT, GERD and paroxysmal atrial fibrillation. Patient was brought to [facility] ICU following asystole/PEA and subsequent cardiogenic shock.  Rheumatology was consulted for evaluation of potential involvement of scleroderma.    Patient follows with Dr. Acosta in rheumatology clinic and his last visit  was on 1/21/2020. At the time, he had well-controlled disease and was on CellCept 1500 mg daily and prednisone 4 mg daily. His last PFTs on 2/20/2020 are consistent with normal lung function.Apparently, patient has not been compliant with medications for the past 10 months, as he has " had difficulty time his medications and did not refill them after they ran out 2 months ago.    Over the past several weeks, patient has had worsening shortness of breath with exertion.  He presented to urgent care on 10/11/2021 due to this increasing shortness of breath.  Work-up at the time showed no leukocytosis, normal renal function, but an elevated CRP (21), and transaminitis (, ).  He was subsequently discharged with prednisone taper (starting at 50 mg daily).  Unfortunately, patient's shortness of breath steadily worsened over time, so he presented to the emergency department at [facility] on 10/19/2021, with worsening fatigue and weakness.    While in the emergency department, his oxygen needs increased from the nasal cannula to BiPAP.  Lactic acid was elevated at 8.4 that is trending down 3.3 (10/21).  Blood cultures were obtained and he is currently on antibiotics for empiric coverage. CT PE was performed, showing a tiny PE in the right hilar region, groundglass opacities that showed interval progression, trace bilateral pleural fluid (consistent with pulmonary edema, infection, or CHF.), Diffuse anasarca, cortical thinning of the kidneys, and a heterogenous appearing liver. Given these findings, patient was started on heparin and furosemide.  He was found to have atrial fibrillation with RVR, so he was given an amiodarone bolus for conversion. Unfortunately, patient's respiratory status continued to decline.  He was trialed off BiPAP but his blood pressure dropped and his dyspnea worsened that required intubation.  However soon after sedation with succinylcholine and etomidate and initial placement of the tube, patient arrested and went into asystole/PEA. Patient subsequently returned to ROSC within 5 minutes.  With his worsening circulatory status, patient has been placed on ECMO. He also had significantly mottled skin and dusky blue digits that in setting of underlying severe  raynaud's phenomenon and pressor support. Patient was evaluated by pulmonary who does not think that his imaging and oxygen requirements are giving any concern of ILD flare given his stable PFTs in recent past. Pulmonary recommended to hold Cellcept and felt no indication for treating with high steroids for ILD flair although stress dose steroids can be considered.     While in the ICU, patient leukocytosis has been increasing from 11.1 to 21.9.  His renal function has worsened from a baseline of 0.8-1.1, up to 1.86.  BNP elevated to 58,828.  Patient has had a negative rheumatoid work-up so far except for a positive SHERRELL (9/1/2017), 1: 160, speckled.  Other prior immune work-up, including RF, CCP, SCL 70, SSA, RNA Hermelindo III, ANCA, troponin, and myositis panel have been negative. Patient was evaluated with rheumatology and recommended to repeat ANCA vasculitis, lupus anticoagulant panel work up. Patient doesn't need high dose steroids but stress dose could be considered.      Interim Hx 10/21  Patient intubated and sedated   Family at bedside reports that patient gradually deteriorated over last few weeks. He was developing significant edema in lower extremities and having difficulty in breathing. Most recently he was also having difficulty in swallowing food. He was feeling fatigue that was also progressively getting worse over the last few weeks. Patient won't share his medical hx in detail with the family          Past Medical History:     Diffuse Cutaneous Systemic Sclerosis  Dermatomyositis  Interstitial Lung Disease  Raynauds  Aflutter s/p ablation (2017)  Paroxymal Atrial Fibrillation  SVT  GERD          Past Surgical History:     Past Surgical History:   Procedure Laterality Date     COLONOSCOPY N/A 7/19/2017    Procedure: COLONOSCOPY;  COLONOSCOPY;  Surgeon: Mita Jimenez MD;  Location:  GI     CV CORONARY ANGIOGRAM  10/20/2021    Procedure: CV CORONARY ANGIOGRAM;  Surgeon: Merlin Donato MD;   Location:  HEART CARDIAC CATH LAB     CV EXTRACORPERAL MEMBRANE OXYGENATION N/A 10/20/2021    Procedure: Extracorporeal Membrance Oxygenation;  Surgeon: Merlin Donato MD;  Location:  HEART CARDIAC CATH LAB     CV LEFT HEART CATH N/A 10/20/2021    Procedure: Left Heart Cath;  Surgeon: Merlin Donato MD;  Location:  HEART CARDIAC CATH LAB     NO HISTORY OF SURGERY               Social History:     Social History     Tobacco Use     Smoking status: Never Smoker     Smokeless tobacco: Former User     Types: Chew   Substance Use Topics     Alcohol use: Yes     Comment: very rarely             Family History:     Family History   Problem Relation Age of Onset     Prostate Cancer Father      Lung Cancer Other      Family History Negative Mother      Family History Negative Maternal Grandmother      Family History Negative Maternal Grandfather      Family History Negative Paternal Grandmother      Other - See Comments Paternal Grandfather         Atherosclerosis     Family History Negative Brother      Family History Negative Sister      Family History Negative Brother      LUNG DISEASE No family hx of      Rheumatologic Disease No family hx of              Immunizations:   COVID-19 vaccinated          Allergies:     Allergies   Allergen Reactions     Ampicillin Rash             Medications:     Medications Prior to Admission   Medication Sig Dispense Refill Last Dose     aspirin 81 MG EC tablet Take 1 tablet (81 mg) by mouth daily 30 tablet 1      Blood Pressure Monitoring KIT 1 each three times a week Dr Acosta has asked you to check your blood pressure 2-3 times per week using your home monitor.  Please keep track of your findings in a journal and bring it to your appointments.   Contact this office if your blood pressure: the top number (systolic) is consistently 30 points higher than your normal BP or if the bottom number (diastolic) is consistently 20 points higher than your normal BP. 1 kit 1       diltiazem ER COATED BEADS (CARDIZEM CD/CARTIA XT) 180 MG 24 hr capsule Take 1 capsule (180 mg) by mouth daily 60 capsule 0      lisinopril (ZESTRIL) 10 MG tablet Take 1 tablet (10 mg) by mouth daily 60 tablet 0      mycophenolate (GENERIC EQUIVALENT) 500 MG tablet Take 3 tablets (1,500 mg) by mouth 2 times daily Labs due every 8-12 weeks. Past due.  Call clinic to schedule follow up appointment.  Past due. 540 tablet 1      pantoprazole (PROTONIX) 40 MG EC tablet Take 1 tablet (40 mg) by mouth daily 60 tablet 0      predniSONE (DELTASONE) 1 MG tablet Take 4 tablets (4 mg) by mouth daily 360 tablet 0      predniSONE (DELTASONE) 10 MG tablet 5 tabs po every day for 5 days, then 4 tabs po every day for 3 days, 3 tabs po every day for 3 days, 2 tab po every day for 3 days, 1 tabs po every day for 3 days, 1/2 tab po every day for 4 days 57 tablet 0      sulfamethoxazole-trimethoprim (BACTRIM) 400-80 MG tablet Once daily for PJP prophylaxis. Start after bronchoscopy 180 tablet 3              Review of Systems:   Unable to obtain review of systems due to sedation          Physical Exam:     HEENT: Intubated. Atraumatic. Reduced wrinkles on nasolabial fold.  Cardiovascular: Regular rate, regular rhythm. No murmurs appreciated.  Pulmonary: Coarse bilaterally - on mechanical ventilation   Abdomen: Tightening of skin over abdomen. Soft, nontender to palpation.  Extremities: skin tightening over bilateral lower and upper extremities. Cool to touch. Cyanosis over distal fingertips and toes, improved with ECMO. Now with residual cyanosis of left third middle finger and right digits. Unable to assess muscular function.   Skin: Tightening of skin over bilateral lower extremities, no hair on lower extremities up ~6cm. Tightening of skin and loss of hair on upper extremities, up ~15cm to arm. Bilateral digits with peripheral flattening. Left third digit and right second digit with ulceration. Periungual telangiectasias present.  Additional tightening seen over chest and abdomen, with reduced wrinkles on face.           Data:     Autoimmune Labs:  SHERRELL + 1:160, speckled (9/14/2017)    Negative Labs:  Myositis panel negative (LENNOX 1, NXP2, MDA5, TIF1-g, MI-2, P155/114, PL-7, OJ, EJ, SRP, Marlene-1, SSA 52/60)  RF < 20 (6/22/17)  CCP negative (6/22/17)  SCL-70 negative (9/14/2017)  SSA/SSB Negative (7/7/2017)  RNA Hermelindo III (9/14/2017)  ANCA (PR3, MPO) negative (9/14/2017)  Jo1 negative (6/22/2017)      Echocardiogram:  Biplane LVEF is 14%.  Left ventricular size is normal.  Severe diffuse hypokinesis is present.  Mild to moderate right ventricular dilation is present.  Global right ventricular function is moderately reduced.  No pericardial effusion is present.  LV/RV dysfunction is new when compared to prior study      CT Chest Abdomen Pelvis (10/20/21)  1. Increase in basilar predominant pulmonary airspace opacities which likely represent pulmonary edema in the setting of recent cardiogenic shock. Infection is not excluded.  2. Interval ECMO cannulation, right common femoral vein approach ECMO cannula tip terminates at the superior cavoatrial junction and right  common femoral artery ECMO catheter tip terminates at the aortic bifurcation.  3. Small right and trace left pleural effusions, small volume ascites, and soft tissue anasarca.  4. Retained iodinated contrast in the renal cortices on this noncontrast CT, related to prior contrast administration and compatible with acute kidney injury.

## 2021-10-21 NOTE — PROGRESS NOTES
SPIRITUAL HEALTH SERVICES  SPIRITUAL ASSESSMENT Progress Note (Palliative Focus)  [facility] ([place] [phone]    REFERRAL SOURCE: Staff request.    Attempted visit with family of patient Kwaku Medrano; however, no family present during the day today per RN.     Plan: I will follow to assess spiritual support needs while Palliative Care is consulted.    Maddy Noonan  Palliative   Pager [phone]  [facility] Inpatient Team Consult pager [phone] (M-F 8-4:30)  After-hours Answering Service [phone]

## 2021-10-21 NOTE — PROGRESS NOTES
ECMO Shift Summary:[id number]      ECMO Equipment:  Console Serial Number: [id number]  Circuit Lot Number: [id number]  Oxygenator Lot number: [id number]      Patient remains on VA ECMO, all equipment is functioning and alarms are appropriately set. RPM's 3700 with flow range 4.5-4.56 L/min. Sweep gas is at 3 LPM and FiO2 60%. Circuit remains free of air, clot and fibrin. Cannulas are secure with no bleeding from site. Extremities are warm.     Significant Shift Events: None    Vent settings:  Ventilation Mode: CMV/AC  (Continuous Mandatory Ventilation/ Assist Control)  FiO2 (%): 50 %  Rate Set (breaths/minute): 18 breaths/min  Tidal Volume Set (mL): 460 mL  PEEP (cm H2O): 5 cmH2O  Oxygen Concentration (%): 50 %  Resp: 18  .    Heparin is running at 1000 u/hr, ACT range 183-219.    Urine output is 75 ml/hr , blood loss was minimal. No product given.       Intake/Output Summary (Last 24 hours) at 10/20/2021 215  Last data filed at 10/20/2021 2100  Gross per 24 hour   Intake 2321.22 ml   Output 2020 ml   Net 301.22 ml       ECHO:  No results found for this or any previous visit.  Results for orders placed during the hospital encounter of 18    Echocardiogram    Narrative  [id number]  [facility]19  [id number]  [id number]^KITTY^JOHN^AISLINN      [facility]  Echocardiography Laboratory  [address]  [address]  [place] [address]  Phone [phone]  Fax [phone]      Name: FLOR CALZADA  MRN: [id number]  : 1965  Study Date: 2018 07:50 AM  Age: 52 yrs  Gender: Male  Patient Location: [facility]  Reason For Study: Hx a flutter  Ordering Physician: JOHN TANG  Referring Physician: PREM VERONICA  Performed By: Estella Kam    BSA: 1.9 m2  Height: 69 in  Weight: 168 lb  BP: 106/58 mmHg  _____________________________________________________________________________  __      Procedure  Complete Echo  Adult.  _____________________________________________________________________________  __      Interpretation Summary    Left ventricular systolic function is normal.The visual ejection fraction is  estimated at 60-65%.  The right ventricular systolic function is normal.  The left atrium is mildly dilated.  Mild aortic root and ascending aorta dilatation.    Compared to priror study dated 08/15/2017 LVEF has improved.  _____________________________________________________________________________  __      Left Ventricle  The left ventricle is normal in size. There is normal left ventricular wall  thickness. Left ventricular systolic function is normal. The visual ejection  fraction is estimated at 60-65%. Left ventricular diastolic function is  normal. No regional wall motion abnormalities noted.    Right Ventricle  The right ventricle is normal size. The right ventricular systolic function is  normal.    Atria  The left atrium is mildly dilated. Right atrial size is normal. There is no  color Doppler evidence of an atrial shunt.    Mitral Valve  There is trace mitral regurgitation.    Tricuspid Valve  There is trace tricuspid regurgitation. The right ventricular systolic  pressure is approximated at 24.5 mmHg plus the right atrial pressure.      Aortic Valve  The aortic valve is trileaflet. lambl's excrescences seen. No aortic  regurgitation is present. No aortic stenosis is present.    Pulmonic Valve  There is trace pulmonic valvular regurgitation. There is no pulmonic valvular  stenosis.    Vessels  Mild aortic root dilatation. 3.8 cm. The ascending aorta is Mildly dilated.  3.8 cm. The IVC is normal in size and reactivity with respiration, suggesting  normal central venous pressure.    Pericardium  There is no pericardial effusion.    Rhythm  The rhythm was sinus bradycardia.    _____________________________________________________________________________  __  MMode/2D Measurements & Calculations  RVDd: 3.1  cm  IVSd: 1.1 cm  LVIDd: 4.9 cm  LVIDs: 3.4 cm  LVPWd: 0.96 cm  FS: 29.4 %  LV mass(C)d: 178.3 grams  LV mass(C)dI: 93.0 grams/m2    Ao root diam: 3.8 cm  LA dimension: 4.2 cm  asc Aorta Diam: 3.8 cm  LA/Ao: 1.1  LVOT diam: 2.5 cm  LVOT area: 5.0 cm2  LA Volume (BP): 70.6 ml  LA Volume Index (BP): 36.8 ml/m2  RWT: 0.39      Doppler Measurements & Calculations  MV E max aleks: 84.0 cm/sec  MV A max aleks: 64.1 cm/sec  MV E/A: 1.3  MV dec time: 0.31 sec    PA V2 max: 96.5 cm/sec  PA max PG: 3.7 mmHg  PA acc time: 0.13 sec  TR max aleks: 247.6 cm/sec  TR max P.5 mmHg  E/E' av.5  Lateral E/e': 5.5  Medial E/e': 9.5        _____________________________________________________________________________  __        Report approved by: Sam Pardo 2018 11:20 AM      CXR:  Recent Results (from the past 24 hour(s))   XR Chest Port 1 View    Narrative    EXAMINATION: XR CHEST PORT 1 VIEW, 10/20/2021 12:45 AM    INDICATION: PA Cath placement    COMPARISON: 10/19/2021    FINDINGS: Single portable supine AP radiograph of the chest. ET tube  projects over the mid thoracic trachea. Right IJ central venous  catheter with tip terminating over the mid SVC. Interval placement of  right IJ [place]-Demetrio catheter with tip terminating over the distal right  main pulmonary artery. Enteric tube side-port and tip projects over  the stomach.    Trachea is midline. The cardiomediastinal silhouette is stable. Small  right pleural effusion. No left pleural effusion. No pneumothorax.  Biapical pleural thickening. Increased bilateral interstitial and  airspace opacities.    Partially visualized upper abdomen demonstrates multiple air and stool  distended loops of bowel. Soft tissue and bones are within normal  limits.      Impression    IMPRESSION:  1. Interval placement of [place]-Demetrio catheter with tip terminating in  the distal right main pulmonary artery.  2. Interval retraction of right IJ central venous catheter with tip  terminating  over the mid SVC.  3. Increased bilateral interstitial and airspace opacities, right  greater than left. Small right pleural effusion.    I have personally reviewed the examination and initial interpretation  and I agree with the findings.    TAHIRA FUENTES MD         SYSTEM ID:  [id number]   Echo Complete   Result Value    Biplane LVEF 14%    Narrative    [id number]  [id number]  [id number]  [id number]^LEO^RAS     [facility]  Echocardiography Laboratory  [address] [place] [id number]     Name: FLOR CALZADA  MRN: [id number]  : 1965  Study Date: 10/20/2021 08:11 AM  Age: 56 yrs  Gender: Male  Patient Location: [facility]  Reason For Study: Heart Failure  Ordering Physician: RAS BAILEY  Referring Physician: MAURI FARAH  Performed By: Judith Burton     BSA: 1.8 m2  Height: 69 in  Weight: 137 lb  ______________________________________________________________________________  Procedure  Complete Portable Echo Adult.  ______________________________________________________________________________  Interpretation Summary  Biplane LVEF is 14%.  Left ventricular size is normal.  Severe diffuse hypokinesis is present.  Mild to moderate right ventricular dilation is present.  Global right ventricular function is moderately reduced.  No pericardial effusion is present.  LV/RV dysfunction is new when compared to prior study.  ______________________________________________________________________________  Left Ventricle  Left ventricular wall thickness is normal. Left ventricular size is normal.  Biplane LVEF is 14%. Diastolic function not assessed due to arrhythmia. Severe  diffuse hypokinesis is present.     Right Ventricle  Mild to moderate right ventricular dilation is present. Global right  ventricular function is moderately reduced.     Atria  Both atria appear normal.     Mitral Valve  Moderate mitral insufficiency is present.     Aortic Valve  Mild aortic valve  calcification is present. The valve leaflets are not well  visualized. On Doppler interrogation, there is no significant stenosis or  regurgitation.     Tricuspid Valve  The tricuspid valve is normal. Mild tricuspid insufficiency is present. The  right ventricular systolic pressure is approximated at 17.4 mmHg plus the  right atrial pressure.     Pulmonic Valve  The pulmonic valve is normal.     Vessels  The pulmonary artery and bifurcation cannot be assessed. The inferior vena  cava cannot be assessed. Mild dilatation of the aorta is present. Ascending  aorta 3.6 cm. IVC diameter >2.1 cm collapsing <50% with sniff suggests a high  RA pressure estimated at 15 mmHg or greater.     Pericardium  No pericardial effusion is present.     Miscellaneous  The PASP is 14mmHg over the RA pressure.     Compared to Previous Study  LV/RV dysfunction is new when compared to prior study.     Attestation  I have personally viewed the imaging and agree with the interpretation and  report as documented by the fellow, Garry Wray, and/or edited by me.  ______________________________________________________________________________  MMode/2D Measurements & Calculations  IVSd: 1.0 cm  LVIDd: 5.0 cm  LVIDs: 4.6 cm  LVPWd: 0.95 cm  FS: 8.5 %  LV mass(C)d: 178.7 grams  LV mass(C)dI: 101.6 grams/m2  Ao root diam: 3.6 cm  LVOT diam: 2.3 cm  LVOT area: 4.2 cm2     EF(MOD-bp): 14.0 %  RWT: 0.38     Doppler Measurements & Calculations  TR max aleks: 208.3 cm/sec  TR max P.4 mmHg     ______________________________________________________________________________  Report approved by: Sam Sharma 10/20/2021 10:06 AM         US Lower Extremity Arterial Duplex Bilateral    Narrative    ULTRASOUND LOWER EXTREMITY ARTERIAL DUPLEX BILATERAL 10/20/2021 9:57  AM    CLINICAL HISTORY: Severe mottling.     COMPARISONS: None available.    REFERRING PROVIDER: RAS BAILEY    TECHNIQUE: Bilateral leg arteries evaluated with grayscale, color  Doppler,  and Doppler waveform ultrasound.    FINDINGS: Occasional ectopy with variable waveform morphology and  amplitude.    RIGHT:       Common femoral artery: 59/18 cm/s, triphasic, 7.5 mm       Profundus femoral artery: 89/24 cm/s, triphasic, 5.3 mm         Superficial femoral artery, origin: 77/19 cm/s, triphasic, 4.6 mm       Superficial femoral artery, mid thigh: 65/14 cm/s, triphasic       Superficial femoral artery, distal thigh: 87/0 cm/s, triphasic,  3.4 mm         Popliteal artery: 39/6 cm/s, triphasic         Posterior tibial artery, ankle: 30/0 cm/s, triphasic       Anterior tibial artery, ankle: 17/0 cm/s, biphasic    LEFT:       Common femoral artery: 130/16 cm/s, triphasic, 8.0 mm       Profundus femoral artery: 93/12 cm/s, triphasic, 4.8 mm         Superficial femoral artery, origin: 111/20 cm/s, triphasic, 3.0  mm       Superficial femoral artery, mid thigh: 95/15 cm/s, triphasic, 2.9  mm       Superficial femoral artery, distal thigh: 88/14 cm/s, triphasic,  3.3 mm         Popliteal artery: 36/6 cm/s, triphasic         Posterior tibial artery, ankle: 84/14 cm/s, triphasic       Anterior tibial artery, ankle: 22/0 cm/s, triphasic      Impression    IMPRESSION:  1. Arrythmia.    2. No significant arterial stenosis demonstrated.    CIRO TALBOT MD         SYSTEM ID:  [id number]   Echo Complete    Narrative    [id number]  [id number]  [id number]^JANET^MAGGI     [facility]  Echocardiography Laboratory  [address] [place] [id number]     Name: FLOR CALZADA  MRN: [id number]  : 1965  Study Date: 10/20/2021 10:17 AM  Age: 56 yrs  Gender: Male  Patient Location: [facility]  Reason For Study: Cardiac Device, Unspecified. VA ECMO cannulation in Cath Lab  Ordering Physician: MAGGI GONZALES  Referring Physician: MAURI FARAH  Performed By: [facility] Vashti Isabel     BSA: 1.7 m2  Height: 68 in  Weight: 137 lb  BP: 119/94  mmHg  ______________________________________________________________________________  Procedure  Complete Portable Echo Adult. Contrast Optison. Optison (NDC #5199-9734-39)  given intravenously. Patient was given 10 ml mixture of 3 ml Optison and 6 ml  saline. 0 ml wasted.  ______________________________________________________________________________  Interpretation Summary  Limited TTE for VA-ECMO Cannulation     At baseline (prior to cannulation): The LVEF is 10-15% with severe RV  dysfunction. The LVEDV is 145 ml and the LVESV is 127 mL.  At 4.0 L/min: The LVEF is 5-10% with severe RV dysfunction. There is moderate  mitral regurgitation. The LVEDV is 123 mL and the LVESV is 115 mL.  ______________________________________________________________________________  ______________________________________________________________________________  QLAB 3DQ Advanced  Stroke Vol: 13.0 ml  10_EDV(3DQA): 151.1 ml  10_ESV(3DQA): 138.1 ml  10_EF(3DQA): 8.6 %  10_Tmsv 3-6: 0.00 msec  10_Tmsv 3-5: 0.00 msec  10_Tmsv 3-6 (%): 0.00 %  10_Tmsv 3-5 (%): 0.00 %     ______________________________________________________________________________  Report approved by: Sam Garcia 10/20/2021 04:01 PM         Cardiac Catheterization    Narrative    1. No angiographic evidence of coronary artery disease  2. Elevated LV ventricular pressures prior to VA-ECMO cannulation  3. Successful VA-ECMO cannulation with 17 Fr arterial 25 Fr venous   cannulas in right femoral artery/vein  4. LV end-diastolic pressure improved after placement on VA-ECMO   CT Head w/o Contrast    Narrative    CT HEAD W/O CONTRAST 10/20/2021 12:38 PM    History: Mental status change, unknown cause   ICD-10:    Comparison: None available    Technique: Using multidetector thin collimation helical acquisition  technique, axial, coronal and sagittal CT images from the skull base  to the vertex were obtained without intravenous contrast.   (topogram) image(s) also  obtained and reviewed.    Findings: There is no intracranial hemorrhage, mass effect, or midline  shift. Gray/white matter differentiation in both cerebral hemispheres  is preserved. Beam hardening artifact over the occipital lobes.  Ventricles are proportionate to the cerebral sulci. The basal cisterns  are clear.    The bony calvaria and the bones of the skull base are normal. The  visualized portions of the paranasal sinuses are clear. Scattered  fluid within the mastoid air cells.      Impression    Impression:    1. No acute intracranial pathology.  2. Scattered fluid within the mastoid air cells which is nonspecific  in the setting of intubation.    LUCIANO DA SILVA MD         SYSTEM ID:  [id number]   CT Chest Abdomen Pelvis w/o Contrast    Narrative    CT CHEST ABDOMEN PELVIS W/O CONTRAST 10/20/2021 12:44 PM    History: ECMO    Comparison: CT chest and pelvis 10/19/2021, MRI abdomen 7/5/2017    Technique: Helical CT acquisition from the lung apices to the pubic  symphysis was obtained without intravenous contrast. Axial, coronal,  and sagittal reconstructions were obtained and reviewed.    Contrast: None    Findings:     CHEST:   Right internal jugular [place]-Demetrio catheter tip terminates in the right  pulmonary artery.  Lungs: Increase in bibasilar lower lung predominant airspace opacities  superimposed on pre-existing fibrosis. Small right and trace left  pleural effusions. No pneumothorax.  Airways: The endotracheal tube tip is in the thoracic trachea. The  central tracheal bronchial tree is clear.  Vessels: Main pulmonary artery and aorta are normal in caliber. Normal  three-vessel arch. Solid air tracking from the left upper extremity to  the left subclavian vein which is likely intravenously related to  medication administration  Heart: Heart size is enlarged without pericardial effusion.  Lymph nodes: No suspicious mediastinal or hilar lymphadenopathy.  Calcified left hilar lymph nodes.  Thyroid: Within  normal limits.  Esophagus: Nasogastric tube courses through the esophagus, terminating  in the gastric fundus.    ABDOMEN PELVIS:    Liver: Redemonstration of hepatic segment 6 hypodensity which is  better characterized on prior MRI as a hemangioma. Scattered hepatic  calcifications likely sequelae of prior granulomatous infection.  Biliary system: Gallbladder is within normal limits. Trace vicariously  excreted contrast within the gallbladder lumen. No intrahepatic or  extrahepatic biliary ductal dilatation.  Pancreas: No focal mass or dilation of the main pancreatic duct.  Stomach: Within normal limits.  Spleen: Splenic calcifications likely sequelae of prior granulomatous  infection..  Adrenal glands: Within normal limits.  Kidneys: Bilateral delayed nephrograms related to prior contrast  examination..  Bladder: Younger catheter in place..  Reproductive organs: Coarse prostate calcifications.  Colon: Within normal limits.  Appendix: Within normal limits.  Small Bowel: Within normal limits.  Lymph nodes: No intra-abdominal or pelvic lymphadenopathy.  Vasculature: Right common femoral artery ECMO tip terminates at the  aortic bifurcation. Left-sided common femoral arterial line tip  terminates within the infrarenal aorta. A right common femoral vein  ECMO catheter tip terminates near the superior cavoatrial junction  Peritoneum: Small volume free fluid in the pelvis.     Soft tissues: Anasarca.   Bones: No suspicious osseous lesion.      Impression    IMPRESSION:   1. Increase in basilar predominant pulmonary airspace opacities which  likely represent pulmonary edema in the setting of recent cardiogenic  shock. Infection is not excluded.  2. Interval ECMO cannulation, right common femoral vein approach ECMO  cannula tip terminates at the superior cavoatrial junction and right  common femoral artery ECMO catheter tip terminates at the aortic  bifurcation.  3. Small right and trace left pleural effusions, small volume  ascites,  and soft tissue anasarca.  4. Retained iodinated contrast in the renal cortices on this  noncontrast CT, related to prior contrast administration and  compatible with acute kidney injury.    I have personally reviewed the examination and initial interpretation  and I agree with the findings.    FELISHA ARMANDO DO         SYSTEM ID:  [id number]   XR Chest Port 1 View    Narrative    Portable chest 10/20/2021 1303 hours    INDICATION: Check endotracheal tube placement and ECLS cannula  placement    COMPARISON: 10/20/2021 0043 hours    Findings: Heart size appears upper normal uterine for portable  technique. NG/OG tube tip and sidehole projects within the stomach.  Endotracheal tube tip approximately 3.5 cm above the brooke. A right  IJ [place]-Demetrio catheter again noted this tip in the distal right main  branch pulmonary artery. Continued patchy opacities in the lungs which  may indicate RDS (infection/edema/pulmonary hemorrhage). Inferior  approach ECLS cannula tip projects near the SVC/right atrial junction.      Impression    IMPRESSION: Continued probable ARDS, similar to earlier this morning.  ECLS cannula tip projects near the SVC/right atrial junction    NEELIMA CALABRESE MD         SYSTEM ID:  [id number]   US Lower Extremity Arterial Duplex Bilateral    Narrative    BILATERAL LOWER EXTREMITY DUPLEX ARTERIAL ULTRASOUND 10/20/2021 3:44  PM    CLINICAL HISTORY: Patient on VA ECMO. Evaluate patency of lower  extremity arteries.    COMPARISONS: None available..    REFERRING PROVIDER: MAURI FARAH    TECHNIQUE: Grayscale, color Doppler, Doppler waveform ultrasound  evaluation of bilateral external iliac arteries through anterior and  posterior tibial arteries.    FINDINGS:  RIGHT:         COMMON FEMORAL ARTERY: obscured by ECMO       PROFUNDUS FEMORAL ARTERY: obscured by ECMO         SUPERFICIAL FEMORAL ARTERY, proximal: obscured by ECMO       SUPERFICIAL FEMORAL ARTERY, mid: 37 cm/s, monophasic, turbulent        SUPERFICIAL FEMORAL ARTERY, distal: 40 cm/s, monophasic,  turbulent         POPLITEAL ARTERY: 28 cm/s, monophasic, turbulent         POSTERIOR TIBIAL ARTERY, ankle: 18 cm/s, monophasic, turbulent       ANTERIOR TIBIAL ARTERY, ankle: 8 cm/s, monophasic, turbulent    LEFT:         COMMON FEMORAL ARTERY: obscured by ECMO       PROFUNDUS FEMORAL ARTERY: obscured by ECMO         SUPERFICIAL FEMORAL ARTERY, proximal: 44 cm/s, monophasic,  turbulent       SUPERFICIAL FEMORAL ARTERY, mid: 34 cm/s, monophasic, turbulent       SUPERFICIAL FEMORAL ARTERY, distal: 33 cm/s, monophasic,  turbulent         POPLITEAL ARTERY: 22 cm/s, monophasic, turbulent         POSTERIOR TIBIAL ARTERY, ankle: 15 cm/s, monophasic, turbulent       ANTERIOR TIBIAL ARTERY, ankle: 4 cm/s, monophasic, turbulent      Impression    IMPRESSION:  1. RIGHT: The interrogated right lower extremity arteries are patent  with turbulent waveforms secondary to ECMO.    2. LEFT: The interrogated left lower extremity arteries are patent  with turbulent waveforms secondary to ECMO.        Guidelines:  [facility] duplex criteria for lower limb arterial  occlusive disease  -Percent stenosis- Normal (1-19%): Peak systolic velocity (cm/s):  <150, End-diastolic velocity (cm/s): <40, Velocity ratio (Vr): <1.5,  Distal arterial waveform: Triphasic  -Percent stenosis- 20-49%: Peak systolic velocity (cm/s): 150-200,  End-diastolic velocity (cm/s): <40, Velocity ratio (Vr): 1.5-2.0,  Distal arterial waveform: Triphasic  -Percent stenosis- 50-75%: Peak systolic velocity (cm/s): 200-300,  End-diastolic velocity (cm/s): <90, Velocity ratio (Vr): 2.0-3.9,  Distal arterial waveform: Poststenotic turbulence distal to stenosis,  monophasic distal waveform  -Percent stenosis- >75%: Peak systolic velocity (cm/s): >300,  End-diastolic velocity (cm/s): <90, Velocity ratio (Vr): >4.0, Distal  arterial waveform: Dampened distal waveform and low PSV/EDV* in  the  stenosis  -Percent stenosis- Occlusion: Absent flow by color Doppler/pulsed  Doppler spectral analysis; length of occlusion estimated from distance  between exit and reentry collateral arteries  *PSV = peak systolic velocity, EDV = end-diastolic velocity  http://link.carrillo.com/chapter/10.1007/[email or web address]_23/fulltext  html    I have personally reviewed the examination and initial interpretation  and I agree with the findings.    KIRSTIN SOARES MD         SYSTEM ID:  [id number]       Labs:  Recent Labs   Lab 10/20/21  2013 10/20/21  1820 10/20/21  1548 10/20/21  1546 10/20/21  1253 10/20/21  1253   PH 7.31* 7.32* 7.36  --   --  7.22*   PCO2 42 39 30*  --   --  43   PO2 141* 72* 84  --   --  124*   HCO3 21 20* 17*  --   --  18*   O2PER 50 50 50  40 50   < > 60    < > = values in this interval not displayed.       Lab Results   Component Value Date    HGB 11.3 (L) 10/20/2021    PHGB <30 10/20/2021     10/20/2021    FIBR 208 10/20/2021    INR 4.51 (H) 10/20/2021    PTT >240 (HH) 10/20/2021    DD 6.31 (H) 10/20/2021         Plan is to continue VA ECMO support.      Shimon Mendez, RT  ECMO Specialist  10/20/2021 9:52 PM

## 2021-10-21 NOTE — PROGRESS NOTES
ECMO Shift Summary:      ECMO Equipment:  Console Serial Number: [id number]  Circuit Lot Number: [id number]  Oxygenator Lot number: [id number]       Patient remains on VA ECMO, all equipment is functioning and alarms are appropriately set. RPM's 3525 with flow range 4.49-4.3 L/min. Sweep gas is at 1 LPM and FiO2 60%. Circuit remains free of air, clot and fibrin. Cannulas are secure with no bleeding from site. Extremities are cool and dusky.    Significant Shift Events: None    Vent settings:  Ventilation Mode: CMV/AC  (Continuous Mandatory Ventilation/ Assist Control)  FiO2 (%): 50 %  Rate Set (breaths/minute): 12 breaths/min  Tidal Volume Set (mL): 450 mL  PEEP (cm H2O): 5 cmH2O  Oxygen Concentration (%): 50 %  Resp: 18  .    Heparin is running at 1400 u/hr, ACT range 187-.195    Urine output is as charted, blood loss was none. Product given included none.       Intake/Output Summary (Last 24 hours) at 10/21/2021 0550  Last data filed at 10/21/2021 0545  Gross per 24 hour   Intake 2929.3 ml   Output 1580 ml   Net 1349.3 ml       ECHO:  No results found for this or any previous visit.  Results for orders placed during the hospital encounter of 18    Echocardiogram    Narrative  [id number]  [id number]  [id number]  [id number]^KITTY^JOHN^AISLINN      [facility]  [facility]  Echocardiography Laboratory  [address]  [address]  [place] [address]  Phone [phone]  Fax [phone]      Name: FLOR CALZADA  MRN: [id number]  : 1965  Study Date: 2018 07:50 AM  Age: 52 yrs  Gender: Male  Patient Location: [facility] [place]  Reason For Study: Hx a flutter  Ordering Physician: JOHN TANG  Referring Physician: PREM VERONICA  Performed By: Estella Kam    BSA: 1.9 m2  Height: 69 in  Weight: 168 lb  BP: 106/58 mmHg  _____________________________________________________________________________  __      Procedure  Complete Echo  Adult.  _____________________________________________________________________________  __      Interpretation Summary    Left ventricular systolic function is normal.The visual ejection fraction is  estimated at 60-65%.  The right ventricular systolic function is normal.  The left atrium is mildly dilated.  Mild aortic root and ascending aorta dilatation.    Compared to priror study dated 08/15/2017 LVEF has improved.  _____________________________________________________________________________  __      Left Ventricle  The left ventricle is normal in size. There is normal left ventricular wall  thickness. Left ventricular systolic function is normal. The visual ejection  fraction is estimated at 60-65%. Left ventricular diastolic function is  normal. No regional wall motion abnormalities noted.    Right Ventricle  The right ventricle is normal size. The right ventricular systolic function is  normal.    Atria  The left atrium is mildly dilated. Right atrial size is normal. There is no  color Doppler evidence of an atrial shunt.    Mitral Valve  There is trace mitral regurgitation.    Tricuspid Valve  There is trace tricuspid regurgitation. The right ventricular systolic  pressure is approximated at 24.5 mmHg plus the right atrial pressure.      Aortic Valve  The aortic valve is trileaflet. lambl's excrescences seen. No aortic  regurgitation is present. No aortic stenosis is present.    Pulmonic Valve  There is trace pulmonic valvular regurgitation. There is no pulmonic valvular  stenosis.    Vessels  Mild aortic root dilatation. 3.8 cm. The ascending aorta is Mildly dilated.  3.8 cm. The IVC is normal in size and reactivity with respiration, suggesting  normal central venous pressure.    Pericardium  There is no pericardial effusion.    Rhythm  The rhythm was sinus bradycardia.    _____________________________________________________________________________  __  MMode/2D Measurements & Calculations  RVDd: 3.1  cm  IVSd: 1.1 cm  LVIDd: 4.9 cm  LVIDs: 3.4 cm  LVPWd: 0.96 cm  FS: 29.4 %  LV mass(C)d: 178.3 grams  LV mass(C)dI: 93.0 grams/m2    Ao root diam: 3.8 cm  LA dimension: 4.2 cm  asc Aorta Diam: 3.8 cm  LA/Ao: 1.1  LVOT diam: 2.5 cm  LVOT area: 5.0 cm2  LA Volume (BP): 70.6 ml  LA Volume Index (BP): 36.8 ml/m2  RWT: 0.39      Doppler Measurements & Calculations  MV E max aleks: 84.0 cm/sec  MV A max aleks: 64.1 cm/sec  MV E/A: 1.3  MV dec time: 0.31 sec    PA V2 max: 96.5 cm/sec  PA max PG: 3.7 mmHg  PA acc time: 0.13 sec  TR max aleks: 247.6 cm/sec  TR max P.5 mmHg  E/E' av.5  Lateral E/e': 5.5  Medial E/e': 9.5        _____________________________________________________________________________  __        Report approved by: Sam Pardo 2018 11:20 AM      CXR:  Recent Results (from the past 24 hour(s))   Echo Complete   Result Value    Biplane LVEF 14%    [facility]    [id number]  [id number]  [id number]  [id number]^LEO^RAS     [facility]  Echocardiography Laboratory  [address] [place] [address]     Name: FLOR CALZADA  MRN: [id number]  : 1965  Study Date: 10/20/2021 08:11 AM  Age: 56 yrs  Gender: Male  Patient Location: [facility]  Reason For Study: Heart Failure  Ordering Physician: RAS BAILEY  Referring Physician: MAURI FARAH  Performed By: Judith Burton     BSA: 1.8 m2  Height: 69 in  Weight: 137 lb  ______________________________________________________________________________  Procedure  Complete Portable Echo Adult.  ______________________________________________________________________________  Interpretation Summary  Biplane LVEF is 14%.  Left ventricular size is normal.  Severe diffuse hypokinesis is present.  Mild to moderate right ventricular dilation is present.  Global right ventricular function is moderately reduced.  No pericardial effusion is present.  LV/RV dysfunction is new when compared to prior  study.  ______________________________________________________________________________  Left Ventricle  Left ventricular wall thickness is normal. Left ventricular size is normal.  Biplane LVEF is 14%. Diastolic function not assessed due to arrhythmia. Severe  diffuse hypokinesis is present.     Right Ventricle  Mild to moderate right ventricular dilation is present. Global right  ventricular function is moderately reduced.     Atria  Both atria appear normal.     Mitral Valve  Moderate mitral insufficiency is present.     Aortic Valve  Mild aortic valve calcification is present. The valve leaflets are not well  visualized. On Doppler interrogation, there is no significant stenosis or  regurgitation.     Tricuspid Valve  The tricuspid valve is normal. Mild tricuspid insufficiency is present. The  right ventricular systolic pressure is approximated at 17.4 mmHg plus the  right atrial pressure.     Pulmonic Valve  The pulmonic valve is normal.     Vessels  The pulmonary artery and bifurcation cannot be assessed. The inferior vena  cava cannot be assessed. Mild dilatation of the aorta is present. Ascending  aorta 3.6 cm. IVC diameter >2.1 cm collapsing <50% with sniff suggests a high  RA pressure estimated at 15 mmHg or greater.     Pericardium  No pericardial effusion is present.     Miscellaneous  The PASP is 14mmHg over the RA pressure.     Compared to Previous Study  LV/RV dysfunction is new when compared to prior study.     Attestation  I have personally viewed the imaging and agree with the interpretation and  report as documented by the fellow, Garry Wray, and/or edited by me.  ______________________________________________________________________________  MMode/2D Measurements & Calculations  IVSd: 1.0 cm  LVIDd: 5.0 cm  LVIDs: 4.6 cm  LVPWd: 0.95 cm  FS: 8.5 %  LV mass(C)d: 178.7 grams  LV mass(C)dI: 101.6 grams/m2  Ao root diam: 3.6 cm  LVOT diam: 2.3 cm  LVOT area: 4.2 cm2     EF(MOD-bp): 14.0 %  RWT:  0.38     Doppler Measurements & Calculations  TR max aleks: 208.3 cm/sec  TR max P.4 mmHg     ______________________________________________________________________________  Report approved by: Sam Sharma 10/20/2021 10:06 AM         US Lower Extremity Arterial Duplex Bilateral    Narrative    ULTRASOUND LOWER EXTREMITY ARTERIAL DUPLEX BILATERAL 10/20/2021 9:57  AM    CLINICAL HISTORY: Severe mottling.     COMPARISONS: None available.    REFERRING PROVIDER: RAS BAILEY    TECHNIQUE: Bilateral leg arteries evaluated with grayscale, color  Doppler, and Doppler waveform ultrasound.    FINDINGS: Occasional ectopy with variable waveform morphology and  amplitude.    RIGHT:       Common femoral artery: 59/18 cm/s, triphasic, 7.5 mm       Profundus femoral artery: 89/24 cm/s, triphasic, 5.3 mm         Superficial femoral artery, origin: 77/19 cm/s, triphasic, 4.6 mm       Superficial femoral artery, mid thigh: 65/14 cm/s, triphasic       Superficial femoral artery, distal thigh: 87/0 cm/s, triphasic,  3.4 mm         Popliteal artery: 39/6 cm/s, triphasic         Posterior tibial artery, ankle: 30/0 cm/s, triphasic       Anterior tibial artery, ankle: 17/0 cm/s, biphasic    LEFT:       Common femoral artery: 130/16 cm/s, triphasic, 8.0 mm       Profundus femoral artery: 93/12 cm/s, triphasic, 4.8 mm         Superficial femoral artery, origin: 111/20 cm/s, triphasic, 3.0  mm       Superficial femoral artery, mid thigh: 95/15 cm/s, triphasic, 2.9  mm       Superficial femoral artery, distal thigh: 88/14 cm/s, triphasic,  3.3 mm         Popliteal artery: 36/6 cm/s, triphasic         Posterior tibial artery, ankle: 84/14 cm/s, triphasic       Anterior tibial artery, ankle: 22/0 cm/s, triphasic      Impression    IMPRESSION:  1. Arrythmia.    2. No significant arterial stenosis demonstrated.    CIRO TALBOT MD         SYSTEM ID:  [id number]   Echo Complete    Narrative     [id number]  [id number]  [id number]  [id number]^JANET^MAGGI     [facility]  Echocardiography Laboratory  [address] [place] [address]     Name: FLOR CALZADA  MRN: [id number]  : 1965  Study Date: 10/20/2021 10:17 AM  Age: 56 yrs  Gender: Male  Patient Location: [facility]  Reason For Study: Cardiac Device, Unspecified. VA ECMO cannulation in Cath Lab  Ordering Physician: MAGGI GONZALES  Referring Physician: MAURI FARAH  Performed By: CJ Isabel     BSA: 1.7 m2  Height: 68 in  Weight: 137 lb  BP: 119/94 mmHg  ______________________________________________________________________________  Procedure  Complete Portable Echo Adult. Contrast Optison. Optison (NDC #0415-7773-51)  given intravenously. Patient was given 10 ml mixture of 3 ml Optison and 6 ml  saline. 0 ml wasted.  ______________________________________________________________________________  Interpretation Summary  Limited TTE for VA-ECMO Cannulation     At baseline (prior to cannulation): The LVEF is 10-15% with severe RV  dysfunction. The LVEDV is 145 ml and the LVESV is 127 mL.  At 4.0 L/min: The LVEF is 5-10% with severe RV dysfunction. There is moderate  mitral regurgitation. The LVEDV is 123 mL and the LVESV is 115 mL.  ______________________________________________________________________________  ______________________________________________________________________________  QLAB 3DQ Advanced  Stroke Vol: 13.0 ml  10_EDV(3DQA): 151.1 ml  10_ESV(3DQA): 138.1 ml  10_EF(3DQA): 8.6 %  10_Tmsv 3-6: 0.00 msec  10_Tmsv 3-5: 0.00 msec  10_Tmsv 3-6 (%): 0.00 %  10_Tmsv 3-5 (%): 0.00 %     ______________________________________________________________________________  Report approved by: Sam Garcia 10/20/2021 04:01 PM         Cardiac Catheterization    Narrative    1. No angiographic evidence of coronary artery disease  2. Elevated LV ventricular pressures prior to VA-ECMO  cannulation  3. Successful VA-ECMO cannulation with 17 Fr arterial 25 Fr venous   cannulas in right femoral artery/vein  4. LV end-diastolic pressure improved after placement on VA-ECMO   CT Head w/o Contrast    Narrative    CT HEAD W/O CONTRAST 10/20/2021 12:38 PM    History: Mental status change, unknown cause   ICD-10:    Comparison: None available    Technique: Using multidetector thin collimation helical acquisition  technique, axial, coronal and sagittal CT images from the skull base  to the vertex were obtained without intravenous contrast.   (topogram) image(s) also obtained and reviewed.    Findings: There is no intracranial hemorrhage, mass effect, or midline  shift. Gray/white matter differentiation in both cerebral hemispheres  is preserved. Beam hardening artifact over the occipital lobes.  Ventricles are proportionate to the cerebral sulci. The basal cisterns  are clear.    The bony calvaria and the bones of the skull base are normal. The  visualized portions of the paranasal sinuses are clear. Scattered  fluid within the mastoid air cells.      Impression    Impression:    1. No acute intracranial pathology.  2. Scattered fluid within the mastoid air cells which is nonspecific  in the setting of intubation.    LUCIANO DA SILVA MD         SYSTEM ID:  [id number]   CT Chest Abdomen Pelvis w/o Contrast    Narrative    CT CHEST ABDOMEN PELVIS W/O CONTRAST 10/20/2021 12:44 PM    History: ECMO    Comparison: CT chest and pelvis 10/19/2021, MRI abdomen 7/5/2017    Technique: Helical CT acquisition from the lung apices to the pubic  symphysis was obtained without intravenous contrast. Axial, coronal,  and sagittal reconstructions were obtained and reviewed.    Contrast: None    Findings:     CHEST:   Right internal jugular [place]-Demetrio catheter tip terminates in the right  pulmonary artery.  Lungs: Increase in bibasilar lower lung predominant airspace opacities  superimposed on pre-existing fibrosis. Small right  and trace left  pleural effusions. No pneumothorax.  Airways: The endotracheal tube tip is in the thoracic trachea. The  central tracheal bronchial tree is clear.  Vessels: Main pulmonary artery and aorta are normal in caliber. Normal  three-vessel arch. Solid air tracking from the left upper extremity to  the left subclavian vein which is likely intravenously related to  medication administration  Heart: Heart size is enlarged without pericardial effusion.  Lymph nodes: No suspicious mediastinal or hilar lymphadenopathy.  Calcified left hilar lymph nodes.  Thyroid: Within normal limits.  Esophagus: Nasogastric tube courses through the esophagus, terminating  in the gastric fundus.    ABDOMEN PELVIS:    Liver: Redemonstration of hepatic segment 6 hypodensity which is  better characterized on prior MRI as a hemangioma. Scattered hepatic  calcifications likely sequelae of prior granulomatous infection.  Biliary system: Gallbladder is within normal limits. Trace vicariously  excreted contrast within the gallbladder lumen. No intrahepatic or  extrahepatic biliary ductal dilatation.  Pancreas: No focal mass or dilation of the main pancreatic duct.  Stomach: Within normal limits.  Spleen: Splenic calcifications likely sequelae of prior granulomatous  infection..  Adrenal glands: Within normal limits.  Kidneys: Bilateral delayed nephrograms related to prior contrast  examination..  Bladder: Younger catheter in place..  Reproductive organs: Coarse prostate calcifications.  Colon: Within normal limits.  Appendix: Within normal limits.  Small Bowel: Within normal limits.  Lymph nodes: No intra-abdominal or pelvic lymphadenopathy.  Vasculature: Right common femoral artery ECMO tip terminates at the  aortic bifurcation. Left-sided common femoral arterial line tip  terminates within the infrarenal aorta. A right common femoral vein  ECMO catheter tip terminates near the superior cavoatrial junction  Peritoneum: Small volume free  fluid in the pelvis.     Soft tissues: Anasarca.   Bones: No suspicious osseous lesion.      Impression    IMPRESSION:   1. Increase in basilar predominant pulmonary airspace opacities which  likely represent pulmonary edema in the setting of recent cardiogenic  shock. Infection is not excluded.  2. Interval ECMO cannulation, right common femoral vein approach ECMO  cannula tip terminates at the superior cavoatrial junction and right  common femoral artery ECMO catheter tip terminates at the aortic  bifurcation.  3. Small right and trace left pleural effusions, small volume ascites,  and soft tissue anasarca.  4. Retained iodinated contrast in the renal cortices on this  noncontrast CT, related to prior contrast administration and  compatible with acute kidney injury.    I have personally reviewed the examination and initial interpretation  and I agree with the findings.    FELISHA ARMANDO DO         SYSTEM ID:  [id number]   XR Chest Port 1 View    Narrative    Portable chest 10/20/2021 1303 hours    INDICATION: Check endotracheal tube placement and ECLS cannula  placement    COMPARISON: 10/20/2021 0043 hours    Findings: Heart size appears upper normal uterine for portable  technique. NG/OG tube tip and sidehole projects within the stomach.  Endotracheal tube tip approximately 3.5 cm above the brooke. A right  IJ [place]-Demetrio catheter again noted this tip in the distal right main  branch pulmonary artery. Continued patchy opacities in the lungs which  may indicate RDS (infection/edema/pulmonary hemorrhage). Inferior  approach ECLS cannula tip projects near the SVC/right atrial junction.      Impression    IMPRESSION: Continued probable ARDS, similar to earlier this morning.  ECLS cannula tip projects near the SVC/right atrial junction    NEELIMA CALABRESE MD         SYSTEM ID:  [id number]   US Lower Extremity Arterial Duplex Bilateral    Narrative    BILATERAL LOWER EXTREMITY DUPLEX ARTERIAL ULTRASOUND 10/20/2021  3:44  PM    CLINICAL HISTORY: Patient on VA ECMO. Evaluate patency of lower  extremity arteries.    COMPARISONS: None available..    REFERRING PROVIDER: MAURI FARAH    TECHNIQUE: Grayscale, color Doppler, Doppler waveform ultrasound  evaluation of bilateral external iliac arteries through anterior and  posterior tibial arteries.    FINDINGS:  RIGHT:         COMMON FEMORAL ARTERY: obscured by ECMO       PROFUNDUS FEMORAL ARTERY: obscured by ECMO         SUPERFICIAL FEMORAL ARTERY, proximal: obscured by ECMO       SUPERFICIAL FEMORAL ARTERY, mid: 37 cm/s, monophasic, turbulent       SUPERFICIAL FEMORAL ARTERY, distal: 40 cm/s, monophasic,  turbulent         POPLITEAL ARTERY: 28 cm/s, monophasic, turbulent         POSTERIOR TIBIAL ARTERY, ankle: 18 cm/s, monophasic, turbulent       ANTERIOR TIBIAL ARTERY, ankle: 8 cm/s, monophasic, turbulent    LEFT:         COMMON FEMORAL ARTERY: obscured by ECMO       PROFUNDUS FEMORAL ARTERY: obscured by ECMO         SUPERFICIAL FEMORAL ARTERY, proximal: 44 cm/s, monophasic,  turbulent       SUPERFICIAL FEMORAL ARTERY, mid: 34 cm/s, monophasic, turbulent       SUPERFICIAL FEMORAL ARTERY, distal: 33 cm/s, monophasic,  turbulent         POPLITEAL ARTERY: 22 cm/s, monophasic, turbulent         POSTERIOR TIBIAL ARTERY, ankle: 15 cm/s, monophasic, turbulent       ANTERIOR TIBIAL ARTERY, ankle: 4 cm/s, monophasic, turbulent      Impression    IMPRESSION:  1. RIGHT: The interrogated right lower extremity arteries are patent  with turbulent waveforms secondary to ECMO.    2. LEFT: The interrogated left lower extremity arteries are patent  with turbulent waveforms secondary to ECMO.        Guidelines:  [facility] duplex criteria for lower limb arterial  occlusive disease  -Percent stenosis- Normal (1-19%): Peak systolic velocity (cm/s):  <150, End-diastolic velocity (cm/s): <40, Velocity ratio (Vr): <1.5,  Distal arterial waveform: Triphasic  -Percent stenosis- 20-49%:  Peak systolic velocity (cm/s): 150-200,  End-diastolic velocity (cm/s): <40, Velocity ratio (Vr): 1.5-2.0,  Distal arterial waveform: Triphasic  -Percent stenosis- 50-75%: Peak systolic velocity (cm/s): 200-300,  End-diastolic velocity (cm/s): <90, Velocity ratio (Vr): 2.0-3.9,  Distal arterial waveform: Poststenotic turbulence distal to stenosis,  monophasic distal waveform  -Percent stenosis- >75%: Peak systolic velocity (cm/s): >300,  End-diastolic velocity (cm/s): <90, Velocity ratio (Vr): >4.0, Distal  arterial waveform: Dampened distal waveform and low PSV/EDV* in the  stenosis  -Percent stenosis- Occlusion: Absent flow by color Doppler/pulsed  Doppler spectral analysis; length of occlusion estimated from distance  between exit and reentry collateral arteries  *PSV = peak systolic velocity, EDV = end-diastolic velocity  http://link.carrillo.com/chapter/10.1007/[email or web address]_23/fulltext  html    I have personally reviewed the examination and initial interpretation  and I agree with the findings.    KIRSTIN SOARES MD         SYSTEM ID:  [id number]       Labs:  Recent Labs   Lab 10/21/21  0350 10/21/21  0348 10/21/21  0301 10/21/21  0159 10/20/21  2359 10/20/21  2359   PH 7.35  --  7.35 7.46*  --  7.43   PCO2 36  --  37 26*  --  29*   PO2 92  --  90 113*  --  106*   HCO3 20*  --  21 19*  --  19*   O2PER 50  60 50 50 50   < > 50    < > = values in this interval not displayed.       Lab Results   Component Value Date    HGB 10.7 (L) 10/21/2021    PHGB <30 10/21/2021     10/21/2021    FIBR 221 10/21/2021    INR 4.67 (H) 10/21/2021     (HH) 10/21/2021    DD 7.19 (H) 10/21/2021         Plan is to continue to support.      Lianne Underwood, RT  ECMO Specialist  10/21/2021 5:50 AM

## 2021-10-21 NOTE — PROGRESS NOTES
ECMO Shift Summary:      ECMO Equipment:  Console Serial Number: [id number]  Circuit Lot Number: [id number]  Oxygenator Lot number: [id number]      Patient remains on VA ECMO, all equipment is functioning and alarms are appropriately set. RPM's 3375 with flow range 4.07-4.32 L/min. Sweep gas is at 1 LPM and FiO2 60%. Circuit remains free of air, clot and fibrin. Cannulas are secure with no bleeding from site. Extremities are cool.     Significant Shift Events: Decreased flows to 4L.    Vent settings:  Ventilation Mode: CMV/AC  (Continuous Mandatory Ventilation/ Assist Control)  FiO2 (%): 50 %  Rate Set (breaths/minute): 12 breaths/min  Tidal Volume Set (mL): 460 mL  PEEP (cm H2O): 5 cmH2O  Oxygen Concentration (%): 50 %  Resp: 12  .    Heparin is running at 1400 u/hr, ACT range 203-211.    Urine output is low, blood loss was minimal. Product given included 1 unit of FFP.       Intake/Output Summary (Last 24 hours) at 10/21/2021 1658  Last data filed at 10/21/2021 1600  Gross per 24 hour   Intake 3145.91 ml   Output 697 ml   Net 2448.91 ml       ECHO:  No results found for this or any previous visit.  Results for orders placed during the hospital encounter of 18    Echocardiogram    Narrative  [id number]  [facility]  [id number]  [id number]^KITTY^JOHN^AISLINN      [facility]  [facility]  Echocardiography Laboratory  [address]  [address]  [place] [address]  Phone [phone]  Fax [phone]      Name: FLOR CALZADA  MRN: [id number]  : 1965  Study Date: 2018 07:50 AM  Age: 52 yrs  Gender: Male  [place] [facility] [place]  Reason For Study: Hx a flutter  Ordering Physician: JOHN TANG  Referring Physician: PREM VERONICA  Performed By: Estella Kam    BSA: 1.9 m2  Height: 69 in  Weight: 168 lb  BP: 106/58 mmHg  _____________________________________________________________________________  __      Procedure  Complete Echo  Adult.  _____________________________________________________________________________  __      Interpretation Summary    Left ventricular systolic function is normal.The visual ejection fraction is  estimated at 60-65%.  The right ventricular systolic function is normal.  The left atrium is mildly dilated.  Mild aortic root and ascending aorta dilatation.    Compared to priror study dated 08/15/2017 LVEF has improved.  _____________________________________________________________________________  __      Left Ventricle  The left ventricle is normal in size. There is normal left ventricular wall  thickness. Left ventricular systolic function is normal. The visual ejection  fraction is estimated at 60-65%. Left ventricular diastolic function is  normal. No regional wall motion abnormalities noted.    Right Ventricle  The right ventricle is normal size. The right ventricular systolic function is  normal.    Atria  The left atrium is mildly dilated. Right atrial size is normal. There is no  color Doppler evidence of an atrial shunt.    Mitral Valve  There is trace mitral regurgitation.    Tricuspid Valve  There is trace tricuspid regurgitation. The right ventricular systolic  pressure is approximated at 24.5 mmHg plus the right atrial pressure.      Aortic Valve  The aortic valve is trileaflet. lambl's excrescences seen. No aortic  regurgitation is present. No aortic stenosis is present.    Pulmonic Valve  There is trace pulmonic valvular regurgitation. There is no pulmonic valvular  stenosis.    Vessels  Mild aortic root dilatation. 3.8 cm. The ascending aorta is Mildly dilated.  3.8 cm. The IVC is normal in size and reactivity with respiration, suggesting  normal central venous pressure.    Pericardium  There is no pericardial effusion.    Rhythm  The rhythm was sinus bradycardia.    _____________________________________________________________________________  __  MMode/2D Measurements & Calculations  RVDd: 3.1  cm  IVSd: 1.1 cm  LVIDd: 4.9 cm  LVIDs: 3.4 cm  LVPWd: 0.96 cm  FS: 29.4 %  LV mass(C)d: 178.3 grams  LV mass(C)dI: 93.0 grams/m2    Ao root diam: 3.8 cm  LA dimension: 4.2 cm  asc Aorta Diam: 3.8 cm  LA/Ao: 1.1  LVOT diam: 2.5 cm  LVOT area: 5.0 cm2  LA Volume (BP): 70.6 ml  LA Volume Index (BP): 36.8 ml/m2  RWT: 0.39      Doppler Measurements & Calculations  MV E max aleks: 84.0 cm/sec  MV A max aleks: 64.1 cm/sec  MV E/A: 1.3  MV dec time: 0.31 sec    PA V2 max: 96.5 cm/sec  PA max PG: 3.7 mmHg  PA acc time: 0.13 sec  TR max aleks: 247.6 cm/sec  TR max P.5 mmHg  E/E' av.5  Lateral E/e': 5.5  Medial E/e': 9.5        _____________________________________________________________________________  __        Report approved by: Sam Pardo 2018 11:20 AM      CXR:  Recent Results (from the past 24 hour(s))   XR Chest Port 1 View    Narrative    EXAMINATION: XR CHEST PORT 1 VIEW, 10/21/2021 1:57 AM    INDICATION: Check endotracheal tube placement and ECLS cannula  placement. DO NOT log-roll patient.  Place film under patient using  patient safety handling process.    COMPARISON: 10/20/2021    FINDINGS: Single portable supine AP radiograph of the chest. ET tube  projects over the mid thoracic trachea. Lower approach ECMO cannula  projects over the high right atrium. Right IJ [place]-Demetrio catheter has  been retracted and projects over the right main pulmonary artery.  NG/OG tube side-port and tip projects over the stomach.    Trachea is midline. The cardiomediastinal silhouette is stably  enlarged. Small bilateral pleural effusions. Right greater than left  basilar atelectasis. Unchanged bilateral interstitial and airspace  opacities. No pneumothorax.    Partially visualized upper abdomen is unremarkable. No acute osseous  abnormality. Soft tissue is within normal limits.      Impression    IMPRESSION:  1. ET tube projects over the mid thoracic trachea. Interval retraction  of the [place]-Demetrio catheter with tip  terminating over the right main  pulmonary artery. ECMO cannula tip projects over the high right  atrium.  2. Stable right greater than left interstitial and airspace opacities  and small bilateral pleural effusions.    I have personally reviewed the examination and initial interpretation  and I agree with the findings.    STELLA MCQUEEN MD         SYSTEM ID:  [id number]   Echocardiogram Limited   Result Value    3D LVEF 15%    LVEF  10-20% (severely reduced)    Narrative    [id number]  [id number]  [id number]  [id number]^CHRISTOPHE^MIGUEL^DYLAN CHARLES     [facility]  Echocardiography Laboratory  [address] [place] [id number]     Name: FLOR CALZADA  MRN: [id number]  : 1965  Study Date: 10/21/2021 10:37 AM  Age: 56 yrs  Gender: Male  Patient Location: [facility]  Reason For Study: Heart Failure  Ordering Physician: MIGUEL SAEED  Referring Physician: MAURI FARAH  Performed By: Dolly Oconnor     BSA: 1.7 m2  Height: 68 in  Weight: 137 lb  ______________________________________________________________________________  Procedure  Limited Portable Echo Adult. 3D image acquisition, reconstruction, and real-  time interpretation was performed.  ______________________________________________________________________________  Interpretation Summary  VA ECMO 4.2 LPM     Left ventricular function is decreased. The ejection fraction is 10-20%  (severely reduced).  Global right ventricular function is severely reduced.  Moderate mitral insufficiency is present.  No pericardial effusion is present.  There has been no change.  ______________________________________________________________________________  Left Ventricle  3D LVEF volumetric analysis is 15%. Moderate to severe left ventricular  dilation is present. Left ventricular function is decreased. The ejection  fraction is 10-20% (severely reduced). Severe diffuse hypokinesis is present.     Right Ventricle  Moderate  right ventricular dilation is present. Global right ventricular  function is severely reduced.     Mitral Valve  Moderate mitral insufficiency is present.     Tricuspid Valve  Mild to moderate tricuspid insufficiency is present. The right ventricular  systolic pressure is approximated at 14.4 mmHg plus the right atrial pressure.     Pericardium  No pericardial effusion is present.     Compared to Previous Study  There has been no change.  ______________________________________________________________________________  MMode/2D Measurements & Calculations  IVSd: 1.2 cm  LVIDd: 5.1 cm  LVIDs: 4.7 cm  LVPWd: 1.2 cm  FS: 8.4 %  LV mass(C)d: 239.3 grams  LV mass(C)dI: 137.5 grams/m2  RWT: 0.46     Doppler Measurements & Calculations  TR max aleks: 188.7 cm/sec  TR max P.4 mmHg     ______________________________________________________________________________  Report approved by: Sam Valerio 10/21/2021 01:25 PM         US Abdomen Limited    Narrative    EXAMINATION: US ABDOMEN LIMITED, 10/21/2021 1:45 PM    COMPARISON: CT abdomen and pelvis 10/20/2021    HISTORY: Elevated LFTs. Shock liver    TECHNIQUE: The abdomen was scanned in standard fashion with  specialized ultrasound transducer(s) using both grey scale and limited  color Doppler techniques.    FINDINGS:   Fluid: Mild ascites and right pleural effusion. ECMO cannula in the  IVC.    Liver: The liver demonstrates normal echotexture, measuring 16.7 cm in  craniocaudal dimension. Hyperechoic mass measuring 2.5 cm in segment 6  corresponds to hemangioma detected on prior MRI.     Gallbladder: There is mild wall thickening. No pericholecystic fluid  or cholelithiasis. Sonographic Duncan's could not be evaluated as the  patient is sedated.    Bile Ducts: Both the intra- and extrahepatic biliary system are of  normal caliber.  The common bile duct measures 4 mm in diameter.    Pancreas: Obscured by bowel gas     Kidney: The right kidney measures 10.6 cm long.  There is no  hydronephrosis or hydroureter, no shadowing renal calculi, cystic  lesion or mass.       Impression    IMPRESSION:   1.  Mild hepatomegaly with mild ascites and right pleural effusion.  2.  Mild gallbladder wall thickening is likely due to underlying liver  disease and/or ascites. No other signs of cholecystitis.  3.  Stable hepatic hemangioma.    COURTNEY DE OLIVEIRA MD         SYSTEM ID:  [id number]       Labs:  Recent Labs   Lab 10/21/21  1544 10/21/21  1543 10/21/21  1350 10/21/21  1151 10/21/21  0951 10/21/21  0951   PH 7.42  --  7.42 7.40  --  7.38   PCO2 29*  --  29* 31*  --  33*   PO2 152*  --  135* 121*  --  127*   HCO3 19*  --  19* 20*  --  20*   O2PER 50  60 50 50 50   < > 50    < > = values in this interval not displayed.       Lab Results   Component Value Date    HGB 9.9 (L) 10/21/2021    PHGB <30 10/21/2021     (L) 10/21/2021    FIBR 220 10/21/2021    INR 5.61 (HH) 10/21/2021     (HH) 10/21/2021    DD 5.26 (H) 10/21/2021    ANTCH 37 (L) 10/21/2021         Plan is to continue VA ECMO support.      Georgiana Rooney, RRT  ECMO Specialist  10/21/2021 4:58 PM

## 2021-10-21 NOTE — PLAN OF CARE
Off sedation AM, maintains on fentanyl drip for comfort.  RASS -4, E1V1M1, only face grimacing occasionally.  Pupil equal and sluggish reactive.  MAP still soft, currently on epi 0.08. , A fib with occasional arrhythmia.  Maintains on VA ECMO flow 4, sweep 1 O2 60%. No oozing from canula site, absent bilateral dorsalis pulses.  Ventilated on a/c mode same settings.  No urine despite albumin 250cc given. Will closely monitor potassium and acid-base balance, may start CRRT tonight if numbers continue to worsen.

## 2021-10-21 NOTE — PLAN OF CARE
Pt remains sedated w/versed and fentanyl gtts, PERRL, afebrile, coughs to suction, does not follow commands. Vent settings cmv 50%/460/5/12, (RR changed f/ 18 to 12 overnight), and LS diminished. HR , junctional rhythm to afib this AM, MAP>65, levo gtt titrated off, on epi gtt.  Extremities cool, warming up slightly now, digits still dusky throughout. Heparin gtt up to 1400units per ACT goals. 750ml albumin given total for decreasing uop and for BP support overnight. Pulsatility has returned, sats are reading now. ECMO 60%, sweep 2, flows 4.3, and speed 3500, no other blood products overnight. UOP decreasing to 15ml/hr, no BMs. OG to LIS, not feeding yet. Calcium replaced x2.  Will continue to update MD with any changes in condition per protocol.

## 2021-10-21 NOTE — PROGRESS NOTES
ECMO Attending Progress Note  10/21/2021    Kwaku Medrano is a 56 year old male who was cannulated for ECMO 10/20/2021 due to profound cardiogenic and vasodilatory shock.    Cannulation Site:  17F Fr in the R femoral artery  25F Fr in the R femoral vein  8F reperfusion cannula    Interval events: pulsatility improved overnight, decreased flow, increased epi and decreased ne. Small fluid challenge with albumin    Physical Exam:  Temp:  [95.9  F (35.5  C)-98.8  F (37.1  C)] 98.8  F (37.1  C)  Pulse:  [] 104  Resp:  [12-22] 12  BP: (108)/(88) 108/88  MAP:  [60 mmHg-82 mmHg] 66 mmHg  Arterial Line BP: ()/(40-84) 85/42  FiO2 (%):  [50 %-60 %] 50 %  SpO2:  [85 %-100 %] 94 %    Intake/Output Summary (Last 24 hours) at 10/20/2021 1834  Last data filed at 10/20/2021 1800  Gross per 24 hour   Intake 1513.8 ml   Output 1840 ml   Net -326.2 ml    Ventilation Mode: CMV/AC  (Continuous Mandatory Ventilation/ Assist Control)  FiO2 (%): 50 %  Rate Set (breaths/minute): 12 breaths/min  Tidal Volume Set (mL): 460 mL  PEEP (cm H2O): 5 cmH2O  Oxygen Concentration (%): 50 %  Resp: 12       Labs:  Recent Labs   Lab 10/21/21  1151 10/21/21  0951 10/21/21  0753 10/21/21  0558   PH 7.40 7.38 7.39 7.37   PCO2 31* 33* 33* 37   PO2 121* 127* 124* 132*   HCO3 20* 20* 20* 21   O2PER 50 50 50 50      Recent Labs   Lab 10/21/21  0952 10/21/21  0348 10/20/21  2158 10/20/21  1546   WBC 16.7* 17.5* 15.9* 16.0*   HGB 9.9* 10.7* 10.6* 11.3*     Creatinine   Date Value Ref Range Status   10/21/2021 2.89 (H) 0.66 - 1.25 mg/dL Final   10/21/2021 2.61 (H) 0.66 - 1.25 mg/dL Final   10/20/2021 2.38 (H) 0.66 - 1.25 mg/dL Final   10/20/2021 2.06 (H) 0.66 - 1.25 mg/dL Final   05/27/2020 1.18 0.66 - 1.25 mg/dL Final   02/18/2020 1.07 0.66 - 1.25 mg/dL Final   01/17/2020 0.91 0.66 - 1.25 mg/dL Final   11/21/2019 1.02 0.66 - 1.25 mg/dL Final       Blood Flow (Circuit) LPM: 4.76 LPM  Gas Flow  LPM: (S) 3 LPM  Gas FiO2   %: 40 %  ACT  (seconds): 183  seconds  Blood Temp  (degrees C): 36.7 C  Pulse Oximetry  (SpO2%):  (Unable to obtain)  Arterial Pressure  mmHg: 320 mmHg      ECMO Issues including assessments and plan on DOS 10/21/2021:  Neuro: Sedated for mechanical ventilation and ECMO.  No acute distress.  NIRS stable 60/70 b/l  RASS goal:  -2  CV: Cardiogenic shock.  Hemodynamically stable  and epi 0.07  Pulm: Keep vent settings at rest settings as above. Minimal ECMO at 40% fio2 50% pao2 in 120s  FEN/Renal: Electrolytes stable w/ replacement protocols in place, Cr 2.89, UOP slowing back down, likely due to atn related to huge pressors at presentation   Heme: ACT goal:180-200 now that av opening every other beat Hemoglobin 9.9.  Minimal oozing around the ECMO cannulas.  ID: Receiving empiric antibiotics  Cannulae: Position is acceptable on exam and the available imaging.  Distal perfusion cannula is in place and patent.  Extremities are well-perfused.    Consider increasing epi for inotrope, and decreasing flow a little further if able to goal map 60-65 as long as lactate continues to fall UOP may be a red herring in that it may drop off due to atn and may not be related to ongoing perfusion.      I have personally reviewed the ECMO flows, oxygenation and CO2 clearance, anticoagulation, and cannula position.  I have also personally assessed the patient's systemic response with hemodynamics, oxygenation, ventilation, and bleeding.       The patient requires continued ECMO support and management in the ICU.  I have discussed patient care and treatment plan with the primary team.      Sabino Trinidad MD  Critical Care Cardiology  [phone]    October 21, 2021

## 2021-10-21 NOTE — CONSULTS
Nephrology Initial Consult  October 21, 2021      Kwaku Medrano MRN:[id number] YOB: 1965  Date of Admission:10/19/2021  Primary care provider: Stew Caldwell  Requesting physician: Dennis Flood MD    ASSESSMENT AND RECOMMENDATIONS:     #Oliguric DIMPLE likely secondary to acute tubular injury in setting of asystole/PEA arrest  #Cardiogenic and vasodilatory shock on VA ECMO  # Shock liver    - Baseline creatinine ~ 1-1.1  - Acid base status and electrolytes currently acceptable.   - UOP of 200 ml since AM.  - No immediate indication for RRT, but anticipate he will require it soon. Consent obtained 10/21/2021.     Recommendations were communicated to primary team via note    Seen and discussed with Dr. Mason Simental MD   [phone]      REASON FOR CONSULT: Oliguric DIMPLE    HISTORY OF PRESENT ILLNESS:  Admitting provider and nursing notes reviewed  Kwaku Medrano is a 56 year old male with past medical history of diffuse cutaneous systemic sclerosis, dermatomyositis, interstitial lung disease, paroxysmal atrial fibrillation.  He was admitted to ICU following asystole/PEA and subsequently developed cardiogenic shock.    He presented to ED at [facility] on 10/19/2021 with worsening fatigue and weakness.  There he was noted to have lactic acidosis.  Underwent CT PE which showed tiny PE in right hilar region.  There was bilateral pleural fluid consistent with pulmonary edema.  There was diffuse anasarca and cortical thinning of kidneys and heterogeneous appearance of liver.  Given these findings he was started on heparin and Lasix.  He later had A. fib with RVR and received amiodarone bolus for conversion.  His respiratory status unfortunately continued to decline and he was intubated.  Immediately post sedation with succinylcholine and etomidate he went into asystole/PEA with return of ROSC within 5 minutes.  With his worsening circulatory status he has been placed on ECMO.    PAST  MEDICAL HISTORY:    Past Medical History:   Diagnosis Date     Atrial fibrillation and flutter (H) 07/2017     Fracture      GERD (gastroesophageal reflux disease)      Interstitial lung disease (H)     sclerderma ILD; present on CXR 3-2017 and CT 5-2017; dx confirmed by CT 9-2017 fibrotic NSIP pattern with increased fibrosis; started mycophenolate 7/2017     Scleroderma (H)     dx 9- at [facility] by Dr. Acosta       Past Surgical History:   Procedure Laterality Date     COLONOSCOPY N/A 7/19/2017    Procedure: COLONOSCOPY;  COLONOSCOPY;  Surgeon: Mita Jimenez MD;  Location:  GI     CV CORONARY ANGIOGRAM  10/20/2021    Procedure: CV CORONARY ANGIOGRAM;  Surgeon: Merlin Donato MD;  Location:  HEART CARDIAC CATH LAB     CV EXTRACORPERAL MEMBRANE OXYGENATION N/A 10/20/2021    Procedure: Extracorporeal Membrance Oxygenation;  Surgeon: Merlin Donato MD;  Location:  HEART CARDIAC CATH LAB     CV LEFT HEART CATH N/A 10/20/2021    Procedure: Left Heart Cath;  Surgeon: Merlin Donato MD;  Location:  HEART CARDIAC CATH LAB     NO HISTORY OF SURGERY          MEDICATIONS:  PTA Meds  Prior to Admission medications    Medication Sig Last Dose Taking? Auth Provider   aspirin 81 MG EC tablet Take 1 tablet (81 mg) by mouth daily   Radha Chavez MD   Blood Pressure Monitoring KIT 1 each three times a week Dr Acosta has asked you to check your blood pressure 2-3 times per week using your home monitor.  Please keep track of your findings in a journal and bring it to your appointments.   Contact this office if your blood pressure: the top number (systolic) is consistently 30 points higher than your normal BP or if the bottom number (diastolic) is consistently 20 points higher than your normal BP.   Denny Acosta MD   diltiazem ER COATED BEADS (CARDIZEM CD/CARTIA XT) 180 MG 24 hr capsule Take 1 capsule (180 mg) by mouth daily   Adan Batista, PA-C   lisinopril  (ZESTRIL) 10 MG tablet Take 1 tablet (10 mg) by mouth daily   Adan Batista PA-C   mycophenolate (GENERIC EQUIVALENT) 500 MG tablet Take 3 tablets (1,500 mg) by mouth 2 times daily Labs due every 8-12 weeks. Past due.  Call clinic to schedule follow up appointment.  Past due.   Denny Acosta MD   pantoprazole (PROTONIX) 40 MG EC tablet Take 1 tablet (40 mg) by mouth daily   Adan Batista PA-C   predniSONE (DELTASONE) 1 MG tablet Take 4 tablets (4 mg) by mouth daily   Denny Acosta MD   predniSONE (DELTASONE) 10 MG tablet 5 tabs po every day for 5 days, then 4 tabs po every day for 3 days, 3 tabs po every day for 3 days, 2 tab po every day for 3 days, 1 tabs po every day for 3 days, 1/2 tab po every day for 4 days   Adan Batista PA-C   sulfamethoxazole-trimethoprim (BACTRIM) 400-80 MG tablet Once daily for PJP prophylaxis. Start after bronchoscopy   Denny Acosta MD      Current Meds    aspirin  81 mg Oral Daily     [START ON 10/22/2021] ceFEPIme (MAXIPIME) IV  2 g Intravenous Q24H     pantoprazole  40 mg Per Feeding Tube QAM AC    Or     pantoprazole (PROTONIX) IV  40 mg Intravenous QAM AC     polyethylene glycol  17 g Oral Daily     [Held by provider] predniSONE  4 mg Oral Daily     senna-docusate  1 tablet Oral BID    Or     senna-docusate  2 tablet Oral BID     sodium chloride (PF)  10 mL Intravenous Once     sodium chloride (PF)  3 mL Intracatheter Q8H     vancomycin place colunga - receiving intermittent dosing  1 each Intravenous See Admin Instructions     Infusion Meds    EPINEPHrine 0.07 mcg/kg/min (10/21/21 1500)     fentaNYL 50 mcg/hr (10/21/21 1500)     HEParin 1,400 Units/hr (10/21/21 1800)     heparin (PRESSURE BAG) 2 unit/mL in 0.9% NaCl 6 Units/hr (10/21/21 1500)     - MEDICATION INSTRUCTIONS -       midazolam Stopped (10/21/21 1133)     norepinephrine Stopped (10/20/21 2000)     - MEDICATION INSTRUCTIONS -         ALLERGIES:    Allergies   Allergen Reactions     Ampicillin Rash        REVIEW OF SYSTEMS:  Unable to obtain    SOCIAL HISTORY:   Social History     Socioeconomic History     Marital status: Single     Spouse name: Not on file     Number of children: Not on file     Years of education: Not on file     Highest education level: Not on file   Occupational History     Not on file   Tobacco Use     Smoking status: Never Smoker     Smokeless tobacco: Former User     Types: Chew   Substance and Sexual Activity     Alcohol use: Yes     Comment: very rarely     Drug use: No     Sexual activity: Not Currently   Other Topics Concern     Parent/sibling w/ CABG, MI or angioplasty before 65F 55M? Not Asked   Social History Narrative    Work in [employer].  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  Denies exposure to hot tubs, silica, pet birds, mold.  Single, no children.     Social Determinants of Health     Financial Resource Strain:      Difficulty of Paying Living Expenses:    Food Insecurity:      Worried About Running Out of Food in the Last Year:      Ran Out of Food in the Last Year:    Transportation Needs:      Lack of Transportation (Medical):      Lack of Transportation (Non-Medical):    Physical Activity:      Days of Exercise per Week:      Minutes of Exercise per Session:    Stress:      Feeling of Stress :    Social Connections:      Frequency of Communication with Friends and Family:      Frequency of Social Gatherings with Friends and Family:      Attends Yarsani Services:      Active Member of Clubs or Organizations:      Attends Club or Organization Meetings:      Marital Status:    Intimate Partner Violence:      Fear of Current or Ex-Partner:      Emotionally Abused:      Physically Abused:      Sexually Abused:          FAMILY MEDICAL HISTORY:   Family History   Problem Relation Age of Onset     Prostate Cancer Father      Lung Cancer Other      Family History Negative Mother      Family History Negative Maternal Grandmother      Family  History Negative Maternal Grandfather      Family History Negative Paternal Grandmother      Other - See Comments Paternal Grandfather         Atherosclerosis     Family History Negative Brother      Family History Negative Sister      Family History Negative Brother      LUNG DISEASE No family hx of      Rheumatologic Disease No family hx of        PHYSICAL EXAM:   Temp  Av.8  F (37.1  C)  Min: 95.9  F (35.5  C)  Max: 101.3  F (38.5  C)  Arterial Line BP  Min: 63/61  Max: 154/40  Arterial Line MAP (mmHg)  Av.6 mmHg  Min: 60 mmHg  Max: 88 mmHg      Pulse  Av.1  Min: 60  Max: 144 Resp  Av.5  Min: 10  Max: 50  FiO2 (%)  Av.5 %  Min: 50 %  Max: 100 %  SpO2  Av.3 %  Min: 62 %  Max: 100 %    CVP (mmHg): 15 mmHg  /88 (BP Location: Left arm)   Pulse 115   Temp 98.8  F (37.1  C)   Resp 14   Wt 64 kg (141 lb 3.2 oz)   SpO2 (!) 85%   BMI 20.91 kg/m     Date 10/21/21 0700 - 10/22/21 0659   Shift 8031-7715 9132-8029 7729-3134 24 Hour Total   INTAKE   I.V. 630.94 205.6  836.54   NG/   150   Colloid 500   500   Shift Total(mL/kg) 1280.94(20) 205.6(3.21)  1486.54(23.21)   OUTPUT   Urine 43 4  47   Emesis/NG output 0   0   Shift Total(mL/kg) 43(0.67) 4(0.06)  47(0.73)   Weight (kg) 64.05 64.05 64.05 64.05      Admit Weight: 62.4 kg (137 lb 8 oz)     GENERAL APPEARANCE: intubated and sedated.  EYES: no scleral icterus, pupils equal  Endo: no goiter, no moon facies  Lymphatics: no cervical or supraclavicular LAD  Pulmonary: lungs clear to auscultation with equal breath sounds bilaterally, no clubbing  CV: regular rhythm, normal rate, no rub   - JVD not distended   - Edema absent  GI: soft, nontender, normal bowel sounds  MS: no evidence of inflammation in joints, no muscle tenderness  :  lino  SKIN: no rash, warm, dry, no cyanosis       LABS:   CMP  Recent Labs   Lab 10/21/21  1550 10/21/21  1544 10/21/21  1543 10/21/21  1156 10/21/21  0952 10/21/21  0803 10/21/21  0350  10/21/21  0348 10/20/21  2200 10/20/21  2158 10/19/21  2334 10/19/21  2330   NA  --   --  143  --  143  --   --  144  --  143   < > 139   POTASSIUM  --   --  4.9  --  4.6  --   --  4.4  --  4.5   < > 4.2   CHLORIDE  --   --  110*  --  110*  --   --  111*  --  110*   < > 105   CO2  --   --  19*  --  21  --   --  21  --  19*   < > 19*   ANIONGAP  --   --  14  --  12  --   --  12  --  14   < > 15*   GLC 92 100* 96 92 97   < >   < > 100*   < > 110*   < > 171*   BUN  --   --  59*  --  58*  --   --  52*  --  49*   < > 32*   CR  --   --  3.18*  --  2.89*  --   --  2.61*  --  2.38*   < > 1.41*   GFRESTIMATED  --   --  20*  --  23*  --   --  26*  --  29*   < > 55*   BETZY  --   --  7.5*  --  7.8*  --   --  8.0*  --  7.7*   < > 8.0*   MAG  --   --  2.3  --  2.3  --   --  2.2  --  2.4*   < > 2.5*   PHOS  --   --   --   --   --   --   --  6.2*  --   --   --  7.7*   PROTTOTAL  --   --  5.1*  --  5.0*  --   --  4.8*  --  4.8*   < > 5.4*   ALBUMIN  --   --  2.8*  --  2.6*  --   --  2.5*  --  2.4*   < > 2.4*   BILITOTAL  --   --  3.3*  --  3.0*  --   --  2.8*  --  2.6*   < > 1.7*   ALKPHOS  --   --  71  --  72  --   --  74  --  72   < > 88   AST  --   --  5,804*  --  6,623*  --   --  7,595*  --  6,355*   < > 812*   ALT  --   --  3,405*  --  3,453*  --   --  3,561*  --  3,179*   < > 606*    < > = values in this interval not displayed.     CBC  Recent Labs   Lab 10/21/21  1543 10/21/21  0952 10/21/21  0348 10/20/21  2158   HGB 9.9* 9.9* 10.7* 10.6*   WBC 17.3* 16.7* 17.5* 15.9*   RBC 3.46* 3.53* 3.79* 3.75*   HCT 31.0* 31.4* 33.4* 32.9*   MCV 90 89 88 88   MCH 28.6 28.0 28.2 28.3   MCHC 31.9 31.5 32.0 32.2   RDW 16.5* 16.3* 16.2* 15.9*   * 125* 157 175     INR  Recent Labs   Lab 10/21/21  1543 10/21/21  0952 10/21/21  0348 10/20/21  2158   INR 5.61* 4.74* 4.67* 4.80*   * 203* 181* 143*     ABG  Recent Labs   Lab 10/21/21  1746 10/21/21  1544 10/21/21  1543 10/21/21  1350 10/21/21  1151 10/21/21  1151   PH 7.39 7.42  --   7.42  --  7.40   PCO2 31* 29*  --  29*  --  31*   PO2 129* 152*  --  135*  --  121*   HCO3 19* 19*  --  19*  --  20*   O2PER 50 50  60 50 50   < > 50    < > = values in this interval not displayed.      URINE STUDIES  Recent Labs   Lab Test 10/20/21  1522 06/07/17  1013   COLOR Light Yellow Yellow   APPEARANCE Slightly Cloudy* Clear   URINEGLC Negative Negative   URINEBILI Negative Negative   URINEKETONE Negative Negative   SG 1.011 1.025   UBLD Large* Trace*   URINEPH 5.0 5.5   PROTEIN Negative Negative   UROBILINOGEN  --  0.2   NITRITE Negative Negative   LEUKEST Trace* Negative   RBCU 6* O - 2   WBCU 6* O - 2     Recent Labs   Lab Test 06/07/17  1013   UTPG 0.26*     PTH  No lab results found.  IRON STUDIES  No lab results found.    IMAGING:  All imaging studies reviewed by me.     Chris Simental MD

## 2021-10-22 NOTE — PHARMACY-VANCOMYCIN DOSING SERVICE
Pharmacy Vancomycin Note  Date of Service 2021  Patient's  1965   56 year old, male    Indication: Sepsis  Day of Therapy: 3  Current vancomycin regimen:  Intermittent based on levels  Current vancomycin monitoring method: Trough (Method 2 = manual dose calculation)  Current vancomycin therapeutic monitoring goal: 15-20 mg/L    Current estimated CrCl =  CRRT    Creatinine for last 3 days  10/19/2021:  3:30 PM Creatinine 1.11 mg/dL; 11:30 PM Creatinine 1.41 mg/dL  10/20/2021:  3:50 AM Creatinine 1.55 mg/dL;  8:28 AM Creatinine 1.86 mg/dL; 12:51 PM Creatinine 1.99 mg/dL;  3:45 PM Creatinine 2.06 mg/dL;  9:58 PM Creatinine 2.38 mg/dL  10/21/2021:  3:48 AM Creatinine 2.61 mg/dL;  9:52 AM Creatinine 2.89 mg/dL;  3:43 PM Creatinine 3.18 mg/dL; 10:00 PM Creatinine 3.49 mg/dL  10/22/2021:  3:30 AM Creatinine 3.86 mg/dL;  9:40 AM Creatinine 4.00 mg/dL;  9:40 AM Creatinine 4.00 mg/dL    Recent Vancomycin Levels (past 3 days)  10/21/2021:  6:53 AM Vancomycin 26.6 mg/L  10/22/2021:  3:30 AM Vancomycin 23.0 mg/L    Vancomycin IV Administrations (past 72 hours)                   vancomycin 1500 mg in 0.9% NaCl 250 ml intermittent infusion 1,500 mg (mg) 1,500 mg New Bag 10/20/21 0837    vancomycin 1500 mg in 0.9% NaCl 250 ml intermittent infusion 1,500 mg (mg) 1,500 mg New Bag 10/19/21 1705                Nephrotoxins and other renal medications (From now, onward)    Start     Dose/Rate Route Frequency Ordered Stop    10/22/21 1600  vancomycin 1250 mg in 0.9% NaCl 250 mL intermittent infusion 1,250 mg      1,250 mg  over 90 Minutes Intravenous EVERY 24 HOURS 10/22/21 1521      10/20/21 0130  norepinephrine (LEVOPHED) 16 mg in  mL infusion MAX CONC CENTRAL LINE      0.01-0.6 mcg/kg/min × 69.4 kg  0.7-39 mL/hr  Intravenous CONTINUOUS 10/20/21 0120               Contrast Orders - past 72 hours (72h ago, onward)    Start     Dose/Rate Route Frequency Ordered Stop    10/20/21 1230  perflutren diluted 1mL to  2mL with saline (OPTISON) diluted injection 6 mL      6 mL Intravenous ONCE 10/20/21 1210 10/20/21 1210    10/20/21 1119  iopamidol (ISOVUE-370) solution  Status:  Discontinued        ONCE PRN 10/20/21 1120 10/20/21 1248          Interpretation of levels and current regimen:  Vancomycin level is reflective of supratherapeutic level    Renal Function: ARF on CRRT      Plan:  1. Resume vancomcyin 1250 mg (20 mg/kg) IV q24h  2. Vancomycin monitoring method: Renal Replacement Therapy  3. Vancomycin therapeutic monitoring goal: 15-20 mg/L  4. Pharmacy will check vancomycin levels as appropriate in 3-5 Days.  5. Serum creatinine levels will be ordered daily for the first week of therapy and at least twice weekly for subsequent weeks.    Kourtney Escobedo RPH

## 2021-10-22 NOTE — PROGRESS NOTES
Nephrology Progress Note  10/22/2021       Kwaku Medrano is a 56 year old male with past medical history of diffuse cutaneous systemic sclerosis, dermatomyositis, interstitial lung disease, paroxysmal atrial fibrillation.  He was admitted to ICU following asystole/PEA and subsequently developed cardiogenic shock.  Had PEA arrest after intubation and was eventually placed on ECMO on 10/20, started on CRRT 10/22.    Interval History :   Mr Medrano had CRRT started this am mainly for management of hyperkalemia, on 2k baths and I=O fluid goal.  Next step will be going to 2k baths, last K 5.3, still quite tenuous from hemodynamic standpoint.      Assessment & Recommendations:   DIMPLE-Reviewed his Rheumatological issues, does not appear related to these, his Cr was normal at presentation and UA was bland.  Etiology likely ATN secondary to his arrest, starting CRRT today to manage electrolytes and volume.     -Access is via ECMO circuit.     -CRRT, 2k baths for now, K is coming down but still >5.  I=O fluid goal.      Volume status-Net positive ~3L, starting CRRT with plan to start with I=O today, will try to pull fluid in coming days if hemodynamics tolerate I=O.      Electrolytes/pH-K 5.3, bicarb 20, on 2k baths.      Ca/phos/pth-Ca 6.8, iCal 3.7, replacement ordered with CRRT.  Mg normal at 2.3, Phos mildly up at 6.6.      Anemia-Hgb 9.0, acute management per team.      Nutrition-Deferred    Seen and discussed with Dr White    Recommendations were communicated to primary team via verbal communication.     JOHANN Sosa CNS  Clinical Nurse Specialist  [phone]    Review of Systems:   I reviewed the following systems:  ROS not done due to vent/sedation.     Physical Exam:   I/O last 3 completed shifts:  In: 2671.93 [I.V.:1425.93; NG/GT:210]  Out: 154 [Urine:54; Emesis/NG output:100]   /88 (BP Location: Left arm)   Pulse (!) 121   Temp 97.9  F (36.6  C)   Resp 18   Wt 65.1 kg (143 lb 9.6 oz)   SpO2  94%   BMI 21.27 kg/m       GENERAL APPEARANCE: intubated and sedated.  EYES: no scleral icterus, pupils equal  Endo: no goiter, no moon facies  Lymphatics: no cervical or supraclavicular LAD  Pulmonary: lungs clear to auscultation with equal breath sounds bilaterally, no clubbing  CV: regular rhythm, normal rate, no rub   - JVD not distended   - Edema absent  GI: soft, nontender, normal bowel sounds  MS: no evidence of inflammation in joints, no muscle tenderness  :  lino  SKIN: no rash, warm, dry, no cyanosis    Labs:   All labs reviewed by me  Electrolytes/Renal - Recent Labs   Lab Test 10/22/21  1137 10/22/21  0948 10/22/21  0940 10/22/21  0332 10/22/21  0330 10/21/21  2200 10/21/21  2200 10/21/21  0350 10/21/21  0348   NA  --   --  143  143  --  143  --  143   < > 144   POTASSIUM  --   --  5.3  5.3  --  5.5*  --  5.3   < > 4.4   CHLORIDE  --   --  110*  110*  --  109  --  110*   < > 111*   CO2  --   --  20  20  --  19*  --  20   < > 21   BUN  --   --  72*  72*  --  72*  --  66*   < > 52*   CR  --   --  4.00*  4.00*  --  3.86*  --  3.49*   < > 2.61*   GLC 81 85 86  86  91   < > 93   < > 100*  104*   < > 100*   BETZY  --   --  6.8*  6.8*  --  7.1*  --  7.4*   < > 8.0*   MAG  --   --  2.3  --  2.4*  --  2.5*   < > 2.2   PHOS  --   --  6.6*  --  7.3*  --   --   --  6.2*    < > = values in this interval not displayed.       CBC -   Recent Labs   Lab Test 10/22/21  0940 10/22/21  0330 10/21/21  2200   WBC 19.7* 20.1* 19.0*   HGB 9.0* 9.4* 9.4*   PLT 90* 99* 102*       LFTs -   Recent Labs   Lab Test 10/22/21  0940 10/22/21  0330 10/21/21  2200   ALKPHOS 75 76 78   BILITOTAL 4.0* 3.8* 3.6*   ALT 2,916* 3,331* 3,348*   AST 3,452* 4,431* 5,065*   PROTTOTAL 5.2* 5.2* 5.4*   ALBUMIN 2.7*  2.7* 3.0* 3.0*       Iron Panel - No lab results found.        Current Medications:    aspirin  81 mg Oral Daily     ceFEPIme (MAXIPIME) IV  2 g Intravenous Q12H     hydrocortisone sodium succinate PF  20 mg Intravenous Q12H      pantoprazole  40 mg Per Feeding Tube QAM AC    Or     pantoprazole (PROTONIX) IV  40 mg Intravenous QAM AC     polyethylene glycol  17 g Oral Daily     [Held by provider] predniSONE  4 mg Oral Daily     senna-docusate  1 tablet Oral BID    Or     senna-docusate  2 tablet Oral BID     sodium chloride (PF)  10 mL Intravenous Once     sodium chloride (PF)  3 mL Intracatheter Q8H     vancomycin place colunga - receiving intermittent dosing  1 each Intravenous See Admin Instructions       CRRT replacement solution 12.5 mL/kg/hr (10/22/21 0901)     EPINEPHrine 0.08 mcg/kg/min (10/22/21 1310)     fentaNYL 50 mcg/hr (10/22/21 1300)     HEParin 22.695 Units/kg/hr (10/22/21 1300)     heparin (PRESSURE BAG) 2 unit/mL in 0.9% NaCl 3 mL/hr (10/22/21 1300)     - MEDICATION INSTRUCTIONS -       midazolam 3 mg/hr (10/22/21 1300)     - MEDICATION INSTRUCTIONS -       norepinephrine Stopped (10/20/21 2000)     - MEDICATION INSTRUCTIONS -       CRRT replacement solution 200 mL/hr at 10/22/21 0901     CRRT replacement solution 12.5 mL/kg/hr (10/22/21 0901)

## 2021-10-22 NOTE — PROGRESS NOTES
ECMO Shift Summary:9451-9370      ECMO Equipment:  Console Serial Number: [id number]  Circuit Lot Number: [id number]  Oxygenator Lot number: [id number]    Patient remains on VA ECMO, all equipment is functioning and alarms are appropriately set. RPM's 3750 with flow range 4.61 L/min. Sweep gas is at 4 LPM and FiO2 60%. Circuit remains free of air, clot and fibrin. Cannulas are secure with no bleeding from site. Extremities are stiff and dusky. Suctioned ETT for moderate amount of pulmonary edema/red.    Significant Shift Events: Traveled to [place] for pan scan and met with team to discuss plan.    Vent settings:  Ventilation Mode: CMV/AC  (Continuous Mandatory Ventilation/ Assist Control)  FiO2 (%): 50 %  Rate Set (breaths/minute): 12 breaths/min  Tidal Volume Set (mL): 460 mL  PEEP (cm H2O): 5 cmH2O  Oxygen Concentration (%): 50 %  Resp: 12  .    Heparin is running at 1600 u/hr, ACT range 191-203.    Urine output is none, blood loss was none. Product given included none.       Intake/Output Summary (Last 24 hours) at 10/22/2021 1756  Last data filed at 10/22/2021 1700  Gross per 24 hour   Intake 2385.45 ml   Output 895 ml   Net 1490.45 ml       ECHO:  No results found for this or any previous visit.  Results for orders placed during the hospital encounter of 18    Echocardiogram    Narrative  [id number]  [facility]  [id number]  [id number]^KITTY^JOHN^OMARBradnerMARSHA      [facility]  [facility]  Echocardiography Laboratory  [address]  [place] [address]  Phone [phone]  Fax [phone]      Name: FLOR CALZADA  MRN: [id number]  : 1965  Study Date: 2018 07:50 AM  Age: 52 yrs  Gender: Male  Patient Location: [facility] [place]  Reason For Study: Hx a flutter  Ordering Physician: JOHN TANG  Referring Physician: PREM VERONICA  Performed By: Estella Kam    BSA: 1.9 m2  Height: 69 in  Weight: 168 lb  BP: 106/58  mmHg  _____________________________________________________________________________  __      Procedure  Complete Echo Adult.  _____________________________________________________________________________  __      Interpretation Summary    Left ventricular systolic function is normal.The visual ejection fraction is  estimated at 60-65%.  The right ventricular systolic function is normal.  The left atrium is mildly dilated.  Mild aortic root and ascending aorta dilatation.    Compared to priror study dated 08/15/2017 LVEF has improved.  _____________________________________________________________________________  __      Left Ventricle  The left ventricle is normal in size. There is normal left ventricular wall  thickness. Left ventricular systolic function is normal. The visual ejection  fraction is estimated at 60-65%. Left ventricular diastolic function is  normal. No regional wall motion abnormalities noted.    Right Ventricle  The right ventricle is normal size. The right ventricular systolic function is  normal.    Atria  The left atrium is mildly dilated. Right atrial size is normal. There is no  color Doppler evidence of an atrial shunt.    Mitral Valve  There is trace mitral regurgitation.    Tricuspid Valve  There is trace tricuspid regurgitation. The right ventricular systolic  pressure is approximated at 24.5 mmHg plus the right atrial pressure.      Aortic Valve  The aortic valve is trileaflet. lambl's excrescences seen. No aortic  regurgitation is present. No aortic stenosis is present.    Pulmonic Valve  There is trace pulmonic valvular regurgitation. There is no pulmonic valvular  stenosis.    Vessels  Mild aortic root dilatation. 3.8 cm. The ascending aorta is Mildly dilated.  3.8 cm. The IVC is normal in size and reactivity with respiration, suggesting  normal central venous pressure.    Pericardium  There is no pericardial effusion.    Rhythm  The rhythm was sinus  "bradycardia.    _____________________________________________________________________________  __  MMode/2D Measurements & Calculations  RVDd: 3.1 cm  IVSd: 1.1 cm  LVIDd: 4.9 cm  LVIDs: 3.4 cm  LVPWd: 0.96 cm  FS: 29.4 %  LV mass(C)d: 178.3 grams  LV mass(C)dI: 93.0 grams/m2    Ao root diam: 3.8 cm  LA dimension: 4.2 cm  asc Aorta Diam: 3.8 cm  LA/Ao: 1.1  LVOT diam: 2.5 cm  LVOT area: 5.0 cm2  LA Volume (BP): 70.6 ml  LA Volume Index (BP): 36.8 ml/m2  RWT: 0.39      Doppler Measurements & Calculations  MV E max aleks: 84.0 cm/sec  MV A max aleks: 64.1 cm/sec  MV E/A: 1.3  MV dec time: 0.31 sec    PA V2 max: 96.5 cm/sec  PA max PG: 3.7 mmHg  PA acc time: 0.13 sec  TR max aleks: 247.6 cm/sec  TR max P.5 mmHg  E/E' av.5  Lateral E/e': 5.5  Medial E/e': 9.5        _____________________________________________________________________________  __        Report approved by: Sam Pardo 2018 11:20 AM      CXR:  Recent Results (from the past 24 hour(s))   XR Chest Port 1 View    Narrative    EXAMINATION: XR CHEST PORT 1 VIEW, 10/22/2021 1:30 AM    INDICATION: Check endotracheal tube placement and ECLS cannula  placement. DO NOT log-roll patient.  Place film under patient using  patient safety handling process.    COMPARISON: 10/21/2021    FINDINGS: Single portable 20 degrees AP radiograph of the chest. ET  tube projects over the mid thoracic trachea. Right IJ [place]-Demetrio  catheter with tip terminating over the distal right main pulmonary  artery. Enteric tube side-port and tip projects over the stomach.  Lower approach\" cannula projects over the cavoatrial junction.    Trachea is midline. The cardiomediastinal silhouette is enlarged.  Small bilateral pleural effusions. No pneumothorax. Unchanged  bilateral interstitial and airspace opacities.    Impression visualized upper abdomen, bones, and soft tissue are  unremarkable.      Impression    IMPRESSION:  1. Stable support devices.  2. Unchanged small " bilateral pleural effusions and bilateral  interstitial and airspace opacities.    I have personally reviewed the examination and initial interpretation  and I agree with the findings.    STELLA MCQUEEN MD         SYSTEM ID:  [id number]   CT Head w/o Contrast    Narrative    Exam: CT HEAD W/O CONTRAST  10/22/2021 1:56 PM    History: Post cardiac arrest.    Comparison:  CT 10/20/2021.    Technique: Using multidetector thin collimation helical acquisition  technique, axial, coronal and sagittal CT images from the skull base  to the vertex were obtained without intravenous contrast.     Findings:    No intracranial hemorrhage, mass effect, or midline shift. The  ventricles are proportionate to the cerebral sulci. The gray to white  matter differentiation of the cerebral hemispheres is preserved. The  basal cisterns are patent.    The visualized paranasal sinuses are clear. Moderate bilateral mastoid  effusions, nonspecific in the setting of intubation. No acute  abnormality of the orbits.      Impression    Impression:   No acute intracranial pathology.    I have personally reviewed the examination and initial interpretation  and I agree with the findings.    PARAS MONREAL MD         SYSTEM ID:  [id number]   CT Chest Abdomen Pelvis w/o Contrast    Narrative    EXAMINATION: CT CHEST ABDOMEN PELVIS W/O CONTRAST, 10/22/2021 2:08 PM    TECHNIQUE: Helical CT images from the thoracic inlet through the  symphysis pubis were obtained without intravenous contrast.     COMPARISON: CT 10/20/2021    HISTORY: Rising lactic acid on ECMO. Evaluate for bowel ischemia  versus liver ischemia    FINDINGS:    Chest:    Heart/ Mediastinum: Heart is within normal limits. Right internal  jugular [place]-Demetrio catheter is unchanged. No bulky lymphadenopathy.  Calcified left hilar lymph nodes.  Enteric tube in the esophagus    Lungs/pleura: Endotracheal tube tip is approximately 2 cm above the  brooke. Layering fluid in the right mainstem bronchus  extending into  the right lower lobe. Moderate to large bilateral pleural effusions  have increased there is adjacent atelectasis and dependent groundglass  opacities suggesting pulmonary edema. Scattered groundglass nodules.    Chest wall/axilla: No bulky lymphadenopathy..    Abdomen and Pelvis:    Liver: Scattered punctate calcifications from prior granulomatous  disease. No suspicious masses. No hepatic steatosis.     Gallbladder/biliary tree: Hyperdense contents are vicarious excretion  of previously administered intravenous contrast. No biliary  dilatation.    Spleen: Scattered punctate calcifications from prior granulomatous  disease.    Pancreas: Unremarkable. No mass or ductal dilatation.    Adrenal glands: Unremarkable.    Kidneys: Persistent enhancement from prior contrast demonstration with  scattered cortical hypodensities compatible with acute kidney injury.  No hydronephrosis.    Bowel: Unremarkable. No obstruction. Increased abdominal ascites. No  definite pneumatosis or portal venous gas to suggest bowel ischemia.    Retroperitoneum: Right femoral approach arterial and venous ECMO  catheters are unchanged. Left femoral arterial catheter..  No bulky  lymphadenopathy.    Pelvis: Urinary bladder is unremarkable.  Prostate and seminal  vesicles are within normal limits.    Bones: Unremarkable. No suspicious lesions.    Soft Tissues: Anasarca.      Impression    IMPRESSION:    1.  Increased moderate to large bilateral pleural effusions.  2.  Increased bibasilar atelectasis and groundglass opacities  suggesting pulmonary edema and/or infection.  3.  Increased ascites and anasarca.  4.  Stable support devices as above.  5.  No findings to suggest bowel or liver ischemia on these  noncontrast images.    I have personally reviewed the examination and initial interpretation  and I agree with the findings.    FELISHA ARMANDO DO         SYSTEM ID:  [id number]       Labs:  Recent Labs   Lab 10/22/21  1612  10/22/21  1605 10/22/21  1413 10/22/21  1133 10/22/21  0940 10/22/21  0940   PH  --  7.38 7.34* 7.29*  --  7.27*   PCO2  --  29* 33* 36  --  40   PO2  --  80 121* 95  --  103   HCO3  --  17* 18* 17*  --  18*   O2PER 50 50  60 50 50   < > 50    < > = values in this interval not displayed.       Lab Results   Component Value Date    HGB 8.8 (L) 10/22/2021    PHGB <30 10/22/2021    PLT 88 (L) 10/22/2021    FIBR 256 10/22/2021    INR 4.11 (H) 10/22/2021    PTT >240 (HH) 10/22/2021    DD 3.26 (H) 10/22/2021    ANTCH 51 (L) 10/22/2021         Plan is to continue to support.      Lianne Underwood, RT  ECMO Specialist  10/22/2021 5:56 PM

## 2021-10-22 NOTE — PROGRESS NOTES
[facility] Rheumatology follow up     Kwaku Medrano MRN# [id number]   Age: 56 year old YOB: 1965     Date of Admission: 10/19/2021    Reason for follow up: Hx of diffuse cutaneous sliding scale, ILD, Raynaud's and DM       Requesting physician: Jonah Huston MD                   Assessment and Plan:     56 years old . Kwaku Medrano with hx of  diffuse cutaneous systemic sclerosis, ILD, Raynaud's, dermatomyositis (potential overlap with scleroderma), A-flutter s/p ablation 2017, SVT, GERD and paroxysmal atrial fibrillation previously stable PFTs who has remained non compliant with his medications for last 10 months now presented with cardiogenic shock requiring VA ECMO support.     Decompensated heart failure/cardiogenic shock is likely related to underlying heart disease in setting of atrial fibrillation with rapid ventricular response and possible idiosyncratic response to antiarrhythmic mediations. It is less likely due to flaring of autoimmune or systemic inflammatory disease given his stable condition on last follow up. However considering his limited compliance with medication over the last 10 months it could still be the possibility. He has hx of severe raynaud's with digital ulceration and presence of new pulmonary embolism in a patient with autoimmune could raise the concern of catastrophic antiphospholipids syndrome. However suspicion is very low and serologies for APS has been ordered to rule it out. Lupus anticoagulant is neg but Beta 2 GP and cardiolipin abs are pending .    Improvement in digital and extremity perfusion is likely due to improved circulatory support. Given low suspicion of undiagnosed or new-onset vasculitis with negative ANCA, We do not think if there will be any therapeutic benefit with high dose steroids. Instead steroids pose a risk of scleroderma renal crisis, which may be more difficult to identify in the context of existing acute kidney injury 2/2  "to cardiorenal compromise. Since prior chronic steroid use places patient at risk for steroid-induced adrenal insufficiency, We recommend replacing solumedrol with \"stress-dose\" hydrocortisone 20 mg IV q 12 hours for 72  Hours.     Although angiogram findings revealed non-obstructive coronary disease, However microvascular disease can lead myocardial ischemia in setting of scleroderma. When cardiovascular and renal stabilize cardiac MRI with gadolinium can be considered to look for delayed washout.    Plan:  Avoid Solu-Medrol. (High-dose corticosteroids are associated with elevated risk of scleroderma renal crisis)  Consider stress dose Hydrocortisone 20 mg IV q 12 hours for 72 hrs (Give the hx of chronic steroid use)  Lupus anticoagulant panel (anticardiolipin, anti Beta-2 lipoprotein IgG/IgM) pending  Continue to follow urine output and renal function- Keep low threshold to perform peripheral smear if blood counts drop to investigate TTP versus steroid-associated scleroderma renal crisis    Discussed case with MD Oh Lugo MD on 10/22/2021 at 3:58 PM    I discussed this patient with the Rheumatology Fellow. I agree with the findings and recommendations.    Kenny Flowers M.D.  Staff Rheumatologist, [facility]  Pager [phone]        Summary      56 year old Mr Medrano has a past medical history diffuse cutaneous systemic sclerosis, ILD, Raynaud's, dermatomyositis (potential overlap with scleroderma), A-flutter s/p ablation 2017, SVT, GERD and paroxysmal atrial fibrillation. Patient was brought to [facility] ICU following asystole/PEA and subsequent cardiogenic shock.  Rheumatology was consulted for evaluation of potential involvement of scleroderma.    Patient follows with Dr. Acosta in rheumatology clinic and his last visit  was on 1/21/2020. At the time, he had well-controlled disease and was on CellCept 1500 mg daily and prednisone 4 mg daily. His last PFTs on 2/20/2020 are consistent with " normal lung function.Apparently, patient has not been compliant with medications for the past 10 months, as he has had difficulty time his medications and did not refill them after they ran out 2 months ago.    Over the past several weeks, patient has had worsening shortness of breath with exertion.  He presented to urgent care on 10/11/2021 due to this increasing shortness of breath.  Work-up at the time showed no leukocytosis, normal renal function, but an elevated CRP (21), and transaminitis (, ).  He was subsequently discharged with prednisone taper (starting at 50 mg daily).  Unfortunately, patient's shortness of breath steadily worsened over time, so he presented to the emergency department at [facility] on 10/19/2021, with worsening fatigue and weakness.    While in the emergency department, his oxygen needs increased from the nasal cannula to BiPAP.  Lactic acid was elevated at 8.4 that is trending down 3.3 (10/21).  Blood cultures were obtained and he is currently on antibiotics for empiric coverage. CT PE was performed, showing a tiny PE in the right hilar region, groundglass opacities that showed interval progression, trace bilateral pleural fluid (consistent with pulmonary edema, infection, or CHF.), Diffuse anasarca, cortical thinning of the kidneys, and a heterogenous appearing liver. Given these findings, patient was started on heparin and furosemide.  He was found to have atrial fibrillation with RVR, so he was given an amiodarone bolus for conversion. Unfortunately, patient's respiratory status continued to decline.  He was trialed off BiPAP but his blood pressure dropped and his dyspnea worsened that required intubation.  However soon after sedation with succinylcholine and etomidate and initial placement of the tube, patient arrested and went into asystole/PEA. Patient subsequently returned to ROSC within 5 minutes.  With his worsening circulatory status, patient has been  placed on ECMO. He also had significantly mottled skin and dusky blue digits that in setting of underlying severe raynaud's phenomenon and pressor support. Patient was evaluated by pulmonary who does not think that his imaging and oxygen requirements are giving any concern of ILD flare given his stable PFTs in recent past. Pulmonary recommended to hold Cellcept and felt no indication for treating with high steroids for ILD flair although stress dose steroids can be considered.     While in the ICU, patient leukocytosis has been increasing from 11.1 to 21.9.  His renal function has worsened from a baseline of 0.8-1.1, up to 1.86.  BNP elevated to 58,828.  Patient has had a negative rheumatoid work-up so far except for a positive SHERRELL (9/1/2017), 1: 160, speckled.  Other prior immune work-up, including RF, CCP, SCL 70, SSA, RNA Hermelindo III, ANCA, troponin, and myositis panel have been negative. Patient was evaluated with rheumatology and recommended to repeat ANCA vasculitis, lupus anticoagulant panel work up. Patient doesn't need high dose steroids but stress dose could be considered.      Interim Hx 10/21  Patient intubated and sedated   Family at bedside reports that patient gradually deteriorated over last few weeks. He was developing significant edema in lower extremities and having difficulty in breathing. Most recently he was also having difficulty in swallowing food. He was feeling fatigue that was also progressively getting worse over the last few weeks. Patient won't share his medical hx in detail with the family.    Interim History 10/22  Patient is intubated and sedated currently on ECMO support   There is no significant change in objective assessment.  Family at bedside           Past Medical History:     Diffuse Cutaneous Systemic Sclerosis  Dermatomyositis  Interstitial Lung Disease  Raynauds  Aflutter s/p ablation (2017)  Paroxymal Atrial Fibrillation  SVT  GERD          Past Surgical History:     Past  Surgical History:   Procedure Laterality Date     COLONOSCOPY N/A 7/19/2017    Procedure: COLONOSCOPY;  COLONOSCOPY;  Surgeon: Mita Jimenez MD;  Location:  GI     CV CORONARY ANGIOGRAM  10/20/2021    Procedure: CV CORONARY ANGIOGRAM;  Surgeon: Merlin Donato MD;  Location:  HEART CARDIAC CATH LAB     CV EXTRACORPERAL MEMBRANE OXYGENATION N/A 10/20/2021    Procedure: Extracorporeal Membrance Oxygenation;  Surgeon: Merlin Donato MD;  Location:  HEART CARDIAC CATH LAB     CV LEFT HEART CATH N/A 10/20/2021    Procedure: Left Heart Cath;  Surgeon: Merlin Donato MD;  Location:  HEART CARDIAC CATH LAB     NO HISTORY OF SURGERY               Social History:     Social History     Tobacco Use     Smoking status: Never Smoker     Smokeless tobacco: Former User     Types: Chew   Substance Use Topics     Alcohol use: Yes     Comment: very rarely             Family History:     Family History   Problem Relation Age of Onset     Prostate Cancer Father      Lung Cancer Other      Family History Negative Mother      Family History Negative Maternal Grandmother      Family History Negative Maternal Grandfather      Family History Negative Paternal Grandmother      Other - See Comments Paternal Grandfather         Atherosclerosis     Family History Negative Brother      Family History Negative Sister      Family History Negative Brother      LUNG DISEASE No family hx of      Rheumatologic Disease No family hx of              Immunizations:   COVID-19 vaccinated          Allergies:     Allergies   Allergen Reactions     Ampicillin Rash             Medications:     Medications Prior to Admission   Medication Sig Dispense Refill Last Dose     aspirin 81 MG EC tablet Take 1 tablet (81 mg) by mouth daily 30 tablet 1      Blood Pressure Monitoring KIT 1 each three times a week Dr Acosta has asked you to check your blood pressure 2-3 times per week using your home monitor.  Please keep track of  your findings in a journal and bring it to your appointments.   Contact this office if your blood pressure: the top number (systolic) is consistently 30 points higher than your normal BP or if the bottom number (diastolic) is consistently 20 points higher than your normal BP. 1 kit 1      diltiazem ER COATED BEADS (CARDIZEM CD/CARTIA XT) 180 MG 24 hr capsule Take 1 capsule (180 mg) by mouth daily 60 capsule 0      lisinopril (ZESTRIL) 10 MG tablet Take 1 tablet (10 mg) by mouth daily 60 tablet 0      mycophenolate (GENERIC EQUIVALENT) 500 MG tablet Take 3 tablets (1,500 mg) by mouth 2 times daily Labs due every 8-12 weeks. Past due.  Call clinic to schedule follow up appointment.  Past due. 540 tablet 1      pantoprazole (PROTONIX) 40 MG EC tablet Take 1 tablet (40 mg) by mouth daily 60 tablet 0      predniSONE (DELTASONE) 1 MG tablet Take 4 tablets (4 mg) by mouth daily 360 tablet 0      predniSONE (DELTASONE) 10 MG tablet 5 tabs po every day for 5 days, then 4 tabs po every day for 3 days, 3 tabs po every day for 3 days, 2 tab po every day for 3 days, 1 tabs po every day for 3 days, 1/2 tab po every day for 4 days 57 tablet 0      sulfamethoxazole-trimethoprim (BACTRIM) 400-80 MG tablet Once daily for PJP prophylaxis. Start after bronchoscopy 180 tablet 3              Review of Systems:   Unable to obtain review of systems due to sedation          Physical Exam:     HEENT: Intubated. Atraumatic. Reduced wrinkles on nasolabial fold.  Cardiovascular: Regular rate, regular rhythm. No murmurs appreciated.  Pulmonary: Coarse bilaterally - on mechanical ventilation   Abdomen: Tightening of skin over abdomen. Soft, nontender to palpation.  Extremities: skin tightening over bilateral lower and upper extremities. Cool to touch. Cyanosis over distal fingertips and toes, improved with ECMO. Now with residual cyanosis of left third middle finger and right digits. Unable to assess muscular function.   Skin: Tightening of skin  over bilateral lower extremities, no hair on lower extremities up ~6cm. Tightening of skin and loss of hair on upper extremities, up ~15cm to arm. Bilateral digits with peripheral flattening. Left third digit and right second digit dusky dark bulue with ulceration. Periungual telangiectasias present. Additional tightening seen over chest and abdomen, with reduced wrinkles on face.           Data:     Autoimmune Labs:  SHERRELL + 1:160, speckled (9/14/2017)    Negative Labs:  Myositis panel negative (LENNOX 1, NXP2, MDA5, TIF1-g, MI-2, P155/114, PL-7, OJ, EJ, SRP, Marlene-1, SSA 52/60)  RF < 20 (6/22/17)  CCP negative (6/22/17)  SCL-70 negative (9/14/2017)  SSA/SSB Negative (7/7/2017)  RNA Hermelindo III (9/14/2017)  ANCA (PR3, MPO) negative (9/14/2017)  Jo1 negative (6/22/2017)      Echocardiogram:  Biplane LVEF is 14%.  Left ventricular size is normal.  Severe diffuse hypokinesis is present.  Mild to moderate right ventricular dilation is present.  Global right ventricular function is moderately reduced.  No pericardial effusion is present.  LV/RV dysfunction is new when compared to prior study      CT Chest Abdomen Pelvis (10/20/21)  1. Increase in basilar predominant pulmonary airspace opacities which likely represent pulmonary edema in the setting of recent cardiogenic shock. Infection is not excluded.  2. Interval ECMO cannulation, right common femoral vein approach ECMO cannula tip terminates at the superior cavoatrial junction and right  common femoral artery ECMO catheter tip terminates at the aortic bifurcation.  3. Small right and trace left pleural effusions, small volume ascites, and soft tissue anasarca.  4. Retained iodinated contrast in the renal cortices on this noncontrast CT, related to prior contrast administration and compatible with acute kidney injury.

## 2021-10-22 NOTE — PROGRESS NOTES
ECMO Attending Progress Note  10/22/2021    Kwaku Medrano is a 56 year old male who was cannulated for ECMO 10/20/2021 due to profound cardiogenic and vasodilatory shock.    Cannulation Site:  17F Fr in the R femoral artery  25F Fr in the R femoral vein  8F reperfusion cannula    Interval events: Pulsatility down some again <10 but av appears to be opening again, lactate up some lfts stablized started crrt this morning, sedation held did not wake up ffp for elevated inr    Physical Exam:  Temp:  [97.9  F (36.6  C)-98.8  F (37.1  C)] 98.1  F (36.7  C)  Pulse:  [] 120  Resp:  [10-22] 22  MAP:  [58 mmHg-98 mmHg] 86 mmHg  Arterial Line BP: ()/(47-98) 87/84  FiO2 (%):  [50 %] 50 %  SpO2:  [81 %-100 %] 96 %    Intake/Output Summary (Last 24 hours) at 10/20/2021 1834  Last data filed at 10/20/2021 1800  Gross per 24 hour   Intake 1513.8 ml   Output 1840 ml   Net -326.2 ml    Ventilation Mode: CMV/AC  (Continuous Mandatory Ventilation/ Assist Control)  FiO2 (%): 50 %  Rate Set (breaths/minute): 12 breaths/min  Tidal Volume Set (mL): 460 mL  PEEP (cm H2O): 5 cmH2O  Oxygen Concentration (%): 50 %  Resp: 22       Labs:  Recent Labs   Lab 10/22/21  0940 10/22/21  0752 10/22/21  0552 10/22/21  0437 10/22/21  0332 10/22/21  0332   PH 7.27* 7.34* 7.33*  --   --  7.28*   PCO2 40 33* 35  --   --  40   PO2 103 108* 151*  --   --  146*   HCO3 18* 18* 19*  --   --  19*   O2PER 50 40 50 100   < > 50  60    < > = values in this interval not displayed.      Recent Labs   Lab 10/22/21  0940 10/22/21  0330 10/21/21  2200 10/21/21  1543   WBC 19.7* 20.1* 19.0* 17.3*   HGB 9.0* 9.4* 9.4* 9.9*     Creatinine   Date Value Ref Range Status   10/22/2021 4.00 (H) 0.66 - 1.25 mg/dL Final   10/22/2021 4.00 (H) 0.66 - 1.25 mg/dL Final   10/22/2021 3.86 (H) 0.66 - 1.25 mg/dL Final   10/21/2021 3.49 (H) 0.66 - 1.25 mg/dL Final   05/27/2020 1.18 0.66 - 1.25 mg/dL Final   02/18/2020 1.07 0.66 - 1.25 mg/dL Final   01/17/2020 0.91 0.66 -  1.25 mg/dL Final   11/21/2019 1.02 0.66 - 1.25 mg/dL Final       Blood Flow (Circuit) LPM: 4.76 LPM  Gas Flow  LPM: (S) 3 LPM  Gas FiO2   %: 40 %  ACT  (seconds): 183 seconds  Blood Temp  (degrees C): 36.7 C  Pulse Oximetry  (SpO2%):  (Unable to obtain)  Arterial Pressure  mmHg: 320 mmHg      ECMO Issues including assessments and plan on DOS 10/22/2021:  Neuro: Sedated for mechanical ventilation and ECMO.  No acute distress.  NIRS stable 50s b/l Dopplerable pulses per nursing.  RASS goal:  -2  CV: Cardiogenic shock.  Hemodynamically stable  and epi 0.06  Pulm: Keep vent settings at rest settings as above.   FEN/Renal: Electrolytes stable w/ replacement protocols in place, started crrt this am  Heme: ACT goal:180-200 now that av opening every other beat Hemoglobin 9.0.  Minimal oozing around the ECMO cannulas.  ID: Receiving empiric antibiotics  Cannulae: Position is acceptable on exam and the available imaging.  Distal perfusion cannula is in place and patent.  Extremities are well-perfused.    MAP 70; Consider continuing epi at current dose (low to moderate) while may cause a lactic acidosis it was tolerated at much higher doses in the past, I would reduce flow further to see if you can continue to improve pulsatility if his lactate continues to climb I would consider scanning to look for gut ischemia.      I have personally reviewed the ECMO flows, oxygenation and CO2 clearance, anticoagulation, and cannula position.  I have also personally assessed the patient's systemic response with hemodynamics, oxygenation, ventilation, and bleeding.       The patient requires continued ECMO support and management in the ICU.  I have discussed patient care and treatment plan with the primary team.      Sabino Trinidad MD  Critical Care Cardiology  [phone]    October 22, 2021

## 2021-10-22 NOTE — PROGRESS NOTES
"CLINICAL NUTRITION SERVICES - ASSESSMENT NOTE     Nutrition Prescription    Recommendations:  Pending hemodynamics, start enteral nutrition within 24-48 hrs.     Malnutrition Status:    Severe malnutrition in the context of acute on chronic illness    Future Recommendations:  When medically appropriate, would recommend Osmolite 1.5 Sergio @ 45ml/hr (1080ml/day) + Prosource (2 pkts QID) via OGT to provide: 1940 kcals (31 kcal/kg), 155 g protein (2.5 g/kg), 822 ml free H20, 219 g CHO, and 0 g fiber daily. Neprhonex       REASON FOR ASSESSMENT  Kwaku Medrano is a 56 year old male assessed by dietitian for Provider Order - RD to Assess and Order TF. PMH of scleroderma, ILD, severe Raynaud's disease, hx of atrial flutter s/p ablation. Transferred from OSH for management of ongoing shock, ultimately cannulated for VA ECMO and CRRT initiated.     NUTRITION HISTORY  Unable to obtain from pt at present.    CURRENT NUTRITION ORDERS  Diet: NPO x 2 days     LABS  Labs reviewed  Na 143, K+ 5.5, Cr 3.86, BUN 72, Phos 7.3, euglycemia   ALT 3331, AST 4431, lactate 6.2 (rising)    MEDICATIONS  Medications reviewed  Epi 0.08 mcg/kg/min, cefepime, protonix, senna-docusate     ANTHROPOMETRICS  Height:   Ht Readings from Last 2 Encounters:   10/19/21 1.75 m (5' 8.9\")   02/20/20 1.753 m (5' 9\")     Most Recent Weight: 65.1 kg (143 lb 9.6 oz)    IBW: 72.7 kg  BMI: Normal BMI  Weight History: Admitted at 62.4 kg on 10/20. Based on records, pt has lost ~7 kg (10%) over the past 10 days. Difficult to assess wt trends with potential fluctuation in fluid status and scale discrepancies.   Wt Readings from Last 10 Encounters:   10/22/21 65.1 kg (143 lb 9.6 oz)   10/19/21 69.4 kg (153 lb)   10/11/21 69.4 kg (153 lb)   02/20/20 74.4 kg (164 lb)   01/21/20 75.3 kg (165 lb 14.4 oz)   11/22/19 74.4 kg (164 lb)   08/02/19 75.7 kg (166 lb 12.8 oz)   07/23/19 76 kg (167 lb 9.6 oz)   03/29/19 74.8 kg (165 lb)   02/14/19 74.6 kg (164 lb 9 oz) "       Dosing Weight: 62 kg (actual wt)     ASSESSED NUTRITION NEEDS  Estimated Energy Needs: 1550 - 1850+ kcals/day (25 - 30+ kcals/kg)  Justification: Maintenance, vented   Estimated Protein Needs: 125 - 155 grams protein/day (2 - 2.5 grams of pro/kg)  Justification: Increased needs, ECMO, CRRT   Estimated Fluid Needs: (1 mL/kcal)   Justification: Maintenance    PHYSICAL FINDINGS  See malnutrition section below.  OGT in place and AXR verified.    MALNUTRITION  % Intake: Unable to assess, lack of hx   % Weight Loss: 10% in <1 month (severe) -- difficult to assess true wt loss vs fluctuations in fluids   Subcutaneous Fat Loss: Facial region:  Moderate, Upper arm: Moderate, Lower arm: Mild and Thoracic/intercostal: Mild  Muscle Loss: Temporal: Mild, Facial & jaw region: Mild, Scapular bone: Mild, Thoracic region (clavicle, acromium bone, deltoid, trapezius, pectoral): Moderate, Upper arm (bicep, tricep): Moderate and Lower arm  (forearm): Mild. -- difficult to assess lower extremities in setting of edema   Fluid Accumulation/Edema: Mild  Malnutrition Diagnosis: Severe malnutrition in the context of acute on chronic illness    NUTRITION DIAGNOSIS  Inadequate protein-energy intake related to NPO without enteral nutrition support as evidenced by currently meeting 0% estimated protein-energy needs.       INTERVENTIONS  No interventions at this time. Per discussion with team on rounds, holding off on enteral nutrition support in setting of rising lactate and continued pressor requirement.     Goals  Total avg nutritional intake to meet a minimum of 25 kcal/kg and 2 g PRO/kg daily (per dosing wt 62 kg).     Monitoring/Evaluation  Progress toward goals will be monitored and evaluated per protocol.  Cecilia Almonte, SAMANTHA, LD  [id number]  Pgr: [phone]

## 2021-10-22 NOTE — CONSULTS
[facility]  WOC Nurse Inpatient Adult Pressure Injury Prevention Assessment: ECMO  Initial     Positioning Tolerance: Good  Date of ECMO cannulation: 10/19  Presence of Ischemia: Yes  Location of ischemia: bilateral feet and toes  left foot toes with blisters - pateint has raynauds    Pressure Injury Prevention Interventions In Place:  Optifoam Dressing under ECMO Cannula, Z flow Positioner under head, Pillows for repositioning, TAPs Wedge Positioners in use, Heel off-loading boots, Pillows under calves for heel suspension and Mepilex Sacral Dressing   Current support surface: Standard  ICU mattress       Pressure Injury Prevention Interventions Added:  None Wanted to pad the knee however, due to the RN's guidance that they would not want to change the dressing and possibly jeopardize the cannula        Plan of Care for Positioning and Pressure Injury Prevention  Reposition patient every 1-2 hours using TAP Wedges  Position head on Z flow positioner, mold indentation at areas of pressure points.  Pad ECMO IJ cannula with Optifoam (#[id number]) along face and scalp between skin and cannula and under Coban head wraps    Pad ECMO groin and chest cannula under rigid connectors with Optifoam or Soft cloth  Heel off-loading Boots at all times  Sacral Mepilex for Prevention, change every 5 days and prn  Low Air loss mattress    Patient History:   According to medical record:   Kwaku Medrano is a 56 year old male with PMH of scleroderma, ILD, Raynauds,  A Flutter s/p CTI ablation in 2017, pAF, SVT, GERD who had not been taking his medications for the past 10 months. He was recently seen in an urgent care clinic on 10/11/2021 for SOB. He was started on a prednisone taper because his breathing trouble was thought to be secondary to an ILD flare. He was started back on his medications (Dilt /day, Lisinopril 10, Protonix 40, Pred taper) except for cellcept. He presented to the  OSH with SOB. He was found in A fib w RVR with HR in the 130s. He was given a bolus of amio 150mg and then developed severe respiratory distress. Post RSI (w etomidate and succinylcholine) patient had cardiac arrest (<5min) with asystole/PEA. Post arrest, patient was transferred to [facility] for additional management of ongoing shock.  Current Diet / Nutrition:     Orders Placed This Encounter      NPO for Medical/Clinical Reasons Except for: No Exceptions      Output:    I/O last 3 completed shifts:  In: 2671.93 [I.V.:1425.93; NG/GT:210]  Out: 154 [Urine:54; Emesis/NG output:100]  Containment: of urine/stool: Incontinence Protocol, indwelling catheter    Risk Assessment:   Sensory Perception: 1-->completely limited  Moisture: 4-->rarely moist  Activity: 1-->bedfast  Mobility: 1-->completely immobile  Nutrition: 2-->probably inadequate  Friction and Shear: 2-->potential problem  Deepak Score: 11    Labs:  Recent Labs   Lab 10/22/21  0940 10/22/21  0330 10/22/21  0330   ALBUMIN 2.7*   < > 3.0*   HGB 9.0*   < > 9.4*   INR 4.10*   < > 4.04*   WBC 19.7*   < > 20.1*   CRP  --   --  84.0*    < > = values in this interval not displayed.       Focused Assessment: right Right groin ECMO cannula    Pressure Injury Present::No    Education provided to: nurse  Discussed importance of:dressing change frequency and making sure that the cannulas are not pressing on julianne prominences  Discussed plan of care with Nurse  WOC Nurse follow-up plan:weekly    Leydi Collins RN BS CWOCN

## 2021-10-22 NOTE — PLAN OF CARE
Versed gtt off all night and fentanyl gtt on hold for sedation vacation, pt does not follow commands, does KYLE minimally, coughs to suction, afebrile. No changes to vent settings, LS diminished bilaterally, sputum culture done. Afib vs acc junctional, 80-120s, keeping MAP>65, epi gtt on, dopplering pulses throughout. 1u FFP given for increased INR, no other products or volume given overnight.   Heparin gtt at 1400, ECMO 60%, sweep 4, flows 4.2, speed 3500. Pulsatility in and out all night. No UOP overnight, MD aware, CRRT to be started when bags arrive, consent already signed.  Lactic increasing, procalcitonin elevated, lytes elevated. No BMs overnight, Calcium replaced per protocol.  Plans to start CRRT today and potentially start nutrition per team decision.

## 2021-10-22 NOTE — PROGRESS NOTES
ECMO Shift Summary:      ECMO Equipment:  Console Serial Number: [id number]  Circuit Lot Number: [id number]  Oxygenator Lot number: [id number]    Patient remains on VA ECMO, all equipment is functioning and alarms are appropriately set. RPM's 3500 with flow range 3.97-4.21 L/min. Sweep gas is at 4 LPM and FiO2 60%. Circuit remains free of air, clot and fibrin. Cannulas are secure with no bleeding from site. Extremities are cool. Suctioned ETT for thin pink sputum.    Significant Shift Events: None    Vent settings:  Ventilation Mode: CMV/AC  (Continuous Mandatory Ventilation/ Assist Control)  FiO2 (%): 50 %  Rate Set (breaths/minute): 12 breaths/min  Tidal Volume Set (mL): 460 mL  PEEP (cm H2O): 5 cmH2O  Oxygen Concentration (%): 50 %  Resp: 12  .    Heparin is running at 1400 u/kg/hr, ACT range 203.     Product given included one unit FFP.       Intake/Output Summary (Last 24 hours) at 10/22/2021 0522  Last data filed at 10/22/2021 0500  Gross per 24 hour   Intake 2663.43 ml   Output 159 ml   Net 2504.43 ml       ECHO:  No results found for this or any previous visit.  Results for orders placed during the hospital encounter of 18    Echocardiogram    Narrative  [id number]  Formerly [facility]  [id number]  [id number]^KITTY^JOHN^AISLINN      [facility]  [facility]  Echocardiography Laboratory  [address]  [place] [address]  Phone [phone]  Fax [phone]      Name: FLOR CALZADA  MRN: [id number]  : 1965  Study Date: 2018 07:50 AM  Age: 52 yrs  Gender: Male  Patient Location: [facility]  Reason For Study: Hx a flutter  Ordering Physician: JOHN TANG  Referring Physician: PREM VERONICA  Performed By: Estella Kam    BSA: 1.9 m2  Height: 69 in  Weight: 168 lb  BP: 106/58 mmHg  _____________________________________________________________________________  __      Procedure  Complete Echo  Adult.  _____________________________________________________________________________  __      Interpretation Summary    Left ventricular systolic function is normal.The visual ejection fraction is  estimated at 60-65%.  The right ventricular systolic function is normal.  The left atrium is mildly dilated.  Mild aortic root and ascending aorta dilatation.    Compared to priror study dated 08/15/2017 LVEF has improved.  _____________________________________________________________________________  __      Left Ventricle  The left ventricle is normal in size. There is normal left ventricular wall  thickness. Left ventricular systolic function is normal. The visual ejection  fraction is estimated at 60-65%. Left ventricular diastolic function is  normal. No regional wall motion abnormalities noted.    Right Ventricle  The right ventricle is normal size. The right ventricular systolic function is  normal.    Atria  The left atrium is mildly dilated. Right atrial size is normal. There is no  color Doppler evidence of an atrial shunt.    Mitral Valve  There is trace mitral regurgitation.    Tricuspid Valve  There is trace tricuspid regurgitation. The right ventricular systolic  pressure is approximated at 24.5 mmHg plus the right atrial pressure.      Aortic Valve  The aortic valve is trileaflet. lambl's excrescences seen. No aortic  regurgitation is present. No aortic stenosis is present.    Pulmonic Valve  There is trace pulmonic valvular regurgitation. There is no pulmonic valvular  stenosis.    Vessels  Mild aortic root dilatation. 3.8 cm. The ascending aorta is Mildly dilated.  3.8 cm. The IVC is normal in size and reactivity with respiration, suggesting  normal central venous pressure.    Pericardium  There is no pericardial effusion.    Rhythm  The rhythm was sinus bradycardia.    _____________________________________________________________________________  __  MMode/2D Measurements & Calculations  RVDd: 3.1  cm  IVSd: 1.1 cm  LVIDd: 4.9 cm  LVIDs: 3.4 cm  LVPWd: 0.96 cm  FS: 29.4 %  LV mass(C)d: 178.3 grams  LV mass(C)dI: 93.0 grams/m2    Ao root diam: 3.8 cm  LA dimension: 4.2 cm  asc Aorta Diam: 3.8 cm  LA/Ao: 1.1  LVOT diam: 2.5 cm  LVOT area: 5.0 cm2  LA Volume (BP): 70.6 ml  LA Volume Index (BP): 36.8 ml/m2  RWT: 0.39      Doppler Measurements & Calculations  MV E max aleks: 84.0 cm/sec  MV A max aleks: 64.1 cm/sec  MV E/A: 1.3  MV dec time: 0.31 sec    PA V2 max: 96.5 cm/sec  PA max PG: 3.7 mmHg  PA acc time: 0.13 sec  TR max aleks: 247.6 cm/sec  TR max P.5 mmHg  E/E' av.5  Lateral E/e': 5.5  Medial E/e': 9.5        _____________________________________________________________________________  __        Report approved by: Sam Pardo 2018 11:20 AM      CXR:  Recent Results (from the past 24 hour(s))   Echocardiogram Limited   Result Value    3D LVEF 15%    LVEF  10-20% (severely reduced)    [facility]    [id number]  [id number]  [id number]  [id number]^CHRISTOPHE^MIGUEL^DYLAN CHARLES     [facility]  Echocardiography Laboratory  [address] [place] [id number]     Name: FLOR CALZADA  MRN: [id number]  : 1965  Study Date: 10/21/2021 10:37 AM  Age: 56 yrs  Gender: Male  Patient Location: [facility]  Reason For Study: Heart Failure  Ordering Physician: MIGUEL SAEED  Referring Physician: MAURI FARAH  Performed By: Dolly Oconnor     BSA: 1.7 m2  Height: 68 in  Weight: 137 lb  ______________________________________________________________________________  Procedure  Limited Portable Echo Adult. 3D image acquisition, reconstruction, and real-  time interpretation was performed.  ______________________________________________________________________________  Interpretation Summary  VA ECMO 4.2 LPM     Left ventricular function is decreased. The ejection fraction is 10-20%  (severely reduced).  Global right ventricular function is severely  reduced.  Moderate mitral insufficiency is present.  No pericardial effusion is present.  There has been no change.  ______________________________________________________________________________  Left Ventricle  3D LVEF volumetric analysis is 15%. Moderate to severe left ventricular  dilation is present. Left ventricular function is decreased. The ejection  fraction is 10-20% (severely reduced). Severe diffuse hypokinesis is present.     Right Ventricle  Moderate right ventricular dilation is present. Global right ventricular  function is severely reduced.     Mitral Valve  Moderate mitral insufficiency is present.     Tricuspid Valve  Mild to moderate tricuspid insufficiency is present. The right ventricular  systolic pressure is approximated at 14.4 mmHg plus the right atrial pressure.     Pericardium  No pericardial effusion is present.     Compared to Previous Study  There has been no change.  ______________________________________________________________________________  MMode/2D Measurements & Calculations  IVSd: 1.2 cm  LVIDd: 5.1 cm  LVIDs: 4.7 cm  LVPWd: 1.2 cm  FS: 8.4 %  LV mass(C)d: 239.3 grams  LV mass(C)dI: 137.5 grams/m2  RWT: 0.46     Doppler Measurements & Calculations  TR max aleks: 188.7 cm/sec  TR max P.4 mmHg     ______________________________________________________________________________  Report approved by: Sam Valerio 10/21/2021 01:25 PM         US Abdomen Limited    Narrative    EXAMINATION: US ABDOMEN LIMITED, 10/21/2021 1:45 PM    COMPARISON: CT abdomen and pelvis 10/20/2021    HISTORY: Elevated LFTs. Shock liver    TECHNIQUE: The abdomen was scanned in standard fashion with  specialized ultrasound transducer(s) using both grey scale and limited  color Doppler techniques.    FINDINGS:   Fluid: Mild ascites and right pleural effusion. ECMO cannula in the  IVC.    Liver: The liver demonstrates normal echotexture, measuring 16.7 cm in  craniocaudal dimension. Hyperechoic mass  measuring 2.5 cm in segment 6  corresponds to hemangioma detected on prior MRI.     Gallbladder: There is mild wall thickening. No pericholecystic fluid  or cholelithiasis. Sonographic Duncan's could not be evaluated as the  patient is sedated.    Bile Ducts: Both the intra- and extrahepatic biliary system are of  normal caliber.  The common bile duct measures 4 mm in diameter.    Pancreas: Obscured by bowel gas     Kidney: The right kidney measures 10.6 cm long. There is no  hydronephrosis or hydroureter, no shadowing renal calculi, cystic  lesion or mass.       Impression    IMPRESSION:   1.  Mild hepatomegaly with mild ascites and right pleural effusion.  2.  Mild gallbladder wall thickening is likely due to underlying liver  disease and/or ascites. No other signs of cholecystitis.  3.  Stable hepatic hemangioma.    COURTNEY DE OLIVEIRA MD         SYSTEM ID:  [id number]       Labs:  Recent Labs   Lab 10/22/21  0437 10/22/21  0332 10/22/21  0330 10/22/21  0206 10/21/21  2350 10/21/21  2350 10/21/21  2200 10/21/21  2200   PH  --  7.28*  --  7.30*  --  7.26*  --  7.29*   PCO2  --  40  --  39  --  46*  --  40   PO2  --  146*  --  139*  --  146*  --  130*   HCO3  --  19*  --  19*  --  21  --  19*   O2PER 100 50  60 50 50   < > 50   < > 50    < > = values in this interval not displayed.       Lab Results   Component Value Date    HGB 9.4 (L) 10/22/2021    PHGB <30 10/22/2021    PLT 99 (L) 10/22/2021    FIBR 257 10/22/2021    INR 4.04 (H) 10/22/2021     (HH) 10/22/2021    DD 4.10 (H) 10/22/2021    ANTCH 37 (L) 10/21/2021         Plan is continue on VA ecmo and start CRRT today.      Jonah De La Torre, RT  ECMO Specialist  10/22/2021 5:22 AM

## 2021-10-22 NOTE — PROGRESS NOTES
Cardiology Progress Note  Kwaku Medrano MRN: [id number]  Age: 56 year old, : 1965  Date: 10/22/2021            Assessment and Plan:     Kwaku Medrano is a 56 year old male with PMH of scleroderma, ILD, Raynauds,  A Flutter s/p CTI ablation in 2017, pAF, SVT, GERD who had not been taking his medications for the past 10 months. He was recently seen in an urgent care clinic on 10/11/2021 for SOB. He was started on a prednisone taper because his breathing trouble was thought to be secondary to an ILD flare. He was started back on his medications (Dilt /day, Lisinopril 10, Protonix 40, Pred taper) except for cellcept. He presented to the OSH with SOB. He was found in A fib w RVR with HR in the 130s. He was given a bolus of amio 150mg and then developed severe respiratory distress. Post RSI (w etomidate and succinylcholine) patient had cardiac arrest (<5min) with asystole/PEA. Post arrest, patient was transferred to [facility] for additional management of ongoing shock.  Patient had increased pressor needs on 10/20, so VA-ECMO was placed.     Changes today:  - Appreciate CSI ECMO recommendations; increase flow and try and wean epi  - CRRT per nephrology  - Cell products for coagulopathy per ECMO algorithm   - Restart fentanyl and versed  -Trend lactic acid q2hr  - CT C/A/P  - Ongoing goals of care discussions with family                Neurology: Metabolic encephalopathy -- in the setting of cardiogenic shock.  Intubated, sedated.  CT head showed no acute pathology -  Repeat 10/22  Off sedation patient moved all extremities, not following command  Plan:  -- resume sedation with fentanyl and versed  as needed  --  Delirium protocol  -- Treat shock as below.      Cardiovascular / Hemodynamics: - Mixed circulatory shock: cardiogenic (post amiodarone administration for atrial fibrillation with RVR in OSH; likely subacute cardiomyopathy); possibly septic (in the setting of fever and  pulmonary infiltrates)  - NICM : normal coronary angiogram. Possibly tachycardia induced versus septic induced. Troponin elevated without ischemic EKG changes. Unlikely to be myocarditis.  - PEA arrest post intubation on 10/19 -- ROSC after 5 minutes of CPR  - Paroxysmal atrial fibrillation with RVR --  junctional rhythm post amiodarone and ECMO cannulation then back to AFib  - History of atrial flutter s/p ablation in 2017  - History of Raynaud/scleroderma -- peripheral cyanosis not related to Raynaud. No scleroderma flare. Stress dose steroids. US duplex arterial showed distal flow.     DATE MAP CVP PAP PCWP Anjelica CO Anjelica CI SVR MVo2 Therapies   October 20, prior to ECMO 61  16 32/24 14 2.9 1.7 1241 54 Levo 0.6, epi 0.14         TTE: EF 10-15% prior to ECMO. Severe RV dysfunction.  Post-ECMO TTE: Severely reduced BiV function; Mod MR  EKG: AFib     ECMO settings: flow 4-4.2 L/min, Speed 3600 RPM, FiO2 60%, sweep 4, ACT goal 200-220     ECMO cannulas: 25 F venous, 17 F arterial, 8 F reperfusion cannula     Plan:  --Increase flows to 4.5L/min  --ACT goal 200-220 given history of Raynaud.  --hold ACE/ARB for now given likely reduced renal fxn after arrest  --holding beta blocker given shock   --hydrocortisone per rheumatology  --CRRT for volume removal  --Add NE as needed  --Attempt to wean epi as able      Pulmonary: Acute hypoxic respiratory failure -- community acquired pneumonia, cardiogenic shock (pulmonary edema), and small PE  History of ILD  -- Pulmonology ruled out ILD flare  Small PE --  Tiny pulmonary embolism visualized at the right hilar region  Vent Settings:  Ventilation Mode: CMV/AC  (Continuous Mandatory Ventilation/ Assist Control)  FiO2 (%): 50 %  Rate Set (breaths/minute): 12 breaths/min  Tidal Volume Set (mL): 460 mL  PEEP (cm H2O): 5 cmH2O  Oxygen Concentration (%): 50 %  Resp: 17    CXR: Lines in stable position.  CT chest 10/20:  Increase in basilar predominant pulmonary airspace opacities  which likely represent pulmonary edema in the setting of recent cardiogenic shock.     Plan:  -- Continue cefepime and vancomycin.  -- Volume as above  --wean vent as able  --daily CXR  --Q2h ABGs for now  --repeat CT chest      GI and Nutrition: Shock liver  History of GERD and esophageal dysmotility.  Concern for liver necrosis or bowel ischemia given elevated lactate  Plan:  --repeat CT abb  --monitor BID LFTs  --NPO for now, start TF when lactic acid stable   --bowel regimen - on board  --GI Prophylaxis: PPI       Renal, Fluid and Electrolytes: Acute Oliguric DIMPLE -- Cardiorenal . Creatinine uptrending 3.86 (baseline 0.9-1). UOP minimal  Lactic acidosis -- lactate up to 9, improved slowly to ~3; has since steadily increased to ~7      Plan:  --CRRT  - trend lactate q2h  --maintain K>4 and Mg>2       Infectious Disease: Community acquired pneumonia  --Continue cefepime and vancomycin  --daily blood cultures  --monitor for signs of infection given lines, and leukocytosis      Hematology and Oncology: Coagulopathy  Receiving heparin for ECMO   - VitK 5mg (10/21)  - FFP x 3 (10/21)  --cryo PRN fibrinogen < 100; FFP for INR >2  --Transfuse for Hgb<7, platelets <50k  --heparin gtt for ECMO with ACT goal 200-220 (given Raynaud)  --US LE w/ arterial duplex per ECMO protocol   --DVT PPX: Heparin as above      Endocrinology: No known medical history  --insulin gtt as needed  --Steroids as above      Lines: PA catheter October 20, 2021  R femoral arterial and venous ECMO cannulae October 20, 2021  L femoral arterial line October 20, 2021  R radial arterial line October 20, 2021  ETT October 20, 2021  Younger catheter October 20, 2021  OG tube October 20, 2021  Restraint: needed    Current lines are required for patient management         Family update by me today: Yes     Code Status: FULL CODE    The pt was discussed and evaluated with Dr. Conner, attending physician, who agrees with the assessment and plan above.      Milton Patel MD  [facility] Heart  Cardiology Fellow  [phone]                Subjective/Interval Events     Lactic acid rising overnight. CRRT started this morning. Reduced pulsatility on ECMO circuit that improves with stimulus/agitation. Not following commands. Extremities still mottled with ischemic digits. Abdomen soft. No vomitting or stooling. In Afib with RVR          Objective     /88 (BP Location: Left arm)   Pulse 116   Temp 98.6  F (37  C)   Resp 14   Wt 65.1 kg (143 lb 9.6 oz)   SpO2 (!) 89%   BMI 21.27 kg/m    Temp:  [98.1  F (36.7  C)-98.8  F (37.1  C)] 98.6  F (37  C)  Pulse:  [] 116  Resp:  [10-19] 14  MAP:  [58 mmHg-78 mmHg] 76 mmHg  Arterial Line BP: (59-96)/(42-73) 69/69  FiO2 (%):  [50 %] 50 %  SpO2:  [81 %-97 %] 89 %  Wt Readings from Last 2 Encounters:   10/22/21 65.1 kg (143 lb 9.6 oz)   10/19/21 69.4 kg (153 lb)     I/O last 3 completed shifts:  In: 2671.93 [I.V.:1425.93; NG/GT:210]  Out: 154 [Urine:54; Emesis/NG output:100]      GENERAL APPEARANCE: Intubated, sedated. NAD.  HEENT: No icterus, pupils equal, ETT in place, OG tube in place  CARDIOVASCULAR: irregularly irregular tachycardia, normal S1 and S2, no S3 or S4, click or rub. +grade 2/6 systolic murmur.   RESP: Coarse bilaterally, good air movement. Mechanical ventilation.    GASTRO: Soft, bowel sounds hypoactive but present. Abd soft though skin is taught  GENITOURINARY: Younger in place.  EXTREMITIES: Cool, 1-2+ edema, pulses dopplered. VA ECMO cannulas in groin, arterial sheath in left groin  NEURO: Sedated and intubated, Pupils equal and reactive   INTEGUMENTARY: Ischemic finger, mottled feet, Cannula/Line sites CDI   LINES/TUBES/DRAINS: (noted below) V-A ECMO Cannulas R groin, arterial sheath  L groin. R radial Arterial line. ETT. OG. Younger Catheter.             Data:     Vent Settings:  Resp: 14 SpO2: (!) 89 % O2 Device: Mechanical Ventilator Oxygen Delivery: 50 LPM    Arterial Blood Gas:    Recent Labs   Lab 10/22/21  0552 10/22/21  0437 10/22/21  0332 10/22/21  0330 10/22/21  0206 10/22/21  0206 10/21/21  2350 10/21/21  2350   PH 7.33*  --  7.28*  --   --  7.30*  --  7.26*   PCO2 35  --  40  --   --  39  --  46*   PO2 151*  --  146*  --   --  139*  --  146*   HCO3 19*  --  19*  --   --  19*  --  21   O2PER 50 100 50  60 50   < > 50   < > 50    < > = values in this interval not displayed.       Vitals:    10/20/21 0400 10/21/21 0000 10/22/21 0000   Weight: 62.4 kg (137 lb 8 oz) 64 kg (141 lb 3.2 oz) 65.1 kg (143 lb 9.6 oz)   I/O last 3 completed shifts:  In: 2671.93 [I.V.:1425.93; NG/GT:210]  Out: 154 [Urine:54; Emesis/NG output:100]  Recent Labs   Lab 10/22/21  0332 10/22/21  0330 10/21/21  2200 10/21/21  2200   NA  --  143  --  143   POTASSIUM  --  5.5*  --  5.3   CHLORIDE  --  109  --  110*   CO2  --  19*  --  20   ANIONGAP  --  15*  --  13   GLC 97 93   < > 100*  104*   BUN  --  72*  --  66*   CR  --  3.86*  --  3.49*   BETZY  --  7.1*  --  7.4*    < > = values in this interval not displayed.     No components found for: URINE   Recent Labs   Lab 10/22/21  0330 10/21/21  2200 10/21/21  1543   AST 4,431* 5,065* 5,804*   ALT 3,331* 3,348* 3,405*   BILITOTAL 3.8* 3.6* 3.3*   ALBUMIN 3.0* 3.0* 2.8*   PROTTOTAL 5.2* 5.4* 5.1*   ALKPHOS 76 78 71     Temp: 98.6  F (37  C) Temp src: BladderTemp  Min: 98.1  F (36.7  C)  Max: 98.8  F (37.1  C)   Recent Labs   Lab 10/22/21  0330 10/21/21  2200 10/21/21  1543 10/21/21  0952 10/21/21  0348   WBC 20.1* 19.0* 17.3* 16.7* 17.5*   HGB 9.4* 9.4* 9.9* 9.9* 10.7*   HCT 29.7* 30.0* 31.0* 31.4* 33.4*   MCV 90 90 90 89 88   RDW 16.6* 16.5* 16.5* 16.3* 16.2*   PLT 99* 102* 124* 125* 157     Recent Labs   Lab 10/22/21  0330 10/21/21  2200 10/21/21  1543 10/21/21  0952 10/21/21  0348   INR 4.04* 3.85* 5.61* 4.74* 4.67*   * 212* 238* 203* 181*     Recent Labs   Lab 10/22/21  0332 10/22/21  0330 10/21/21  2200 10/21/21  1550   GLC 97 93 100*  104* 92       All  imaging personally reviewed:  Recent Results (from the past 24 hour(s))   Echocardiogram Limited   Result Value    3D LVEF 15%    LVEF  10-20% (severely reduced)    Narrative    [id number]  [id number]  [id number]  [id number]^CHRISTOPHE^MIGUEL^DYLAN CHARLES     [facility],[place]  Echocardiography Laboratory  [address] [place] [address]     Name: FLOR CALZADA  MRN: [id number]  : 1965  Study Date: 10/21/2021 10:37 AM  Age: 56 yrs  Gender: Male  Patient Location: [facility]  Reason For Study: Heart Failure  Ordering Physician: MIGUEL SAEED  Referring Physician: MAURI FARAH  Performed By: Dolly Oconnor     BSA: 1.7 m2  Height: 68 in  Weight: 137 lb  ______________________________________________________________________________  Procedure  Limited Portable Echo Adult. 3D image acquisition, reconstruction, and real-  time interpretation was performed.  ______________________________________________________________________________  Interpretation Summary  VA ECMO 4.2 LPM     Left ventricular function is decreased. The ejection fraction is 10-20%  (severely reduced).  Global right ventricular function is severely reduced.  Moderate mitral insufficiency is present.  No pericardial effusion is present.  There has been no change.  ______________________________________________________________________________  Left Ventricle  3D LVEF volumetric analysis is 15%. Moderate to severe left ventricular  dilation is present. Left ventricular function is decreased. The ejection  fraction is 10-20% (severely reduced). Severe diffuse hypokinesis is present.     Right Ventricle  Moderate right ventricular dilation is present. Global right ventricular  function is severely reduced.     Mitral Valve  Moderate mitral insufficiency is present.     Tricuspid Valve  Mild to moderate tricuspid insufficiency is present. The right ventricular  systolic pressure is approximated at 14.4 mmHg  plus the right atrial pressure.     Pericardium  No pericardial effusion is present.     Compared to Previous Study  There has been no change.  ______________________________________________________________________________  MMode/2D Measurements & Calculations  IVSd: 1.2 cm  LVIDd: 5.1 cm  LVIDs: 4.7 cm  LVPWd: 1.2 cm  FS: 8.4 %  LV mass(C)d: 239.3 grams  LV mass(C)dI: 137.5 grams/m2  RWT: 0.46     Doppler Measurements & Calculations  TR max aleks: 188.7 cm/sec  TR max P.4 mmHg     ______________________________________________________________________________  Report approved by: Sam Valerio 10/21/2021 01:25 PM         US Abdomen Limited    Narrative    EXAMINATION: US ABDOMEN LIMITED, 10/21/2021 1:45 PM    COMPARISON: CT abdomen and pelvis 10/20/2021    HISTORY: Elevated LFTs. Shock liver    TECHNIQUE: The abdomen was scanned in standard fashion with  specialized ultrasound transducer(s) using both grey scale and limited  color Doppler techniques.    FINDINGS:   Fluid: Mild ascites and right pleural effusion. ECMO cannula in the  IVC.    Liver: The liver demonstrates normal echotexture, measuring 16.7 cm in  craniocaudal dimension. Hyperechoic mass measuring 2.5 cm in segment 6  corresponds to hemangioma detected on prior MRI.     Gallbladder: There is mild wall thickening. No pericholecystic fluid  or cholelithiasis. Sonographic Duncan's could not be evaluated as the  patient is sedated.    Bile Ducts: Both the intra- and extrahepatic biliary system are of  normal caliber.  The common bile duct measures 4 mm in diameter.    Pancreas: Obscured by bowel gas     Kidney: The right kidney measures 10.6 cm long. There is no  hydronephrosis or hydroureter, no shadowing renal calculi, cystic  lesion or mass.       Impression    IMPRESSION:   1.  Mild hepatomegaly with mild ascites and right pleural effusion.  2.  Mild gallbladder wall thickening is likely due to underlying liver  disease and/or ascites. No  "other signs of cholecystitis.  3.  Stable hepatic hemangioma.    COURTNEY DE OLIVEIRA MD         SYSTEM ID:  [id number]   XR Chest Port 1 View    Narrative    EXAMINATION: XR CHEST PORT 1 VIEW, 10/22/2021 1:30 AM    INDICATION: Check endotracheal tube placement and ECLS cannula  placement. DO NOT log-roll patient.  Place film under patient using  patient safety handling process.    COMPARISON: 10/21/2021    FINDINGS: Single portable 20 degrees AP radiograph of the chest. ET  tube projects over the mid thoracic trachea. Right IJ [place]-Demetrio  catheter with tip terminating over the distal right main pulmonary  artery. Enteric tube side-port and tip projects over the stomach.  Lower approach\" cannula projects over the cavoatrial junction.    Trachea is midline. The cardiomediastinal silhouette is enlarged.  Small bilateral pleural effusions. No pneumothorax. Unchanged  bilateral interstitial and airspace opacities.    Impression visualized upper abdomen, bones, and soft tissue are  unremarkable.      Impression    IMPRESSION:  1. Stable support devices.  2. Unchanged small bilateral pleural effusions and bilateral  interstitial and airspace opacities.    I have personally reviewed the examination and initial interpretation  and I agree with the findings.    STELLA MCQUEEN MD         SYSTEM ID:  [id number]             Medications     Current Facility-Administered Medications   Medication     alum & mag hydroxide-simethicone (MAALOX) suspension 30 mL     artificial tears ophthalmic ointment     aspirin (ASA) chewable tablet 81 mg     calcium chloride in  mL intermittent infusion 1 g     calcium gluconate 2 g in 100 mL sodium chloride intermittent infusion (premix)     calcium gluconate 2 g in 100 mL sodium chloride intermittent infusion (premix)     calcium gluconate 4 g in sodium chloride 0.9 % 100 mL intermittent infusion     ceFEPIme (MAXIPIME) 2 g vial to attach to  ml bag for ADULTS or 50 ml bag for PEDS     " dialysate for CVVHD & CVVHDF (PrismaSol BGK 2/3.5)     EPINEPHrine (ADRENALIN) 16 mg in sodium chloride 0.9 % 250 mL infusion     fentaNYL (SUBLIMAZE) 50 mcg/mL bolus from infusion pump 50 mcg     fentaNYL (SUBLIMAZE) infusion     heparin (porcine) 100 unit/mL in 0.45% Sodium Chloride ANTICOAGULANT infusion     heparin 100 UNIT/ML injection 350-710 Units     heparin 2 unit/mL in 0.9% NaCl (for REPERFUSION CATHETER)     hydrocortisone sodium succinate PF (solu-CORTEF) injection 20 mg     lidocaine (LMX4) cream     lidocaine (LMX4) cream     lidocaine 1 % 0.1-1 mL     lidocaine 1 % 0.1-1 mL     magnesium sulfate 2 g in water intermittent infusion     magnesium sulfate 2 g in water intermittent infusion     magnesium sulfate 4 g in 100 mL sterile water (premade)     medication instruction     midazolam (VERSED) drip - ADULT 100 mg/100 mL in NS (pre-mix)     naloxone (NARCAN) injection 0.2 mg    Or     naloxone (NARCAN) injection 0.4 mg    Or     naloxone (NARCAN) injection 0.2 mg    Or     naloxone (NARCAN) injection 0.4 mg     No heparin required     norepinephrine (LEVOPHED) 16 mg in  mL infusion MAX CONC CENTRAL LINE     ondansetron (ZOFRAN-ODT) ODT tab 4 mg    Or     ondansetron (ZOFRAN) injection 4 mg     pantoprazole (PROTONIX) 2 mg/mL suspension 40 mg    Or     pantoprazole (PROTONIX) IV push injection 40 mg     Patient is already receiving anticoagulation with heparin, enoxaparin (LOVENOX), warfarin (COUMADIN)  or other anticoagulant medication     polyethylene glycol (MIRALAX) Packet 17 g     POST-filter replacement solution for CVVHD & CVVHDF (PrismaSol BGK 2/3.5)     potassium chloride 20 mEq in 50 mL intermittent infusion     potassium chloride 20 mEq in 50 mL intermittent infusion     PRE-filter replacement solution for CVVHD & CVVHDF (PrismaSol BGK 2/3.5)     [Held by provider] predniSONE (DELTASONE) tablet 4 mg     senna-docusate (SENOKOT-S/PERICOLACE) 8.6-50 MG per tablet 1 tablet    Or      senna-docusate (SENOKOT-S/PERICOLACE) 8.6-50 MG per tablet 2 tablet     sodium chloride (PF) 0.9% PF flush 10 mL     sodium chloride (PF) 0.9% PF flush 3 mL     sodium chloride (PF) 0.9% PF flush 3 mL     sodium chloride (PF) 0.9% PF flush 3 mL     sodium phosphate 10 mmol in D5W intermittent infusion     sodium phosphate 15 mmol in D5W intermittent infusion     sodium phosphate 15 mmol in D5W intermittent infusion     sodium phosphate 20 mmol in D5W intermittent infusion     sodium phosphate 25 mmol in D5W intermittent infusion     vancomycin place [place] - receiving intermittent dosing

## 2021-10-22 NOTE — PROGRESS NOTES
Brief renal progress note:    Rising lactic, rising K. Continued oliguria. Per plan as documented in renal consult from yesterday, will initiate CRRT.    Access in line with VA ECMO. 2K baths, I=O for now.      /88 (BP Location: Left arm)   Pulse 104   Temp 98.8  F (37.1  C)   Resp 12   Wt 65.1 kg (143 lb 9.6 oz)   SpO2 (!) 85%   BMI 21.27 kg/m      Last Comprehensive Metabolic Panel:  Sodium   Date Value Ref Range Status   10/22/2021 143 133 - 144 mmol/L Preliminary   05/27/2020 139 133 - 144 mmol/L Final     Potassium   Date Value Ref Range Status   10/22/2021 5.5 (H) 3.4 - 5.3 mmol/L Preliminary   05/27/2020 3.8 3.4 - 5.3 mmol/L Final     Chloride   Date Value Ref Range Status   10/22/2021 109 94 - 109 mmol/L Preliminary   05/27/2020 108 94 - 109 mmol/L Final     Carbon Dioxide   Date Value Ref Range Status   05/27/2020 25 20 - 32 mmol/L Final     Carbon Dioxide (CO2)   Date Value Ref Range Status   10/21/2021 20 20 - 32 mmol/L Final     Anion Gap   Date Value Ref Range Status   10/21/2021 13 3 - 14 mmol/L Final   05/27/2020 6 3 - 14 mmol/L Final     Glucose   Date Value Ref Range Status   10/22/2021 97 70 - 99 mg/dL Final   10/21/2021 100 (H) 70 - 99 mg/dL Final   05/27/2020 100 (H) 70 - 99 mg/dL Final     Urea Nitrogen   Date Value Ref Range Status   10/21/2021 66 (H) 7 - 30 mg/dL Final   05/27/2020 11 7 - 30 mg/dL Final     Creatinine   Date Value Ref Range Status   10/21/2021 3.49 (H) 0.66 - 1.25 mg/dL Final   05/27/2020 1.18 0.66 - 1.25 mg/dL Final     GFR Estimate   Date Value Ref Range Status   10/21/2021 18 (L) >60 mL/min/1.73m2 Final     Comment:     As of July 11, 2021, eGFR is calculated by the CKD-EPI creatinine equation, without race adjustment. eGFR can be influenced by muscle mass, exercise, and diet. The reported eGFR is an estimation only and is only applicable if the renal function is stable.   05/27/2020 68 >60 mL/min/[1.73_m2] Final     Comment:     Non  GFR  Calc  Starting 12/18/2018, serum creatinine based estimated GFR (eGFR) will be   calculated using the Chronic Kidney Disease Epidemiology Collaboration   (CKD-EPI) equation.       Calcium   Date Value Ref Range Status   10/21/2021 7.4 (L) 8.5 - 10.1 mg/dL Final   05/27/2020 8.4 (L) 8.5 - 10.1 mg/dL Final

## 2021-10-22 NOTE — PROGRESS NOTES
10/22/21 1000   Quick Adds   Type of Visit Initial Occupational Therapy Evaluation   Living Environment   Living Environment Comments Unable to attain PLOF pt w/ no command follow   General Information   Onset of Illness/Injury or Date of Surgery 10/20/21   Referring Physician Milton Patel MD   Additional Occupational Profile Info/Pertinent History of Current Problem Kwaku Medrano is a 56 year old male with past medical history of diffuse cutaneous systemic sclerosis, dermatomyositis, interstitial lung disease, paroxysmal atrial fibrillation.  He was admitted to ICU following asystole/PEA and subsequently developed cardiogenic shock.   Existing Precautions/Restrictions other (see comments)  (ECMO, intubated, ART line)   Cognitive Status Examination   Cognitive Status Comments OT:no command follow   Range of Motion Comprehensive   Comment, General Range of Motion Pt stiff throughout BUE/BLE (ankle assessed only 2/2 ECMO and fem line)    Clinical Impression   Criteria for Skilled Therapeutic Interventions Met (OT) yes   OT Diagnosis decreased ind in I/ADLS   OT Problem List-Impairments impacting ADL problems related to;activity tolerance impaired;mobility;strength;range of motion (ROM);post-surgical precautions   ADL comments/analysis decreased ind in I/ADLS   Assessment of Occupational Performance 1-3 Performance Deficits   Planned Therapy Interventions (OT) ADL retraining;IADL retraining;balance training;bed mobility training;cognition;strengthening;transfer training;ROM;home program guidelines;progressive activity/exercise;risk factor education   Clinical Decision Making Complexity (OT) high complexity   Therapy Frequency (OT) 3x/week   Predicted Duration of Therapy 11/25/21   Risk & Benefits of therapy have been explained evaluation/treatment results reviewed;patient   OT Discharge Planning    OT Discharge Recommendation (DC Rec) Transitional Care Facility;Acute Rehab Center-Motivated patient will  benefit from intensive, interdisciplinary therapy.  Anticipate will be able to tolerate 3 hours of therapy per day;Long term care facility   OT Rationale for DC Rec to increase ind in ADLS   Total Evaluation Time (Minutes)   Total Evaluation Time (Minutes) 3

## 2021-10-22 NOTE — PLAN OF CARE
Major Shift Events:  Pt remains intubated and sedated on VA ECMO via peripheral cannulation.  Sedation was off entirely this morning and pt KYLE but did not follow commands.  Became restless so sedation resumed, Versed at 3 mg/hr and Fentanyl 50 mcg/hr.  ECMO flows 4.6 lpm at 3750 RPM's with sweep of 4 and fiO2 60%.  Vent CMV 12/460/5/50%.  Epi titrated to 0.1 mcg/kg/min and Levophed restarted at 0.02 mcg/kg/min in order to maintain MAP greater than 65.  CRRT initiated this moring, goal I=O.  Pt positive 500 ml since midnight.  Due to rising Lactate chest/abd/pelvis CT scan obtained.  TF initiated at trophic feeds only via OG.  Family updated by RN and MD at bedside.   Plan: Continue with current plan of care.  For vital signs and complete assessments, please see documentation flowsheets.

## 2021-10-23 NOTE — PLAN OF CARE
Major Shift Events:   -Patient remains intubated and sedated on VA ECMO  -PERRL; withdraws in all extremities.  -VA ECMO without alarms; Epi and Levo gtts titrated for MAP >65  -CRRT pulling at goal; I=)  -Lactic Acid downtrending; see labs    Plan: Continue to monitor  For vital signs and complete assessments, please see documentation flowsheets.

## 2021-10-23 NOTE — PROGRESS NOTES
ECMO Shift Summary:      ECMO Equipment:  Console Serial Number: [id number]  Circuit Lot Number: [id number]  Oxygenator Lot number: [id number]    Patient remains on [facility] ECMO, all equipment is functioning and alarms are appropriately set. RPM's 3750 with flow range 4.5-4.6 L/min. Sweep gas is at 2 LPM and FiO2 60%. Circuit remains free of air, clot and fibrin. Cannulas are secure with no bleeding from site. Extremities are mottled,dusky. Suctioned ETT for pink-tinged.    Significant Shift Events: none    Vent settings:  Ventilation Mode: CMV/AC  (Continuous Mandatory Ventilation/ Assist Control)  FiO2 (%): 50 %  Rate Set (breaths/minute): 12 breaths/min  Tidal Volume Set (mL): 460 mL  PEEP (cm H2O): 5 cmH2O  Oxygen Concentration (%): 50 %  Resp: 12      Heparin is running at 1600 u/hr, ACT range 207-215.    Urine output is none, blood loss was none. Product given included none.       Intake/Output Summary (Last 24 hours) at 10/23/2021 0612  Last data filed at 10/23/2021 0600  Gross per 24 hour   Intake 2440.71 ml   Output 2146 ml   Net 294.71 ml       ECHO:  No results found for this or any previous visit.  Results for orders placed during the hospital encounter of 18    Echocardiogram    Narrative  [id number]  [facility]  [id number]  [id number]^KITTY^JOHN^[facility]      [facility]  [facility]  Echocardiography Laboratory  [address]  [place] [address]  Phone [phone]  Fax [phone]      Name: FLOR CALZADA  MRN: [id number]  : 1965  Study Date: 2018 07:50 AM  Age: 52 yrs  Gender: Male  [place] [facility] [place]  Reason For Study: Hx a flutter  Ordering Physician: JOHN TANG  Referring Physician: PREM VERONICA  Performed By: Estella Kam    BSA: 1.9 m2  Height: 69 in  Weight: 168 lb  BP: 106/58 mmHg  _____________________________________________________________________________  __      Procedure  Complete Echo  Adult.  _____________________________________________________________________________  __      Interpretation Summary    Left ventricular systolic function is normal.The visual ejection fraction is  estimated at 60-65%.  The right ventricular systolic function is normal.  The left atrium is mildly dilated.  Mild aortic root and ascending aorta dilatation.    Compared to priror study dated 08/15/2017 LVEF has improved.  _____________________________________________________________________________  __      Left Ventricle  The left ventricle is normal in size. There is normal left ventricular wall  thickness. Left ventricular systolic function is normal. The visual ejection  fraction is estimated at 60-65%. Left ventricular diastolic function is  normal. No regional wall motion abnormalities noted.    Right Ventricle  The right ventricle is normal size. The right ventricular systolic function is  normal.    Atria  The left atrium is mildly dilated. Right atrial size is normal. There is no  color Doppler evidence of an atrial shunt.    Mitral Valve  There is trace mitral regurgitation.    Tricuspid Valve  There is trace tricuspid regurgitation. The right ventricular systolic  pressure is approximated at 24.5 mmHg plus the right atrial pressure.      Aortic Valve  The aortic valve is trileaflet. lambl's excrescences seen. No aortic  regurgitation is present. No aortic stenosis is present.    Pulmonic Valve  There is trace pulmonic valvular regurgitation. There is no pulmonic valvular  stenosis.    Vessels  Mild aortic root dilatation. 3.8 cm. The ascending aorta is Mildly dilated.  3.8 cm. The IVC is normal in size and reactivity with respiration, suggesting  normal central venous pressure.    Pericardium  There is no pericardial effusion.    Rhythm  The rhythm was sinus bradycardia.    _____________________________________________________________________________  __  MMode/2D Measurements & Calculations  RVDd: 3.1  cm  IVSd: 1.1 cm  LVIDd: 4.9 cm  LVIDs: 3.4 cm  LVPWd: 0.96 cm  FS: 29.4 %  LV mass(C)d: 178.3 grams  LV mass(C)dI: 93.0 grams/m2    Ao root diam: 3.8 cm  LA dimension: 4.2 cm  asc Aorta Diam: 3.8 cm  LA/Ao: 1.1  LVOT diam: 2.5 cm  LVOT area: 5.0 cm2  LA Volume (BP): 70.6 ml  LA Volume Index (BP): 36.8 ml/m2  RWT: 0.39      Doppler Measurements & Calculations  MV E max aleks: 84.0 cm/sec  MV A max aleks: 64.1 cm/sec  MV E/A: 1.3  MV dec time: 0.31 sec    PA V2 max: 96.5 cm/sec  PA max PG: 3.7 mmHg  PA acc time: 0.13 sec  TR max aleks: 247.6 cm/sec  TR max P.5 mmHg  E/E' av.5  Lateral E/e': 5.5  Medial E/e': 9.5        _____________________________________________________________________________  __        Report approved by: Sam Pardo 2018 11:20 AM      CXR:  Recent Results (from the past 24 hour(s))   CT Head w/o Contrast    Narrative    Exam: CT HEAD W/O CONTRAST  10/22/2021 1:56 PM    History: Post cardiac arrest.    Comparison:  CT 10/20/2021.    Technique: Using multidetector thin collimation helical acquisition  technique, axial, coronal and sagittal CT images from the skull base  to the vertex were obtained without intravenous contrast.     Findings:    No intracranial hemorrhage, mass effect, or midline shift. The  ventricles are proportionate to the cerebral sulci. The gray to white  matter differentiation of the cerebral hemispheres is preserved. The  basal cisterns are patent.    The visualized paranasal sinuses are clear. Moderate bilateral mastoid  effusions, nonspecific in the setting of intubation. No acute  abnormality of the orbits.      Impression    Impression:   No acute intracranial pathology.    I have personally reviewed the examination and initial interpretation  and I agree with the findings.    PARAS MONREAL MD         SYSTEM ID:  [id number]   CT Chest Abdomen Pelvis w/o Contrast    Narrative    EXAMINATION: CT CHEST ABDOMEN PELVIS W/O CONTRAST, 10/22/2021 2:08  PM    TECHNIQUE: Helical CT images from the thoracic inlet through the  symphysis pubis were obtained without intravenous contrast.     COMPARISON: CT 10/20/2021    HISTORY: Rising lactic acid on ECMO. Evaluate for bowel ischemia  versus liver ischemia    FINDINGS:    Chest:    Heart/ Mediastinum: Heart is within normal limits. Right internal  jugular Helmetta-Demetrio catheter is unchanged. No bulky lymphadenopathy.  Calcified left hilar lymph nodes.  Enteric tube in the esophagus    Lungs/pleura: Endotracheal tube tip is approximately 2 cm above the  brooke. Layering fluid in the right mainstem bronchus extending into  the right lower lobe. Moderate to large bilateral pleural effusions  have increased there is adjacent atelectasis and dependent groundglass  opacities suggesting pulmonary edema. Scattered groundglass nodules.    Chest wall/axilla: No bulky lymphadenopathy..    Abdomen and Pelvis:    Liver: Scattered punctate calcifications from prior granulomatous  disease. No suspicious masses. No hepatic steatosis.     Gallbladder/biliary tree: Hyperdense contents are vicarious excretion  of previously administered intravenous contrast. No biliary  dilatation.    Spleen: Scattered punctate calcifications from prior granulomatous  disease.    Pancreas: Unremarkable. No mass or ductal dilatation.    Adrenal glands: Unremarkable.    Kidneys: Persistent enhancement from prior contrast demonstration with  scattered cortical hypodensities compatible with acute kidney injury.  No hydronephrosis.    Bowel: Unremarkable. No obstruction. Increased abdominal ascites. No  definite pneumatosis or portal venous gas to suggest bowel ischemia.    Retroperitoneum: Right femoral approach arterial and venous ECMO  catheters are unchanged. Left femoral arterial catheter..  No bulky  lymphadenopathy.    Pelvis: Urinary bladder is unremarkable.  Prostate and seminal  vesicles are within normal limits.    Bones: Unremarkable. No suspicious  lesions.    Soft Tissues: Anasarca.      Impression    IMPRESSION:    1.  Increased moderate to large bilateral pleural effusions.  2.  Increased bibasilar atelectasis and groundglass opacities  suggesting pulmonary edema and/or infection.  3.  Increased ascites and anasarca.  4.  Stable support devices as above.  5.  No findings to suggest bowel or liver ischemia on these  noncontrast images.    I have personally reviewed the examination and initial interpretation  and I agree with the findings.    FELISHA ARMANDO,          SYSTEM ID:  [id number]       Labs:  Recent Labs   Lab 10/23/21  0358 10/23/21  0357 10/23/21  0148 10/22/21  2356 10/22/21  2202 10/22/21  2202   PH 7.50*  --  7.45 7.47*  --  7.44   PCO2 24*  --  29* 26*  --  29*   PO2 135*  --  113* 121*  --  88   HCO3 19*  --  20* 19*  --  20*   O2PER 50  60 50 50 50   < > 50    < > = values in this interval not displayed.       Lab Results   Component Value Date    HGB 8.6 (L) 10/23/2021    PHGB <30 10/23/2021    PLT 74 (L) 10/23/2021    FIBR 273 10/23/2021    INR 3.87 (H) 10/23/2021    PTT >240 (HH) 10/23/2021    DD 3.51 (H) 10/23/2021    ANTCH 51 (L) 10/22/2021         Plan is to continue ECMO support, wean sweep as tolerated.      Louisa Irizarry, RT  ECMO Specialist  10/23/2021 6:12 AM

## 2021-10-23 NOTE — DISCHARGE SUMMARY
[facility]    Cardiology Death Summary- Cards 2      Date of Admission: 10/19/2021  Date of Death: 10/23/2021  2:20 PM  Discharging Provider: Jenny Sung MD      Hospital Course   Mr Medrano is a 56 year old male with PMH of scleroderma, ILD, Raynauds, atrial flutter s/p CTI, pAF, and GERD who presented with SOB in the setting of stopping his scleroderma medications approximately 10 months prior. On presentation, he was noted to have atrial fibrillation with RVR and was given amiodarone and diltiazem with resultant development of respiratory distress for which he underwent RSI and was intubated. Patient then suffered PEA cardiac arrest and was transferred to [facility]. Patient with ongoing worsening of cardipulmonary status and end organ perfusion in setting of escalating pressors and thus was placed on [facility] ECMO. After several days of care, patient with ongoing hemodynamic and end organ abnormalities requiring ongoing ECMO support. After family discussion, they elected to withdraw support as they felt ongoing care would not be in line with patient's wishes. Support was withdrawn and patient passed quickly surrounded by family    Cause of death: Cardiac arrest, acute hypoxic respiratory failure in setting of cardiogenic shock, atrial fibrillation with RVR with severe end organ hypoperfusion in setting of underlying untreated scleroderma       Jenny Sung MD  [facility]  ______________________________________________________________________    Physical Exam: Unresponsive to noxious stimuli, Spontaneous respirations absent, Breath sounds absent, Carotid pulse absent, Heart sounds absent and Pupillary light reflex absent    Significant Results and Procedures   VA ECMO cannulation 10/20  Results for orders placed or performed during the hospital encounter of 10/19/21   XR Chest Port 1 View    Narrative     EXAMINATION: XR CHEST PORT 1 VIEW, 10/20/2021 12:45 AM    INDICATION: PA Cath placement    COMPARISON: 10/19/2021    FINDINGS: Single portable supine AP radiograph of the chest. ET tube  projects over the mid thoracic trachea. Right IJ central venous  catheter with tip terminating over the mid SVC. Interval placement of  right IJ [place]-Demetrio catheter with tip terminating over the distal right  main pulmonary artery. Enteric tube side-port and tip projects over  the stomach.    Trachea is midline. The cardiomediastinal silhouette is stable. Small  right pleural effusion. No left pleural effusion. No pneumothorax.  Biapical pleural thickening. Increased bilateral interstitial and  airspace opacities.    Partially visualized upper abdomen demonstrates multiple air and stool  distended loops of bowel. Soft tissue and bones are within normal  limits.      Impression    IMPRESSION:  1. Interval placement of [place]-Demetrio catheter with tip terminating in  the distal right main pulmonary artery.  2. Interval retraction of right IJ central venous catheter with tip  terminating over the mid SVC.  3. Increased bilateral interstitial and airspace opacities, right  greater than left. Small right pleural effusion.    I have personally reviewed the examination and initial interpretation  and I agree with the findings.    TAHIRA FUENTES MD         SYSTEM ID:  [id number]   US Lower Extremity Arterial Duplex Bilateral    Narrative    ULTRASOUND LOWER EXTREMITY ARTERIAL DUPLEX BILATERAL 10/20/2021 9:57  AM    CLINICAL HISTORY: Severe mottling.     COMPARISONS: None available.    REFERRING PROVIDER: RAS BAILEY    TECHNIQUE: Bilateral leg arteries evaluated with grayscale, color  Doppler, and Doppler waveform ultrasound.    FINDINGS: Occasional ectopy with variable waveform morphology and  amplitude.    RIGHT:       Common femoral artery: 59/18 cm/s, triphasic, 7.5 mm       Profundus femoral artery: 89/24 cm/s, triphasic, 5.3 mm          Superficial femoral artery, origin: 77/19 cm/s, triphasic, 4.6 mm       Superficial femoral artery, mid thigh: 65/14 cm/s, triphasic       Superficial femoral artery, distal thigh: 87/0 cm/s, triphasic,  3.4 mm         Popliteal artery: 39/6 cm/s, triphasic         Posterior tibial artery, ankle: 30/0 cm/s, triphasic       Anterior tibial artery, ankle: 17/0 cm/s, biphasic    LEFT:       Common femoral artery: 130/16 cm/s, triphasic, 8.0 mm       Profundus femoral artery: 93/12 cm/s, triphasic, 4.8 mm         Superficial femoral artery, origin: 111/20 cm/s, triphasic, 3.0  mm       Superficial femoral artery, mid thigh: 95/15 cm/s, triphasic, 2.9  mm       Superficial femoral artery, distal thigh: 88/14 cm/s, triphasic,  3.3 mm         Popliteal artery: 36/6 cm/s, triphasic         Posterior tibial artery, ankle: 84/14 cm/s, triphasic       Anterior tibial artery, ankle: 22/0 cm/s, triphasic      Impression    IMPRESSION:  1. Arrythmia.    2. No significant arterial stenosis demonstrated.    CIRO TALBOT MD         SYSTEM ID:  [id number]   XR Chest Port 1 View    Narrative    Portable chest 10/20/2021 1303 hours    INDICATION: Check endotracheal tube placement and ECLS cannula  placement    COMPARISON: 10/20/2021 0043 hours    Findings: Heart size appears upper normal uterine for portable  technique. NG/OG tube tip and sidehole projects within the stomach.  Endotracheal tube tip approximately 3.5 cm above the brooke. A right  IJ [place]-Demetrio catheter again noted this tip in the distal right main  branch pulmonary artery. Continued patchy opacities in the lungs which  may indicate RDS (infection/edema/pulmonary hemorrhage). Inferior  approach ECLS cannula tip projects near the SVC/right atrial junction.      Impression    IMPRESSION: Continued probable ARDS, similar to earlier this morning.  ECLS cannula tip projects near the SVC/right atrial junction    NEELIMA CALABRESE MD         SYSTEM ID:  [id number]   XR Chest Port  1 View    Narrative    EXAMINATION: XR CHEST PORT 1 VIEW, 10/21/2021 1:57 AM    INDICATION: Check endotracheal tube placement and ECLS cannula  placement. DO NOT log-roll patient.  Place film under patient using  patient safety handling process.    COMPARISON: 10/20/2021    FINDINGS: Single portable supine AP radiograph of the chest. ET tube  projects over the mid thoracic trachea. Lower approach ECMO cannula  projects over the high right atrium. Right IJ [place]-Demetrio catheter has  been retracted and projects over the right main pulmonary artery.  NG/OG tube side-port and tip projects over the stomach.    Trachea is midline. The cardiomediastinal silhouette is stably  enlarged. Small bilateral pleural effusions. Right greater than left  basilar atelectasis. Unchanged bilateral interstitial and airspace  opacities. No pneumothorax.    Partially visualized upper abdomen is unremarkable. No acute osseous  abnormality. Soft tissue is within normal limits.      Impression    IMPRESSION:  1. ET tube projects over the mid thoracic trachea. Interval retraction  of the [place]-Demetrio catheter with tip terminating over the right main  pulmonary artery. ECMO cannula tip projects over the high right  atrium.  2. Stable right greater than left interstitial and airspace opacities  and small bilateral pleural effusions.    I have personally reviewed the examination and initial interpretation  and I agree with the findings.    STELLA MCQUEEN MD         SYSTEM ID:  [id number]   US Lower Extremity Arterial Duplex Bilateral    Narrative    BILATERAL LOWER EXTREMITY DUPLEX ARTERIAL ULTRASOUND 10/20/2021 3:44  PM    CLINICAL HISTORY: Patient on VA ECMO. Evaluate patency of lower  extremity arteries.    COMPARISONS: None available..    REFERRING PROVIDER: MAURI FARAH    TECHNIQUE: Grayscale, color Doppler, Doppler waveform ultrasound  evaluation of bilateral external iliac arteries through anterior and  posterior tibial  arteries.    FINDINGS:  RIGHT:         COMMON FEMORAL ARTERY: obscured by ECMO       PROFUNDUS FEMORAL ARTERY: obscured by ECMO         SUPERFICIAL FEMORAL ARTERY, proximal: obscured by ECMO       SUPERFICIAL FEMORAL ARTERY, mid: 37 cm/s, monophasic, turbulent       SUPERFICIAL FEMORAL ARTERY, distal: 40 cm/s, monophasic,  turbulent         POPLITEAL ARTERY: 28 cm/s, monophasic, turbulent         POSTERIOR TIBIAL ARTERY, ankle: 18 cm/s, monophasic, turbulent       ANTERIOR TIBIAL ARTERY, ankle: 8 cm/s, monophasic, turbulent    LEFT:         COMMON FEMORAL ARTERY: obscured by ECMO       PROFUNDUS FEMORAL ARTERY: obscured by ECMO         SUPERFICIAL FEMORAL ARTERY, proximal: 44 cm/s, monophasic,  turbulent       SUPERFICIAL FEMORAL ARTERY, mid: 34 cm/s, monophasic, turbulent       SUPERFICIAL FEMORAL ARTERY, distal: 33 cm/s, monophasic,  turbulent         POPLITEAL ARTERY: 22 cm/s, monophasic, turbulent         POSTERIOR TIBIAL ARTERY, ankle: 15 cm/s, monophasic, turbulent       ANTERIOR TIBIAL ARTERY, ankle: 4 cm/s, monophasic, turbulent      Impression    IMPRESSION:  1. RIGHT: The interrogated right lower extremity arteries are patent  with turbulent waveforms secondary to ECMO.    2. LEFT: The interrogated left lower extremity arteries are patent  with turbulent waveforms secondary to ECMO.        Guidelines:  [facility] duplex criteria for lower limb arterial  occlusive disease  -Percent stenosis- Normal (1-19%): Peak systolic velocity (cm/s):  <150, End-diastolic velocity (cm/s): <40, Velocity ratio (Vr): <1.5,  Distal arterial waveform: Triphasic  -Percent stenosis- 20-49%: Peak systolic velocity (cm/s): 150-200,  End-diastolic velocity (cm/s): <40, Velocity ratio (Vr): 1.5-2.0,  Distal arterial waveform: Triphasic  -Percent stenosis- 50-75%: Peak systolic velocity (cm/s): 200-300,  End-diastolic velocity (cm/s): <90, Velocity ratio (Vr): 2.0-3.9,  Distal arterial waveform: Poststenotic  turbulence distal to stenosis,  monophasic distal waveform  -Percent stenosis- >75%: Peak systolic velocity (cm/s): >300,  End-diastolic velocity (cm/s): <90, Velocity ratio (Vr): >4.0, Distal  arterial waveform: Dampened distal waveform and low PSV/EDV* in the  stenosis  -Percent stenosis- Occlusion: Absent flow by color Doppler/pulsed  Doppler spectral analysis; length of occlusion estimated from distance  between exit and reentry collateral arteries  *PSV = peak systolic velocity, EDV = end-diastolic velocity  http://link.carrillo.com/chapter/10.1007/[email or web address]_23/fulltext  html    I have personally reviewed the examination and initial interpretation  and I agree with the findings.    KIRSTIN SOARES MD         SYSTEM ID:  [id number]   CT Head w/o Contrast    Narrative    CT HEAD W/O CONTRAST 10/20/2021 12:38 PM    History: Mental status change, unknown cause   ICD-10:    Comparison: None available    Technique: Using multidetector thin collimation helical acquisition  technique, axial, coronal and sagittal CT images from the skull base  to the vertex were obtained without intravenous contrast.   (topogram) image(s) also obtained and reviewed.    Findings: There is no intracranial hemorrhage, mass effect, or midline  shift. Gray/white matter differentiation in both cerebral hemispheres  is preserved. Beam hardening artifact over the occipital lobes.  Ventricles are proportionate to the cerebral sulci. The basal cisterns  are clear.    The bony calvaria and the bones of the skull base are normal. The  visualized portions of the paranasal sinuses are clear. Scattered  fluid within the mastoid air cells.      Impression    Impression:    1. No acute intracranial pathology.  2. Scattered fluid within the mastoid air cells which is nonspecific  in the setting of intubation.    LUCIANO DA SILVA MD         SYSTEM ID:  [id number]   CT Chest Abdomen Pelvis w/o Contrast    Narrative    CT CHEST ABDOMEN PELVIS W/O  CONTRAST 10/20/2021 12:44 PM    History: ECMO    Comparison: CT chest and pelvis 10/19/2021, MRI abdomen 7/5/2017    Technique: Helical CT acquisition from the lung apices to the pubic  symphysis was obtained without intravenous contrast. Axial, coronal,  and sagittal reconstructions were obtained and reviewed.    Contrast: None    Findings:     CHEST:   Right internal jugular [place]-Demetrio catheter tip terminates in the right  pulmonary artery.  Lungs: Increase in bibasilar lower lung predominant airspace opacities  superimposed on pre-existing fibrosis. Small right and trace left  pleural effusions. No pneumothorax.  Airways: The endotracheal tube tip is in the thoracic trachea. The  central tracheal bronchial tree is clear.  Vessels: Main pulmonary artery and aorta are normal in caliber. Normal  three-vessel arch. Solid air tracking from the left upper extremity to  the left subclavian vein which is likely intravenously related to  medication administration  Heart: Heart size is enlarged without pericardial effusion.  Lymph nodes: No suspicious mediastinal or hilar lymphadenopathy.  Calcified left hilar lymph nodes.  Thyroid: Within normal limits.  Esophagus: Nasogastric tube courses through the esophagus, terminating  in the gastric fundus.    ABDOMEN PELVIS:    Liver: Redemonstration of hepatic segment 6 hypodensity which is  better characterized on prior MRI as a hemangioma. Scattered hepatic  calcifications likely sequelae of prior granulomatous infection.  Biliary system: Gallbladder is within normal limits. Trace vicariously  excreted contrast within the gallbladder lumen. No intrahepatic or  extrahepatic biliary ductal dilatation.  Pancreas: No focal mass or dilation of the main pancreatic duct.  Stomach: Within normal limits.  Spleen: Splenic calcifications likely sequelae of prior granulomatous  infection..  Adrenal glands: Within normal limits.  Kidneys: Bilateral delayed nephrograms related to prior  contrast  examination..  Bladder: Younger catheter in place..  Reproductive organs: Coarse prostate calcifications.  Colon: Within normal limits.  Appendix: Within normal limits.  Small Bowel: Within normal limits.  Lymph nodes: No intra-abdominal or pelvic lymphadenopathy.  Vasculature: Right common femoral artery ECMO tip terminates at the  aortic bifurcation. Left-sided common femoral arterial line tip  terminates within the infrarenal aorta. A right common femoral vein  ECMO catheter tip terminates near the superior cavoatrial junction  Peritoneum: Small volume free fluid in the pelvis.     Soft tissues: Anasarca.   Bones: No suspicious osseous lesion.      Impression    IMPRESSION:   1. Increase in basilar predominant pulmonary airspace opacities which  likely represent pulmonary edema in the setting of recent cardiogenic  shock. Infection is not excluded.  2. Interval ECMO cannulation, right common femoral vein approach ECMO  cannula tip terminates at the superior cavoatrial junction and right  common femoral artery ECMO catheter tip terminates at the aortic  bifurcation.  3. Small right and trace left pleural effusions, small volume ascites,  and soft tissue anasarca.  4. Retained iodinated contrast in the renal cortices on this  noncontrast CT, related to prior contrast administration and  compatible with acute kidney injury.    I have personally reviewed the examination and initial interpretation  and I agree with the findings.    FELISHA ARMANDO DO         SYSTEM ID:  [id number]   XR Chest Port 1 View    Narrative    EXAMINATION: XR CHEST PORT 1 VIEW, 10/22/2021 1:30 AM    INDICATION: Check endotracheal tube placement and ECLS cannula  placement. DO NOT log-roll patient.  Place film under patient using  patient safety handling process.    COMPARISON: 10/21/2021    FINDINGS: Single portable 20 degrees AP radiograph of the chest. ET  tube projects over the mid thoracic trachea. Right IJ [place]-Demetrio  catheter with  "tip terminating over the distal right main pulmonary  artery. Enteric tube side-port and tip projects over the stomach.  Lower approach\" cannula projects over the cavoatrial junction.    Trachea is midline. The cardiomediastinal silhouette is enlarged.  Small bilateral pleural effusions. No pneumothorax. Unchanged  bilateral interstitial and airspace opacities.    Impression visualized upper abdomen, bones, and soft tissue are  unremarkable.      Impression    IMPRESSION:  1. Stable support devices.  2. Unchanged small bilateral pleural effusions and bilateral  interstitial and airspace opacities.    I have personally reviewed the examination and initial interpretation  and I agree with the findings.    STELLA MCQUEEN MD         SYSTEM ID:  [id number]   US Abdomen Limited    Narrative    EXAMINATION: US ABDOMEN LIMITED, 10/21/2021 1:45 PM    COMPARISON: CT abdomen and pelvis 10/20/2021    HISTORY: Elevated LFTs. Shock liver    TECHNIQUE: The abdomen was scanned in standard fashion with  specialized ultrasound transducer(s) using both grey scale and limited  color Doppler techniques.    FINDINGS:   Fluid: Mild ascites and right pleural effusion. ECMO cannula in the  IVC.    Liver: The liver demonstrates normal echotexture, measuring 16.7 cm in  craniocaudal dimension. Hyperechoic mass measuring 2.5 cm in segment 6  corresponds to hemangioma detected on prior MRI.     Gallbladder: There is mild wall thickening. No pericholecystic fluid  or cholelithiasis. Sonographic Duncan's could not be evaluated as the  patient is sedated.    Bile Ducts: Both the intra- and extrahepatic biliary system are of  normal caliber.  The common bile duct measures 4 mm in diameter.    Pancreas: Obscured by bowel gas     Kidney: The right kidney measures 10.6 cm long. There is no  hydronephrosis or hydroureter, no shadowing renal calculi, cystic  lesion or mass.       Impression    IMPRESSION:   1.  Mild hepatomegaly with mild ascites and " "right pleural effusion.  2.  Mild gallbladder wall thickening is likely due to underlying liver  disease and/or ascites. No other signs of cholecystitis.  3.  Stable hepatic hemangioma.    COURTNEY DE OLIVEIRA MD         SYSTEM ID:  [id number]   XR Chest Port 1 View    Narrative    EXAMINATION: XR CHEST PORT 1 VIEW, 10/23/2021 4:24 AM    INDICATION: Check endotracheal tube placement and ECLS cannula  placement. DO NOT log-roll patient.  Place film under patient using  patient safety handling process.    COMPARISON: 10/28/2020    FINDINGS: Single portable 15 degree upright AP radiograph of the  chest. ET tube projects over the mid thoracic trachea. Esophageal  temperature probe projects over the mid esophagus. Interval retraction  of right IJ [place]-Demetrio catheter with tip terminating over the mid right  main pulmonary artery. Enteric tube side-port and tip projects over  the stomach. Lower approach\" cannula projects over the cavoatrial  junction.    Trachea is midline. The cardiomediastinal silhouette is enlarged.  Small bilateral pleural effusions. No pneumothorax. Stable to  minimally decreased bilateral interstitial and airspace opacities.    Impression visualized upper abdomen, bones, and soft tissue are  unremarkable.      Impression    IMPRESSION:  1. Slight retraction of the [place]-Demetrio catheter with tip projecting  over the mid right main pulmonary artery. Remainder of support devices  are stable.  2. Unchanged small bilateral pleural effusions and stable to minimally  decreased bilateral interstitial and airspace opacities.    I have personally reviewed the examination and initial interpretation  and I agree with the findings.    CHATO JAMESON MD         SYSTEM ID:  [id number]   CT Chest Abdomen Pelvis w/o Contrast    Narrative    EXAMINATION: CT CHEST ABDOMEN PELVIS W/O CONTRAST, 10/22/2021 2:08 PM    TECHNIQUE: Helical CT images from the thoracic inlet through the  symphysis pubis were obtained without intravenous " contrast.     COMPARISON: CT 10/20/2021    HISTORY: Rising lactic acid on ECMO. Evaluate for bowel ischemia  versus liver ischemia    FINDINGS:    Chest:    Heart/ Mediastinum: Heart is within normal limits. Right internal  jugular [place]-Demetrio catheter is unchanged. No bulky lymphadenopathy.  Calcified left hilar lymph nodes.  Enteric tube in the esophagus    Lungs/pleura: Endotracheal tube tip is approximately 2 cm above the  brooke. Layering fluid in the right mainstem bronchus extending into  the right lower lobe. Moderate to large bilateral pleural effusions  have increased there is adjacent atelectasis and dependent groundglass  opacities suggesting pulmonary edema. Scattered groundglass nodules.    Chest wall/axilla: No bulky lymphadenopathy..    Abdomen and Pelvis:    Liver: Scattered punctate calcifications from prior granulomatous  disease. No suspicious masses. No hepatic steatosis.     Gallbladder/biliary tree: Hyperdense contents are vicarious excretion  of previously administered intravenous contrast. No biliary  dilatation.    Spleen: Scattered punctate calcifications from prior granulomatous  disease.    Pancreas: Unremarkable. No mass or ductal dilatation.    Adrenal glands: Unremarkable.    Kidneys: Persistent enhancement from prior contrast demonstration with  scattered cortical hypodensities compatible with acute kidney injury.  No hydronephrosis.    Bowel: Unremarkable. No obstruction. Increased abdominal ascites. No  definite pneumatosis or portal venous gas to suggest bowel ischemia.    Retroperitoneum: Right femoral approach arterial and venous ECMO  catheters are unchanged. Left femoral arterial catheter..  No bulky  lymphadenopathy.    Pelvis: Urinary bladder is unremarkable.  Prostate and seminal  vesicles are within normal limits.    Bones: Unremarkable. No suspicious lesions.    Soft Tissues: Anasarca.      Impression    IMPRESSION:    1.  Increased moderate to large bilateral pleural  effusions.  2.  Increased bibasilar atelectasis and groundglass opacities  suggesting pulmonary edema and/or infection.  3.  Increased ascites and anasarca.  4.  Stable support devices as above.  5.  No findings to suggest bowel or liver ischemia on these  noncontrast images.    I have personally reviewed the examination and initial interpretation  and I agree with the findings.    FELISHA ARMANDO DO         SYSTEM ID:  [id number]   CT Head w/o Contrast    Narrative    Exam: CT HEAD W/O CONTRAST  10/22/2021 1:56 PM    History: Post cardiac arrest.    Comparison:  CT 10/20/2021.    Technique: Using multidetector thin collimation helical acquisition  technique, axial, coronal and sagittal CT images from the skull base  to the vertex were obtained without intravenous contrast.     Findings:    No intracranial hemorrhage, mass effect, or midline shift. The  ventricles are proportionate to the cerebral sulci. The gray to white  matter differentiation of the cerebral hemispheres is preserved. The  basal cisterns are patent.    The visualized paranasal sinuses are clear. Moderate bilateral mastoid  effusions, nonspecific in the setting of intubation. No acute  abnormality of the orbits.      Impression    Impression:   No acute intracranial pathology.    I have personally reviewed the examination and initial interpretation  and I agree with the findings.    PARAS MONREAL MD         SYSTEM ID:  [id number]   Echo Complete     Value    Biplane LVEF 14%    Narrative    [id number]  [id number]  [id number]  [id number]^LEO^RAS     [facility],[place]  Echocardiography Laboratory  [address] [place] [id number]     Name: FLOR CALZADA  MRN: [id number]  : 1965  Study Date: 10/20/2021 08:11 AM  Age: 56 yrs  Gender: Male  Patient Location: [facility]  Reason For Study: Heart Failure  Ordering Physician: RAS BAILEY  Referring Physician: MAURI FARAH  Performed By: Judith Burton     BSA: 1.8  m2  Height: 69 in  Weight: 137 lb  ______________________________________________________________________________  Procedure  Complete Portable Echo Adult.  ______________________________________________________________________________  Interpretation Summary  Biplane LVEF is 14%.  Left ventricular size is normal.  Severe diffuse hypokinesis is present.  Mild to moderate right ventricular dilation is present.  Global right ventricular function is moderately reduced.  No pericardial effusion is present.  LV/RV dysfunction is new when compared to prior study.  ______________________________________________________________________________  Left Ventricle  Left ventricular wall thickness is normal. Left ventricular size is normal.  Biplane LVEF is 14%. Diastolic function not assessed due to arrhythmia. Severe  diffuse hypokinesis is present.     Right Ventricle  Mild to moderate right ventricular dilation is present. Global right  ventricular function is moderately reduced.     Atria  Both atria appear normal.     Mitral Valve  Moderate mitral insufficiency is present.     Aortic Valve  Mild aortic valve calcification is present. The valve leaflets are not well  visualized. On Doppler interrogation, there is no significant stenosis or  regurgitation.     Tricuspid Valve  The tricuspid valve is normal. Mild tricuspid insufficiency is present. The  right ventricular systolic pressure is approximated at 17.4 mmHg plus the  right atrial pressure.     Pulmonic Valve  The pulmonic valve is normal.     Vessels  The pulmonary artery and bifurcation cannot be assessed. The inferior vena  cava cannot be assessed. Mild dilatation of the aorta is present. Ascending  aorta 3.6 cm. IVC diameter >2.1 cm collapsing <50% with sniff suggests a high  RA pressure estimated at 15 mmHg or greater.     Pericardium  No pericardial effusion is present.     Miscellaneous  The PASP is 14mmHg over the RA pressure.     Compared to Previous  Study  LV/RV dysfunction is new when compared to prior study.     Attestation  I have personally viewed the imaging and agree with the interpretation and  report as documented by the fellow, Garry Wray, and/or edited by me.  ______________________________________________________________________________  MMode/2D Measurements & Calculations  IVSd: 1.0 cm  LVIDd: 5.0 cm  LVIDs: 4.6 cm  LVPWd: 0.95 cm  FS: 8.5 %  LV mass(C)d: 178.7 grams  LV mass(C)dI: 101.6 grams/m2  Ao root diam: 3.6 cm  LVOT diam: 2.3 cm  LVOT area: 4.2 cm2     EF(MOD-bp): 14.0 %  RWT: 0.38     Doppler Measurements & Calculations  TR max aleks: 208.3 cm/sec  TR max P.4 mmHg     ______________________________________________________________________________  Report approved by: Sam Sharma 10/20/2021 10:06 AM         Echo Complete    Narrative    [id number]  [id number]  [id number]  [id number]^JANET^MAGGI     [facility],[place]  Echocardiography Laboratory  [address] [place] [id number]     Name: FLOR CALZADA  MRN: [id number]  : 1965  Study Date: 10/20/2021 10:17 AM  Age: 56 yrs  Gender: Male  Patient Location: [facility]  Reason For Study: Cardiac Device, Unspecified. VA ECMO cannulation in Cath Lab  Ordering Physician: MAGIG GONZALES  Referring Physician: MAURI FARAH  Performed By: SOULEYMANE Isabel     BSA: 1.7 m2  Height: 68 in  Weight: 137 lb  BP: 119/94 mmHg  ______________________________________________________________________________  Procedure  Complete Portable Echo Adult. Contrast Optison. Optison (NDC #5909-4001-61)  given intravenously. Patient was given 10 ml mixture of 3 ml Optison and 6 ml  saline. 0 ml wasted.  ______________________________________________________________________________  Interpretation Summary  Limited TTE for VA-ECMO Cannulation     At baseline (prior to cannulation): The LVEF is 10-15% with severe RV  dysfunction. The LVEDV is 145 ml and the LVESV  is 127 mL.  At 4.0 L/min: The LVEF is 5-10% with severe RV dysfunction. There is moderate  mitral regurgitation. The LVEDV is 123 mL and the LVESV is 115 mL.  ______________________________________________________________________________  ______________________________________________________________________________  QLAB 3DQ Advanced  Stroke Vol: 13.0 ml  10_EDV(3DQA): 151.1 ml  10_ESV(3DQA): 138.1 ml  10_EF(3DQA): 8.6 %  10_Tmsv 3-6: 0.00 msec  10_Tmsv 3-5: 0.00 msec  10_Tmsv 3-6 (%): 0.00 %  10_Tmsv 3-5 (%): 0.00 %     ______________________________________________________________________________  Report approved by: Sam Garcia 10/20/2021 04:01 PM         Echocardiogram Limited     Value    3D LVEF 15%    LVEF  10-20% (severely reduced)    [facility]    [id number]  [id number]  [id number]  [id number]^CHRISTOPHE^MIGUEL^DYLAN CHARLES     [facility],[place]  Echocardiography Laboratory  [address] [place] [address]     Name: FLOR CALZADA  MRN: [id number]  : 1965  Study Date: 10/21/2021 10:37 AM  Age: 56 yrs  Gender: Male  Patient Location: [facility]  Reason For Study: Heart Failure  Ordering Physician: MIGUEL SAEED  Referring Physician: MAURI FARAH  Performed By: Dolly Oconnor     BSA: 1.7 m2  Height: 68 in  Weight: 137 lb  ______________________________________________________________________________  Procedure  Limited Portable Echo Adult. 3D image acquisition, reconstruction, and real-  time interpretation was performed.  ______________________________________________________________________________  Interpretation Summary  VA ECMO 4.2 LPM     Left ventricular function is decreased. The ejection fraction is 10-20%  (severely reduced).  Global right ventricular function is severely reduced.  Moderate mitral insufficiency is present.  No pericardial effusion is present.  There has been no  change.  ______________________________________________________________________________  Left Ventricle  3D LVEF volumetric analysis is 15%. Moderate to severe left ventricular  dilation is present. Left ventricular function is decreased. The ejection  fraction is 10-20% (severely reduced). Severe diffuse hypokinesis is present.     Right Ventricle  Moderate right ventricular dilation is present. Global right ventricular  function is severely reduced.     Mitral Valve  Moderate mitral insufficiency is present.     Tricuspid Valve  Mild to moderate tricuspid insufficiency is present. The right ventricular  systolic pressure is approximated at 14.4 mmHg plus the right atrial pressure.     Pericardium  No pericardial effusion is present.     Compared to Previous Study  There has been no change.  ______________________________________________________________________________  MMode/2D Measurements & Calculations  IVSd: 1.2 cm  LVIDd: 5.1 cm  LVIDs: 4.7 cm  LVPWd: 1.2 cm  FS: 8.4 %  LV mass(C)d: 239.3 grams  LV mass(C)dI: 137.5 grams/m2  RWT: 0.46     Doppler Measurements & Calculations  TR max aleks: 188.7 cm/sec  TR max P.4 mmHg     ______________________________________________________________________________  Report approved by: Sam Valerio 10/21/2021 01:25 PM         Echocardiogram Limited     Value    LVEF  10-15% (severely reduced)    [facility]    [id number]  [id number]  [id number]  [id number]^NAYAN^MIRIT     [facility],[place]  Echocardiography Laboratory  [address] [place] [id number]     Name: FLOR CALZADA  MRN: [id number]  : 1965  Study Date: 10/23/2021 10:14 AM  Age: 56 yrs  Gender: Male  Patient Location: [facility]  Reason For Study: Heart Failure  Ordering Physician: ISRAEL SPICER  Referring Physician: MAURI FARAH  Performed By: Lupe Escobedo RDCS     BSA: 1.8 m2  Height: 68 in  Weight: 147 lb  HR: 79  BP: 108/88  mmHg  ______________________________________________________________________________  Procedure  Limited Portable Echo Adult.  ______________________________________________________________________________  Interpretation Summary  VA ECMO 4.0 LPM  Left ventricular function is decreased. The ejection fraction is 10-15%  (severely reduced).  Global right ventricular function is severely reduced.  Moderate right ventricular dilation is present.  Two mobile echodensities (the smaller one measures 1.3x0.5 and is more mobile.  There is another larger one 1.1x1.2cm which appear attached to the RV free  wall). Can be thrombus.  Mild to moderate mitral insufficiency is present.     This study was compared with the study from 10/21/2021. Echodensities in the  RV are new. Otherwise no significant change.  ______________________________________________________________________________  Left Ventricle  Left ventricular function is decreased. The ejection fraction is 10-15%  (severely reduced). Severe diffuse hypokinesis is present.     Right Ventricle  Moderate right ventricular dilation is present. Global right ventricular  function is severely reduced. Mobile echodensities (the smaller one measures  1.3x0.5 and is more mobile. There is another larger one 1.1x1.2cm which appear  attached to the RV free wall). Can be thrombus.     Mitral Valve  Mild to moderate mitral insufficiency is present.     Aortic Valve  Aortic valve is normal in structure and function.     Tricuspid Valve  Mild tricuspid insufficiency is present. The right ventricular systolic  pressure is approximated at 14.2 mmHg plus the right atrial pressure.     Vessels  The aorta root is normal.     Pericardium  No pericardial effusion is present.     Compared to Previous Study  This study was compared with the study from 10/21/2021 . Echodensities in the  RV are new. Otherwise no significant change.      ______________________________________________________________________________  MMode/2D Measurements & Calculations  LVIDd: 5.3 cm     Doppler Measurements & Calculations  TR max aleks: 188.7 cm/sec  TR max P.2 mmHg     ______________________________________________________________________________  Report approved by: Anjali LOPEZ 10/23/2021 10:51 AM         Cardiac Catheterization    Narrative    1. No angiographic evidence of coronary artery disease  2. Elevated LV ventricular pressures prior to VA-ECMO cannulation  3. Successful VA-ECMO cannulation with 17 Fr arterial 25 Fr venous   cannulas in right femoral artery/vein  4. LV end-diastolic pressure improved after placement on VA-ECMO       Consultations This Hospital Stay   PHARMACY IP CONSULT  PHARMACY IP CONSULT  PHARMACY TO DOSE VANCO  PULMONARY GENERAL ADULT IP CONSULT  RHEUMATOLOGY IP CONSULT  PALLIATIVE CARE ADULT IP CONSULT  WOUND OSTOMY CONTINENCE NURSE  IP CONSULT  OCCUPATIONAL THERAPY ADULT IP CONSULT  NEPHROLOGY ICU IP CONSULT  NUTRITION SERVICES ADULT IP CONSULT  PHARMACY CRRT IP CONSULT  PHARMACY IP CONSULT  SMOKING CESSATION PROGRAM IP CONSULT    Primary Care Physician   Stew Caldwell    Time Spent on this Encounter   I, Jenny Sung MD, personally saw the patient today and spent greater than 30 minutes discharging this patient.

## 2021-10-23 NOTE — PROGRESS NOTES
[facility] Rheumatology follow up     Kwaku Medrano MRN# [id number]   Age: 56 year old YOB: 1965     Date of Admission: 10/19/2021    Reason for follow up: Hx of diffuse cutaneous sliding scale, ILD, Raynaud's and DM       Requesting physician: Jonah Huston MD                   Assessment and Plan:     # Decompensated heart failure/cardiogenic shock: contractiility may be slightly improved, but circulation is still ECMO- and pressor-dependent, now 72 hours after cannulation. Cause of cardiomyopathy remains obscure. Cardiac MRI could provide evidence for possible SSc-driven microvasculopathy (thought to occur in up to 45% of SSc patient hearts over time). Management recommendations would not change with knowledge of SSc cardiomyopathy, so cardiac MR is not urgent.    # Systemic sclerosis, diffuse variant:  Digital ischemia, acute on chronic, persists. Vasculitis serologies are negative; lupus anticoagulant is negative. Doubt vasculitis or anti-phospholipid antibody syndrome. High concern for eventual gangrene development at R 2nd and L 3 UE digital tips. Due to pressor requirements and shock, he is not a candidate for systemic or local vasodilator therapy. Continue thermoprotective measures for hands. No role for immunotherapy    # Renal failure: Agree with Nephrology that severe kidney injury is likely DIMPLE, not scleroderma renal crisis. Agree with continued CRRT.    # History of chronic steroid use: plan to continue stress-dose hydrocortisone (20 mg q 12 hours).   steroid-associated scleroderma renal crisis    Kenny Flowers M.D.  Staff Rheumatologist, [facility]  Pager [phone]        Summary      56 year old Mr Medrano has a past medical history diffuse cutaneous systemic sclerosis, ILD, Raynaud's, dermatomyositis (potential overlap with scleroderma), A-flutter s/p ablation 2017, SVT, GERD and paroxysmal atrial fibrillation. Patient was brought to [facility] ICU following asystole/PEA and  subsequent cardiogenic shock.  Rheumatology was consulted for evaluation of potential involvement of scleroderma    Interim History 10/23  Patient is intubated and sedated currently on ECMO support   There is no significant change in objective assessment.         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)  Interstitial Lung Disease  Raynauds with chronic digital ischemia  Aflutter s/p ablation (2017)  Paroxymal Atrial Fibrillation  SVT  GERD  PEA arrest, cardiogenic shock           Past Surgical History:     Past Surgical History:   Procedure Laterality Date     COLONOSCOPY N/A 7/19/2017    Procedure: COLONOSCOPY;  COLONOSCOPY;  Surgeon: Mita Jimenez MD;  Location:  GI     CV CORONARY ANGIOGRAM  10/20/2021    Procedure: CV CORONARY ANGIOGRAM;  Surgeon: Merlin Donato MD;  Location:  HEART CARDIAC CATH LAB     CV EXTRACORPERAL MEMBRANE OXYGENATION N/A 10/20/2021    Procedure: Extracorporeal Membrance Oxygenation;  Surgeon: Merlin Donato MD;  Location:  HEART CARDIAC CATH LAB     CV LEFT HEART CATH N/A 10/20/2021    Procedure: Left Heart Cath;  Surgeon: Merlin Donato MD;  Location:  HEART CARDIAC CATH LAB     NO HISTORY OF SURGERY               Social History:     Social History     Tobacco Use     Smoking status: Never Smoker     Smokeless tobacco: Former User     Types: Chew   Substance Use Topics     Alcohol use: Yes     Comment: very rarely             Family History:     Family History   Problem Relation Age of Onset     Prostate Cancer Father      Lung Cancer Other      Family History Negative Mother      Family History Negative Maternal Grandmother      Family History Negative Maternal Grandfather      Family History Negative Paternal Grandmother      Other - See Comments Paternal Grandfather         Atherosclerosis     Family History Negative Brother      Family History Negative Sister      Family  History Negative Brother      LUNG DISEASE No family hx of      Rheumatologic Disease No family hx of              Immunizations:   COVID-19 vaccinated          Allergies:     Allergies   Allergen Reactions     Ampicillin Rash             Medications:     Medications Prior to Admission   Medication Sig Dispense Refill Last Dose     aspirin 81 MG EC tablet Take 1 tablet (81 mg) by mouth daily 30 tablet 1      Blood Pressure Monitoring KIT 1 each three times a week Dr Acosta has asked you to check your blood pressure 2-3 times per week using your home monitor.  Please keep track of your findings in a journal and bring it to your appointments.   Contact this office if your blood pressure: the top number (systolic) is consistently 30 points higher than your normal BP or if the bottom number (diastolic) is consistently 20 points higher than your normal BP. 1 kit 1      diltiazem ER COATED BEADS (CARDIZEM CD/CARTIA XT) 180 MG 24 hr capsule Take 1 capsule (180 mg) by mouth daily 60 capsule 0      lisinopril (ZESTRIL) 10 MG tablet Take 1 tablet (10 mg) by mouth daily 60 tablet 0      mycophenolate (GENERIC EQUIVALENT) 500 MG tablet Take 3 tablets (1,500 mg) by mouth 2 times daily Labs due every 8-12 weeks. Past due.  Call clinic to schedule follow up appointment.  Past due. 540 tablet 1      pantoprazole (PROTONIX) 40 MG EC tablet Take 1 tablet (40 mg) by mouth daily 60 tablet 0      predniSONE (DELTASONE) 1 MG tablet Take 4 tablets (4 mg) by mouth daily 360 tablet 0      predniSONE (DELTASONE) 10 MG tablet 5 tabs po every day for 5 days, then 4 tabs po every day for 3 days, 3 tabs po every day for 3 days, 2 tab po every day for 3 days, 1 tabs po every day for 3 days, 1/2 tab po every day for 4 days 57 tablet 0      sulfamethoxazole-trimethoprim (BACTRIM) 400-80 MG tablet Once daily for PJP prophylaxis. Start after bronchoscopy 180 tablet 3              Review of Systems:   Unable to obtain review of systems due to  sedation          Physical Exam:     HEENT: Intubated. Atraumatic. Reduced wrinkles on nasolabial fold.  Cardiovascular: Regular rate, regular rhythm. No murmurs appreciated.  Pulmonary: Coarse bilaterally - on mechanical ventilation   Abdomen: Tightening of skin over abdomen. Soft, nontender to palpation.  Extremities: skin tightening over bilateral lower and upper extremities. Cool to touch. Cyanosis over distal fingertips and toes, improved with ECMO. Now with residual cyanosis of left third middle finger and right digits. Unable to assess muscular function.   Skin: Tightening of skin over bilateral lower extremities, no hair on lower extremities up ~6cm. Tightening of skin and loss of hair on upper extremities, up ~15cm to arm. Bilateral digits with peripheral flattening. Left third digit and right second digit dusky dark blue with ulceration/eschar. Periungual telangiectasias present. Additional tightening seen over chest and abdomen, with reduced wrinkles on face.           Data:     Autoimmune Labs:  SHERRELL + 1:160, speckled (9/14/2017)    Negative Labs:  Myositis panel negative (LENNOX 1, NXP2, MDA5, TIF1-g, MI-2, P155/114, PL-7, OJ, EJ, SRP, Marlene-1, SSA 52/60)  RF < 20 (6/22/17)  CCP negative (6/22/17)  SCL-70 negative (9/14/2017)  SSA/SSB Negative (7/7/2017)  RNA Hermelindo III (9/14/2017)  ANCA (PR3, MPO) negative (9/14/2017)  Jo1 negative (6/22/2017)    : ANCA, Pr3, MPO negative  CRP 90 < 60  ESR WNL  Lupus anticoagulant negative  CBC: Hgb 8.6, platelets 65k      Echocardiogram 10-23:  Biplane LVEF is 10- 14%.  Left ventricular size is normal.  Severe diffuse hypokinesis is present.   moderate right ventricular dilation is present.  Global right ventricular function is moderately reduced.  No pericardial effusion is present.  LV/RV dysfunction is new when compared to prior study    10-22-21 CT chest abd: bowel normal; bilateral pleural effusions

## 2021-10-23 NOTE — PROGRESS NOTES
Nephrology Progress Note  10/23/2021       Kwaku Medrano is a 56 year old male with past medical history of diffuse cutaneous systemic sclerosis, dermatomyositis, interstitial lung disease, paroxysmal atrial fibrillation.  He was admitted to ICU following asystole/PEA and subsequently developed cardiogenic shock.  Had PEA arrest after intubation and was eventually placed on ECMO on 10/20, started on CRRT 10/22.    Interval History :   Mr Medrano had CRRT started yesterday mainly for management of hyperkalemia, on 2k baths and I=O fluid goal.  Next step will be going to 2k baths, last K improved to 4.3, still quite tenuous from hemodynamic standpoint. Lactic acid is downtrending    Assessment & Recommendations:   DIMPLE-Reviewed his Rheumatological issues, does not appear related to these, his Cr was normal at presentation and UA was bland.  Etiology likely ATN secondary to his arrest, starting CRRT today to manage electrolytes and volume.     -Access is via ECMO circuit.     -CRRT, 2k baths for now, K is coming down I=O fluid goal, consider 0-25 ml             per hour as tolerated tomorrow.      Volume status-Net positive ~3L on 10/21 and ~ 400 mL yesterday, started CRRT with plan of I=O today, will try to pull fluid in coming days if hemodynamics tolerate I=O.      Electrolytes/pH-K 4.3, bicarb 19, on 2k baths.      Ca/phos/pth-Ca 7.8,  Improving with replacement ordered with CRRT.  Mg normal at 2.3, Phos improved to 3.9  Ionize calcium today pending    Anemia-Hgb 8.6, acute management per team.      Nutrition-Deferred      Recommendations were communicated to primary team via verbal communication.     Steve Eugene, APRN CNS  Clinical Nurse Specialist  [phone]    Review of Systems:   I reviewed the following systems:  ROS not done due to vent/sedation.     Physical Exam:   I/O last 3 completed shifts:  In: 2440.71 [I.V.:2024.71; Other:1; NG/GT:275]  Out: 2146 [Urine:5; Emesis/NG output:200; Other:1941]   BP  108/88 (BP Location: Left arm)   Pulse 111   Temp 98.4  F (36.9  C)   Resp 15   Wt 67.1 kg (147 lb 14.9 oz)   SpO2 99%   BMI 21.91 kg/m       GENERAL APPEARANCE: intubated and sedated.  EYES: no scleral icterus, pupils equal  Endo: no goiter, no moon facies  Lymphatics: no cervical or supraclavicular LAD  Pulmonary: lungs clear to auscultation with equal breath sounds bilaterally, no clubbing  CV: regular rhythm, normal rate, no rub   - JVD not distended   - Edema absent  GI: soft, nontender, normal bowel sounds  MS: no evidence of inflammation in joints, no muscle tenderness  :  lino  SKIN: no rash, warm, dry, no cyanosis    Labs:   All labs reviewed by me  Electrolytes/Renal -   Recent Labs   Lab Test 10/23/21  1143 10/23/21  0932 10/23/21  0926 10/23/21  0358 10/23/21  0357 10/23/21  0003 10/22/21  2203 10/22/21  1801 10/22/21  1711   NA  --   --  140  140  --  140  --  142   < > 142   POTASSIUM  --   --  4.4  4.4  --  4.3  --  4.7   < > 5.0   CHLORIDE  --   --  109  109  --  108  --  109   < > 109   CO2  --   --  22  22  --  19*  --  19*   < > 20   BUN  --   --  52*  52*  --  58*  --  64*   < > 64*   CR  --   --  2.77*  2.77*  --  2.94*  --  3.26*   < > 3.42*   * 98 96  96   < > 82   < > 76   < > 70   BETZY  --   --  7.8*  7.8*  --  7.8*  --  7.5*   < > 7.3*   MAG  --   --  2.0  --  2.1  --  2.2   < > 2.2   PHOS  --   --  3.9  --  3.9  --   --   --  5.3*    < > = values in this interval not displayed.       CBC -   Recent Labs   Lab Test 10/23/21  0926 10/23/21  0357 10/22/21  2203   WBC 20.1* 19.2* 18.7*   HGB 8.8* 8.6* 8.8*   PLT 65* 74* 76*       LFTs -   Recent Labs   Lab Test 10/23/21  0926 10/23/21  0357 10/22/21  2203   ALKPHOS 85 83 86   BILITOTAL 5.8* 5.1* 4.8*   ALT 2,185* 2,376* 2,649*   AST 1,347* 1,692* 2,131*   PROTTOTAL 4.7* 4.8* 4.9*   ALBUMIN 2.5*  2.5* 2.6* 2.5*       Iron Panel - No lab results found.        Current Medications:    aspirin  81 mg Oral or Feeding Tube  Daily     B and C vitamin Complex with folic acid  5 mL Per Feeding Tube Daily     ceFEPIme (MAXIPIME) IV  2 g Intravenous Q12H     hydrocortisone sodium succinate PF  20 mg Intravenous Q12H     pantoprazole  40 mg Per Feeding Tube QAM AC    Or     pantoprazole (PROTONIX) IV  40 mg Intravenous QAM AC     polyethylene glycol  17 g Oral or Feeding Tube Daily     [Held by provider] predniSONE  4 mg Oral Daily     senna-docusate  1 tablet Oral or Feeding Tube BID    Or     senna-docusate  2 tablet Oral or Feeding Tube BID     sodium chloride (PF)  3 mL Intracatheter Q8H     vancomycin  1,250 mg Intravenous Q24H       dextrose       CRRT replacement solution 12.5 mL/kg/hr (10/23/21 1021)     EPINEPHrine 0.12 mcg/kg/min (10/23/21 1200)     fentaNYL 50 mcg/hr (10/23/21 1200)     HEParin 1,500 Units/hr (10/23/21 1200)     heparin (PRESSURE BAG) 2 unit/mL in 0.9% NaCl 3 mL/hr (10/23/21 1200)     - MEDICATION INSTRUCTIONS -       midazolam 2 mg/hr (10/23/21 1200)     - MEDICATION INSTRUCTIONS -       norepinephrine 0.08 mcg/kg/min (10/23/21 1250)     - MEDICATION INSTRUCTIONS -       CRRT replacement solution 200 mL/hr at 10/23/21 1021     CRRT replacement solution 12.5 mL/kg/hr (10/23/21 1021)

## 2021-10-23 NOTE — PROGRESS NOTES
ECLS Discontinuation Note:      ECLS was discontinued 10/23/2021  Family at bedside, for withdrawal of care. Clamped out/extubated at 1550.    Lianne Underwood, RT  ECMO Specialist  10/23/2021 3:50 PM

## 2021-10-23 NOTE — PROGRESS NOTES
Death Note:    After much discussion family chose to pursue comfort care measures.  Cardiology Fellow (Dr. Sung) and Dr. Conner discussed end of life measures extensively with family at bedside.  Mike contacted and came to bedside for prayers with family.  At 1550 pt was given Fentanyl and Versed and was extubated, drips stopped, CRRT turned off and ECMO stopped.  MAP quickly to 40's and PEA until official time of death at 1620.   Family at bedside during withdrawal and until time of death.

## 2021-10-23 NOTE — SIGNIFICANT EVENT
Significant Event Note    Time of event: 3:30PM    Description of event:  Provided update to family at bedside. After extensive discussion of current status and long-term prognosis, when reflecting on Mr. Medrano's goals, family elected to move towards comfort cares based upon what they believe his goals would have been in this setting    Plan:  -will plan to initiate comfort cares and withdraw invasive supportive measures  -DNR/DNI    Discussed with: patient's family/emergency contact, bedside nurse and supervising physician, Dr. Dalila Sung MD

## 2021-10-23 NOTE — DEATH PRONOUNCEMENT
MD DEATH PRONOUNCEMENT    Called to pronounce Kwaku Medrano dead.    Physical Exam: Unresponsive to noxious stimuli, Spontaneous respirations absent, Breath sounds absent, Carotid pulse absent, Heart sounds absent and Pupillary light reflex absent    Patient was pronounced dead at 4:20 PM, 2021.    Preliminary Cause of Death: cardiac arrest, cardiogenic shock    Active Problems:    Cardiogenic shock (H)       Infectious disease present?: NO    Communicable disease present? (examples: HIV, chicken pox, TB, Ebola, CJD) :  NO    Multi-drug resistant organism present? (example: MRSA): NO    Please consider an autopsy if any of the following exist:  NO Unexpected or unexplained death during or following any dental, medical, or surgical diagnostic treatment procedures.   NO Death of mother at or up to seven days after delivery.     NO All  and pediatric deaths.     NO Death where the cause is sufficiently obscure to delay completion of the death certificate.   NO Deaths in which autopsy would confirm a suspected illness/condition that would affect surviving family members or recipients of transplanted organs.     The following deaths must be reported to the 's Office:  NO A death that may be due entirely or in part to any factors other than natural disease (recent surgery, recent trauma, suspected abuse/neglect).   NO A death that may be an accident, suicide, or homicide.     NO Any sudden, unexpected death in which there is no prior history of significant heart disease or any other condition associated with sudden death.   NO A death under suspicious, unusual, or unexpected circumstances.    NO Any death which is apparently due to natural causes but in which the  does not have a personal physician familiar with the patient s medical history, social, or environmental situation or the circumstances of the terminal event.   NO Any death apparently due to Sudden Infant Death Syndrome.      NO Deaths that occur during, in association with, or as consequences of a diagnostic, therapeutic, or anesthetic procedure.   NO Any death in which a fracture of a major bone has occurred within the past (6) six months.   NO A death of persons note seen by their physician within 120 days of demise.     NO Any death in which the  was an inmate of a public institution or was in the custody of Law Enforcement personnel.   NO  All unexpected deaths of children   NO Solid organ donors   NO Unidentified bodies   NO Deaths of persons whose bodies are to be cremated or otherwise disposed of so that the bodies will later be unavailable for examination;   NO Deaths unattended by a physician outside of a licensed healthcare facility or licensed residential hospice program   NO Deaths occurring within 24 hours of arrival to a health care facility if death is unexpected.    NO Deaths associated with the decedent s employment.   NO Deaths attributed to acts of terrorism.   NO Any death in which there is uncertainty as to whether it is a medical examiner s care should be discussed with the medical investigator.        Body disposition: Autopsy was discussed with family member:  Father, Brother and Sister in person.  Permission for autopsy was declined.    Jenny Sung MD  Cardiology Fellow PGY4  P: [phone]

## 2021-10-23 NOTE — PROGRESS NOTES
SPIRITUAL HEALTH SERVICES  SPIRITUAL ASSESSMENT Progress Note (Palliative Focus)  [facility] 4E    I was paged by unit to offer support to pt/family before transition to comfort measures only. I shared end-of-life ritual at bedside. Family very supportive of pt and each other.     Cesar Garza) Corey Mcclelland M.Div., [facility]  Staff   Pager [phone]

## 2021-10-23 NOTE — PROGRESS NOTES
ECMO Attending Progress Note  10/23/2021    Kwaku Medrano is a 56 year old male who was cannulated for ECMO 10/20/2021 due to profound cardiogenic and vasodilatory shock.    Cannulation Site:  17F Fr in the R femoral artery  25F Fr in the R femoral vein  8F reperfusion cannula    Interval events: Pulsatility back up to 20mmHg, pressors up a bit, on ne and epi again lactate down, remains on crrt on sedation again do to agitation while off sedation.     Physical Exam:  Temp:  [96.1  F (35.6  C)-98.4  F (36.9  C)] 98.4  F (36.9  C)  Pulse:  [] 103  Resp:  [12-16] 12  MAP:  [59 mmHg-76 mmHg] 68 mmHg  Arterial Line BP: (70-97)/(20-72) 88/61  FiO2 (%):  [40 %-50 %] 40 %  SpO2:  [78 %-100 %] 99 %    Intake/Output Summary (Last 24 hours) at 10/20/2021 1834  Last data filed at 10/20/2021 1800  Gross per 24 hour   Intake 1513.8 ml   Output 1840 ml   Net -326.2 ml    Ventilation Mode: CMV/AC  (Continuous Mandatory Ventilation/ Assist Control)  FiO2 (%): (S) 40 %  Rate Set (breaths/minute): 12 breaths/min  Tidal Volume Set (mL): 460 mL  PEEP (cm H2O): 5 cmH2O  Oxygen Concentration (%): 50 %  Resp: 12       Labs:  Recent Labs   Lab 10/23/21  1342 10/23/21  1140 10/23/21  0926 10/23/21  0754   PH 7.42 7.42 7.45 7.45   PCO2 36 34* 31* 30*   PO2 87 133* 150* 140*   HCO3 23 22 21 21   O2PER 40 50 50 50      Recent Labs   Lab 10/23/21  0926 10/23/21  0357 10/22/21  2203 10/22/21  1612   WBC 20.1* 19.2* 18.7* 19.7*   HGB 8.8* 8.6* 8.8* 8.8*     Creatinine   Date Value Ref Range Status   10/23/2021 2.77 (H) 0.66 - 1.25 mg/dL Final   10/23/2021 2.77 (H) 0.66 - 1.25 mg/dL Final   10/23/2021 2.94 (H) 0.66 - 1.25 mg/dL Final   10/22/2021 3.26 (H) 0.66 - 1.25 mg/dL Final   05/27/2020 1.18 0.66 - 1.25 mg/dL Final   02/18/2020 1.07 0.66 - 1.25 mg/dL Final   01/17/2020 0.91 0.66 - 1.25 mg/dL Final   11/21/2019 1.02 0.66 - 1.25 mg/dL Final       Blood Flow (Circuit) LPM: 4.76 LPM  Gas Flow  LPM: (S) 3 LPM  Gas FiO2   %: 40 %  ACT   (seconds): 183 seconds  Blood Temp  (degrees C): 36.7 C  Pulse Oximetry  (SpO2%):  (Unable to obtain)  Arterial Pressure  mmHg: 320 mmHg      ECMO Issues including assessments and plan on DOS 10/23/2021:  Neuro: Sedated for mechanical ventilation and ECMO.  No acute distress.  NIRS stable 60-70s b/l Dopplerable pulses per nursing.  RASS goal:  -2  CV: Cardiogenic shock.  Hemodynamically stable  and epi 0.12 NE 0.08  Pulm: Keep vent settings at rest settings as above.   FEN/Renal: Electrolytes stable w/ replacement protocols in place, started crrt this am  Heme: recommend decreasing ACT goal:180-200 now that av opening Hemoglobin 8.8  Minimal oozing around the ECMO cannulas.  ID: Receiving empiric antibiotics  Cannulae: Position is acceptable on exam and the available imaging.  Distal perfusion cannula is in place and patent.  Extremities are well-perfused.    Recommend decreasing NE as able without adjusting flow further. Was able to decrease flow to 3.6LPM without issue. Tomorrow we can try to reduce further perhaps to a goal of 3.0 as long as his pressor requirement do not increase beyond where they are currently. I suspect he is just requiring this degree of pressor for the inotropic support > than the vasoplegic support.      I have personally reviewed the ECMO flows, oxygenation and CO2 clearance, anticoagulation, and cannula position.  I have also personally assessed the patient's systemic response with hemodynamics, oxygenation, ventilation, and bleeding.       The patient requires continued ECMO support and management in the ICU.  I have discussed patient care and treatment plan with the primary team.      Sabino Trinidad MD  Critical Care Cardiology  [phone]    October 23, 2021

## 2021-10-24 LAB
BACTERIA BLD CULT: NO GROWTH
NSE SERPL IA-MCNC: 22.3 UG/L

## 2021-10-25 LAB
B2 GLYCOPROT1 IGG SERPL IA-ACNC: 1.5 U/ML
B2 GLYCOPROT1 IGM SERPL IA-ACNC: 2.8 U/ML
BACTERIA BLD CULT: NO GROWTH
BACTERIA SPT CULT: NORMAL
CARDIOLIPIN IGG SER IA-ACNC: <2 GPL-U/ML
CARDIOLIPIN IGG SER IA-ACNC: NEGATIVE
CARDIOLIPIN IGM SER IA-ACNC: 2 MPL-U/ML
CARDIOLIPIN IGM SER IA-ACNC: NEGATIVE
GRAM STAIN RESULT: NORMAL
GRAM STAIN RESULT: NORMAL
INTERPRETATION TEGPIA: NORMAL
PA AA BLD-ACNC: 19 %
PA ADP BLD-ACNC: 45 %

## 2021-10-26 LAB — BACTERIA BLD CULT: NO GROWTH

## 2021-10-27 LAB
BACTERIA BLD CULT: NO GROWTH
BACTERIA SPT CULT: ABNORMAL
BACTERIA SPT CULT: ABNORMAL
GRAM STAIN RESULT: ABNORMAL
GRAM STAIN RESULT: ABNORMAL
S100 CA BINDING PROTEIN B SER-MCNC: 251 NG/L
S100 CA BINDING PROTEIN B SER-MCNC: 299 NG/L
S100 CA BINDING PROTEIN B SER-MCNC: 307 NG/L

## 2021-10-28 LAB — BACTERIA BLD CULT: NO GROWTH

## 2021-11-10 LAB
7AMINOCLONAZEPAM SERPL-MCNC: NEGATIVE NG/ML
ALPRAZ SERPL-MCNC: NEGATIVE NG/ML
AMPHET BLD CFM-MCNC: NEGATIVE NG/ML
APAP BLD-MCNC: NEGATIVE UG/ML
BARBITURATES SPEC-MCNC: NEGATIVE UG/ML
BENZODIAZ SPEC QL: POSITIVE
BENZODIAZ SPEC-MCNC: ABNORMAL NG/ML
BUPRENORPHINE SERPL-MCNC: NEGATIVE NG/ML
BZE BLD CFM-MCNC: NEGATIVE NG/ML
CARBOXYTHC BLD-MCNC: NEGATIVE NG/ML
CARISOPRODOL IA: NEGATIVE UG/ML
CHLORDIAZEP SERPL-MCNC: NEGATIVE NG/ML
CLONAZEPAM SERPL-MCNC: NEGATIVE NG/ML
DECLARED MEDICATIONS: ABNORMAL
DESALKYLFLURAZ SERPL CFM-MCNC: NEGATIVE NG/ML
DIAZEPAM SERPL-MCNC: NEGATIVE NG/ML
DRUGS FLD: POSITIVE
ETHANOL BLD-MCNC: NEGATIVE GM/DL
FENTANYL BLD CFM-MCNC: 2.3 NG/ML
FENTANYL IA: ABNORMAL NG/ML
FENTANYL SPEC QL: POSITIVE
FLURAZEPAM SPEC-MCNC: NEGATIVE NG/ML
GABAPENTIN IA: NEGATIVE UG/ML
LORAZEPAM SERPL-MCNC: NEGATIVE NG/ML
MEPERIDINE SERPLBLD-MCNC: NEGATIVE NG/ML
METHADONE SAL CFM-MCNC: NEGATIVE NG/ML
MIDAZOLAM SERPL-MCNC: 77.1 NG/ML
NORCHLORDIAZEP SERPL-MCNC: NEGATIVE NG/ML
NORDIAZEPAM SPEC-MCNC: NEGATIVE NG/ML
NORFENTANYL BLD CFM-MCNC: 0.3 NG/ML
OPIATES SPEC-MCNC: NEGATIVE NG/ML
OXAZEPAM SERPL CFM-MCNC: NEGATIVE NG/ML
OXYCODONE SERPLBLD SCN-MCNC: NEGATIVE NG/ML
PCP SPEC-MCNC: NEGATIVE NG/ML
PROPOXYPH SPEC-MCNC: NEGATIVE NG/ML
TEMAZEPAM SERPL-MCNC: NEGATIVE NG/ML
TRAMADOL BLD-MCNC: NEGATIVE NG/ML
TRIAZOLAM SPEC-MCNC: NEGATIVE NG/ML

## (undated) DEVICE — CATH ANGIO SUPERTORQUE PLUS JL4 6FRX100CM 533620

## (undated) DEVICE — 17FR 23CM ARTERIAL ECMO CANNULA WITH BIOLINE COATING

## (undated) DEVICE — VALVE HEMOSTASIS .096" COPILOT MECH 1003331

## (undated) DEVICE — WIRE GUIDE 0.035"X145CM AMPLATZ XSTIFF J THSCF-35-145-3-AES

## (undated) DEVICE — TUBING PRESSURE 30"

## (undated) DEVICE — SHTH INTRO 0.021IN ID 6FR DIA

## (undated) DEVICE — 8FR X 24CM SUPER ARROW-FLEX PERCUTANEOUS SHEATH INTRODUCER SET WITH INTEGRAL HEMOSTASIS VALVE/SIDE PORT AND RADIOPAQUE TIP MARKER BAND

## (undated) DEVICE — INTRO SHEATH MICRO PLATINUM TIP 4FRX40CM 7274

## (undated) DEVICE — KIT HAND CONTROL ACIST 014644 AR-P54

## (undated) DEVICE — CATH ANGIO 6FR 100CM MULPUR A1 SH 533640

## (undated) DEVICE — PACK HEART RIGHT CUSTOM SAN32RHF18

## (undated) DEVICE — GW VASC .035IN DIA 260CML 7CML 3 MM RADIUS J CURVE 502455

## (undated) DEVICE — Device

## (undated) DEVICE — CANNULA VENOUS 25FR LONG

## (undated) DEVICE — CATH ANGIO INFINITI PIGTAIL 6FRX110CM 6 SH 534650S

## (undated) DEVICE — INTRO SHEATH 6FRX25CM PINNACLE RSS606

## (undated) DEVICE — WIRE GUIDE 0.035"X150CM EMERALD J TIP 502521

## (undated) DEVICE — GUIDEWIRE OPTOWIRE 3 W/O GAUGE FACTOR CONNECTOR F1032

## (undated) DEVICE — MANIFOLD KIT ANGIO AUTOMATED 014613

## (undated) DEVICE — STATLOCK PSI DEVICE

## (undated) DEVICE — CATH ANGIO INFINITI 3DRC 6FRX100CM 534676T

## (undated) RX ORDER — FENTANYL CITRATE 50 UG/ML
INJECTION, SOLUTION INTRAMUSCULAR; INTRAVENOUS
Status: DISPENSED
Start: 2017-10-18

## (undated) RX ORDER — NITROGLYCERIN 5 MG/ML
VIAL (ML) INTRAVENOUS
Status: DISPENSED
Start: 2021-01-01

## (undated) RX ORDER — LIDOCAINE HYDROCHLORIDE 10 MG/ML
INJECTION, SOLUTION EPIDURAL; INFILTRATION; INTRACAUDAL; PERINEURAL
Status: DISPENSED
Start: 2017-10-18

## (undated) RX ORDER — HEPARIN SODIUM 1000 [USP'U]/ML
INJECTION, SOLUTION INTRAVENOUS; SUBCUTANEOUS
Status: DISPENSED
Start: 2017-10-18

## (undated) RX ORDER — FENTANYL CITRATE 50 UG/ML
INJECTION, SOLUTION INTRAMUSCULAR; INTRAVENOUS
Status: DISPENSED
Start: 2017-07-19